# Patient Record
Sex: FEMALE | Race: BLACK OR AFRICAN AMERICAN | NOT HISPANIC OR LATINO | Employment: UNEMPLOYED | URBAN - METROPOLITAN AREA
[De-identification: names, ages, dates, MRNs, and addresses within clinical notes are randomized per-mention and may not be internally consistent; named-entity substitution may affect disease eponyms.]

---

## 2017-03-16 ENCOUNTER — TRANSFERRED RECORDS (OUTPATIENT)
Dept: HEALTH INFORMATION MANAGEMENT | Facility: CLINIC | Age: 57
End: 2017-03-16

## 2017-04-03 ENCOUNTER — OFFICE VISIT (OUTPATIENT)
Dept: FAMILY MEDICINE | Facility: CLINIC | Age: 57
End: 2017-04-03
Payer: COMMERCIAL

## 2017-04-03 ENCOUNTER — HOSPITAL ENCOUNTER (OUTPATIENT)
Dept: MAMMOGRAPHY | Facility: CLINIC | Age: 57
Discharge: HOME | End: 2017-04-03
Attending: INTERNAL MEDICINE | Admitting: INTERNAL MEDICINE
Payer: COMMERCIAL

## 2017-04-03 VITALS
WEIGHT: 251 LBS | DIASTOLIC BLOOD PRESSURE: 86 MMHG | TEMPERATURE: 98.2 F | HEART RATE: 85 BPM | HEIGHT: 67 IN | BODY MASS INDEX: 39.39 KG/M2 | SYSTOLIC BLOOD PRESSURE: 132 MMHG | OXYGEN SATURATION: 95 %

## 2017-04-03 DIAGNOSIS — G62.9 NEUROPATHY: ICD-10-CM

## 2017-04-03 DIAGNOSIS — N92.6 IRREGULAR MENSTRUAL CYCLE: ICD-10-CM

## 2017-04-03 DIAGNOSIS — M54.9 BILATERAL BACK PAIN, UNSPECIFIED BACK LOCATION, UNSPECIFIED CHRONICITY: ICD-10-CM

## 2017-04-03 DIAGNOSIS — Z12.4 SCREENING FOR MALIGNANT NEOPLASM OF CERVIX: ICD-10-CM

## 2017-04-03 DIAGNOSIS — Z12.31 VISIT FOR SCREENING MAMMOGRAM: ICD-10-CM

## 2017-04-03 DIAGNOSIS — R23.4 SKIN THICKENING: ICD-10-CM

## 2017-04-03 DIAGNOSIS — E03.8 SUBCLINICAL HYPOTHYROIDISM: ICD-10-CM

## 2017-04-03 DIAGNOSIS — I10 ESSENTIAL HYPERTENSION: ICD-10-CM

## 2017-04-03 DIAGNOSIS — J30.2 SEASONAL ALLERGIC RHINITIS, UNSPECIFIED ALLERGIC RHINITIS TRIGGER: ICD-10-CM

## 2017-04-03 DIAGNOSIS — K59.01 SLOW TRANSIT CONSTIPATION: ICD-10-CM

## 2017-04-03 DIAGNOSIS — F10.10 ALCOHOL ABUSE: Chronic | ICD-10-CM

## 2017-04-03 DIAGNOSIS — Z00.00 ROUTINE GENERAL MEDICAL EXAMINATION AT A HEALTH CARE FACILITY: Primary | ICD-10-CM

## 2017-04-03 LAB
ALBUMIN SERPL-MCNC: 2.9 G/DL (ref 3.4–5)
ALP SERPL-CCNC: 163 U/L (ref 40–150)
ALT SERPL W P-5'-P-CCNC: 34 U/L (ref 0–50)
ANION GAP SERPL CALCULATED.3IONS-SCNC: 7 MMOL/L (ref 3–14)
AST SERPL W P-5'-P-CCNC: 54 U/L (ref 0–45)
BILIRUB SERPL-MCNC: 0.9 MG/DL (ref 0.2–1.3)
BUN SERPL-MCNC: 7 MG/DL (ref 7–30)
CALCIUM SERPL-MCNC: 9 MG/DL (ref 8.5–10.1)
CHLORIDE SERPL-SCNC: 103 MMOL/L (ref 94–109)
CHOLEST SERPL-MCNC: 190 MG/DL
CO2 SERPL-SCNC: 28 MMOL/L (ref 20–32)
CREAT SERPL-MCNC: 0.75 MG/DL (ref 0.52–1.04)
ERYTHROCYTE [DISTWIDTH] IN BLOOD BY AUTOMATED COUNT: 13.5 % (ref 10–15)
FSH SERPL-ACNC: 8.6 IU/L
GFR SERPL CREATININE-BSD FRML MDRD: 79 ML/MIN/1.7M2
GLUCOSE SERPL-MCNC: 106 MG/DL (ref 70–99)
HCT VFR BLD AUTO: 39 % (ref 35–47)
HDLC SERPL-MCNC: 92 MG/DL
HGB BLD-MCNC: 13.5 G/DL (ref 11.7–15.7)
LDLC SERPL CALC-MCNC: 84 MG/DL
LH SERPL-ACNC: 7.6 IU/L
MCH RBC QN AUTO: 34.8 PG (ref 26.5–33)
MCHC RBC AUTO-ENTMCNC: 34.6 G/DL (ref 31.5–36.5)
MCV RBC AUTO: 101 FL (ref 78–100)
NONHDLC SERPL-MCNC: 98 MG/DL
PLATELET # BLD AUTO: 151 10E9/L (ref 150–450)
POTASSIUM SERPL-SCNC: 4.1 MMOL/L (ref 3.4–5.3)
PROLACTIN SERPL-MCNC: 6 UG/L (ref 3–27)
PROT SERPL-MCNC: 7.4 G/DL (ref 6.8–8.8)
RBC # BLD AUTO: 3.88 10E12/L (ref 3.8–5.2)
SODIUM SERPL-SCNC: 138 MMOL/L (ref 133–144)
T4 FREE SERPL-MCNC: 1 NG/DL (ref 0.76–1.46)
TRIGL SERPL-MCNC: 70 MG/DL
TSH SERPL DL<=0.005 MIU/L-ACNC: 6.62 MU/L (ref 0.4–4)
WBC # BLD AUTO: 4.8 10E9/L (ref 4–11)

## 2017-04-03 PROCEDURE — 80061 LIPID PANEL: CPT | Performed by: INTERNAL MEDICINE

## 2017-04-03 PROCEDURE — 83002 ASSAY OF GONADOTROPIN (LH): CPT | Performed by: INTERNAL MEDICINE

## 2017-04-03 PROCEDURE — 85027 COMPLETE CBC AUTOMATED: CPT | Performed by: INTERNAL MEDICINE

## 2017-04-03 PROCEDURE — 99396 PREV VISIT EST AGE 40-64: CPT | Performed by: INTERNAL MEDICINE

## 2017-04-03 PROCEDURE — G0145 SCR C/V CYTO,THINLAYER,RESCR: HCPCS | Performed by: INTERNAL MEDICINE

## 2017-04-03 PROCEDURE — 83001 ASSAY OF GONADOTROPIN (FSH): CPT | Performed by: INTERNAL MEDICINE

## 2017-04-03 PROCEDURE — 87624 HPV HI-RISK TYP POOLED RSLT: CPT | Performed by: INTERNAL MEDICINE

## 2017-04-03 PROCEDURE — 84146 ASSAY OF PROLACTIN: CPT | Performed by: INTERNAL MEDICINE

## 2017-04-03 PROCEDURE — 84443 ASSAY THYROID STIM HORMONE: CPT | Performed by: INTERNAL MEDICINE

## 2017-04-03 PROCEDURE — 84439 ASSAY OF FREE THYROXINE: CPT | Performed by: INTERNAL MEDICINE

## 2017-04-03 PROCEDURE — 80053 COMPREHEN METABOLIC PANEL: CPT | Performed by: INTERNAL MEDICINE

## 2017-04-03 PROCEDURE — 36415 COLL VENOUS BLD VENIPUNCTURE: CPT | Performed by: INTERNAL MEDICINE

## 2017-04-03 PROCEDURE — G0202 SCR MAMMO BI INCL CAD: HCPCS

## 2017-04-03 RX ORDER — POLYETHYLENE GLYCOL 3350 17 G/17G
1 POWDER, FOR SOLUTION ORAL DAILY PRN
Qty: 510 G | Refills: 1 | Status: SHIPPED | OUTPATIENT
Start: 2017-04-03 | End: 2021-07-22

## 2017-04-03 RX ORDER — GABAPENTIN 300 MG/1
300 CAPSULE ORAL AT BEDTIME
Qty: 30 CAPSULE | Refills: 11 | Status: SHIPPED | OUTPATIENT
Start: 2017-04-03 | End: 2018-06-27

## 2017-04-03 RX ORDER — FEXOFENADINE HCL 180 MG/1
180 TABLET ORAL DAILY
Qty: 30 TABLET | Refills: 11 | Status: SHIPPED | OUTPATIENT
Start: 2017-04-03 | End: 2018-06-27

## 2017-04-03 RX ORDER — LISINOPRIL 10 MG/1
10 TABLET ORAL DAILY
Qty: 30 TABLET | Refills: 11 | Status: SHIPPED | OUTPATIENT
Start: 2017-04-03 | End: 2018-04-11

## 2017-04-03 RX ORDER — LANOLIN ALCOHOL/MO/W.PET/CERES
100 CREAM (GRAM) TOPICAL DAILY
Qty: 30 TABLET | Refills: 11 | Status: SHIPPED | OUTPATIENT
Start: 2017-04-03 | End: 2018-06-27

## 2017-04-03 NOTE — MR AVS SNAPSHOT
After Visit Summary   4/3/2017    Josiane Dasilva    MRN: [id number]           Patient Information     Date Of Birth          1960        Visit Information        Provider Department      4/3/2017 7:30 AM Carly Grace, DO [facility]        Today's Diagnoses     Routine general medical examination at a health care facility    -  1    Neuropathy (H)        Essential hypertension        Alcohol abuse        Irregular menstrual cycle        Seasonal allergic rhinitis, unspecified allergic rhinitis trigger        Visit for screening mammogram        Screening for malignant neoplasm of cervix        Skin thickening        Slow transit constipation          Care Instructions    Lab today.   Mammogram today upstairs in [address].    Begin taking Gabapentin daily 1-2 hours before bed for neuropathy and sleep aide.    Schedule an appointment with a gynecologist. Schedule in person at [address].    Schedule an appointment with a dermatologist - upstairs [facility] with Dr. Garcia.   Follow up with me in about 6 months or sooner as needed.     Preventive Health Recommendations  Female Ages 50 - 64    Yearly exam: See your health care provider every year in order to  o Review health changes.   o Discuss preventive care.    o Review your medicines if your doctor has prescribed any.      Get a Pap test every three years (unless you have an abnormal result and your provider advises testing more often).    If you get Pap tests with HPV test, you only need to test every 5 years, unless you have an abnormal result.     You do not need a Pap test if your uterus was removed (hysterectomy) and you have not had cancer.    You should be tested each year for STDs (sexually transmitted diseases) if you're at risk.     Have a mammogram every 1 to 2 years.    Have a colonoscopy at age 50, or have a yearly FIT test (stool test). These exams screen for colon cancer.      Have a cholesterol test every  5 years, or more often if advised.    Have a diabetes test (fasting glucose) every three years. If you are at risk for diabetes, you should have this test more often.     If you are at risk for osteoporosis (brittle bone disease), think about having a bone density scan (DEXA).    Shots: Get a flu shot each year. Get a tetanus shot every 10 years.    Nutrition:     Eat at least 5 servings of fruits and vegetables each day.    Eat whole-grain bread, whole-wheat pasta and brown rice instead of white grains and rice.    Talk to your provider about Calcium and Vitamin D.     Lifestyle    Exercise at least 150 minutes a week (30 minutes a day, 5 days a week). This will help you control your weight and prevent disease.    Limit alcohol to one drink per day.    No smoking.     Wear sunscreen to prevent skin cancer.     See your dentist every six months for an exam and cleaning.    See your eye doctor every 1 to 2 years.          Follow-ups after your visit        Additional Services     DERMATOLOGY REFERRAL       Your provider has referred you to: N:  Dermatology  [facility] [phone]   [email or web address]    Please be aware that coverage of these services is subject to the terms and limitations of your health insurance plan.  Call member services at your health plan with any benefit or coverage questions.      Please bring the following with you to your appointment:    (1) Any X-Rays, CTs or MRIs which have been performed.  Contact the facility where they were done to arrange for  prior to your scheduled appointment.    (2) List of current medications  (3) This referral request   (4) Any documents/labs given to you for this referral            OB/GYN REFERRAL       Your provider has referred you to:  G: [facility]  [facility] [phone]  [email or web address]    Please be aware that coverage of these services is subject to the terms and  "limitations of your health insurance plan.  Call member services at your health plan with any benefit or coverage questions.      Please bring the following with you to your appointment:    (1) Any X-Rays, CTs or MRIs which have been performed.  Contact the facility where they were done to arrange for  prior to your scheduled appointment.   (2) List of current medications   (3) This referral request   (4) Any documents/labs given to you for this referral                  Future tests that were ordered for you today     Open Future Orders        Priority Expected Expires Ordered    MA SCREENING DIGITAL BILAT - Future  (s+30) Routine  4/3/2018 4/3/2017            Who to contact     If you have questions or need follow up information about today's clinic visit or your schedule please contact [facility] directly at [phone].  Normal or non-critical lab and imaging results will be communicated to you by MyChart, letter or phone within 4 business days after the clinic has received the results. If you do not hear from us within 7 days, please contact the clinic through [facility] or phone. If you have a critical or abnormal lab result, we will notify you by phone as soon as possible.  Submit refill requests through [employer] or call your pharmacy and they will forward the refill request to us. Please allow 3 business days for your refill to be completed.          Additional Information About Your Visit        [employer] Information     [employer] lets you send messages to your doctor, view your test results, renew your prescriptions, schedule appointments and more. To sign up, go to [email or web address] . Click on \"Log in\" on the left side of the screen, which will take you to the Welcome page. Then click on \"Sign up Now\" on the right side of the page.     You will be asked to enter the access code listed below, as well as some personal information. Please follow the directions to create your username and " "password.     Your access code is: [email or web address]  Expires: 2017 12:50 PM     Your access code will  in 90 days. If you need help or a new code, please call your [place] clinic or [phone].        Care EveryWhere ID     This is your Care EveryWhere ID. This could be used by other organizations to access your [place] medical records  [id number]        Your Vitals Were     Pulse Temperature Height Last Period Pulse Oximetry Breastfeeding?    85 98.2  F (36.8  C) (Tympanic) 5' 7\" (1.702 m) 2014 95% No    BMI (Body Mass Index)                   39.31 kg/m2            Blood Pressure from Last 3 Encounters:   17 132/86   16 139/81   01/08/15 138/90    Weight from Last 3 Encounters:   17 251 lb (113.9 kg)   16 252 lb (114.3 kg)   01/08/15 223 lb (101.2 kg)              We Performed the Following     CBC with platelets     Comprehensive metabolic panel     DEPRESSION ACTION PLAN (DAP) Order [[id number]]     DERMATOLOGY REFERRAL     Follicle stimulating hormone     Lipid panel reflex to direct LDL     Lutropin     OB/GYN REFERRAL     Pap imaged thin layer screen with HPV - recommended age 30 - 65 years (select HPV order below)     Prolactin     TSH with free T4 reflex          Today's Medication Changes          These changes are accurate as of: 4/3/17  8:17 AM.  If you have any questions, ask your nurse or doctor.               Start taking these medicines.        Dose/Directions    fexofenadine 180 MG tablet   Commonly known as:  ALLEGRA   Used for:  Seasonal allergic rhinitis, unspecified allergic rhinitis trigger   Started by:  Carly Grace DO        Dose:  180 mg   Take 1 tablet (180 mg) by mouth daily   Quantity:  30 tablet   Refills:  11       polyethylene glycol powder   Commonly known as:  MIRALAX   Used for:  Slow transit constipation   Started by:  Carly Grace DO        Dose:  1 capful   Take 17 g (1 capful) by mouth daily as needed for constipation "   Quantity:  510 g   Refills:  1         These medicines have changed or have updated prescriptions.        Dose/Directions    lisinopril 10 MG tablet   Commonly known as:  PRINIVIL/ZESTRIL   This may have changed:  See the new instructions.   Used for:  Essential hypertension   Changed by:  Carly Grace DO        Dose:  10 mg   Take 1 tablet (10 mg) by mouth daily   Quantity:  30 tablet   Refills:  11         Stop taking these medicines if you haven't already. Please contact your care team if you have questions.     erythromycin with ethanol 2 % gel   Commonly known as:  EMGEL   Stopped by:  Carly Grace DO           eucerin cream   Stopped by:  Carly Grace DO           folic acid 1 MG tablet   Commonly known as:  FOLVITE   Stopped by:  Carly Grace DO           hydrOXYzine 25 MG tablet   Commonly known as:  ATARAX   Stopped by:  Carly Grace DO           IRON SUPPLEMENT PO   Stopped by:  Carly Grace DO           loratadine 10 MG tablet   Commonly known as:  CLARITIN   Stopped by:  Carly Grace DO           multivitamin, therapeutic with minerals Tabs tablet   Stopped by:  Carly Grace DO           NYQUIL PO   Stopped by:  Carly Grace DO           omeprazole 20 MG tablet   Stopped by:  Carly Grace DO           oxyCODONE 5 MG IR tablet   Commonly known as:  ROXICODONE   Stopped by:  Carly Grace DO           sennosides 8.6 MG tablet   Commonly known as:  SENOKOT   Stopped by:  Carly Grace DO           SINGULAIR PO   Stopped by:  Carly Grace DO           TRAZODONE HCL PO   Stopped by:  Carly Grace DO                Where to get your medicines      These medications were sent to [facility]/pharmacy #[id number] - [place], [place] - [address] [facility]  [address] [place] [address]     Phone:  [phone]     fexofenadine 180 MG tablet    gabapentin 300 MG capsule    lisinopril 10 MG tablet    polyethylene glycol powder     thiamine 100 MG tablet                Primary Care Provider Office Phone # Fax #    Carly Grace [phone]       [facility] [address] [facility] [address]  [facility] [address]        Thank you!     Thank you for choosing [facility]  for your care. Our goal is always to provide you with excellent care. Hearing back from our patients is one way we can continue to improve our services. Please take a few minutes to complete the written survey that you may receive in the mail after your visit with us. Thank you!             Your Updated Medication List - Protect others around you: Learn how to safely use, store and throw away your medicines at www.disposemymeds.org.          This list is accurate as of: 4/3/17  8:17 AM.  Always use your most recent med list.                   Brand Name Dispense Instructions for use    albuterol 108 (90 BASE) MCG/ACT Inhaler    PROAIR HFA/PROVENTIL HFA/VENTOLIN HFA    1 Inhaler    Inhale 2 puffs into the lungs every 4 hours as needed for shortness of breath / dyspnea or wheezing       fexofenadine 180 MG tablet    ALLEGRA    30 tablet    Take 1 tablet (180 mg) by mouth daily       gabapentin 300 MG capsule    NEURONTIN    30 capsule    Take 1 capsule (300 mg) by mouth At Bedtime       lisinopril 10 MG tablet    PRINIVIL/ZESTRIL    30 tablet    Take 1 tablet (10 mg) by mouth daily       polyethylene glycol powder    MIRALAX    510 g    Take 17 g (1 capful) by mouth daily as needed for constipation       thiamine 100 MG tablet     30 tablet    Take 1 tablet (100 mg) by mouth daily

## 2017-04-03 NOTE — PATIENT INSTRUCTIONS
Lab today.   Mammogram today upstairs in [address].    Begin taking Gabapentin daily 1-2 hours before bed for neuropathy and sleep aide.    Schedule an appointment with a gynecologist. Schedule in person at [address].    Schedule an appointment with a dermatologist - [facility] with Dr. Garcia.   Follow up with me in about 6 months or sooner as needed.     Preventive Health Recommendations  Female Ages 50 - 64    Yearly exam: See your health care provider every year in order to  o Review health changes.   o Discuss preventive care.    o Review your medicines if your doctor has prescribed any.      Get a Pap test every three years (unless you have an abnormal result and your provider advises testing more often).    If you get Pap tests with HPV test, you only need to test every 5 years, unless you have an abnormal result.     You do not need a Pap test if your uterus was removed (hysterectomy) and you have not had cancer.    You should be tested each year for STDs (sexually transmitted diseases) if you're at risk.     Have a mammogram every 1 to 2 years.    Have a colonoscopy at age 50, or have a yearly FIT test (stool test). These exams screen for colon cancer.      Have a cholesterol test every 5 years, or more often if advised.    Have a diabetes test (fasting glucose) every three years. If you are at risk for diabetes, you should have this test more often.     If you are at risk for osteoporosis (brittle bone disease), think about having a bone density scan (DEXA).    Shots: Get a flu shot each year. Get a tetanus shot every 10 years.    Nutrition:     Eat at least 5 servings of fruits and vegetables each day.    Eat whole-grain bread, whole-wheat pasta and brown rice instead of white grains and rice.    Talk to your provider about Calcium and Vitamin D.     Lifestyle    Exercise at least 150 minutes a week (30 minutes a day, 5 days a week). This will help you control your weight and prevent  disease.    Limit alcohol to one drink per day.    No smoking.     Wear sunscreen to prevent skin cancer.     See your dentist every six months for an exam and cleaning.    See your eye doctor every 1 to 2 years.

## 2017-04-03 NOTE — LETTER
[facility]  [address] [facility] [address]  [place] [address]  [phone]                                                                                               Date: 4/7/2017    Josiane Dasilva                                                                               [address]    [facility] [address]              Dear Josiane Joshua,  It was nice to see you in clinic recently.   The labs indicate that you likely have not completely gone all the way through menopause yet and that is contributing to your irregular bleeding.   I still think it is reasonable that you visit though with the OB/GYN whom I referred you to for a second opinion.  Your cholesterol is excellent.  Your fasting glucose is mildly elevated but not in a diabetic range.  Please try to work on reducing the portions of carbohydrates in your diet (such as: breads, rice, pasta, sugars) which will help to lower the blood sugar.  Please also continue to work on cutting down on beer intake. If you want to discuss an anti-anxiety medication, please schedule an appointment with me in clinic.  One thyroid test (TSH) is slightly abnormal so I'd like to recheck that in the clinic in about 3-6 months to follow the trend. If it continues to increase, I'd suggest a prescription thyroid medication.   Your pap smear is still in process so you will get a separate notification about that when it is completed.  Please continue with the plan of care as we discussed and let me know if you have any questions/concerns. Thanks  Enclosed is a copy of your results.      It was a pleasure to see you at your last appointment. If you have any questions, please feel free to call myself or my nurse at [phone].          Sincerely,    Carly Grace DO/ Phyllis RESENDIZ CMA  Results for orders placed or performed in visit on 04/03/17   Pap imaged thin layer screen with HPV - recommended age 30 - 65 years (select HPV order below)   Result Value Ref  Range    PAP NIL     Copath Report         Patient Name: ETIENNE BUENROSTRO  MR#: [id number]  Specimen #: [id number]  Collected: 4/3/2017  Received: 4/3/2017  Reported: 4/5/2017 08:39  Ordering Phy(s): MONICA GEORGE    For improved result formatting, select 'View Enhanced Report Format'  under Linked Documents section.    SPECIMEN/STAIN PROCESS:  Pap imaged thin layer prep screening (Surepath, FocalPoint with guided  screening)       Pap-Cyto x 1, HPV ordered x 1    SOURCE: Vaginal  ----------------------------------------------------------------   Pap imaged thin layer prep screening (Surepath, FocalPoint with guided  screening)  SPECIMEN ADEQUACY:  Satisfactory for evaluation.  -Transformation zone component absent.    CYTOLOGIC INTERPRETATION:    Negative for Intraepithelial Lesion or Malignancy    Electronically signed out by:  MIREYA Montana (ASCP)    Processed and screened at [facility],  [facility]    CLINICAL HISTORY:  LMP: 4/2/2017  Irregular Bleeding,    Papanicolaou Te [address] Limitations:  Cervical cytology is a screening test  with limited sensitivity; regular screening is critical for cancer  prevention; Pap tests are primarily effective for the  diagnosis/prevention of squamous cell carcinoma, not adenocarcinomas or  other cancers.    TESTING LAB LOCATION:  [address]  [address] [phone]    COLLECTION SITE:  Client[facility]  [facility]  [facility]     Lipid panel reflex to direct LDL   Result Value Ref Range    Cholesterol 190 <200 mg/dL    Triglycerides 70 <150 mg/dL    HDL Cholesterol 92 >49 mg/dL    LDL Cholesterol Calculated 84 <100 mg/dL    Non HDL Cholesterol 98 <130 mg/dL   Comprehensive metabolic panel   Result Value Ref Range    Sodium 138 133 - 144 mmol/L    Potassium 4.1 3.4 - 5.3 mmol/L    Chloride 103 94 - 109 mmol/L    Carbon Dioxide 28 20 - 32 mmol/L    Anion Gap 7 3  - 14 mmol/L    Glucose 106 (H) 70 - 99 mg/dL    Urea Nitrogen 7 7 - 30 mg/dL    Creatinine 0.75 0.52 - 1.04 mg/dL    GFR Estimate 79 >60 mL/min/1.7m2    GFR Estimate If Black >90   GFR Calc   >60 mL/min/1.7m2    Calcium 9.0 8.5 - 10.1 mg/dL    Bilirubin Total 0.9 0.2 - 1.3 mg/dL    Albumin 2.9 (L) 3.4 - 5.0 g/dL    Protein Total 7.4 6.8 - 8.8 g/dL    Alkaline Phosphatase 163 (H) 40 - 150 U/L    ALT 34 0 - 50 U/L    AST 54 (H) 0 - 45 U/L   CBC with platelets   Result Value Ref Range    WBC 4.8 4.0 - 11.0 10e9/L    RBC Count 3.88 3.8 - 5.2 10e12/L    Hemoglobin 13.5 11.7 - 15.7 g/dL    Hematocrit 39.0 35.0 - 47.0 %     (H) 78 - 100 fl    MCH 34.8 (H) 26.5 - 33.0 pg    MCHC 34.6 31.5 - 36.5 g/dL    RDW 13.5 10.0 - 15.0 %    Platelet Count 151 150 - 450 10e9/L   Prolactin   Result Value Ref Range    Prolactin 6 3 - 27 ug/L   Lutropin   Result Value Ref Range    Lutropin 7.6 IU/L   Follicle stimulating hormone   Result Value Ref Range    FSH 8.6 IU/L   TSH with free T4 reflex   Result Value Ref Range    TSH 6.62 (H) 0.40 - 4.00 mU/L   T4 free   Result Value Ref Range    T4 Free 1.00 0.76 - 1.46 ng/dL
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
## 2017-04-03 NOTE — NURSING NOTE
"Chief Complaint   Patient presents with     Physical       Initial /88  Pulse 85  Temp 98.2  F (36.8  C) (Tympanic)  Ht 5' 7\" (1.702 m)  Wt 251 lb (113.9 kg)  LMP 04/07/2014  SpO2 95%  Breastfeeding? No  BMI 39.31 kg/m2 Estimated body mass index is 39.31 kg/(m^2) as calculated from the following:    Height as of this encounter: 5' 7\" (1.702 m).    Weight as of this encounter: 251 lb (113.9 kg).  BP completed using cuff size: large    Health Maintenance Due   Topic Date Due     RICO QUESTIONNAIRE 1 YEAR  1960     URINE DRUG SCREEN Q1 YR  12/28/1974     ADVANCE DIRECTIVE PLANNING Q5 YRS (NO INBASKET)  12/28/1977     MAMMO SCREEN Q2 YR (SYSTEM ASSIGNED)  06/30/2013     PHQ-9 Q6 MONTHS (NO INBASKET)  07/08/2015     DEPRESSION ACTION PLAN Q1 YR (NO INBASKET)  01/08/2016     PAP SCREENING Q3 YR (SYSTEM ASSIGNED)  07/30/2016         Carmen Frausto MA 4/03/17        "

## 2017-04-03 NOTE — LETTER
April 13, 2017    Josiane Dasilva  [address]    [facility] [address]    Dear Josiane,  We are happy to inform you that your PAP smear result from 04/03/17 is normal.  We are now able to do a follow up test on PAP smears. The DNA test is for HPV (Human Papilloma Virus). Cervical cancer is closely linked with certain types of HPV. Your result showed no evidence of high risk HPV.  Therefore we recommend you return in 5 years for your next pap smear and HPV test.  You will still need to return to the clinic every year for an annual exam and other preventive tests.  Please contact the clinic at [phone] with any questions.  Sincerely,    Carly Grace DO/heath

## 2017-04-03 NOTE — LETTER
My Depression Action Plan  Name: Josiane Dasilva   Date of Birth 1960  Date: 4/3/2017    My doctor: Carly Grace   My clinic: [address] [address] [phone]          GREEN    ZONE   Good Control    What it looks like:     Things are going generally well. You have normal up s and down s. You may even feel depressed from time to time, but bad moods usually last less than a day.   What you need to do:  1. Continue to care for yourself (see self care plan)  2. Check your depression survival kit and update it as needed  3. Follow your physician s recommendations including any medication.  4. Do not stop taking medication unless you consult with your physician first.           YELLOW         ZONE Getting Worse    What it looks like:     Depression is starting to interfere with your life.     It may be hard to get out of bed; you may be starting to isolate yourself from others.    Symptoms of depression are starting to last most all day and this has happened for several days.     You may have suicidal thoughts but they are not constant.   What you need to do:     1. Call your care team, your response to treatment will improve if you keep your care team informed of your progress. Yellow periods are signs an adjustment may need to be made.     2. Continue your self-care, even if you have to fake it!    3. Talk to someone in your support network    4. Open up your depression survival kit           RED    ZONE Medical Alert - Get Help    What it looks like:     Depression is seriously interfering with your life.     You may experience these or other symptoms: You can t get out of bed most days, can t work or engage in other necessary activities, you have trouble taking care of basic hygiene, or basic responsibilities, thoughts of suicide or death that will not go away, self-injurious behavior.     What you need to do:  1. Call your care team and  request a same-day appointment. If they are not available (weekends or after hours) call your local crisis line, emergency room or 911.      Electronically signed by: Carly Grace, April 3, 2017    Depression Self Care Plan / Survival Kit    Self-Care for Depression  Here s the deal. Your body and mind are really not as separate as most people think.  What you do and think affects how you feel and how you feel influences what you do and think. This means if you do things that people who feel good do, it will help you feel better.  Sometimes this is all it takes.  There is also a place for medication and therapy depending on how severe your depression is, so be sure to consult with your medical provider and/ or Behavioral Health Consultant if your symptoms are worsening or not improving.     In order to better manage my stress, I will:    Exercise  Get some form of exercise, every day. This will help reduce pain and release endorphins, the  feel good  chemicals in your brain. This is almost as good as taking antidepressants!  This is not the same as joining a gym and then never going! (they count on that by the way ) It can be as simple as just going for a walk or doing some gardening, anything that will get you moving.      Hygiene   Maintain good hygiene (Get out of bed in the morning, Make your bed, Brush your teeth, Take a shower, and Get dressed like you were going to work, even if you are unemployed).  If your clothes don't fit try to get ones that do.    Diet  I will strive to eat foods that are good for me, drink plenty of water, and avoid excessive sugar, caffeine, alcohol, and other mood-altering substances.  Some foods that are helpful in depression are: complex carbohydrates, B vitamins, flaxseed, fish or fish oil, fresh fruits and vegetables.    Psychotherapy  I agree to participate in Individual Therapy (if recommended).    Medication  If prescribed medications, I agree to take them.  Missing doses  can result in serious side effects.  I understand that drinking alcohol, or other illicit drug use, may cause potential side effects.  I will not stop my medication abruptly without first discussing it with my provider.    Staying Connected With Others  I will stay in touch with my friends, family members, and my primary care provider/team.    Use your imagination  Be creative.  We all have a creative side; it doesn t matter if it s oil painting, sand castles, or mud pies! This will also kick up the endorphins.    Witness Beauty  (AKA stop and smell the roses) Take a look outside, even in mid-winter. Notice colors, textures. Watch the squirrels and birds.     Service to others  Be of service to others.  There is always someone else in need.  By helping others we can  get out of ourselves  and remember the really important things.  This also provides opportunities for practicing all the other parts of the program.    Humor  Laugh and be silly!  Adjust your TV habits for less news and crime-drama and more comedy.    Control your stress  Try breathing deep, massage therapy, biofeedback, and meditation. Find time to relax each day.     My support system    Clinic Contact:  Phone number:    Contact 1:  Phone number:    Contact 2:  Phone number:    Oriental orthodox/:  Phone number:    Therapist:  Phone number:    Local crisis center:    Phone number:    Other community support:  Phone number:

## 2017-04-05 ENCOUNTER — HOSPITAL ENCOUNTER (EMERGENCY)
Facility: CLINIC | Age: 57
Discharge: HOME | End: 2017-04-05
Attending: EMERGENCY MEDICINE | Admitting: EMERGENCY MEDICINE
Payer: COMMERCIAL

## 2017-04-05 ENCOUNTER — APPOINTMENT (OUTPATIENT)
Dept: ULTRASOUND IMAGING | Facility: CLINIC | Age: 57
End: 2017-04-05
Attending: EMERGENCY MEDICINE
Payer: COMMERCIAL

## 2017-04-05 ENCOUNTER — APPOINTMENT (OUTPATIENT)
Dept: GENERAL RADIOLOGY | Facility: CLINIC | Age: 57
End: 2017-04-05
Attending: EMERGENCY MEDICINE
Payer: COMMERCIAL

## 2017-04-05 VITALS
WEIGHT: 255 LBS | TEMPERATURE: 98.1 F | SYSTOLIC BLOOD PRESSURE: 148 MMHG | OXYGEN SATURATION: 98 % | DIASTOLIC BLOOD PRESSURE: 78 MMHG | HEART RATE: 81 BPM | BODY MASS INDEX: 39.94 KG/M2 | RESPIRATION RATE: 18 BRPM

## 2017-04-05 DIAGNOSIS — M79.602 PAIN OF LEFT UPPER EXTREMITY: ICD-10-CM

## 2017-04-05 LAB
COPATH REPORT: NORMAL
PAP: NORMAL

## 2017-04-05 PROCEDURE — 25000132 ZZH RX MED GY IP 250 OP 250 PS 637: Performed by: EMERGENCY MEDICINE

## 2017-04-05 PROCEDURE — 93971 EXTREMITY STUDY: CPT | Mod: LT

## 2017-04-05 PROCEDURE — 73080 X-RAY EXAM OF ELBOW: CPT | Mod: LT

## 2017-04-05 PROCEDURE — 99284 EMERGENCY DEPT VISIT MOD MDM: CPT | Mod: 25

## 2017-04-05 PROCEDURE — 99284 EMERGENCY DEPT VISIT MOD MDM: CPT

## 2017-04-05 RX ORDER — CYCLOBENZAPRINE HCL 10 MG
10 TABLET ORAL 3 TIMES DAILY PRN
Qty: 20 TABLET | Refills: 0 | Status: SHIPPED | OUTPATIENT
Start: 2017-04-05 | End: 2017-04-11

## 2017-04-05 RX ORDER — OXYCODONE HYDROCHLORIDE 5 MG/1
5 TABLET ORAL ONCE
Status: COMPLETED | OUTPATIENT
Start: 2017-04-05 | End: 2017-04-05

## 2017-04-05 RX ADMIN — OXYCODONE HYDROCHLORIDE 5 MG: 5 TABLET ORAL at 22:13

## 2017-04-05 ASSESSMENT — ENCOUNTER SYMPTOMS: NECK PAIN: 0

## 2017-04-05 NOTE — ED AVS SNAPSHOT
Emergency Department    [address] [place] [id number]    Phone:  [phone]    Fax:  [phone]                                       Josiane Dasilva   MRN: [id number]    Department:   Emergency Department   Date of Visit:  4/5/2017           After Visit Summary Signature Page     I have received my discharge instructions, and my questions have been answered. I have discussed any challenges I see with this plan with the nurse or doctor.    ..........................................................................................................................................  Patient/Patient Representative Signature      ..........................................................................................................................................  Patient Representative Print Name and Relationship to Patient    ..................................................               ................................................  Date                                            Time    ..........................................................................................................................................  Reviewed by Signature/Title    ...................................................              ..............................................  Date                                                            Time

## 2017-04-05 NOTE — ED AVS SNAPSHOT
Emergency Department    [address] [facility] [address]    Phone:  [phone]    Fax:  [phone]                                       Josiane Dasilva   MRN: [id number]    Department:   Emergency Department   Date of Visit:  4/5/2017           Patient Information     Date Of Birth          1960        Your diagnoses for this visit were:     Pain of left upper extremity        You were seen by Ean Pizarro MD.      Follow-up Information     Follow up with Carly Grace DO In 1 week.    Specialty:  Internal Medicine    Contact information:    [facility]  [address]  [facility] [id number] [phone]          Follow up with  Emergency Department.    Specialty:  EMERGENCY MEDICINE    Why:  As needed, If symptoms worsen    Contact information:    [address] [id number] [phone]        Discharge Instructions         Myalgias  Myalgias are another word for muscle aches and soreness. This is a symptom, not a disease. Myalgias can have many causes. A cold, the flu, or an acute infection can cause them. So can any illness with a high fever. They may happen after exertion (such as heavy exercise) or trauma (such as an accident or fall). Some medicines (such as statins and certain antidepressants) can cause myalgias. They can also be a symptom of chronic or ongoing medical problems (such as lupus, chronic fatigue, or hypothyroidism). With these illnesses, other serious symptoms often occur in addition to muscle pain and soreness.    Myalgias most often go away on their own. If they don't go away, come back, or are severe, testing may be needed to help find the cause.  Home care    Rest until you feel better.    Follow instructions that you were given for how to care for yourself. This may depend on the cause of your myalgias.     If myalgia is thought to be due to a medicine, be sure to talk to the doctor that prescribed  the medicine about the best course of action.    To control pain, take prescription or over-the-counter medicines as directed. Unless told not to, you can try acetaminophen or ibuprophen.  Follow-up care  Follow up with your healthcare provider or as advised by our staff. If your symptoms do not go away in a few days or if they come back, follow up with your healthcare provider for an exam and testing.  When to see medical advice  Call your healthcare provider for any of the following:    Fever of 10.4 F (38 C) or higher, or as directed by your healthcare provider    Pain that gets worse and not better, or that goes away and comes back    New joint pains    New rash    Severe headache, neck pain, drowsiness, or confusion    [id number] [employer]. [address] [place] [address]. All rights reserved. This information is not intended as a substitute for professional medical care. Always follow your healthcare professional's instructions.    Opioid Medication Information    You have been given a prescription for an opioid (narcotic) pain medicine and/or have received a pain medicine while here in the Emergency Department. These medicines can make you drowsy or impaired. You must not drive, operate dangerous equipment, or engage in any other dangerous activities while taking these medications. If you drive while taking these medications, you could be arrested for DUI, or driving under the influence. Do not drink any alcohol while you are taking these medications.   Opioid pain medications can cause addiction. If you have a history of chemical dependency of any type, you are at a higher risk of becoming addicted to pain medications.  Only take these prescribed medications to treat your pain when all other options have been tried. Take it for as short a time and as few doses as possible. Store your pain pills in a secure place, as they are frequently stolen and provide a dangerous opportunity for  children or visitors in your house to start abusing these powerful medications. We will not replace any lost or stolen medicine.  As soon as your pain is better, you should flush all your remaining medication.   Many prescription pain medications contain Tylenol  (acetaminophen), including Vicodin , Tylenol #3 , Norco , Lortab , and Percocet .  You should not take any extra pills of Tylenol  if you are using these prescription medications or you can get very sick.  Do not ever take more than 4000 mg of acetaminophen in any 24 hour period.  All opioids tend to cause constipation. Drink plenty of water and eat foods that have a lot of fiber, such as fruits, vegetables, prune juice, apple juice and high fiber cereal.  Take a laxative if you don t move your bowels at least every other day. Miralax , Milk of Magnesia, Colace , or Senna  can be used to keep you regular.            24 Hour Appointment Hotline       To make an appointment at any [facility], call [phone] ([phone]). If you don't have a family doctor or clinic, we will help you find one. [employer] clinics are conveniently located to serve the needs of you and your family.             Review of your medicines      START taking        Dose / Directions Last dose taken    cyclobenzaprine 10 MG tablet   Commonly known as:  FLEXERIL   Dose:  10 mg   Quantity:  20 tablet        Take 1 tablet (10 mg) by mouth 3 times daily as needed for muscle spasms   Refills:  0          Our records show that you are taking the medicines listed below. If these are incorrect, please call your family doctor or clinic.        Dose / Directions Last dose taken    albuterol 108 (90 BASE) MCG/ACT Inhaler   Commonly known as:  PROAIR HFA/PROVENTIL HFA/VENTOLIN HFA   Dose:  2 puff   Quantity:  1 Inhaler        Inhale 2 puffs into the lungs every 4 hours as needed for shortness of breath / dyspnea or wheezing   Refills:  0        fexofenadine 180 MG tablet   Commonly known  as:  ALLEGRA   Dose:  180 mg   Quantity:  30 tablet        Take 1 tablet (180 mg) by mouth daily   Refills:  11        gabapentin 300 MG capsule   Commonly known as:  NEURONTIN   Dose:  300 mg   Quantity:  30 capsule        Take 1 capsule (300 mg) by mouth At Bedtime   Refills:  11        lisinopril 10 MG tablet   Commonly known as:  PRINIVIL/ZESTRIL   Dose:  10 mg   Quantity:  30 tablet        Take 1 tablet (10 mg) by mouth daily   Refills:  11        polyethylene glycol powder   Commonly known as:  MIRALAX   Dose:  1 capful   Quantity:  510 g        Take 17 g (1 capful) by mouth daily as needed for constipation   Refills:  1        thiamine 100 MG tablet   Dose:  100 mg   Quantity:  30 tablet        Take 1 tablet (100 mg) by mouth daily   Refills:  11                Prescriptions were sent or printed at these locations (1 Prescription)                   Other Prescriptions                Printed at Department/Unit printer (1 of 1)         cyclobenzaprine (FLEXERIL) 10 MG tablet                Procedures and tests performed during your visit     Arm, left, venous US    XR Elbow Left G/E 3 Views      Orders Needing Specimen Collection     None      Pending Results     Date and Time Order Name Status Description    4/3/2017 0859 MA SCREENING DIGITAL BILAT - Future  (s+30) In process             Pending Culture Results     No orders found from 4/3/2017 to 4/6/2017.            Test Results From Your Hospital Stay              4/5/2017 11:37 PM      Narrative     US UPPER EXTREMITY VENOUS DUPLEX LEFT   4/5/2017 11:33 PM     HISTORY: Left arm pain.    COMPARISON: None.    FINDINGS: Gray-scale, color and Doppler spectral analysis ultrasound  was performed of the left arm. Compression and augmentation imaging  was performed.    There is no evidence for deep venous thrombosis. The veins compress  and augment normally.        Impression     IMPRESSION: No DVT.     AMINAH TORO MD         4/5/2017 10:29 PM      Narrative      LEFT ELBOW THREE VIEWS  4/5/2017 10:21 PM     HISTORY: pain    COMPARISON: None.        Impression     IMPRESSION: Normal.    AKASH ZENDEJAS MD                Clinical Quality Measure: Blood Pressure Screening     Your blood pressure was checked while you were in the emergency department today. The last reading we obtained was  BP: (!) 161/105 . Please read the guidelines below about what these numbers mean and what you should do about them.  If your systolic blood pressure (the top number) is less than 120 and your diastolic blood pressure (the bottom number) is less than 80, then your blood pressure is normal. There is nothing more that you need to do about it.  If your systolic blood pressure (the top number) is 120-139 or your diastolic blood pressure (the bottom number) is 80-89, your blood pressure may be higher than it should be. You should have your blood pressure rechecked within a year by a primary care provider.  If your systolic blood pressure (the top number) is 140 or greater or your diastolic blood pressure (the bottom number) is 90 or greater, you may have high blood pressure. High blood pressure is treatable, but if left untreated over time it can put you at risk for heart attack, stroke, or kidney failure. You should have your blood pressure rechecked by a primary care provider within the next 4 weeks.  If your provider in the emergency department today gave you specific instructions to follow-up with your doctor or provider even sooner than that, you should follow that instruction and not wait for up to 4 weeks for your follow-up visit.        Thank you for choosing [facility]       Thank you for choosing [facility] for your care. Our goal is always to provide you with excellent care. Hearing back from our patients is one way we can continue to improve our services. Please take a few minutes to complete the written survey that you may receive in the mail after you visit with us. Thank you!       "  MyChart Information     [employer] lets you send messages to your doctor, view your test results, renew your prescriptions, schedule appointments and more. To sign up, go to [email or web address] . Click on \"Log in\" on the left side of the screen, which will take you to the Welcome page. Then click on \"Sign up Now\" on the right side of the page.     You will be asked to enter the access code listed below, as well as some personal information. Please follow the directions to create your username and password.     Your access code is: [email or web address]  Expires: 2017 12:50 PM     Your access code will  in 90 days. If you need help or a new code, please call your [facility] clinic or [phone].        Care EveryWhere ID     This is your Care EveryWhere ID. This could be used by other organizations to access your [facility] medical records  [id number]        After Visit Summary       This is your record. Keep this with you and show to your community pharmacist(s) and doctor(s) at your next visit.                  "

## 2017-04-06 ASSESSMENT — ENCOUNTER SYMPTOMS
JOINT SWELLING: 0
NUMBNESS: 0

## 2017-04-06 NOTE — ED NOTES
"Pt states she did exercises at physical therapy on Tuesday using her arms including lifting them over her head with a weight. Didn't feel anything Tuesday night as \"I drink beer\" but this morning when she awoke she felt like she couldn't  her left arm from the pain. States the worst of the pain is in her left elbow and into the forearm. Pain begins at shoulder though. CWMS intact to left hand. No numbness or tingling.  "

## 2017-04-06 NOTE — DISCHARGE INSTRUCTIONS
Myalgias  Myalgias are another word for muscle aches and soreness. This is a symptom, not a disease. Myalgias can have many causes. A cold, the flu, or an acute infection can cause them. So can any illness with a high fever. They may happen after exertion (such as heavy exercise) or trauma (such as an accident or fall). Some medicines (such as statins and certain antidepressants) can cause myalgias. They can also be a symptom of chronic or ongoing medical problems (such as lupus, chronic fatigue, or hypothyroidism). With these illnesses, other serious symptoms often occur in addition to muscle pain and soreness.    Myalgias most often go away on their own. If they don't go away, come back, or are severe, testing may be needed to help find the cause.  Home care    Rest until you feel better.    Follow instructions that you were given for how to care for yourself. This may depend on the cause of your myalgias.     If myalgia is thought to be due to a medicine, be sure to talk to the doctor that prescribed the medicine about the best course of action.    To control pain, take prescription or over-the-counter medicines as directed. Unless told not to, you can try acetaminophen or ibuprophen.  Follow-up care  Follow up with your healthcare provider or as advised by our staff. If your symptoms do not go away in a few days or if they come back, follow up with your healthcare provider for an exam and testing.  When to see medical advice  Call your healthcare provider for any of the following:    Fever of 10.4 F (38 C) or higher, or as directed by your healthcare provider    Pain that gets worse and not better, or that goes away and comes back    New joint pains    New rash    Severe headache, neck pain, drowsiness, or confusion    [id number] [employer]. [address] [place] [address], [place] [address]. All rights reserved. This information is not intended as a substitute for professional medical care. Always follow  your healthcare professional's instructions.    Opioid Medication Information    You have been given a prescription for an opioid (narcotic) pain medicine and/or have received a pain medicine while here in the Emergency Department. These medicines can make you drowsy or impaired. You must not drive, operate dangerous equipment, or engage in any other dangerous activities while taking these medications. If you drive while taking these medications, you could be arrested for DUI, or driving under the influence. Do not drink any alcohol while you are taking these medications.   Opioid pain medications can cause addiction. If you have a history of chemical dependency of any type, you are at a higher risk of becoming addicted to pain medications.  Only take these prescribed medications to treat your pain when all other options have been tried. Take it for as short a time and as few doses as possible. Store your pain pills in a secure place, as they are frequently stolen and provide a dangerous opportunity for children or visitors in your house to start abusing these powerful medications. We will not replace any lost or stolen medicine.  As soon as your pain is better, you should flush all your remaining medication.   Many prescription pain medications contain Tylenol  (acetaminophen), including Vicodin , Tylenol #3 , Norco , Lortab , and Percocet .  You should not take any extra pills of Tylenol  if you are using these prescription medications or you can get very sick.  Do not ever take more than 4000 mg of acetaminophen in any 24 hour period.  All opioids tend to cause constipation. Drink plenty of water and eat foods that have a lot of fiber, such as fruits, vegetables, prune juice, apple juice and high fiber cereal.  Take a laxative if you don t move your bowels at least every other day. Miralax , Milk of Magnesia, Colace , or Senna  can be used to keep you regular.

## 2017-04-06 NOTE — ED PROVIDER NOTES
"  History     Chief Complaint:  Arm Pain    HPI   Josiane Dasilva is a 56 year old female who presents with atraumatic left shoulder, arm, elbow, and forearm pain.  The patient reports on Tuesday, 4/4, she went to her PT, who she sees due to long standing history of orthopedic complaints, and was doing left arm extension and flexion exercises.  She states she woke up this morning with stiffness and pain to the left shoulder, posterior arm, elbow, and forearm which was, \"So bad it's been killing me.\" She reports limited range of motion secondary to pain.  She denies any numbness, tingling, or weakness to the left upper extremity and denies any known injury, trauma, or falls prior to the onset of her pain.  She denies neck pain, swelling to the left arm, history of gout to the left arm, personal or family history of blood clots.     Allergies:  Asa [Aspirin] - Stomach irritation (hx of PUD)  Nsaids - Stomach irritation (hx of PUD)     Medications:    Neurontin  Lisinopril  Allegra  Thiamine  Miralax  Albuterol      Past Medical History:    Alcohol Abuse  Anxiety  Depression  Fatty Liver  HTN  Obesity  Pancytopenia  PUD  Tubular adenoma of colon  Vitamin D Deficiency   Gout    Past Surgical History:    Appendectomy  Colonoscopy  Upper endoscopy  Left ankle surgery  Billroth I as teenager secondary to ulcer     Family History:  History reviewed.  No significant family history.     Social History:  The patient is a former smoker. Quit 2005  The patient reports occasional alcohol use.   The patient presents alone.     Review of Systems   Musculoskeletal: Negative for joint swelling and neck pain.        Positive for left shoulder, posterior arm, elbow pain.  Positive for limited ROM secondary to pain.  Negative for numbness, tingling, or weakness to the LUE.     Neurological: Negative for numbness.   All other systems reviewed and are negative.      Physical Exam   Patient Vitals:   BP Temp Temp src Pulse Heart Rate Resp " SpO2 Weight   04/05/17 2358 148/78 - - - 79 18 98 % -   04/05/17 2308 - - - 81 - 16 96 % -   04/05/17 2307 (!) 161/105 - - - - - - -   04/05/17 2155 (!) 171/119 - - 93 - - 97 % -   04/05/17 2051 (!) 184/92 98.1  F (36.7  C) Oral 91 - 15 96 % 115.7 kg (255 lb)       Physical Exam  General: Alert, interactive in mild distress  Head:  Scalp is atraumatic  Eyes:  The pupils are equal, round, and reactive to light    EOM's intact    No scleral icterus  ENT:      Nose:  The external nose is normal  Ears:  External ears are normal  Mouth/Throat: The oropharynx is normal    Mucus membranes are moist       Neck:  Normal range of motion.      There is no rigidity.    Trachea is in the midline         CV:  Regular rate and rhythm    No murmur   Resp:  Breath sounds are clear bilaterally    Non-labored, no retractions or accessory muscle use      GI:  Abdomen is soft, no distension, no tenderness.       MS:  Normal strength in all 4 extremities, No midline cervical, thoracic, or lumbar tenderness. Tenderness over the left lateral elbow.  Limited ROM of   shoulder and elbow secondary to pain. No edema.  No overlying warmth.  2+ radial pulse.     Skin:  Warm and dry, No rash or lesions noted.  Neuro:  Strength 5/5 x4.  Sensation intact  In all 4 extremities.        Psych:  Awake. Alert.  Normal affect.      Appropriate interactions.      Emergency Department Course     Imaging:  Radiographic findings were communicated with the patient who voiced understanding of the findings.    Left Elbow XR per radiology:   IMPRESSION: Normal.    Left arm, venous ultrasound per radiology:   IMPRESSION: No DVT.     Interventions:  2213 Roxicodone, 5 mg, PO    ED Course:  Nursing notes and past medical history reviewed.     I performed a physical examination of the patient as documented above.    I explained the plan with the patient who consents to this.     The patient underwent the workup as described above.     I personally reviewed the  imaging results with the Patient and answered all related questions prior to discharge.     Findings and plan explained to the Patient. Patient discharged home with instructions regarding supportive care, medications, and reasons to return. The importance of close follow-up was reviewed.     Impression & Plan      Medical Decision Making:  Josiane Dasilva is a 56 year old female who presents to the emergency department today with left arm and elbow pain.  The patient was seen and evaluated.  Radiographs and US were obtained, returning as unremarkable.  I believe she has likely suffered an overuse injury from her physical therapy session the other day.  There are no signs of an acute vascular compromise, significant radiculopathy, or more concerning illness.  Her muscle bellies are supple.  There are no signs of compartment syndrome.  I do not believe she needs lab workup or any further evaluation.  There are no signs of a septic arthritis, gout, or more worrisome etiology.  The patient was discharged with flexeril for muscle spasm and a sling.  I recommended she follow up with her primary care provider.  We are somewhat limited in treatment in that she is allergic to NSAIDs.      Diagnosis:    ICD-10-CM   1. Pain of left upper extremity M79.602       Disposition:   Discharge to home with primary care follow up as noted above.     Discharge Medications:         cyclobenzaprine (FLEXERIL) 10 MG tablet Take 1 tablet (10 mg) by mouth 3 times daily as needed for muscle spasms, Disp-20 tablet, R-0, Local Print               Julius ANDERSON am serving as a scribe on 4/5/2017 at 9:56 PM to personally document services performed by Ean Pizarro MD, based on my observations and the provider's statements to me.       Ean Pizarro MD  04/06/17 0338

## 2017-04-07 NOTE — PROGRESS NOTES
Please send a copy of their test results and a letter to the patient as follows. Thanks!  Josiane,  It was nice to see you in clinic recently.   The labs indicate that you likely have not completely gone all the way through menopause yet and that is contributing to your irregular bleeding.   I still think it is reasonable that you visit though with the OB/GYN whom I referred you to for a second opinion.  Your cholesterol is excellent.  Your fasting glucose is mildly elevated but not in a diabetic range.  Please try to work on reducing the portions of carbohydrates in your diet (such as: breads, rice, pasta, sugars) which will help to lower the blood sugar.  Please also continue to work on cutting down on beer intake. If you want to discuss an anti-anxiety medication, please schedule an appointment with me in clinic.  One thyroid test (TSH) is slightly abnormal so I'd like to recheck that in the clinic in about 3-6 months to follow the trend. If it continues to increase, I'd suggest a prescription thyroid medication.   Your pap smear is still in process so you will get a separate notification about that when it is completed.  Please continue with the plan of care as we discussed and let me know if you have any questions/concerns. Thanks!

## 2017-04-11 LAB
FINAL DIAGNOSIS: NORMAL
HPV HR 12 DNA CVX QL NAA+PROBE: NEGATIVE
HPV16 DNA SPEC QL NAA+PROBE: NEGATIVE
HPV18 DNA SPEC QL NAA+PROBE: NEGATIVE
SPECIMEN DESCRIPTION: NORMAL

## 2017-04-16 PROBLEM — K59.01 SLOW TRANSIT CONSTIPATION: Status: ACTIVE | Noted: 2017-04-16

## 2017-04-16 PROBLEM — E03.8 SUBCLINICAL HYPOTHYROIDISM: Status: ACTIVE | Noted: 2017-04-16

## 2017-04-16 PROBLEM — G62.9 NEUROPATHY: Status: ACTIVE | Noted: 2017-04-16

## 2017-04-16 PROBLEM — J30.2 SEASONAL ALLERGIC RHINITIS, UNSPECIFIED ALLERGIC RHINITIS TRIGGER: Status: ACTIVE | Noted: 2017-04-16

## 2017-04-16 PROBLEM — M54.9 BILATERAL BACK PAIN, UNSPECIFIED BACK LOCATION, UNSPECIFIED CHRONICITY: Status: ACTIVE | Noted: 2017-04-16

## 2017-04-16 PROBLEM — N92.6 IRREGULAR MENSTRUAL CYCLE: Status: ACTIVE | Noted: 2017-04-16

## 2017-05-30 ENCOUNTER — HOSPITAL ENCOUNTER (OUTPATIENT)
Dept: MAMMOGRAPHY | Facility: CLINIC | Age: 57
Discharge: HOME | End: 2017-05-30
Attending: INTERNAL MEDICINE | Admitting: INTERNAL MEDICINE
Payer: COMMERCIAL

## 2017-05-30 ENCOUNTER — HOSPITAL ENCOUNTER (OUTPATIENT)
Dept: MAMMOGRAPHY | Facility: CLINIC | Age: 57
End: 2017-05-30
Attending: INTERNAL MEDICINE
Payer: COMMERCIAL

## 2017-05-30 DIAGNOSIS — R92.8 ABNORMAL MAMMOGRAM: ICD-10-CM

## 2017-05-30 PROCEDURE — 76642 ULTRASOUND BREAST LIMITED: CPT | Mod: LT

## 2017-05-30 PROCEDURE — G0279 TOMOSYNTHESIS, MAMMO: HCPCS

## 2017-10-22 ENCOUNTER — HEALTH MAINTENANCE LETTER (OUTPATIENT)
Age: 57
End: 2017-10-22

## 2017-11-14 ENCOUNTER — TELEPHONE (OUTPATIENT)
Dept: FAMILY MEDICINE | Facility: CLINIC | Age: 57
End: 2017-11-14

## 2017-11-14 NOTE — TELEPHONE ENCOUNTER
Reason for Call:  Other prescription    Detailed comments: A RX for a walker was faxed on 10/30/17 Shelli with [facility] is calling to follow up on the status of that     Phone Number Patient can be reached at: Other phone number:  [phone]    Best Time: anytime     Can we leave a detailed message on this number? YES    Call taken on 11/14/2017 at 1:02 PM by Kemi Perez

## 2017-11-16 NOTE — TELEPHONE ENCOUNTER
Yes, I have form. She has no showed for a couple visits. I will fill out form at her upcoming scheduled visit.

## 2018-04-11 ENCOUNTER — HOSPITAL ENCOUNTER (EMERGENCY)
Facility: CLINIC | Age: 58
Discharge: HOME | End: 2018-04-11
Attending: EMERGENCY MEDICINE | Admitting: EMERGENCY MEDICINE
Payer: COMMERCIAL

## 2018-04-11 VITALS
RESPIRATION RATE: 16 BRPM | WEIGHT: 255 LBS | BODY MASS INDEX: 38.65 KG/M2 | TEMPERATURE: 97.8 F | HEIGHT: 68 IN | HEART RATE: 110 BPM | SYSTOLIC BLOOD PRESSURE: 114 MMHG | DIASTOLIC BLOOD PRESSURE: 75 MMHG | OXYGEN SATURATION: 93 %

## 2018-04-11 DIAGNOSIS — M10.9 ACUTE GOUTY ARTHRITIS: ICD-10-CM

## 2018-04-11 DIAGNOSIS — I10 ESSENTIAL HYPERTENSION: ICD-10-CM

## 2018-04-11 PROCEDURE — 99283 EMERGENCY DEPT VISIT LOW MDM: CPT

## 2018-04-11 PROCEDURE — 25000132 ZZH RX MED GY IP 250 OP 250 PS 637: Performed by: EMERGENCY MEDICINE

## 2018-04-11 PROCEDURE — 25000125 ZZHC RX 250: Performed by: EMERGENCY MEDICINE

## 2018-04-11 RX ORDER — OXYCODONE AND ACETAMINOPHEN 5; 325 MG/1; MG/1
1 TABLET ORAL ONCE
Status: COMPLETED | OUTPATIENT
Start: 2018-04-11 | End: 2018-04-11

## 2018-04-11 RX ORDER — COLCHICINE 0.6 MG/1
0.6 TABLET ORAL ONCE
Status: COMPLETED | OUTPATIENT
Start: 2018-04-11 | End: 2018-04-11

## 2018-04-11 RX ORDER — OXYCODONE AND ACETAMINOPHEN 5; 325 MG/1; MG/1
1-2 TABLET ORAL EVERY 4 HOURS PRN
Qty: 6 TABLET | Refills: 0 | Status: SHIPPED | OUTPATIENT
Start: 2018-04-11 | End: 2018-06-27

## 2018-04-11 RX ORDER — PREDNISONE 20 MG/1
60 TABLET ORAL ONCE
Status: COMPLETED | OUTPATIENT
Start: 2018-04-11 | End: 2018-04-11

## 2018-04-11 RX ORDER — PREDNISONE 20 MG/1
TABLET ORAL
Qty: 10 TABLET | Refills: 0 | Status: SHIPPED | OUTPATIENT
Start: 2018-04-11 | End: 2018-06-27

## 2018-04-11 RX ORDER — COLCHICINE 0.6 MG/1
1.2 TABLET ORAL ONCE
Status: COMPLETED | OUTPATIENT
Start: 2018-04-11 | End: 2018-04-11

## 2018-04-11 RX ADMIN — OXYCODONE HYDROCHLORIDE AND ACETAMINOPHEN 1 TABLET: 5; 325 TABLET ORAL at 15:52

## 2018-04-11 RX ADMIN — COLCHICINE 0.6 MG: 0.6 TABLET, FILM COATED ORAL at 16:52

## 2018-04-11 RX ADMIN — PREDNISONE 60 MG: 20 TABLET ORAL at 15:52

## 2018-04-11 RX ADMIN — COLCHICINE 1.2 MG: 0.6 TABLET, FILM COATED ORAL at 15:53

## 2018-04-11 ASSESSMENT — ENCOUNTER SYMPTOMS
ARTHRALGIAS: 1
JOINT SWELLING: 1

## 2018-04-11 NOTE — ED NOTES
Bed: ED15  Expected date:   Expected time:   Means of arrival:   [facility] 57[facility]

## 2018-04-11 NOTE — ED AVS SNAPSHOT
Emergency Department    [address] [place] [address]    Phone:  [phone]    Fax:  [phone]                                       Josiane Dasilva   MRN: [id number]    Department:   Emergency Department   Date of Visit:  4/11/2018           After Visit Summary Signature Page     I have received my discharge instructions, and my questions have been answered. I have discussed any challenges I see with this plan with the nurse or doctor.    ..........................................................................................................................................  Patient/Patient Representative Signature      ..........................................................................................................................................  Patient Representative Print Name and Relationship to Patient    ..................................................               ................................................  Date                                            Time    ..........................................................................................................................................  Reviewed by Signature/Title    ...................................................              ..............................................  Date                                                            Time

## 2018-04-11 NOTE — TELEPHONE ENCOUNTER
"Last Written Prescription Date:  4/03/17  Last Fill Quantity: 30 tablet,  # refills: 11   Last office visit: 4/3/2017 with prescribing provider:  Sanjay   Future Office Visit:      Requested Prescriptions   Pending Prescriptions Disp Refills     lisinopril (PRINIVIL/ZESTRIL) 10 MG tablet [Pharmacy Med Name: LISINOPRIL 10 MG TABLET] 30 tablet 1     Sig: TAKE 1 TABLET (10 MG) BY MOUTH DAILY    ACE Inhibitors (Including Combos) Protocol Failed    4/11/2018  6:16 PM       Failed - Recent (12 mo) or future (30 days) visit within the authorizing provider's specialty    Patient had office visit in the last 12 months or has a visit in the next 30 days with authorizing provider or within the authorizing provider's specialty.  See \"Patient Info\" tab in inbasket, or \"Choose Columns\" in Meds & Orders section of the refill encounter.           Failed - Normal serum creatinine on file in past 12 months    Recent Labs   Lab Test  04/03/17   0832   CR  0.75            Failed - Normal serum potassium on file in past 12 months    Recent Labs   Lab Test  04/03/17   0832   POTASSIUM  4.1            Passed - Blood pressure under 140/90 in past 12 months    BP Readings from Last 3 Encounters:   04/11/18 114/75   04/05/17 148/78   04/03/17 132/86                Passed - Patient is age 18 or older       Passed - No active pregnancy on record       Passed - No positive pregnancy test in past 12 months          "

## 2018-04-11 NOTE — ED PROVIDER NOTES
"  History     Chief Complaint:  Arthritis    HPI   Josiane Dasilva is a 57 year old female with history of gout, hypertension, stomach ulcers, among others, who presents to the emergency department today for evaluation of arthritis that flared up 5 days ago and has progressively been getting worse. The patient reports that she usually only gets pain in her feet due to her gout, but has been evaluated for hand/wrist arthritis in the past. With her current arthritis flare, the patient notes swelling of her right hand/fingers and wrist which is a new symptom for her. Along with the pain in her wrist, she notes decreased range of motion secondary to pain. Nothing seems to make her pain better or worse, but she states that in the past she has felt better after taking \"pills\" in the ED. The patient also notes right ankle pain, but denies any swelling. She notes drinking alcohol and eating meat of late and she knows these bring on her gout. The patient denies any other symptoms; no vaginal discharge. No history of diabetes.     Allergies:  Asa [Aspirin] - ulcers  Nsaids - ulcers    Medications:    Gabapentin   Lisinopril  albuterol inhaler     Past Medical History:    Alcohol abuse   Anxiety   Depressive disorder   Obesity   Fatty liver    Neuropathy   Hypertension   Stomach ulcer  Tubular adenoma of colon    Past Surgical History:    Appendectomy   EGD combined   Left ankle surgery   Other - Billroth I    Family History:    History reviewed. No pertinent family history.      Social History:  The patient was alone in the ED.  Smoking Status: former  Alcohol Use: Yes, 2-3 per day   Marital Status:  Single [1]     Review of Systems   Genitourinary: Negative for vaginal discharge.   Musculoskeletal: Positive for arthralgias (right hand/wrist, ankle) and joint swelling (right hand/wrist).   All other systems reviewed and are negative.    Physical Exam   First Vitals:  BP: 114/75  Pulse: 110  Temp: 97.8  F (36.6  C)  Resp: " "16  Height: 173.4 cm (5' 8.25\")  Weight: 115.7 kg (255 lb)  SpO2: 93 %    Physical Exam  Eyes:  The pupils are equal and round    Conjunctivae and sclerae are normal  ENT:    The nose is normal    Pinnae are normal  CV:  Regular rate and rhythm     No edema  Resp:  Lungs are clear    Non-labored    No rales    No wheezing   MS:  Swelling surrounding the right metacarpals with very minimal warmth, Swelling of the right elbow, Pain with range of motion.    Mild tenderness of right ankle without any swelling or erythema     Normal muscular tone    No asymmetric leg swelling  Skin:  No rash or acute skin lesions noted  Neuro:   Awake, alert.      Speech is normal and fluent.    Face is symmetric.     Moves all extremities    Emergency Department Course     Interventions:  1552 Prednisone 60 mg PO   1552 Oxycodone-acetaminophen 5-325mg (Percocet) 1 tablet PO  1553 colchicine 1.2 mg PO  1652 colchicine 0.6 mg PO      Emergency Department Course:  Nursing notes and vitals reviewed.  I entered the room.  I performed an exam of the patient as documented above.  The patient received the above intervention(s).   1547 the patient was rechecked and updated.  I discussed the treatment plan with the patient. They expressed understanding of this plan and consented to discharge. They will be discharged home with instructions for care and follow up. In addition, the patient will return to the emergency department if their symptoms persist, worsen, if new symptoms arise or if there is any concern.  All questions were answered.     Impression & Plan      Medical Decision Making:  Josiane Dasilva is a 57 year old female with history of gout as well as hypertension, who presents to the emergency department today for evaluation of right hand and elbow and right ankle pain. She has had similar pain in the past with previous gout episodes. She reports that she has recently been eating lots of meat and drinking alcohol. Suspect that this is " the cause of her symptoms. She is afebrile here. She has no diabetes. Examination is consistent with gout in multiple joints. Discussed the possibility of doing laboratory work up or joint aspiration for further diagnostics, but she preferred to trial a treatment which I feel is reasonable given her history. She was given her first two doses of colchicine, as well as a dose of steroids and pain medication. She has a wrist splint applied. She will be given a prescription for a steroid for home as well as a short course of pain medication. She is encouraged to return if symptoms are not improved or worsening despite treatment.    Diagnosis:    ICD-10-CM    1. Acute gouty arthritis M10.9      Disposition:   The patient was discharged to home.    Discharge Medications:  Discharge Medication List as of 4/11/2018  4:55 PM      START taking these medications    Details   predniSONE (DELTASONE) 20 MG tablet Take two tablets (= 40mg) each day for 5 (five) days, Disp-10 tablet, R-0, Local Print      oxyCODONE-acetaminophen (PERCOCET) 5-325 MG per tablet Take 1-2 tablets by mouth every 4 hours as needed, Disp-6 tablet, R-0, Local Print           Scribe Disclosure:  I, Andre Costa, am serving as a scribe on 4/11/2018 to document services personally performed by Kerwin Jay MD, based on my observations and the provider's statements to me.    EMERGENCY DEPARTMENT       Kerwin Jay MD  04/11/18 [id number]

## 2018-04-11 NOTE — ED AVS SNAPSHOT
Emergency Department    [address] [facility] [address]    Phone:  [phone]    Fax:  [phone]                                       Josiane Dasilva   MRN: [id number]    Department:   Emergency Department   Date of Visit:  4/11/2018           Patient Information     Date Of Birth          1960        Your diagnoses for this visit were:     Acute gouty arthritis        You were seen by Kerwin Jay MD.      Follow-up Information     Follow up with Carly Grace DO In 2 days.    Specialty:  Internal Medicine    Contact information:    [address] [facility] [address]  [facility] [id number] [phone]          Follow up with  Emergency Department.    Specialty:  EMERGENCY MEDICINE    Why:  As needed    Contact information:    [address] [facility] [address] [phone]        Discharge Instructions         Gout    Gout is an inflammation of a joint due to a build-up of gout crystals in the joint fluid. This occurs when there is an excess of uric acid (a normal waste product) in the body. Uric acid builds up in the body when the kidneys are unable to filter enough of it from the blood. This may occur with age. It is also associated with kidney disease. Gout occurs more often in people with obesity, diabetes, high blood pressure, or high levels of fats in the blood. It may run in families. Gout tends to come and go. A flare up of gout is called an attack. Drinking alcohol or eating certain foods (such as shellfish or foods with additives such as high-fructose corn syrup) may increase uric acid levels in the blood and cause a gout attack.  During a gout attack, the affected joint may become a hot, red, swollen and painful. If you have had one attack of gout, you are likely to have another. An attack of gout can be treated with medicine. If these attacks become frequent, a daily medicine may be prescribed to help the kidneys remove uric acid from the  body.  Home care  During a gout attack:    Rest painful joints. If gout affects the joints of your foot or leg, you may want to use crutches for the first few days to keep from bearing weight on the affected joint.    When sitting or lying down, raise the painful joint to a level higher than your heart.    Apply an ice pack (ice cubes in a plastic bag wrapped in a thin towel) over the injured area for 20 minutes every 1 to 2 hours the first day for pain relief. Continue this 3 to 4 times a day for swelling and pain.    Avoid alcohol and foods listed below (see Preventing attacks) during a gout attack. Drink extra fluid to help flush the uric acid through your kidneys.    If you were prescribed a medicine to treat gout, take it as your healthcare provider has instructed. Don't skip doses.    Take anti-inflammatory medicine as directed.     If pain medicines have been prescribed, take them exactly as directed.    Preventing attacks    Minimize or avoid alcohol use. Excess alcohol intake can cause a gout attack.    Limit these foods and beverages:    Organ meats, such as kidneys and liver    Certain seafoods (anchovies, sardines, shrimp, scallops, herring, mackerel)    Wild game, meat extracts and meat gravies    Foods and beverages sweetened with high-fructose corn syrup, such as sodas    Eat a healthy diet including low-fat and nonfat dairy, whole grains, and vegetables.    If you are overweight, talk to your healthcare provider about a weight reduction plan. Avoid fasting or extreme low calorie diets (less than 900 calories per day). This will increase uric acid levels in the body.    If you have diabetes or high blood pressure, work with your doctor to manage these conditions.    Protect the joint from injury. Trauma can trigger a gout attack.  Follow-up care  Follow up with your healthcare provider, or as advised.   When to seek medical advice  Call your healthcare provider if you have any of the  following:    Fever over 100.4 F (38. C) with worsening joint pain    Increasing redness around the joint    Pain developing in another joint    Repeated vomiting, abdominal pain, or blood in the vomit or stool (black or red color)  Date Last Reviewed: 3/1/2017    7314-8295 [employer]. [address] [place] [address]. All rights reserved. This information is not intended as a substitute for professional medical care. Always follow your healthcare professional's instructions.          24 Hour Appointment Hotline       To make an appointment at any [facility], call [phone] ([phone]). If you don't have a family doctor or clinic, we will help you find one. [facility] clinics are conveniently located to serve the needs of you and your family.             Review of your medicines      START taking        Dose / Directions Last dose taken    oxyCODONE-acetaminophen 5-325 MG per tablet   Commonly known as:  PERCOCET   Dose:  1-2 tablet   Quantity:  6 tablet        Take 1-2 tablets by mouth every 4 hours as needed   Refills:  0        predniSONE 20 MG tablet   Commonly known as:  DELTASONE   Quantity:  10 tablet        Take two tablets (= 40mg) each day for 5 (five) days   Refills:  0          Our records show that you are taking the medicines listed below. If these are incorrect, please call your family doctor or clinic.        Dose / Directions Last dose taken    albuterol 108 (90 Base) MCG/ACT Inhaler   Commonly known as:  PROAIR HFA/PROVENTIL HFA/VENTOLIN HFA   Dose:  2 puff   Quantity:  1 Inhaler        Inhale 2 puffs into the lungs every 4 hours as needed for shortness of breath / dyspnea or wheezing   Refills:  0        fexofenadine 180 MG tablet   Commonly known as:  ALLEGRA   Dose:  180 mg   Quantity:  30 tablet        Take 1 tablet (180 mg) by mouth daily   Refills:  11        gabapentin 300 MG capsule   Commonly known as:  NEURONTIN   Dose:  300 mg   Quantity:  30 capsule         Take 1 capsule (300 mg) by mouth At Bedtime   Refills:  11        lisinopril 10 MG tablet   Commonly known as:  PRINIVIL/ZESTRIL   Dose:  10 mg   Quantity:  30 tablet        Take 1 tablet (10 mg) by mouth daily   Refills:  11        polyethylene glycol powder   Commonly known as:  MIRALAX   Dose:  1 capful   Quantity:  510 g        Take 17 g (1 capful) by mouth daily as needed for constipation   Refills:  1        thiamine 100 MG tablet   Dose:  100 mg   Quantity:  30 tablet        Take 1 tablet (100 mg) by mouth daily   Refills:  11                Prescriptions were sent or printed at these locations (2 Prescriptions)                   Other Prescriptions                Printed at Department/Unit printer (2 of 2)         predniSONE (DELTASONE) 20 MG tablet               oxyCODONE-acetaminophen (PERCOCET) 5-325 MG per tablet                Orders Needing Specimen Collection     None      Pending Results     No orders found from 4/9/2018 to 4/12/2018.            Pending Culture Results     No orders found from 4/9/2018 to 4/12/2018.            Pending Results Instructions     If you had any lab results that were not finalized at the time of your Discharge, you can call the ED Lab Result RN at [phone]. You will be contacted by this team for any positive Lab results or changes in treatment. The nurses are available 7 days a week from 10A to 6:30P.  You can leave a message 24 hours per day and they will return your call.        Test Results From Your Hospital Stay               Clinical Quality Measure: Blood Pressure Screening     Your blood pressure was checked while you were in the emergency department today. The last reading we obtained was  BP: 114/75 . Please read the guidelines below about what these numbers mean and what you should do about them.  If your systolic blood pressure (the top number) is less than 120 and your diastolic blood pressure (the bottom number) is less than 80, then your blood  "pressure is normal. There is nothing more that you need to do about it.  If your systolic blood pressure (the top number) is 120-139 or your diastolic blood pressure (the bottom number) is 80-89, your blood pressure may be higher than it should be. You should have your blood pressure rechecked within a year by a primary care provider.  If your systolic blood pressure (the top number) is 140 or greater or your diastolic blood pressure (the bottom number) is 90 or greater, you may have high blood pressure. High blood pressure is treatable, but if left untreated over time it can put you at risk for heart attack, stroke, or kidney failure. You should have your blood pressure rechecked by a primary care provider within the next 4 weeks.  If your provider in the emergency department today gave you specific instructions to follow-up with your doctor or provider even sooner than that, you should follow that instruction and not wait for up to 4 weeks for your follow-up visit.        Thank you for choosing [facility]       Thank you for choosing [facility] for your care. Our goal is always to provide you with excellent care. Hearing back from our patients is one way we can continue to improve our services. Please take a few minutes to complete the written survey that you may receive in the mail after you visit with us. Thank you!        [employer] Information     [employer] lets you send messages to your doctor, view your test results, renew your prescriptions, schedule appointments and more. To sign up, go to [email or web address] . Click on \"Log in\" on the left side of the screen, which will take you to the Welcome page. Then click on \"Sign up Now\" on the right side of the page.     You will be asked to enter the access code listed below, as well as some personal information. Please follow the directions to create your username and password.     Your access code is: [email or web address]  Expires: 4/24/2018 10:41 AM     Your access code " will  in 90 days. If you need help or a new code, please call your [facility] clinic or [phone].        Care EveryWhere ID     This is your Care EveryWhere ID. This could be used by other organizations to access your [facility] medical records  [id number]        Equal Access to Services     ANTONIO NDIAYE : Jack Jacobson, waaxda luqadaha, qaybta kaalmarosendo melchor, sandeep palafox. So [facility] [phone].    ATENCIÓN: Si habla español, tiene a bettencourt disposición servicios gratuitos de asistencia lingüística. Llame al [phone].    We comply with applicable federal civil rights laws and [place] laws. We do not discriminate on the basis of race, color, national origin, age, disability, sex, sexual orientation, or gender identity.            After Visit Summary       This is your record. Keep this with you and show to your community pharmacist(s) and doctor(s) at your next visit.

## 2018-04-11 NOTE — DISCHARGE INSTRUCTIONS
Gout    Gout is an inflammation of a joint due to a build-up of gout crystals in the joint fluid. This occurs when there is an excess of uric acid (a normal waste product) in the body. Uric acid builds up in the body when the kidneys are unable to filter enough of it from the blood. This may occur with age. It is also associated with kidney disease. Gout occurs more often in people with obesity, diabetes, high blood pressure, or high levels of fats in the blood. It may run in families. Gout tends to come and go. A flare up of gout is called an attack. Drinking alcohol or eating certain foods (such as shellfish or foods with additives such as high-fructose corn syrup) may increase uric acid levels in the blood and cause a gout attack.  During a gout attack, the affected joint may become a hot, red, swollen and painful. If you have had one attack of gout, you are likely to have another. An attack of gout can be treated with medicine. If these attacks become frequent, a daily medicine may be prescribed to help the kidneys remove uric acid from the body.  Home care  During a gout attack:    Rest painful joints. If gout affects the joints of your foot or leg, you may want to use crutches for the first few days to keep from bearing weight on the affected joint.    When sitting or lying down, raise the painful joint to a level higher than your heart.    Apply an ice pack (ice cubes in a plastic bag wrapped in a thin towel) over the injured area for 20 minutes every 1 to 2 hours the first day for pain relief. Continue this 3 to 4 times a day for swelling and pain.    Avoid alcohol and foods listed below (see Preventing attacks) during a gout attack. Drink extra fluid to help flush the uric acid through your kidneys.    If you were prescribed a medicine to treat gout, take it as your healthcare provider has instructed. Don't skip doses.    Take anti-inflammatory medicine as directed.     If pain medicines have been  prescribed, take them exactly as directed.    Preventing attacks    Minimize or avoid alcohol use. Excess alcohol intake can cause a gout attack.    Limit these foods and beverages:    Organ meats, such as kidneys and liver    Certain seafoods (anchovies, sardines, shrimp, scallops, herring, mackerel)    Wild game, meat extracts and meat gravies    Foods and beverages sweetened with high-fructose corn syrup, such as sodas    Eat a healthy diet including low-fat and nonfat dairy, whole grains, and vegetables.    If you are overweight, talk to your healthcare provider about a weight reduction plan. Avoid fasting or extreme low calorie diets (less than 900 calories per day). This will increase uric acid levels in the body.    If you have diabetes or high blood pressure, work with your doctor to manage these conditions.    Protect the joint from injury. Trauma can trigger a gout attack.  Follow-up care  Follow up with your healthcare provider, or as advised.   When to seek medical advice  Call your healthcare provider if you have any of the following:    Fever over 100.4 F (38. C) with worsening joint pain    Increasing redness around the joint    Pain developing in another joint    Repeated vomiting, abdominal pain, or blood in the vomit or stool (black or red color)  Date Last Reviewed: 3/1/2017    7583-5728 [employer]. [address] [place] [address], [place] [address]. All rights reserved. This information is not intended as a substitute for professional medical care. Always follow your healthcare professional's instructions.

## 2018-04-12 RX ORDER — LISINOPRIL 10 MG/1
TABLET ORAL
Qty: 30 TABLET | Refills: 0 | Status: SHIPPED | OUTPATIENT
Start: 2018-04-12 | End: 2018-06-27

## 2018-04-12 NOTE — TELEPHONE ENCOUNTER
Several recent no showed appts  Medication is being filled for 1 time refill only due to:  Patient needs to be seen because due for yearly exam this month.   Arlene CRUMP RN

## 2018-06-27 ENCOUNTER — RADIANT APPOINTMENT (OUTPATIENT)
Dept: GENERAL RADIOLOGY | Facility: CLINIC | Age: 58
End: 2018-06-27
Attending: INTERNAL MEDICINE
Payer: COMMERCIAL

## 2018-06-27 ENCOUNTER — OFFICE VISIT (OUTPATIENT)
Dept: FAMILY MEDICINE | Facility: CLINIC | Age: 58
End: 2018-06-27
Payer: COMMERCIAL

## 2018-06-27 VITALS
WEIGHT: 246 LBS | OXYGEN SATURATION: 100 % | TEMPERATURE: 97.4 F | SYSTOLIC BLOOD PRESSURE: 128 MMHG | DIASTOLIC BLOOD PRESSURE: 68 MMHG | HEART RATE: 72 BPM | BODY MASS INDEX: 37.28 KG/M2 | HEIGHT: 68 IN

## 2018-06-27 DIAGNOSIS — M79.89 SWELLING OF RIGHT HAND: ICD-10-CM

## 2018-06-27 DIAGNOSIS — I10 ESSENTIAL HYPERTENSION: ICD-10-CM

## 2018-06-27 DIAGNOSIS — E03.8 SUBCLINICAL HYPOTHYROIDISM: ICD-10-CM

## 2018-06-27 DIAGNOSIS — Z11.4 ENCOUNTER FOR SCREENING FOR HIV: ICD-10-CM

## 2018-06-27 DIAGNOSIS — N39.3 STRESS INCONTINENCE OF URINE: ICD-10-CM

## 2018-06-27 DIAGNOSIS — F10.10 ALCOHOL ABUSE: Chronic | ICD-10-CM

## 2018-06-27 DIAGNOSIS — M54.50 BILATERAL LOW BACK PAIN WITHOUT SCIATICA, UNSPECIFIED CHRONICITY: ICD-10-CM

## 2018-06-27 DIAGNOSIS — M10.9 GOUT, UNSPECIFIED CAUSE, UNSPECIFIED CHRONICITY, UNSPECIFIED SITE: ICD-10-CM

## 2018-06-27 DIAGNOSIS — E66.01 MORBID OBESITY (H): ICD-10-CM

## 2018-06-27 DIAGNOSIS — G62.9 NEUROPATHY: Primary | ICD-10-CM

## 2018-06-27 DIAGNOSIS — J30.81 ALLERGIC RHINITIS DUE TO ANIMAL HAIR AND DANDER, UNSPECIFIED CHRONICITY: ICD-10-CM

## 2018-06-27 LAB
ERYTHROCYTE [DISTWIDTH] IN BLOOD BY AUTOMATED COUNT: 15.6 % (ref 10–15)
HCT VFR BLD AUTO: 37.7 % (ref 35–47)
HGB BLD-MCNC: 12 G/DL (ref 11.7–15.7)
MCH RBC QN AUTO: 31.3 PG (ref 26.5–33)
MCHC RBC AUTO-ENTMCNC: 31.8 G/DL (ref 31.5–36.5)
MCV RBC AUTO: 98 FL (ref 78–100)
PLATELET # BLD AUTO: 138 10E9/L (ref 150–450)
RBC # BLD AUTO: 3.83 10E12/L (ref 3.8–5.2)
WBC # BLD AUTO: 4.8 10E9/L (ref 4–11)

## 2018-06-27 PROCEDURE — 73130 X-RAY EXAM OF HAND: CPT | Mod: RT

## 2018-06-27 PROCEDURE — 99215 OFFICE O/P EST HI 40 MIN: CPT | Performed by: INTERNAL MEDICINE

## 2018-06-27 PROCEDURE — 84443 ASSAY THYROID STIM HORMONE: CPT | Performed by: INTERNAL MEDICINE

## 2018-06-27 PROCEDURE — 36415 COLL VENOUS BLD VENIPUNCTURE: CPT | Performed by: INTERNAL MEDICINE

## 2018-06-27 PROCEDURE — 80053 COMPREHEN METABOLIC PANEL: CPT | Performed by: INTERNAL MEDICINE

## 2018-06-27 PROCEDURE — 87389 HIV-1 AG W/HIV-1&-2 AB AG IA: CPT | Performed by: INTERNAL MEDICINE

## 2018-06-27 PROCEDURE — 84550 ASSAY OF BLOOD/URIC ACID: CPT | Performed by: INTERNAL MEDICINE

## 2018-06-27 PROCEDURE — 84439 ASSAY OF FREE THYROXINE: CPT | Performed by: INTERNAL MEDICINE

## 2018-06-27 PROCEDURE — 85027 COMPLETE CBC AUTOMATED: CPT | Performed by: INTERNAL MEDICINE

## 2018-06-27 PROCEDURE — 86431 RHEUMATOID FACTOR QUANT: CPT | Performed by: INTERNAL MEDICINE

## 2018-06-27 PROCEDURE — 86200 CCP ANTIBODY: CPT | Performed by: INTERNAL MEDICINE

## 2018-06-27 RX ORDER — CYCLOBENZAPRINE HCL 5 MG
5 TABLET ORAL
Qty: 20 TABLET | Refills: 0 | Status: ON HOLD | OUTPATIENT
Start: 2018-06-27 | End: 2019-02-22

## 2018-06-27 RX ORDER — LISINOPRIL 10 MG/1
TABLET ORAL
Qty: 90 TABLET | Refills: 3 | Status: ON HOLD | OUTPATIENT
Start: 2018-06-27 | End: 2019-02-28

## 2018-06-27 RX ORDER — GABAPENTIN 300 MG/1
300 CAPSULE ORAL AT BEDTIME
Qty: 90 CAPSULE | Refills: 3 | Status: SHIPPED | OUTPATIENT
Start: 2018-06-27 | End: 2021-07-22

## 2018-06-27 RX ORDER — OXYCODONE AND ACETAMINOPHEN 5; 325 MG/1; MG/1
1-2 TABLET ORAL EVERY 4 HOURS PRN
Qty: 6 TABLET | Refills: 0 | Status: CANCELLED | OUTPATIENT
Start: 2018-06-27

## 2018-06-27 RX ORDER — FEXOFENADINE HCL 180 MG/1
180 TABLET ORAL DAILY
Qty: 90 TABLET | Refills: 3 | Status: SHIPPED | OUTPATIENT
Start: 2018-06-27 | End: 2021-07-22

## 2018-06-27 RX ORDER — LANOLIN ALCOHOL/MO/W.PET/CERES
100 CREAM (GRAM) TOPICAL DAILY
Qty: 90 TABLET | Refills: 3 | Status: SHIPPED | OUTPATIENT
Start: 2018-06-27 | End: 2019-03-13

## 2018-06-27 ASSESSMENT — ANXIETY QUESTIONNAIRES
IF YOU CHECKED OFF ANY PROBLEMS ON THIS QUESTIONNAIRE, HOW DIFFICULT HAVE THESE PROBLEMS MADE IT FOR YOU TO DO YOUR WORK, TAKE CARE OF THINGS AT HOME, OR GET ALONG WITH OTHER PEOPLE: SOMEWHAT DIFFICULT
6. BECOMING EASILY ANNOYED OR IRRITABLE: MORE THAN HALF THE DAYS
GAD7 TOTAL SCORE: 20
7. FEELING AFRAID AS IF SOMETHING AWFUL MIGHT HAPPEN: NEARLY EVERY DAY
3. WORRYING TOO MUCH ABOUT DIFFERENT THINGS: NEARLY EVERY DAY
5. BEING SO RESTLESS THAT IT IS HARD TO SIT STILL: NEARLY EVERY DAY
2. NOT BEING ABLE TO STOP OR CONTROL WORRYING: NEARLY EVERY DAY
1. FEELING NERVOUS, ANXIOUS, OR ON EDGE: NEARLY EVERY DAY

## 2018-06-27 ASSESSMENT — PATIENT HEALTH QUESTIONNAIRE - PHQ9: 5. POOR APPETITE OR OVEREATING: NEARLY EVERY DAY

## 2018-06-27 NOTE — MR AVS SNAPSHOT
After Visit Summary   6/27/2018    Josiane Dasilva    MRN: [id number]           Patient Information     Date Of Birth          1960        Visit Information        Provider Department      6/27/2018 2:00 PM Carly Grace,  [facility] [place]        Today's Diagnoses     Neuropathy    -  1    Essential hypertension        Swelling of right hand        Subclinical hypothyroidism        Gout, unspecified cause, unspecified chronicity, unspecified site        Bilateral low back pain without sciatica, unspecified chronicity        Stress incontinence of urine        Encounter for screening for HIV        Alcohol abuse        Allergic rhinitis due to animal hair and dander, unspecified chronicity          Care Instructions    Labs today on 5th floor    4th floor: [address] [facility] (the physical therapy / chiropractor is on 4th floor too: [facility]    1st floor ([address]) xray    Flexeril for muscle spasms was prescribed along with your other regular medications     Follow up at least annually but sooner if not improving          Follow-ups after your visit        Additional Services     [facility] PT, HAND, AND CHIROPRACTIC REFERRAL       **This order will print in the [facility] Scheduling Office**    Physical Therapy, Hand Therapy and Chiropractic Care are available through:    *[facility]  *[facility]  *[facility]    Call one number to schedule at any of the above locations: [phone].    Your provider has referred you to: Physical Therapy AND Chiropractic care at [facility] or [facility]    Indication/Reason for Referral: Low Back Pain  Onset of Illness: 8 months  Therapy Orders: Evaluate and Treat  Special Programs: None  Special Request: None    Poli Sub-Score (Q5-9): 4 (06/27/18 0813)  Poli Total Score (all 9): 7 (06/27/18 1359)    Additional Comments for the Therapist or Chiropractor: Used to go to chiropractor  "which helped    Please be aware that coverage of these services is subject to the terms and limitations of your health insurance plan.  Call member services at your health plan with any benefit or coverage questions.      Please bring the following to your appointment:    *Your personal calendar for scheduling future appointments  *Comfortable clothing                  Future tests that were ordered for you today     Open Future Orders        Priority Expected Expires Ordered    XR Hand Right G/E 3 Views Routine 2018            Who to contact     If you have questions or need follow up information about today's clinic visit or your schedule please contact [facility] directly at [phone].  Normal or non-critical lab and imaging results will be communicated to you by Halobandhart, letter or phone within 4 business days after the clinic has received the results. If you do not hear from us within 7 days, please contact the clinic through Halobandhart or phone. If you have a critical or abnormal lab result, we will notify you by phone as soon as possible.  Submit refill requests through Moat or call your pharmacy and they will forward the refill request to us. Please allow 3 business days for your refill to be completed.          Additional Information About Your Visit        HalobandharGroupe [employer] Information     Moat lets you send messages to your doctor, view your test results, renew your prescriptions, schedule appointments and more. To sign up, go to [email or web address] . Click on \"Log in\" on the left side of the screen, which will take you to the Welcome page. Then click on \"Sign up Now\" on the right side of the page.     You will be asked to enter the access code listed below, as well as some personal information. Please follow the directions to create your username and password.     Your access code is: [email or web address]  Expires: 2018  3:08 PM     Your access code will  in 90 " "days. If you need help or a new code, please call your [place] clinic or [phone].        Care EveryWhere ID     This is your Care EveryWhere ID. This could be used by other organizations to access your [place] medical records  [id number]        Your Vitals Were     Pulse Temperature Height Last Period Pulse Oximetry Breastfeeding?    72 97.4  F (36.3  C) (Oral) 5' 8.25\" (1.734 m) 04/07/2014 100% No    BMI (Body Mass Index)                   37.13 kg/m2            Blood Pressure from Last 3 Encounters:   06/27/18 128/68   04/11/18 114/75   04/05/17 148/78    Weight from Last 3 Encounters:   06/27/18 246 lb (111.6 kg)   04/11/18 255 lb (115.7 kg)   04/05/17 255 lb (115.7 kg)              We Performed the Following     CBC with platelets     Comprehensive metabolic panel     Cyclic Citrullinated Peptide Antibody IgG     HIV Antigen Antibody Combo     [facility] PT, HAND, AND CHIROPRACTIC REFERRAL     Rheumatoid factor     TSH with free T4 reflex     Uric acid          Today's Medication Changes          These changes are accurate as of 6/27/18  3:08 PM.  If you have any questions, ask your nurse or doctor.               Start taking these medicines.        Dose/Directions    cyclobenzaprine 5 MG tablet   Commonly known as:  FLEXERIL   Used for:  Bilateral low back pain without sciatica, unspecified chronicity   Started by:  Carly Grace DO        Dose:  5 mg   Take 1 tablet (5 mg) by mouth every evening as needed for muscle spasms   Quantity:  20 tablet   Refills:  0       order for DME   Used for:  Bilateral low back pain without sciatica, unspecified chronicity   Started by:  Carly Grace DO        Equipment being ordered: Standard cane   Quantity:  1 each   Refills:  0       order for DME   Used for:  Stress incontinence of urine   Started by:  Carly Grace DO        Equipment being ordered: Incontinence pads   Quantity:  100 each   Refills:  5         Stop taking these medicines if you haven't " already. Please contact your care team if you have questions.     albuterol 108 (90 Base) MCG/ACT Inhaler   Commonly known as:  PROAIR HFA/PROVENTIL HFA/VENTOLIN HFA   Stopped by:  Carly Grace DO           oxyCODONE-acetaminophen 5-325 MG per tablet   Commonly known as:  PERCOCET   Stopped by:  Carly Grace DO                Where to get your medicines      These medications were sent to [facility]/pharmacy #[id number] - [place], [place] - [address]  [address] [address]     Phone:  [phone]     cyclobenzaprine 5 MG tablet    fexofenadine 180 MG tablet    gabapentin 300 MG capsule    lisinopril 10 MG tablet    thiamine 100 MG tablet         Some of these will need a paper prescription and others can be bought over the counter.  Ask your nurse if you have questions.     Bring a paper prescription for each of these medications     order for DME    order for DME                Primary Care Provider Office Phone # Fax #    Carly Grace [phone] [address] [address]        Equal Access to Services     [facility]: Hadii aad ku hadasho Soomaali, waaxda luqadaha, qaybta kaalmada adeegyada, sandeep dorado . So [facility] [phone].    ATENCIÓN: Si habla español, tiene a bettencourt disposición servicios gratuitos de asistencia lingüística. [facility] [phone].    We comply with applicable federal civil rights laws and [place] laws. We do not discriminate on the basis of race, color, national origin, age, disability, sex, sexual orientation, or gender identity.            Thank you!     Thank you for choosing [facility]  for your care. Our goal is always to provide you with excellent care. Hearing back from our patients is one way we can continue to improve our services. Please take a few minutes to complete the written survey that you may receive in the mail after your visit with us. Thank you!              Your Updated Medication List - Protect others around you: Learn how to safely use, store and throw away your medicines at www.disposemymeds.org.          This list is accurate as of 6/27/18  3:08 PM.  Always use your most recent med list.                   Brand Name Dispense Instructions for use Diagnosis    cyclobenzaprine 5 MG tablet    FLEXERIL    20 tablet    Take 1 tablet (5 mg) by mouth every evening as needed for muscle spasms    Bilateral low back pain without sciatica, unspecified chronicity       fexofenadine 180 MG tablet    ALLEGRA    90 tablet    Take 1 tablet (180 mg) by mouth daily    Allergic rhinitis due to animal hair and dander, unspecified chronicity       gabapentin 300 MG capsule    NEURONTIN    90 capsule    Take 1 capsule (300 mg) by mouth At Bedtime    Neuropathy       lisinopril 10 MG tablet    PRINIVIL/ZESTRIL    90 tablet    TAKE 1 TABLET (10 MG) BY MOUTH DAILY    Essential hypertension       order for DME     1 each    Equipment being ordered: Standard cane    Bilateral low back pain without sciatica, unspecified chronicity       order for DME     100 each    Equipment being ordered: Incontinence pads    Stress incontinence of urine       polyethylene glycol powder    MIRALAX    510 g    Take 17 g (1 capful) by mouth daily as needed for constipation    Slow transit constipation       thiamine 100 MG tablet     90 tablet    Take 1 tablet (100 mg) by mouth daily    Alcohol abuse
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
## 2018-06-27 NOTE — LETTER
[facility]  [address] [facility]  [address]  [place]  [address]  Tel: [phone]    July 5, 2018    Josiane Dasilva  [address]    [facility] [address]        Dear Josiane Miranda,  It was nice to see you in clinic recently! Your x-rays do show some inflammation of your hand and the labs make me think its related to gout. I suggest a medication called Allopurinol to try and improve this for you and hopefully help your joints feel better. I'll send this into your pharmacy to start every day. If you notice worsening symptoms on the medication, please let me know right away and I can prescribe some prednisone for you to calm the inflammation down quickly. Otherwise, your labs look very stable. Please follow up with me in about 2-3 months to recheck your gout lab (uric acid) and check on how the physical therapy and chiropractic care has been working for your back as well as your mood. Please let me know if you have any questions or concerns. Thanks!     If you have any further questions or problems, please contact our office.      Sincerely,    Carly Grace DO/ Nidhi Moss, CMA  Results for orders placed or performed in visit on 06/27/18   TSH with free T4 reflex   Result Value Ref Range    TSH 5.48 (H) 0.40 - 4.00 mU/L   Comprehensive metabolic panel   Result Value Ref Range    Sodium 137 133 - 144 mmol/L    Potassium 4.1 3.4 - 5.3 mmol/L    Chloride 102 94 - 109 mmol/L    Carbon Dioxide 23 20 - 32 mmol/L    Anion Gap 12 3 - 14 mmol/L    Glucose 100 (H) 70 - 99 mg/dL    Urea Nitrogen 7 7 - 30 mg/dL    Creatinine 0.76 0.52 - 1.04 mg/dL    GFR Estimate 78 >60 mL/min/1.7m2    GFR Estimate If Black >90 >60 mL/min/1.7m2    Calcium 8.5 8.5 - 10.1 mg/dL    Bilirubin Total 1.0 0.2 - 1.3 mg/dL    Albumin 2.9 (L) 3.4 - 5.0 g/dL    Protein Total 7.2 6.8 - 8.8 g/dL    Alkaline Phosphatase 164 (H) 40 - 150 U/L    ALT 30 0 - 50 U/L    AST 43 0 - 45 U/L   CBC with platelets   Result Value Ref  Range    WBC 4.8 4.0 - 11.0 10e9/L    RBC Count 3.83 3.8 - 5.2 10e12/L    Hemoglobin 12.0 11.7 - 15.7 g/dL    Hematocrit 37.7 35.0 - 47.0 %    MCV 98 78 - 100 fl    MCH 31.3 26.5 - 33.0 pg    MCHC 31.8 31.5 - 36.5 g/dL    RDW 15.6 (H) 10.0 - 15.0 %    Platelet Count 138 (L) 150 - 450 10e9/L   Uric acid   Result Value Ref Range    Uric Acid 6.4 (H) 2.6 - 6.0 mg/dL   Rheumatoid factor   Result Value Ref Range    Rheumatoid Factor <20 <20 IU/mL   Cyclic Citrullinated Peptide Antibody IgG   Result Value Ref Range    Cyclic Citrullinated Peptide Antibody, IgG 1 <7 U/mL   HIV Antigen Antibody Combo   Result Value Ref Range    HIV Antigen Antibody Combo Nonreactive NR^Nonreactive       T4 free   Result Value Ref Range    T4 Free 0.93 0.76 - 1.46 ng/dL               Enclosure: Lab Results

## 2018-06-27 NOTE — PATIENT INSTRUCTIONS
Labs today on 5th floor    4th floor: [address] [facility] (the physical therapy / chiropractor is on 4th floor too: [facility]    1st floor ([address]) xray    Flexeril for muscle spasms was prescribed along with your other regular medications     Follow up at least annually but sooner if not improving

## 2018-06-28 LAB
ALBUMIN SERPL-MCNC: 2.9 G/DL (ref 3.4–5)
ALP SERPL-CCNC: 164 U/L (ref 40–150)
ALT SERPL W P-5'-P-CCNC: 30 U/L (ref 0–50)
ANION GAP SERPL CALCULATED.3IONS-SCNC: 12 MMOL/L (ref 3–14)
AST SERPL W P-5'-P-CCNC: 43 U/L (ref 0–45)
BILIRUB SERPL-MCNC: 1 MG/DL (ref 0.2–1.3)
BUN SERPL-MCNC: 7 MG/DL (ref 7–30)
CALCIUM SERPL-MCNC: 8.5 MG/DL (ref 8.5–10.1)
CCP AB SER IA-ACNC: 1 U/ML
CHLORIDE SERPL-SCNC: 102 MMOL/L (ref 94–109)
CO2 SERPL-SCNC: 23 MMOL/L (ref 20–32)
CREAT SERPL-MCNC: 0.76 MG/DL (ref 0.52–1.04)
GFR SERPL CREATININE-BSD FRML MDRD: 78 ML/MIN/1.7M2
GLUCOSE SERPL-MCNC: 100 MG/DL (ref 70–99)
HIV 1+2 AB+HIV1 P24 AG SERPL QL IA: NONREACTIVE
POTASSIUM SERPL-SCNC: 4.1 MMOL/L (ref 3.4–5.3)
PROT SERPL-MCNC: 7.2 G/DL (ref 6.8–8.8)
RHEUMATOID FACT SER NEPH-ACNC: <20 IU/ML (ref 0–20)
SODIUM SERPL-SCNC: 137 MMOL/L (ref 133–144)
T4 FREE SERPL-MCNC: 0.93 NG/DL (ref 0.76–1.46)
TSH SERPL DL<=0.005 MIU/L-ACNC: 5.48 MU/L (ref 0.4–4)
URATE SERPL-MCNC: 6.4 MG/DL (ref 2.6–6)

## 2018-06-28 ASSESSMENT — PATIENT HEALTH QUESTIONNAIRE - PHQ9: SUM OF ALL RESPONSES TO PHQ QUESTIONS 1-9: 20

## 2018-06-28 ASSESSMENT — ANXIETY QUESTIONNAIRES: GAD7 TOTAL SCORE: 20

## 2018-07-02 PROBLEM — E66.01 MORBID OBESITY (H): Status: ACTIVE | Noted: 2018-07-02

## 2018-07-04 ENCOUNTER — TELEPHONE (OUTPATIENT)
Dept: FAMILY MEDICINE | Facility: CLINIC | Age: 58
End: 2018-07-04

## 2018-07-04 DIAGNOSIS — M1A.09X0 IDIOPATHIC CHRONIC GOUT OF MULTIPLE SITES WITHOUT TOPHUS: Primary | ICD-10-CM

## 2018-07-04 PROBLEM — E66.01 MORBID OBESITY (H): Chronic | Status: ACTIVE | Noted: 2018-07-02

## 2018-07-04 PROBLEM — E03.8 SUBCLINICAL HYPOTHYROIDISM: Chronic | Status: ACTIVE | Noted: 2017-04-16

## 2018-07-04 PROBLEM — N39.3 STRESS INCONTINENCE OF URINE: Status: ACTIVE | Noted: 2018-07-04

## 2018-07-04 PROBLEM — G62.9 NEUROPATHY: Chronic | Status: ACTIVE | Noted: 2017-04-16

## 2018-07-04 RX ORDER — ALLOPURINOL 100 MG/1
100 TABLET ORAL DAILY
Qty: 90 TABLET | Refills: 1 | Status: ON HOLD | OUTPATIENT
Start: 2018-07-04 | End: 2019-02-22

## 2018-08-15 ENCOUNTER — TELEPHONE (OUTPATIENT)
Dept: FAMILY MEDICINE | Facility: CLINIC | Age: 58
End: 2018-08-15

## 2018-08-15 NOTE — TELEPHONE ENCOUNTER
Called Josiane and she did receive my results note but hadn't started Allopurinol yet. Says she will go to pharmacy and start it. Has f/u appointment with me in late Sept to recheck labs.

## 2019-02-14 ENCOUNTER — TELEPHONE (OUTPATIENT)
Dept: FAMILY MEDICINE | Facility: CLINIC | Age: 59
End: 2019-02-14

## 2019-02-14 ENCOUNTER — TELEPHONE (OUTPATIENT)
Dept: LAB | Facility: CLINIC | Age: 59
End: 2019-02-14

## 2019-02-14 NOTE — TELEPHONE ENCOUNTER
Reason for Call:  appointment    Detailed comments: Would either  or [facility] be willing to be her PCP?  She would prefer a Female Provider  Please call patient back  Thank you    Phone Number Patient can be reached at: Cell number on file:    Telephone Information:   Mobile [phone]       Best Time: anytime    Can we leave a detailed message on this number? YES    Call taken on 2/14/2019 at 3:40 PM by Lencho Moore

## 2019-02-15 ENCOUNTER — TELEPHONE (OUTPATIENT)
Dept: LAB | Facility: CLINIC | Age: 59
End: 2019-02-15

## 2019-02-15 NOTE — TELEPHONE ENCOUNTER
"Reason for call:  Patient reporting a symptom    Symptom or request: Vaginal bleeding with clots     Duration (how long have symptoms been present):   Ongoing 3-4 months   Stops then comes right back   Stops at a certain time at night then come back     Dizzy/weak/lightheaded? No   Tired all the time   \"bleeding is not the problem, it's the clots coming out of me\" - size of chicken wings   Denies paleness of skin   Fever/chills? No, hot flashes though   No lightheadedness with sitting up   No pain     Currently spotting   Uses pampers - not going through more than 1 pad per hour, only gushes out when she has been laying down     Have you been treated for this before? No    Discussed with patient - if bleeding becomes heavy, she begins to feel feverish or lightheaded/weak or dizzy or any pain - need ER visit. Pt verbalized understanding.     Also advised she call and schedule with a GYN provider ASAP to follow up on vaginal bleeding. She does not have a gyn provider so [facility]. She agreed to call and schedule ASAP with them     Call taken on 2/15/2019 at 1:24 PM by Cayla Swartz    "

## 2019-02-15 NOTE — TELEPHONE ENCOUNTER
LM that [facility] and [facility] are just to full to accept any new pts. Did offer [place] locations. Said pt can call back and we can assist her further if needed.

## 2019-02-18 NOTE — PROGRESS NOTES
SUBJECTIVE:                                                   Josinae Dasilva is a 58 year old female who presents to clinic today for the following health issue(s):  Patient presents with:  Vaginal Bleeding: heavy vaginal bleeding with large clots started after moving furniture.        HPI: Sent by Dr Grace's office to evaluate vaginal bleeding.  Having vaginal bleeding for 3 months. Can have gushes. Wearing adult diapers for protection. No real pain.   Hx of 3 deliveries.   FH of hysterectomies in mom and sisters. Doesn't know reasons.        Patient's last menstrual period was 04/07/2014..   Patient is not sexually active, No obstetric history on file..  Using not sexually active for contraception.    reports that she quit smoking about 14 years ago. She smoked 0.00 packs per day for 10.00 years. she has never used smokeless tobacco.    STD testing offered?  Declined    Health maintenance updated:  yes    Today's PHQ-2 Score:   PHQ-2 ( 1999 [employer]) 1/8/2015   Q1: Little interest or pleasure in doing things 2   Q2: Feeling down, depressed or hopeless 2   PHQ-2 Score 4     Today's PHQ-9 Score:   PHQ-9 SCORE 2/19/2019   PHQ-9 Total Score -   PHQ-9 Total Score 21     Today's RICO-7 Score:   RICO-7 SCORE 6/27/2018   Total Score 20       Problem list and histories reviewed & adjusted, as indicated.  Additional history: as documented.    Patient Active Problem List   Diagnosis     Hypertension; goal <140/90     Anxiety     Depressive disorder     Alcohol abuse     Fatty liver     Ankle pain, chronic     Neuropathy     Irregular menstrual cycle     Seasonal allergic rhinitis, unspecified allergic rhinitis trigger     Slow transit constipation     Bilateral back pain, unspecified back location, unspecified chronicity     Subclinical hypothyroidism     Morbid obesity (H)     Stress incontinence of urine     Idiopathic chronic gout of multiple sites without tophus     Past Surgical History:   Procedure Laterality Date      APPENDECTOMY       COLONOSCOPY  2014    Procedure: COMBINED COLONOSCOPY, SINGLE BIOPSY/POLYPECTOMY BY BIOPSY;  Surgeon: Mike Mancuso MD;  Location:  GI     ESOPHAGOSCOPY, GASTROSCOPY, DUODENOSCOPY (EGD), COMBINED  2014    Procedure: COMBINED ESOPHAGOSCOPY, GASTROSCOPY, DUODENOSCOPY (EGD), BIOPSY SINGLE OR MULTIPLE;  Surgeon: Mike Mancuso MD;  Location:  GI     ORTHOPEDIC SURGERY      left ankle s/p bimalleolar fracture 2014     OTHER SURGICAL HISTORY      Billroth I as teenager secondary to ulcer      Social History     Tobacco Use     Smoking status: Former Smoker     Packs/day: 0.00     Years: 10.00     Pack years: 0.00     Last attempt to quit: 2005     Years since quittin.1     Smokeless tobacco: Never Used   Substance Use Topics     Alcohol use: Yes     Alcohol/week: 0.0 oz     Comment: H/o heavy alcohol abuse, 2-3 beer/day      Problem (# of Occurrences) Relation (Name,Age of Onset)    Hypertension (1) Father    Thyroid Disease (2) Mother, Sister            Current Outpatient Medications   Medication Sig     allopurinol (ZYLOPRIM) 100 MG tablet Take 1 tablet (100 mg) by mouth daily     cyclobenzaprine (FLEXERIL) 5 MG tablet Take 1 tablet (5 mg) by mouth every evening as needed for muscle spasms     fexofenadine (ALLEGRA) 180 MG tablet Take 1 tablet (180 mg) by mouth daily     gabapentin (NEURONTIN) 300 MG capsule Take 1 capsule (300 mg) by mouth At Bedtime     lisinopril (PRINIVIL/ZESTRIL) 10 MG tablet TAKE 1 TABLET (10 MG) BY MOUTH DAILY     order for DME Equipment being ordered: Standard cane     order for DME Equipment being ordered: Incontinence pads     polyethylene glycol (MIRALAX) powder Take 17 g (1 capful) by mouth daily as needed for constipation     thiamine 100 MG tablet Take 1 tablet (100 mg) by mouth daily     No current facility-administered medications for this visit.      Allergies   Allergen Reactions     Asa [Aspirin]      Stomach irritation  "(hx of PUD)     Nsaids      Stomach irritation (Hx of PUD)       ROS:  12 point review of systems negative other than symptoms noted below.  Eyes: Vision Loss  Head: Nasal Congestion  Cardiovascular: Lightheadedness  Respiratory: Cough  Gastrointestinal: Abdominal Pain and Nausea  Genitourinary: Heavy Bleeding with Period, Hot Flashes and Pelvic Pain  Neurologic: Dizziness  Musculoskeletal: Muscular Weakness  Psychiatric: Difficulty Sleeping    OBJECTIVE:     /74   Pulse 72   Ht 1.734 m (5' 8.25\")   Wt 111.6 kg (246 lb)   LMP 04/07/2014   BMI 37.13 kg/m    Body mass index is 37.13 kg/m .    Exam:  Constitutional:  Appearance: Well nourished, well developed alert, in no acute distress  Neurologic/Psychiatric:  Mental Status:  Oriented X3   Pelvic Exam:  External Genitalia:     Normal appearance for age, no discharge present, no tenderness present, no inflammatory lesions present, color normal  Vagina:     Normal vaginal vault without central or paravaginal defects, no discharge present, no inflammatory lesions present, no masses present, BRIGHT RED BLODD.   Bladder:     Nontender to palpation  Urethra:   Urethral Body:  Urethra palpation normal, urethra structural support normal   Urethral Meatus:  No erythema or lesions present  Cervix:     OS NOT SEEN. TOO HIGH AND PT COULDN'T TOLERATE  Uterus:     Uterus: firm, normal sized and nontender, anteverted in position.   Adnexa:     No adnexal tenderness present, no adnexal masses present  Perineum:     Perineum within normal limits, no evidence of trauma, no rashes or skin lesions present  Anus:     Anus within normal limits, no hemorrhoids present  Inguinal Lymph Nodes:     No lymphadenopathy present  Pubic Hair:     Normal pubic hair distribution for age  Genitalia and Groin:     No rashes present, no lesions present, no areas of discoloration, no masses present       In-Clinic Test Results:  Results for orders placed or performed in visit on 02/19/19 (from " the past 24 hour(s))   CBC with platelets   Result Value Ref Range    WBC 6.1 4.0 - 11.0 10e9/L    RBC Count 2.85 (L) 3.8 - 5.2 10e12/L    Hemoglobin 7.3 (LL) 11.7 - 15.7 g/dL    Hematocrit 24.6 (L) 35.0 - 47.0 %    MCV 86 78 - 100 fl    MCH 25.6 (L) 26.5 - 33.0 pg    MCHC 29.7 (L) 31.5 - 36.5 g/dL    RDW 16.2 (H) 10.0 - 15.0 %    Platelet Count 147 (L) 150 - 450 10e9/L       ASSESSMENT/PLAN:                                                        ICD-10-CM    1. PMB (postmenopausal bleeding) N95.0 US Transvaginal Non OB     CBC with platelets         PMB. Cx not seen. No lesion in vagina.  Needs ultrasound to at least visualize endometrium. Differential Dx is fibroids or endometrial cancer.  Pt has risk factors for endometrial cancer, Obesity, HTN, ETOH abuse  If ultrasound shows endometrium >5mm will need D/C for further evaluation.  CBC today to r/o anemia from the bleeding.    PT ANEMIC DUE TO BLOOD LOSS OVER 3 MONTHS. START PROGESTERONE TO SLOW DOWN BLEEDING. ULTRASOUND IN 2 DAYS. DISCUSSED WITH PATIENT. UNDERSTANDS THE SITUATION IS SERIOUS AND WILL NEED TREATMENT.    Efrain Juarez MD  [facility]

## 2019-02-19 ENCOUNTER — OFFICE VISIT (OUTPATIENT)
Dept: OBGYN | Facility: CLINIC | Age: 59
End: 2019-02-19
Payer: COMMERCIAL

## 2019-02-19 VITALS
BODY MASS INDEX: 37.28 KG/M2 | HEIGHT: 68 IN | HEART RATE: 72 BPM | WEIGHT: 246 LBS | DIASTOLIC BLOOD PRESSURE: 74 MMHG | SYSTOLIC BLOOD PRESSURE: 126 MMHG

## 2019-02-19 DIAGNOSIS — N95.0 PMB (POSTMENOPAUSAL BLEEDING): Primary | ICD-10-CM

## 2019-02-19 LAB
ERYTHROCYTE [DISTWIDTH] IN BLOOD BY AUTOMATED COUNT: 16.2 % (ref 10–15)
HCT VFR BLD AUTO: 24.6 % (ref 35–47)
HGB BLD-MCNC: 7.3 G/DL (ref 11.7–15.7)
MCH RBC QN AUTO: 25.6 PG (ref 26.5–33)
MCHC RBC AUTO-ENTMCNC: 29.7 G/DL (ref 31.5–36.5)
MCV RBC AUTO: 86 FL (ref 78–100)
PLATELET # BLD AUTO: 147 10E9/L (ref 150–450)
RBC # BLD AUTO: 2.85 10E12/L (ref 3.8–5.2)
WBC # BLD AUTO: 6.1 10E9/L (ref 4–11)

## 2019-02-19 PROCEDURE — 36415 COLL VENOUS BLD VENIPUNCTURE: CPT | Performed by: OBSTETRICS & GYNECOLOGY

## 2019-02-19 PROCEDURE — 85027 COMPLETE CBC AUTOMATED: CPT | Performed by: OBSTETRICS & GYNECOLOGY

## 2019-02-19 PROCEDURE — 99243 OFF/OP CNSLTJ NEW/EST LOW 30: CPT | Performed by: OBSTETRICS & GYNECOLOGY

## 2019-02-19 RX ORDER — MEDROXYPROGESTERONE ACETATE 10 MG
10 TABLET ORAL DAILY
Qty: 21 TABLET | Refills: 0 | Status: ON HOLD | OUTPATIENT
Start: 2019-02-19 | End: 2019-02-28

## 2019-02-19 ASSESSMENT — ANXIETY QUESTIONNAIRES
1. FEELING NERVOUS, ANXIOUS, OR ON EDGE: NOT AT ALL
5. BEING SO RESTLESS THAT IT IS HARD TO SIT STILL: NOT AT ALL
IF YOU CHECKED OFF ANY PROBLEMS ON THIS QUESTIONNAIRE, HOW DIFFICULT HAVE THESE PROBLEMS MADE IT FOR YOU TO DO YOUR WORK, TAKE CARE OF THINGS AT HOME, OR GET ALONG WITH OTHER PEOPLE: NOT DIFFICULT AT ALL
6. BECOMING EASILY ANNOYED OR IRRITABLE: NOT AT ALL
3. WORRYING TOO MUCH ABOUT DIFFERENT THINGS: SEVERAL DAYS
2. NOT BEING ABLE TO STOP OR CONTROL WORRYING: SEVERAL DAYS

## 2019-02-19 ASSESSMENT — MIFFLIN-ST. JEOR: SCORE: 1743.32

## 2019-02-19 ASSESSMENT — PATIENT HEALTH QUESTIONNAIRE - PHQ9
5. POOR APPETITE OR OVEREATING: SEVERAL DAYS
SUM OF ALL RESPONSES TO PHQ QUESTIONS 1-9: 21

## 2019-02-19 NOTE — PATIENT INSTRUCTIONS
Check blood count today to be sure not anemic.  Schedule ultrasound to see if lining of uterus is thick or if fibroids are present.   Depending on ultrasound results may need a D+C or other surgery.

## 2019-02-21 NOTE — PROGRESS NOTES
SUBJECTIVE:                                                   Josiane Dasilva is a 58 year old female who presents to clinic today for the following health issue(s):  Patient presents with:  Ultrasound: follow-up post menopausal bleeding        HPI:  Pt here for ultrasound follow up for PMB. Seen 3 days ago and Hb low at 7.3. Was given provera 30mg to help slow bleeding and return for ultrasound.   Pt here today. Lightheaded and symptomatic. Didn't start Provera until yesterday.  Last pap  negative.    Patient's last menstrual period was 2014..   Patient is not sexually active, .   reports that she quit smoking about 14 years ago. She smoked 0.00 packs per day for 10.00 years. she has never used smokeless tobacco.    Health maintenance updated:  DUE FOR MAMMO    Today's PHQ-2 Score:   PHQ-2 (  [employer]) 2015   Q1: Little interest or pleasure in doing things 2   Q2: Feeling down, depressed or hopeless 2   PHQ-2 Score 4     Today's PHQ-9 Score:   PHQ-9 SCORE 2019   PHQ-9 Total Score -   PHQ-9 Total Score 21     Today's RICO-7 Score:   RICO-7 SCORE 2018   Total Score 20       Problem list and histories reviewed & adjusted, as indicated.  Additional history: as documented.    Patient Active Problem List   Diagnosis     Hypertension; goal <140/90     Anxiety     Depressive disorder     Alcohol abuse     Fatty liver     Ankle pain, chronic     Neuropathy     Irregular menstrual cycle     Seasonal allergic rhinitis, unspecified allergic rhinitis trigger     Slow transit constipation     Bilateral back pain, unspecified back location, unspecified chronicity     Subclinical hypothyroidism     Morbid obesity (H)     Stress incontinence of urine     Idiopathic chronic gout of multiple sites without tophus     Post-menopausal bleeding     Past Surgical History:   Procedure Laterality Date     APPENDECTOMY       COLONOSCOPY  2014    Procedure: COMBINED COLONOSCOPY, SINGLE  "BIOPSY/POLYPECTOMY BY BIOPSY;  Surgeon: Mike Mancuso MD;  Location:  GI     ESOPHAGOSCOPY, GASTROSCOPY, DUODENOSCOPY (EGD), COMBINED  2014    Procedure: COMBINED ESOPHAGOSCOPY, GASTROSCOPY, DUODENOSCOPY (EGD), BIOPSY SINGLE OR MULTIPLE;  Surgeon: Mike Mancuso MD;  Location:  GI     ORTHOPEDIC SURGERY      left ankle s/p bimalleolar fracture 2014     OTHER SURGICAL HISTORY      Billroth I as teenager secondary to ulcer      Social History     Tobacco Use     Smoking status: Former Smoker     Packs/day: 0.00     Years: 10.00     Pack years: 0.00     Last attempt to quit: 2005     Years since quittin.1     Smokeless tobacco: Never Used   Substance Use Topics     Alcohol use: Yes     Alcohol/week: 0.0 oz     Comment: H/o heavy alcohol abuse, 2-3 beer/day      Problem (# of Occurrences) Relation (Name,Age of Onset)    Hypertension (1) Father    Thyroid Disease (2) Mother, Sister            No current outpatient medications on file.     No current facility-administered medications for this visit.      Allergies   Allergen Reactions     Asa [Aspirin]      Stomach irritation (hx of PUD)     Nsaids      Stomach irritation (Hx of PUD)       ROS:  Constitutional: Fatigue  Cardiovascular: Lightheadedness  Genitourinary: vaginal bleeding    OBJECTIVE:     /62   Ht 1.734 m (5' 8.25\")   Wt 111.6 kg (246 lb)   LMP 2014   BMI 37.13 kg/m    Body mass index is 37.13 kg/m .    Exam:  Constitutional:  Appearance: Well nourished, well developed alert, in no acute distress  Neurologic/Psychiatric:  Mental Status:  Oriented X3      In-Clinic Test Results:  Results for orders placed or performed in visit on 19 (from the past 24 hour(s))   Hemoglobin   Result Value Ref Range    Hemoglobin 6.5 (LL) 11.7 - 15.7 g/dL       ASSESSMENT/PLAN:                                                        ICD-10-CM    1. Anemia due to acute blood loss D62 Hemoglobin   2. Post-menopausal " bleeding N95.0    3. Morbid obesity (H) E66.01    4. Uterine leiomyoma, unspecified location D25.9          Ultrasound inadequate due to pt's habitus and discomfort. Appears enlarged and myomatous. Endometrium thickened.  Pt is anemic from blood loss and symptomatic.   She needs to be admitted and given 2 units RBCs.   Will discuss with GYN Onc and determine best plan for her.   Consider D/C alone for endometrial sampling. Otherwise combine D/C with frozen section. If negative perform LASH/BSO at same time to control bleeding.     Spoke with GYN Onc. They will see pt on Monday for eval and attempt EMB. Agree with transfusion and outpatient follow up.      15 minutes was spent face to face with the patient today discussing her history, diagnosis, and follow-up plan as noted above. Pt was then admitted to the hospital and needed paper work done there. Had to coordinate f/u outpatient care with GYN Onc.  Total Visit Time: 25 minutes.           Efrain Juarez MD  [facility]

## 2019-02-22 ENCOUNTER — ANCILLARY PROCEDURE (OUTPATIENT)
Dept: ULTRASOUND IMAGING | Facility: CLINIC | Age: 59
End: 2019-02-22
Attending: OBSTETRICS & GYNECOLOGY
Payer: COMMERCIAL

## 2019-02-22 ENCOUNTER — OFFICE VISIT (OUTPATIENT)
Dept: OBGYN | Facility: CLINIC | Age: 59
End: 2019-02-22
Attending: OBSTETRICS & GYNECOLOGY
Payer: COMMERCIAL

## 2019-02-22 ENCOUNTER — HOSPITAL ENCOUNTER (INPATIENT)
Facility: CLINIC | Age: 59
LOS: 4 days | Discharge: HOME | DRG: 812 | End: 2019-02-28
Attending: OBSTETRICS & GYNECOLOGY | Admitting: HOSPITALIST
Payer: COMMERCIAL

## 2019-02-22 ENCOUNTER — HOSPITAL ENCOUNTER (EMERGENCY)
Facility: CLINIC | Age: 59
Discharge: STILL ADMITTED OR ADMINISTRATIVE | DRG: 812 | End: 2019-02-22
Payer: COMMERCIAL

## 2019-02-22 ENCOUNTER — APPOINTMENT (OUTPATIENT)
Dept: GENERAL RADIOLOGY | Facility: CLINIC | Age: 59
DRG: 812 | End: 2019-02-22
Attending: PHYSICIAN ASSISTANT
Payer: COMMERCIAL

## 2019-02-22 VITALS
HEIGHT: 68 IN | SYSTOLIC BLOOD PRESSURE: 112 MMHG | BODY MASS INDEX: 37.28 KG/M2 | WEIGHT: 246 LBS | DIASTOLIC BLOOD PRESSURE: 62 MMHG

## 2019-02-22 DIAGNOSIS — F10.10 ALCOHOL ABUSE: Primary | ICD-10-CM

## 2019-02-22 DIAGNOSIS — N95.0 POST-MENOPAUSAL BLEEDING: ICD-10-CM

## 2019-02-22 DIAGNOSIS — I10 HYPERTENSION, UNSPECIFIED TYPE: Chronic | ICD-10-CM

## 2019-02-22 DIAGNOSIS — K64.4 EXTERNAL HEMORRHOIDS: ICD-10-CM

## 2019-02-22 DIAGNOSIS — E66.01 MORBID OBESITY (H): ICD-10-CM

## 2019-02-22 DIAGNOSIS — D25.9 UTERINE LEIOMYOMA, UNSPECIFIED LOCATION: ICD-10-CM

## 2019-02-22 DIAGNOSIS — A49.8 BACTEROIDES INFECTION: ICD-10-CM

## 2019-02-22 DIAGNOSIS — N95.0 PMB (POSTMENOPAUSAL BLEEDING): ICD-10-CM

## 2019-02-22 DIAGNOSIS — D62 ANEMIA DUE TO ACUTE BLOOD LOSS: Primary | ICD-10-CM

## 2019-02-22 PROBLEM — D64.9 ANEMIA: Status: ACTIVE | Noted: 2019-02-22

## 2019-02-22 LAB
ABO + RH BLD: NORMAL
ABO + RH BLD: NORMAL
ALBUMIN SERPL-MCNC: 2.4 G/DL (ref 3.4–5)
ALBUMIN UR-MCNC: 30 MG/DL
ALP SERPL-CCNC: 190 U/L (ref 40–150)
ALT SERPL W P-5'-P-CCNC: 23 U/L (ref 0–50)
ANION GAP SERPL CALCULATED.3IONS-SCNC: 8 MMOL/L (ref 3–14)
APPEARANCE UR: ABNORMAL
AST SERPL W P-5'-P-CCNC: 52 U/L (ref 0–45)
BACTERIA #/AREA URNS HPF: ABNORMAL /HPF
BASOPHILS # BLD AUTO: 0 10E9/L (ref 0–0.2)
BASOPHILS NFR BLD AUTO: 0.2 %
BILIRUB SERPL-MCNC: 0.9 MG/DL (ref 0.2–1.3)
BILIRUB UR QL STRIP: NEGATIVE
BLD GP AB SCN SERPL QL: NORMAL
BLD PROD TYP BPU: NORMAL
BLD UNIT ID BPU: 0
BLD UNIT ID BPU: 0
BLOOD BANK CMNT PATIENT-IMP: NORMAL
BLOOD PRODUCT CODE: NORMAL
BLOOD PRODUCT CODE: NORMAL
BPU ID: NORMAL
BPU ID: NORMAL
BUN SERPL-MCNC: 7 MG/DL (ref 7–30)
CALCIUM SERPL-MCNC: 8.2 MG/DL (ref 8.5–10.1)
CHLORIDE SERPL-SCNC: 104 MMOL/L (ref 94–109)
CO2 SERPL-SCNC: 22 MMOL/L (ref 20–32)
COLOR UR AUTO: YELLOW
CREAT SERPL-MCNC: 0.96 MG/DL (ref 0.52–1.04)
DIFFERENTIAL METHOD BLD: ABNORMAL
EOSINOPHIL # BLD AUTO: 0.1 10E9/L (ref 0–0.7)
EOSINOPHIL NFR BLD AUTO: 0.4 %
ERYTHROCYTE [DISTWIDTH] IN BLOOD BY AUTOMATED COUNT: 17.3 % (ref 10–15)
GFR SERPL CREATININE-BSD FRML MDRD: 65 ML/MIN/{1.73_M2}
GLUCOSE BLDC GLUCOMTR-MCNC: 133 MG/DL (ref 70–99)
GLUCOSE SERPL-MCNC: 116 MG/DL (ref 70–99)
GLUCOSE UR STRIP-MCNC: NEGATIVE MG/DL
HCT VFR BLD AUTO: 21.3 % (ref 35–47)
HGB BLD-MCNC: 6.5 G/DL (ref 11.7–15.7)
HGB BLD-MCNC: 6.5 G/DL (ref 11.7–15.7)
HGB UR QL STRIP: ABNORMAL
IMM GRANULOCYTES # BLD: 0 10E9/L (ref 0–0.4)
IMM GRANULOCYTES NFR BLD: 0.4 %
KETONES UR STRIP-MCNC: NEGATIVE MG/DL
LACTATE BLD-SCNC: 1.9 MMOL/L (ref 0.7–2)
LEUKOCYTE ESTERASE UR QL STRIP: ABNORMAL
LIPASE SERPL-CCNC: 262 U/L (ref 73–393)
LYMPHOCYTES # BLD AUTO: 0.6 10E9/L (ref 0.8–5.3)
LYMPHOCYTES NFR BLD AUTO: 5.7 %
MCH RBC QN AUTO: 24.8 PG (ref 26.5–33)
MCHC RBC AUTO-ENTMCNC: 30.5 G/DL (ref 31.5–36.5)
MCV RBC AUTO: 81 FL (ref 78–100)
MONOCYTES # BLD AUTO: 0.3 10E9/L (ref 0–1.3)
MONOCYTES NFR BLD AUTO: 2.7 %
MUCOUS THREADS #/AREA URNS LPF: PRESENT /LPF
NEUTROPHILS # BLD AUTO: 10.3 10E9/L (ref 1.6–8.3)
NEUTROPHILS NFR BLD AUTO: 90.6 %
NITRATE UR QL: NEGATIVE
NRBC # BLD AUTO: 0.1 10*3/UL
NRBC BLD AUTO-RTO: 1 /100
NUM BPU REQUESTED: 2
PH UR STRIP: 5.5 PH (ref 5–7)
PLATELET # BLD AUTO: 134 10E9/L (ref 150–450)
POTASSIUM SERPL-SCNC: 4.4 MMOL/L (ref 3.4–5.3)
PROT SERPL-MCNC: 6.7 G/DL (ref 6.8–8.8)
RBC # BLD AUTO: 2.62 10E12/L (ref 3.8–5.2)
RBC #/AREA URNS AUTO: >182 /HPF (ref 0–2)
SODIUM SERPL-SCNC: 134 MMOL/L (ref 133–144)
SOURCE: ABNORMAL
SP GR UR STRIP: 1.01 (ref 1–1.03)
SPECIMEN EXP DATE BLD: NORMAL
SQUAMOUS #/AREA URNS AUTO: 1 /HPF (ref 0–1)
TRANSFUSION RXN BLOOD BANK NOTIFICATION: NORMAL
TRANSFUSION STATUS PATIENT QL: NORMAL
UROBILINOGEN UR STRIP-MCNC: 2 MG/DL (ref 0–2)
WBC # BLD AUTO: 11.3 10E9/L (ref 4–11)
WBC #/AREA URNS AUTO: 18 /HPF (ref 0–5)

## 2019-02-22 PROCEDURE — 81001 URINALYSIS AUTO W/SCOPE: CPT | Performed by: PHYSICIAN ASSISTANT

## 2019-02-22 PROCEDURE — 86901 BLOOD TYPING SEROLOGIC RH(D): CPT | Performed by: OBSTETRICS & GYNECOLOGY

## 2019-02-22 PROCEDURE — 86923 COMPATIBILITY TEST ELECTRIC: CPT | Performed by: OBSTETRICS & GYNECOLOGY

## 2019-02-22 PROCEDURE — 25000132 ZZH RX MED GY IP 250 OP 250 PS 637: Performed by: PHYSICIAN ASSISTANT

## 2019-02-22 PROCEDURE — 36415 COLL VENOUS BLD VENIPUNCTURE: CPT | Performed by: PHYSICIAN ASSISTANT

## 2019-02-22 PROCEDURE — 87181 SC STD AGAR DILUTION PER AGT: CPT | Performed by: PHYSICIAN ASSISTANT

## 2019-02-22 PROCEDURE — 99205 OFFICE O/P NEW HI 60 MIN: CPT | Mod: AI | Performed by: PHYSICIAN ASSISTANT

## 2019-02-22 PROCEDURE — 85018 HEMOGLOBIN: CPT | Performed by: OBSTETRICS & GYNECOLOGY

## 2019-02-22 PROCEDURE — 96361 HYDRATE IV INFUSION ADD-ON: CPT

## 2019-02-22 PROCEDURE — 87076 CULTURE ANAEROBE IDENT EACH: CPT | Performed by: PHYSICIAN ASSISTANT

## 2019-02-22 PROCEDURE — 40000341 ZZHCL STATISTIC BB TRANSF RXN INVEST: Performed by: PATHOLOGY

## 2019-02-22 PROCEDURE — 83690 ASSAY OF LIPASE: CPT | Performed by: PHYSICIAN ASSISTANT

## 2019-02-22 PROCEDURE — 71045 X-RAY EXAM CHEST 1 VIEW: CPT

## 2019-02-22 PROCEDURE — 80053 COMPREHEN METABOLIC PANEL: CPT | Performed by: PHYSICIAN ASSISTANT

## 2019-02-22 PROCEDURE — 25000132 ZZH RX MED GY IP 250 OP 250 PS 637: Performed by: OBSTETRICS & GYNECOLOGY

## 2019-02-22 PROCEDURE — 00000146 ZZHCL STATISTIC GLUCOSE BY METER IP

## 2019-02-22 PROCEDURE — 87086 URINE CULTURE/COLONY COUNT: CPT | Performed by: OBSTETRICS & GYNECOLOGY

## 2019-02-22 PROCEDURE — 86850 RBC ANTIBODY SCREEN: CPT | Performed by: OBSTETRICS & GYNECOLOGY

## 2019-02-22 PROCEDURE — 25000128 H RX IP 250 OP 636: Performed by: PHYSICIAN ASSISTANT

## 2019-02-22 PROCEDURE — 36415 COLL VENOUS BLD VENIPUNCTURE: CPT | Performed by: OBSTETRICS & GYNECOLOGY

## 2019-02-22 PROCEDURE — 87800 DETECT AGNT MULT DNA DIREC: CPT | Performed by: PHYSICIAN ASSISTANT

## 2019-02-22 PROCEDURE — 25800030 ZZH RX IP 258 OP 636: Performed by: PHYSICIAN ASSISTANT

## 2019-02-22 PROCEDURE — 86078 PHYS BLOOD BANK SERV REACTJ: CPT | Performed by: PATHOLOGY

## 2019-02-22 PROCEDURE — 83605 ASSAY OF LACTIC ACID: CPT | Performed by: OBSTETRICS & GYNECOLOGY

## 2019-02-22 PROCEDURE — 36430 TRANSFUSION BLD/BLD COMPNT: CPT

## 2019-02-22 PROCEDURE — 85025 COMPLETE CBC W/AUTO DIFF WBC: CPT | Performed by: PHYSICIAN ASSISTANT

## 2019-02-22 PROCEDURE — 96374 THER/PROPH/DIAG INJ IV PUSH: CPT

## 2019-02-22 PROCEDURE — 86900 BLOOD TYPING SEROLOGIC ABO: CPT | Performed by: OBSTETRICS & GYNECOLOGY

## 2019-02-22 PROCEDURE — 99214 OFFICE O/P EST MOD 30 MIN: CPT | Performed by: OBSTETRICS & GYNECOLOGY

## 2019-02-22 PROCEDURE — G0378 HOSPITAL OBSERVATION PER HR: HCPCS

## 2019-02-22 PROCEDURE — P9016 RBC LEUKOCYTES REDUCED: HCPCS | Performed by: OBSTETRICS & GYNECOLOGY

## 2019-02-22 PROCEDURE — 87040 BLOOD CULTURE FOR BACTERIA: CPT | Performed by: PHYSICIAN ASSISTANT

## 2019-02-22 PROCEDURE — 76830 TRANSVAGINAL US NON-OB: CPT | Performed by: OBSTETRICS & GYNECOLOGY

## 2019-02-22 RX ORDER — ACETAMINOPHEN 325 MG/1
650 TABLET ORAL EVERY 4 HOURS PRN
Status: DISCONTINUED | OUTPATIENT
Start: 2019-02-22 | End: 2019-02-22

## 2019-02-22 RX ORDER — TRANEXAMIC ACID 650 MG/1
1300 TABLET ORAL 2 TIMES DAILY
Status: DISCONTINUED | OUTPATIENT
Start: 2019-02-22 | End: 2019-02-24

## 2019-02-22 RX ORDER — SODIUM CHLORIDE 9 MG/ML
INJECTION, SOLUTION INTRAVENOUS CONTINUOUS
Status: DISCONTINUED | OUTPATIENT
Start: 2019-02-22 | End: 2019-02-24

## 2019-02-22 RX ORDER — ONDANSETRON 2 MG/ML
4 INJECTION INTRAMUSCULAR; INTRAVENOUS EVERY 6 HOURS PRN
Status: DISCONTINUED | OUTPATIENT
Start: 2019-02-22 | End: 2019-02-28 | Stop reason: HOSPADM

## 2019-02-22 RX ORDER — FEXOFENADINE HCL 180 MG/1
180 TABLET ORAL DAILY PRN
Status: DISCONTINUED | OUTPATIENT
Start: 2019-02-22 | End: 2019-02-28 | Stop reason: HOSPADM

## 2019-02-22 RX ORDER — GABAPENTIN 300 MG/1
300 CAPSULE ORAL AT BEDTIME
Status: DISCONTINUED | OUTPATIENT
Start: 2019-02-22 | End: 2019-02-28 | Stop reason: HOSPADM

## 2019-02-22 RX ORDER — ACETAMINOPHEN 325 MG/1
650 TABLET ORAL EVERY 4 HOURS PRN
Status: DISCONTINUED | OUTPATIENT
Start: 2019-02-22 | End: 2019-02-28 | Stop reason: HOSPADM

## 2019-02-22 RX ORDER — ACETAMINOPHEN 650 MG/1
650 SUPPOSITORY RECTAL EVERY 4 HOURS PRN
Status: DISCONTINUED | OUTPATIENT
Start: 2019-02-22 | End: 2019-02-28 | Stop reason: HOSPADM

## 2019-02-22 RX ORDER — ONDANSETRON 4 MG/1
4 TABLET, ORALLY DISINTEGRATING ORAL EVERY 6 HOURS PRN
Status: DISCONTINUED | OUTPATIENT
Start: 2019-02-22 | End: 2019-02-28 | Stop reason: HOSPADM

## 2019-02-22 RX ORDER — PROCHLORPERAZINE 25 MG
25 SUPPOSITORY, RECTAL RECTAL EVERY 12 HOURS PRN
Status: DISCONTINUED | OUTPATIENT
Start: 2019-02-22 | End: 2019-02-28 | Stop reason: HOSPADM

## 2019-02-22 RX ORDER — DIPHENHYDRAMINE HYDROCHLORIDE 50 MG/ML
25 INJECTION INTRAMUSCULAR; INTRAVENOUS EVERY 6 HOURS PRN
Status: DISCONTINUED | OUTPATIENT
Start: 2019-02-22 | End: 2019-02-28 | Stop reason: HOSPADM

## 2019-02-22 RX ORDER — PROCHLORPERAZINE MALEATE 10 MG
10 TABLET ORAL EVERY 6 HOURS PRN
Status: DISCONTINUED | OUTPATIENT
Start: 2019-02-22 | End: 2019-02-28 | Stop reason: HOSPADM

## 2019-02-22 RX ORDER — NICOTINE POLACRILEX 4 MG
15-30 LOZENGE BUCCAL
Status: DISCONTINUED | OUTPATIENT
Start: 2019-02-22 | End: 2019-02-28 | Stop reason: HOSPADM

## 2019-02-22 RX ORDER — DEXTROSE MONOHYDRATE 25 G/50ML
25-50 INJECTION, SOLUTION INTRAVENOUS
Status: DISCONTINUED | OUTPATIENT
Start: 2019-02-22 | End: 2019-02-28 | Stop reason: HOSPADM

## 2019-02-22 RX ORDER — ACETAMINOPHEN 325 MG/1
650 TABLET ORAL ONCE
Status: COMPLETED | OUTPATIENT
Start: 2019-02-22 | End: 2019-02-22

## 2019-02-22 RX ORDER — ACETAMINOPHEN 650 MG/1
650 SUPPOSITORY RECTAL EVERY 4 HOURS PRN
Status: DISCONTINUED | OUTPATIENT
Start: 2019-02-22 | End: 2019-02-22

## 2019-02-22 RX ORDER — LISINOPRIL 10 MG/1
10 TABLET ORAL DAILY
Status: DISCONTINUED | OUTPATIENT
Start: 2019-02-22 | End: 2019-02-23

## 2019-02-22 RX ORDER — NALOXONE HYDROCHLORIDE 0.4 MG/ML
.1-.4 INJECTION, SOLUTION INTRAMUSCULAR; INTRAVENOUS; SUBCUTANEOUS
Status: DISCONTINUED | OUTPATIENT
Start: 2019-02-22 | End: 2019-02-28 | Stop reason: HOSPADM

## 2019-02-22 RX ORDER — ALLOPURINOL 100 MG/1
100 TABLET ORAL DAILY
Status: DISCONTINUED | OUTPATIENT
Start: 2019-02-22 | End: 2019-02-22

## 2019-02-22 RX ORDER — CYCLOBENZAPRINE HCL 5 MG
5 TABLET ORAL
Status: DISCONTINUED | OUTPATIENT
Start: 2019-02-22 | End: 2019-02-22

## 2019-02-22 RX ADMIN — LISINOPRIL 10 MG: 10 TABLET ORAL at 13:54

## 2019-02-22 RX ADMIN — PROCHLORPERAZINE EDISYLATE 10 MG: 5 INJECTION INTRAMUSCULAR; INTRAVENOUS at 19:01

## 2019-02-22 RX ADMIN — ACETAMINOPHEN 650 MG: 325 TABLET, FILM COATED ORAL at 20:09

## 2019-02-22 RX ADMIN — SODIUM CHLORIDE: 9 INJECTION, SOLUTION INTRAVENOUS at 18:48

## 2019-02-22 RX ADMIN — ACETAMINOPHEN 650 MG: 325 TABLET, FILM COATED ORAL at 17:56

## 2019-02-22 RX ADMIN — TRANEXAMIC ACID 1300 MG: 650 TABLET ORAL at 20:10

## 2019-02-22 RX ADMIN — TRANEXAMIC ACID 1300 MG: 650 TABLET ORAL at 13:54

## 2019-02-22 RX ADMIN — GABAPENTIN 300 MG: 300 CAPSULE ORAL at 22:19

## 2019-02-22 ASSESSMENT — ACTIVITIES OF DAILY LIVING (ADL)
BATHING: 0-->INDEPENDENT
AMBULATION: 0-->INDEPENDENT
COGNITION: 0 - NO COGNITION ISSUES REPORTED
SWALLOWING: 0-->SWALLOWS FOODS/LIQUIDS WITHOUT DIFFICULTY
TRANSFERRING: 0-->INDEPENDENT
TOILETING: 0-->INDEPENDENT
DRESS: 0-->INDEPENDENT
NUMBER_OF_TIMES_PATIENT_HAS_FALLEN_WITHIN_LAST_SIX_MONTHS: 1
RETIRED_COMMUNICATION: 0-->UNDERSTANDS/COMMUNICATES WITHOUT DIFFICULTY
WHICH_OF_THE_ABOVE_FUNCTIONAL_RISKS_HAD_A_RECENT_ONSET_OR_CHANGE?: FALL HISTORY;AMBULATION
RETIRED_EATING: 0-->INDEPENDENT
FALL_HISTORY_WITHIN_LAST_SIX_MONTHS: YES

## 2019-02-22 ASSESSMENT — COLUMBIA-SUICIDE SEVERITY RATING SCALE - C-SSRS
2. HAVE YOU ACTUALLY HAD ANY THOUGHTS OF KILLING YOURSELF IN THE PAST MONTH?: NO
6. HAVE YOU EVER DONE ANYTHING, STARTED TO DO ANYTHING, OR PREPARED TO DO ANYTHING TO END YOUR LIFE?: NO
1. IN THE PAST MONTH, HAVE YOU WISHED YOU WERE DEAD OR WISHED YOU COULD GO TO SLEEP AND NOT WAKE UP?: NO

## 2019-02-22 ASSESSMENT — MIFFLIN-ST. JEOR
SCORE: 1743.32
SCORE: 1712.13

## 2019-02-22 NOTE — PROGRESS NOTES
"Met with the patient at the bedside Patient is A+Ox4.  Patient is admitted for transfusion for anemia and vaginal bleeding.  Gave the patient the \"what is outpatient observation\" brochure and answered all questions.  Patient is hopeful to return home after transfusion.  Patient did not identify any further needs.  "

## 2019-02-22 NOTE — PROGRESS NOTES
Discussed case with GYN onc at [facility].  They will see pt Monday and attempt EMB.  Transfuse today. Only allowed 2 units per blood bank.  Pt may need more if having surgery.  GYN onc clinic to call pt while in Hospital to schedule appointment.

## 2019-02-22 NOTE — LETTER
Transition Communication Hand-off for Care Transitions to Next Level of Care Provider    Name: Josiane Dasilva  : 1960  MRN #: [id number]  Primary Care Provider: Carmen Arriaza  Primary Care MD Name: Carmen Arriaza  Primary Clinic: [address] [facility] [address]  Primary Care Clinic Name:  [facility]  Reason for Hospitalization:  Severe anemia, post menopausal bleeding  Anemia  Acute on chronic blood loss anemia (H)  Admit Date/Time: 2019 11:58 AM  Discharge Date: 2019  Payor Source: Payor: MEDICA / Plan: MEDICA ACCESS ABILITY MA / Product Type: HMO /     Readmission Assessment Measure (MERNA) Risk Score/category: elevated          Reason for Communication Hand-off Referral: Multiple providers/specialties    Discharge Plan: discharge to home       Concern for non-adherence with plan of care:   Y/N Y  Discharge Needs Assessment:  Needs      Most Recent Value   # of Referrals Placed by CTS  Communication hand-offs to next level of Care Providers          Already enrolled in Tele-monitoring program and name of program:  N  Follow-up specialty is recommended: Yes    Follow-up plan:    Future Appointments   Date Time Provider Department Center   3/5/2019  8:30 AM Mary Ash MD [facility]   3/8/2019  3:15 PM Carmen Arriaza MD [facility]       Any outstanding tests or procedures:        Referrals     Future Labs/Procedures    Colorectal Surgery Referral     Comments:    External hemorroids  Intermittent bright blood per rectum    [facility] [address] [place] [address], [address] [place] [address], [id number]  Phone:  [phone] Fax:  [phone]            Key Recommendations:  offered to make patient appt w/ colon rectal but pt declined and wanted to make herself.  Please follow up that patient goes to these appointments.     Karmen Perez    AVS/Discharge Summary is the source of truth; this is a helpful guide for improved communication of patient story

## 2019-02-23 LAB
ABO + RH BLD: NORMAL
ABO + RH BLD: NORMAL
ANION GAP SERPL CALCULATED.3IONS-SCNC: 8 MMOL/L (ref 3–14)
BACTERIA SPEC CULT: NORMAL
BASOPHILS # BLD AUTO: 0 10E9/L (ref 0–0.2)
BASOPHILS NFR BLD AUTO: 0.1 %
BLD GP AB SCN SERPL QL: NORMAL
BLD PROD TYP BPU: NORMAL
BLD UNIT ID BPU: 0
BLOOD BANK CMNT PATIENT-IMP: NORMAL
BLOOD PRODUCT CODE: NORMAL
BPU ID: NORMAL
BUN SERPL-MCNC: 8 MG/DL (ref 7–30)
CALCIUM SERPL-MCNC: 7.6 MG/DL (ref 8.5–10.1)
CHLORIDE SERPL-SCNC: 110 MMOL/L (ref 94–109)
CO2 SERPL-SCNC: 21 MMOL/L (ref 20–32)
CREAT SERPL-MCNC: 0.97 MG/DL (ref 0.52–1.04)
DIFFERENTIAL METHOD BLD: ABNORMAL
EOSINOPHIL # BLD AUTO: 0 10E9/L (ref 0–0.7)
EOSINOPHIL NFR BLD AUTO: 0.1 %
ERYTHROCYTE [DISTWIDTH] IN BLOOD BY AUTOMATED COUNT: 17.3 % (ref 10–15)
FERRITIN SERPL-MCNC: 27 NG/ML (ref 8–252)
GFR SERPL CREATININE-BSD FRML MDRD: 64 ML/MIN/{1.73_M2}
GLUCOSE BLDC GLUCOMTR-MCNC: 102 MG/DL (ref 70–99)
GLUCOSE BLDC GLUCOMTR-MCNC: 111 MG/DL (ref 70–99)
GLUCOSE BLDC GLUCOMTR-MCNC: 114 MG/DL (ref 70–99)
GLUCOSE BLDC GLUCOMTR-MCNC: 122 MG/DL (ref 70–99)
GLUCOSE BLDC GLUCOMTR-MCNC: 122 MG/DL (ref 70–99)
GLUCOSE BLDC GLUCOMTR-MCNC: 98 MG/DL (ref 70–99)
GLUCOSE SERPL-MCNC: 107 MG/DL (ref 70–99)
HBA1C MFR BLD: NORMAL % (ref 0–5.6)
HCT VFR BLD AUTO: 19.6 % (ref 35–47)
HGB BLD-MCNC: 6.1 G/DL (ref 11.7–15.7)
IMM GRANULOCYTES # BLD: 0 10E9/L (ref 0–0.4)
IMM GRANULOCYTES NFR BLD: 0.2 %
IRON SATN MFR SERPL: 5 % (ref 15–46)
IRON SERPL-MCNC: 13 UG/DL (ref 35–180)
LYMPHOCYTES # BLD AUTO: 0.6 10E9/L (ref 0.8–5.3)
LYMPHOCYTES NFR BLD AUTO: 5.7 %
Lab: NORMAL
MCH RBC QN AUTO: 25.6 PG (ref 26.5–33)
MCHC RBC AUTO-ENTMCNC: 31.1 G/DL (ref 31.5–36.5)
MCV RBC AUTO: 82 FL (ref 78–100)
MONOCYTES # BLD AUTO: 0.5 10E9/L (ref 0–1.3)
MONOCYTES NFR BLD AUTO: 4.9 %
NEUTROPHILS # BLD AUTO: 9 10E9/L (ref 1.6–8.3)
NEUTROPHILS NFR BLD AUTO: 89 %
NRBC # BLD AUTO: 0 10*3/UL
NRBC BLD AUTO-RTO: 0 /100
NUM BPU REQUESTED: 1
PLATELET # BLD AUTO: 107 10E9/L (ref 150–450)
POTASSIUM SERPL-SCNC: 4.2 MMOL/L (ref 3.4–5.3)
RBC # BLD AUTO: 2.38 10E12/L (ref 3.8–5.2)
SODIUM SERPL-SCNC: 139 MMOL/L (ref 133–144)
SPECIMEN EXP DATE BLD: NORMAL
SPECIMEN SOURCE: NORMAL
TIBC SERPL-MCNC: 274 UG/DL (ref 240–430)
TRANSFUSION RXN BLOOD BANK NOTIFICATION: NORMAL
TRANSFUSION STATUS PATIENT QL: NORMAL
WBC # BLD AUTO: 10.1 10E9/L (ref 4–11)

## 2019-02-23 PROCEDURE — 85025 COMPLETE CBC W/AUTO DIFF WBC: CPT | Performed by: PHYSICIAN ASSISTANT

## 2019-02-23 PROCEDURE — 25800030 ZZH RX IP 258 OP 636: Performed by: PHYSICIAN ASSISTANT

## 2019-02-23 PROCEDURE — 25000132 ZZH RX MED GY IP 250 OP 250 PS 637: Performed by: PHYSICIAN ASSISTANT

## 2019-02-23 PROCEDURE — 96376 TX/PRO/DX INJ SAME DRUG ADON: CPT

## 2019-02-23 PROCEDURE — 36430 TRANSFUSION BLD/BLD COMPNT: CPT

## 2019-02-23 PROCEDURE — 86078 PHYS BLOOD BANK SERV REACTJ: CPT | Performed by: PATHOLOGY

## 2019-02-23 PROCEDURE — 00000146 ZZHCL STATISTIC GLUCOSE BY METER IP

## 2019-02-23 PROCEDURE — 25000128 H RX IP 250 OP 636: Performed by: INTERNAL MEDICINE

## 2019-02-23 PROCEDURE — P9016 RBC LEUKOCYTES REDUCED: HCPCS | Performed by: OBSTETRICS & GYNECOLOGY

## 2019-02-23 PROCEDURE — 86901 BLOOD TYPING SEROLOGIC RH(D): CPT | Performed by: OBSTETRICS & GYNECOLOGY

## 2019-02-23 PROCEDURE — 83540 ASSAY OF IRON: CPT | Performed by: PATHOLOGY

## 2019-02-23 PROCEDURE — 96361 HYDRATE IV INFUSION ADD-ON: CPT

## 2019-02-23 PROCEDURE — 82728 ASSAY OF FERRITIN: CPT | Performed by: PATHOLOGY

## 2019-02-23 PROCEDURE — 36415 COLL VENOUS BLD VENIPUNCTURE: CPT | Performed by: OBSTETRICS & GYNECOLOGY

## 2019-02-23 PROCEDURE — 25000128 H RX IP 250 OP 636: Performed by: PHYSICIAN ASSISTANT

## 2019-02-23 PROCEDURE — 83550 IRON BINDING TEST: CPT | Performed by: PATHOLOGY

## 2019-02-23 PROCEDURE — 99226 ZZC SUBSEQUENT OBSERVATION CARE,LEVEL III: CPT | Performed by: INTERNAL MEDICINE

## 2019-02-23 PROCEDURE — 40000341 ZZHCL STATISTIC BB TRANSF RXN INVEST: Performed by: PATHOLOGY

## 2019-02-23 PROCEDURE — 80048 BASIC METABOLIC PNL TOTAL CA: CPT | Performed by: PHYSICIAN ASSISTANT

## 2019-02-23 PROCEDURE — 96375 TX/PRO/DX INJ NEW DRUG ADDON: CPT

## 2019-02-23 PROCEDURE — 25000132 ZZH RX MED GY IP 250 OP 250 PS 637: Performed by: OBSTETRICS & GYNECOLOGY

## 2019-02-23 PROCEDURE — G0378 HOSPITAL OBSERVATION PER HR: HCPCS

## 2019-02-23 PROCEDURE — 86900 BLOOD TYPING SEROLOGIC ABO: CPT | Performed by: OBSTETRICS & GYNECOLOGY

## 2019-02-23 PROCEDURE — 86850 RBC ANTIBODY SCREEN: CPT | Performed by: OBSTETRICS & GYNECOLOGY

## 2019-02-23 PROCEDURE — 36415 COLL VENOUS BLD VENIPUNCTURE: CPT | Performed by: PHYSICIAN ASSISTANT

## 2019-02-23 PROCEDURE — 86923 COMPATIBILITY TEST ELECTRIC: CPT | Performed by: OBSTETRICS & GYNECOLOGY

## 2019-02-23 PROCEDURE — 87040 BLOOD CULTURE FOR BACTERIA: CPT | Performed by: OBSTETRICS & GYNECOLOGY

## 2019-02-23 RX ADMIN — SODIUM CHLORIDE: 9 INJECTION, SOLUTION INTRAVENOUS at 06:03

## 2019-02-23 RX ADMIN — DIPHENHYDRAMINE HYDROCHLORIDE 25 MG: 50 INJECTION, SOLUTION INTRAMUSCULAR; INTRAVENOUS at 12:31

## 2019-02-23 RX ADMIN — ACETAMINOPHEN 650 MG: 325 TABLET, FILM COATED ORAL at 12:31

## 2019-02-23 RX ADMIN — TRANEXAMIC ACID 1300 MG: 650 TABLET ORAL at 08:07

## 2019-02-23 RX ADMIN — SODIUM CHLORIDE 1000 ML: 9 INJECTION, SOLUTION INTRAVENOUS at 04:00

## 2019-02-23 RX ADMIN — TRANEXAMIC ACID 1300 MG: 650 TABLET ORAL at 21:00

## 2019-02-23 RX ADMIN — ACETAMINOPHEN 650 MG: 325 TABLET, FILM COATED ORAL at 01:25

## 2019-02-23 RX ADMIN — DIPHENHYDRAMINE HYDROCHLORIDE 25 MG: 50 INJECTION, SOLUTION INTRAMUSCULAR; INTRAVENOUS at 01:25

## 2019-02-23 RX ADMIN — SODIUM CHLORIDE 1000 ML: 9 INJECTION, SOLUTION INTRAVENOUS at 02:38

## 2019-02-23 RX ADMIN — SODIUM CHLORIDE: 9 INJECTION, SOLUTION INTRAVENOUS at 23:01

## 2019-02-23 RX ADMIN — LISINOPRIL 10 MG: 10 TABLET ORAL at 08:07

## 2019-02-23 RX ADMIN — GABAPENTIN 300 MG: 300 CAPSULE ORAL at 21:00

## 2019-02-23 NOTE — PROGRESS NOTES
Consult dictated #[id number]    ADDENDUM 0462 on 2/22/19:   Infectious work-up to date is unremarkable. Fever responding well to tylenol, once below 100.4 degrees fahrenheit, ok to transfuse 1 U, premedicate with tylenol and benadryl. Orders are placed. Full dictation to follow.

## 2019-02-23 NOTE — PROGRESS NOTES
MD Notification    Notified Person: MD    Notified Person Name: Meng     Notification Date/Time: 2/23/19 02:21    Notification Interaction: Phone answering service     Purpose of Notification: BPs low during blood transfusion     Orders Received: Transfusion stopped, blood returned to lab blood bank. 1L bolus ordered for hypotension- continue to monitor     Comments:

## 2019-02-23 NOTE — PROVIDER NOTIFICATION
Brief update;    Paged re: drop in BP with reinitiation of blood products    Stop transfusion  Send blood to blood blank.  1 L NS infusion.    Errol Meng MD  2:32 AM    BP improving, but still lower. Additional 1L over 2hr  Errol Meng MD  3:52 AM

## 2019-02-23 NOTE — PROVIDER NOTIFICATION
MD Notification    Person notified: Dr. De La Cruz    Person Name: Rosanna Kovacs RN    Date/Time: 2/23/19@1000    Interaction: Paged    Purpose of Notification: Inform MD that spoke with Amina Grant from blood bank and was told to transfuse 1-2 units from another donor slowly and replenish iron if comes back low.    Orders Received: Dr. De La Cruz came up to round on patient and working on new orders for patient.

## 2019-02-23 NOTE — PROVIDER NOTIFICATION
MD Notification    Person notified: SHAY Cordova    Person Name: Rosannagina Kovacs RN    Date/Time: 2/22/19@0619    Interaction: Paged    Purpose of Notification: Temp up to 102.6 and pulse 117. Requesting to stop blood transfusion and asking for new orders.    Orders Received: Stop blood transfusion and new orders entered for labs, blood cultures and UA.

## 2019-02-23 NOTE — PROGRESS NOTES
Patient is A&Ox4. Direct admit from home this afternoon. No complaints of pain. VSS ex T max: 101.5, tachycardic and elevated respirations. Lung sounds diminished. Regular diet. Voiding adequately. 2 units PRBC ordered and possible transfusion reaction occurred after receiving blood at 75mL/hr for approximately 30 minutes. Orders to stop blood transfusion and labs ordered with blood cultures and UA. Blood and normal saline sent down to blood bank. Lactic acid fired and came back 1.9. See MD notes regarding patient's vital signs and new orders after blood transfusion was started. Pt has generalized weakness. Plan: Discharge pending.

## 2019-02-23 NOTE — PROGRESS NOTES
[facility]    Hospitalist Progress Note    Assessment & Plan     Josiane Dasilva is a 58-year-old female with past medical history of hypertension, anxiety, depression, alcohol abuse, fatty liver, morbid obesity, gout and 3 months of postmenopausal bleeding, with findings of a myomatous uterus, registered under observation status by Gynecology on 02/22 for symptomatic anemia related to postmenopausal bleeding with a hemoglobin of 6.5.  The Hospitalist Service was consulted for fever with concern for transfusion reaction versus underlying undiagnosed infection.  T-max 102.6 degrees Fahrenheit and pulse 113.  Remainder of vitals within normal limits.  The patient is currently stable.      Postmenopausal bleeding  Subacute blood loss anemia, symptomatic:  The patient describes 3 months of PMB, seen by GYN on 02/19 with a hemoglobin of 7.3.  Prescribed Provera to slow down the bleeding, but did not start this until 02/21. Had transvaginal ultrasound which showed a myomatous uterus.  Hemoglobin rechecked and 6.5 in the clinic and patient was admitted for transfusion.  Two units of packed red blood cells were ordered.  A temperature of 100 degrees Fahrenheit was noted prior to initiation of the transfusion; however, the temperature slowly increased while the patient was receiving the transfusion, with of T-max 102.6.  The patient describes symptoms of her anemia including shortness of breath, palpitations, dizziness and lightheadedness.  She denies any melena, hematochezia, hemoptysis or hematemesis.  No abdominal pain, diarrhea, dysuria, rash, chest pain or recent viral illness.  CMP unremarkable aside from an alkaline phosphatase of 190 and an AST of 52.  Lipase within normal limits.  Lactic acid 1.9.  CBC with WBC of 11.3, platelet count of 134, hemoglobin 6.5, again with an MCV of 81.  Chest x-ray unremarkable for acute cardiopulmonary process. UA with large amount of blood, otherwise unremarkable.  "  -no infectious sxs or fever prior to admission  -- Initial transfusion of 1 unit of packed red blood cells was stopped.  A transfusion reaction panel was sent to the blood bank.  An infectious workup has been initiated without clear evidence for infection.  At this time, we will plan to allow the  patient's fever to resolve.  The patient received a dose of Tylenol 650 mg around 17:56 and will receive another dose now.  Once the fever has improved in the next 4 hours, we will restart another unit of packed red blood cells, but will premedicate the patient with both Tylenol and Benadryl.   -- partner discussed the case with Dr. Juarez.  Plan for patient to have close outpatient followup with GYN Onc early next week upon discharge.     -- Followup blood cultures.  Ngtd. Remains afebrile. No focal infectouis sxs  - 2nd attempt at blood transfusion pt had hypotension or routine monitoring in 80s/- but no sxs. No fever. -given 1L NS bolus with hypotension- resolved  - discussed case with on call pathologist at blood bank, Dr. Amina Grant. Transfusion reaction labs all normal.  Dr. Grant recommends transfusion but with different donor (both prior attempts with same donor) and transfuse slowly. Discussed with RN and will call blood bank to transfuse 2 units each over 4 hours and from different donor.   - Ferritin 27, iron 13. Will consider IV iron course.   -Hb stable overnight in 6 range- symptomatic  -- Monitor fever curve closely.   -- IV fluids at 100 mL per hour.   -- Monitor for ongoing evidence of transfusion reaction  -- CBC and BMP In the AM   -gyn folllowing  - am cbc     Hypertension:   Good control.  Low bp last night with 2nd attempt of blood. Given fluid bolus 1LNS  - Continued PTA lisinopril but will hold for now. Reassess in am     Alcohol abuse  Hx of esophageal varices, grade I (per EGD)  Fatty liver disease: Pt reports drinking three beers/night. Describes experiencing \"withdrawal\" at times, but unable " to further describe the symptoms she experiences. No hx of withdrawal seizure. Last beer the day prior to admission.   No signs etoh w/d currently.  -- Monitor for withdrawal      Bilateral deep venous thrombosis prophylaxis:  Encourage ambulation.      CODE STATUS:  Full code.         Melvin De La Cruz MD  Text Page  (7am to 6pm)  Interval History     Felt hot and flushed with first attempt at bp  Low bp without other sxs with 2nd attempt  Denies infectious sxs PTA  Feels fatigued and a little dizzy today as on admission  No rash, itching tongue swelling, cp,sob, n/b/d/d/abd pain  Given 1L NS bolus overnight for low bp. Now bp nl    -Data reviewed today: I reviewed all new labs and imaging results over the last 24 hours. I personally reviewed labs and imaging.  CXR negative for acute findings    Physical Exam   Temp: 98.2  F (36.8  C) Temp src: Oral BP: 123/60 Pulse: 110 Heart Rate: 66 Resp: 18 SpO2: 100 % O2 Device: None (Room air)    There were no vitals filed for this visit.  Vital Signs with Ranges  Temp:  [96.8  F (36  C)-102.8  F (39.3  C)] 96.8  F (36  C)  Pulse:  [] 100  Heart Rate:  [] 66  Resp:  [16-29] 18  BP: ()/(33-60) 120/54  SpO2:  [98 %-100 %] 100 %  I/O last 3 completed shifts:  In: 1088.25 [I.V.:1052]  Out: 1000 [Urine:950; Emesis/NG output:50]    Constitutional: Nad, notoxix, appears comfortable  Respiratory: CTAB  HEENT; nl sclera, op, nl tongue  Cardiovascular: not tachy, RRR no r/g/m  GI: soft, nt, nd  Skin/Integumen: no rash or edema  Psych:  nl affect  Neuro: alert, nl speech and mentation    Medications     sodium chloride 100 mL/hr at 02/23/19 0603       gabapentin  300 mg Oral At Bedtime     insulin aspart  1-6 Units Subcutaneous Q4H     lisinopril  10 mg Oral Daily     tranexamic acid  1,300 mg Oral BID       Data   Recent Labs   Lab 02/23/19  0654 02/22/19  1837 02/22/19  1009 02/19/19  1417   WBC 10.1 11.3*  --  6.1   HGB 6.1* 6.5* 6.5* 7.3*   MCV 82 81  --  86   PLT  107* 134*  --  147*    134  --   --    POTASSIUM 4.2 4.4  --   --    CHLORIDE 110* 104  --   --    CO2 21 22  --   --    BUN 8 7  --   --    CR 0.97 0.96  --   --    ANIONGAP 8 8  --   --    JESSICA 7.6* 8.2*  --   --    * 116*  --   --    ALBUMIN  --  2.4*  --   --    PROTTOTAL  --  6.7*  --   --    BILITOTAL  --  0.9  --   --    ALKPHOS  --  190*  --   --    ALT  --  23  --   --    AST  --  52*  --   --    LIPASE  --  262  --   --        Imaging:   Recent Results (from the past 24 hour(s))   XR Chest Port 1 View    Narrative    XR PORTABLE CHEST ONE VIEW   2/22/2019 7:03 PM     HISTORY: Shortness of breath, fever.    COMPARISON: Chest x-ray from 6/26/2014.      Impression    IMPRESSION: Cardiomediastinal silhouette is normal. Pulmonary  vasculature is normal. Lungs are clear. No pleural fluid.    LISA RODRIGUEZ MD

## 2019-02-23 NOTE — PROVIDER NOTIFICATION
MD Notification    Person notified: Dr. Juarez    Person Name: Rosanna Kovacs RN    Date/Time: 2/22/19@7260    Interaction: Called    Purpose of Notification: Notified MD of low grade temperature and tachycardic and asked if blood transfusion should be started.    Orders Received: Start the blood transfusion and monitor vital signs.

## 2019-02-23 NOTE — PROVIDER NOTIFICATION
MD Notification    Person notified: Dr. Juarez    Person Name: Rosanna Kovacs RN    Date/Time: 2/22/19@2167    Interaction: Called    Purpose of Notification: Patient's temp increased and tachycardic.    Orders Received: Continue blood transfusion and put in order for hospitalist consult. Monitor vital signs.

## 2019-02-23 NOTE — CONSULTS
Consult Date:  02/22/2019      PRIMARY CARE PHYSICIAN:  Carly Grace DO      CONSULTING PHYSICIAN:  Efrain Juarez MD      REASON FOR CONSULTATION:  Medical co-management, concern for transfusion reaction versus underlying infection.      History is obtained from patient and chart review.      HISTORY OF PRESENT ILLNESS:  Josiane Dasilva is a 58-year-old female with past medical history of hypertension, anxiety, depression, alcohol abuse, fatty liver disease, morbid obesity and gout, who initially presented to outpatient GYN on 02/19/2019 for further evaluation of vaginal bleeding.  The patient reported PMB for about 3 months.  Her last menstrual period was 04/07/2014.  At that time she was noted to have a hemoglobin of 7.3 and she was prescribed progesterone to slow down her bleeding with plan for outpatient vaginal ultrasound.  She had a reevaluation in clinic on 02/22.  Unfortunately, she had not started her Provera until 02/21.  Her ultrasound 2/22 showed a myomatous uterus, possible submucous and intramural location of the fibroids.  The hemoglobin was rechecked and was noted to be 6.5.  The patient was symptomatic with complaints of shortness of breath, dizziness, lightheadedness, and palpitations and thus was registered to observation by Gynecology for a blood transfusion.  Two units of packed red blood cells were ordered.  The patient was noted to have a temperature of 100 degrees Fahrenheit prior to receiving the transfusion.  Her  fever slowly increased while receiving the transfusion with a T-max of 102.6 and a pulse of 113.  The patient also reported being quite nauseated.  Denied any recent viral illness, diarrhea, rash, dysuria chest pain, or abdominal pain.  No current vaginal bleeding.  Note history of alcohol abuse with fatty liver and gastric ulcer at the age of 14.  The patient does not use NSAIDs or aspirin because of prior GI bleed.  She last had a colonoscopy and EGD in 2014.  A 3-mm polyp  was found on colonoscopy, resected and retrieved and EGD showed grade 1 esophageal varices, a Billroth 1 anastomosis was found.  The patient denies any melena, hematochezia, hematemesis or hemoptysis.     The Hospitalist Service was consulted because of the ongoing temperature, to rule out infection versus transfusion reaction.  Labs were obtained which showed an unremarkable CMP, aside from a mildly elevated AST of 52 and an alkaline phosphatase of 190, lipase was within normal limits.  Lactic acid 1.9.  CBC with again a hemoglobin of 6.5.  WBC of 11.3, platelets of 134, MCV of 81.  Blood cultures are ordered and pending.  Chest x-ray was unremarkable for acute cardiopulmonary process.      REVIEW OF SYSTEMS:  A 10-point review of systems was performed and is otherwise negative unless specified in the HPI.      PAST MEDICAL HISTORY:   1.  Hypertension.   2.  Anxiety.   3.  Depression.   4.  Alcohol abuse.   5.  Fatty liver.   6.  Esophageal varices grade I noted on EGD in 2014.   7.  Gout.   8.  History of gastric ulcer at the age of 14.   9.  History of alcoholic hepatitis.      MEDICATIONS:    Medications Prior to Admission   Medication Sig Dispense Refill Last Dose     fexofenadine (ALLEGRA) 180 MG tablet Take 1 tablet (180 mg) by mouth daily (Patient taking differently: Take 180 mg by mouth daily as needed ) 90 tablet 3 unknown     gabapentin (NEURONTIN) 300 MG capsule Take 1 capsule (300 mg) by mouth At Bedtime 90 capsule 3 unknown     lisinopril (PRINIVIL/ZESTRIL) 10 MG tablet TAKE 1 TABLET (10 MG) BY MOUTH DAILY 90 tablet 3 2/21/2019 at Unknown time     medroxyPROGESTERone (PROVERA) 10 MG tablet Take 1 tablet (10 mg) by mouth daily for 7 days 21 tablet 0 Hasn't started yet     polyethylene glycol (MIRALAX) powder Take 17 g (1 capful) by mouth daily as needed for constipation 510 g 1 unknown     thiamine 100 MG tablet Take 1 tablet (100 mg) by mouth daily 90 tablet 3 unknown     order for DME Equipment being  ordered: Standard cane 1 each 0 Taking     order for DME Equipment being ordered: Incontinence pads 100 each 5 Taking      ALLERGIES:       Allergies   Allergen Reactions     Asa [Aspirin]      Stomach irritation (hx of PUD)     Nsaids      Stomach irritation (Hx of PUD)     PAST SURGICAL HISTORY:    Past Surgical History:   Procedure Laterality Date     APPENDECTOMY       COLONOSCOPY  8/13/2014    Procedure: COMBINED COLONOSCOPY, SINGLE BIOPSY/POLYPECTOMY BY BIOPSY;  Surgeon: Mike Mancuso MD;  Location:  GI     ESOPHAGOSCOPY, GASTROSCOPY, DUODENOSCOPY (EGD), COMBINED  8/12/2014    Procedure: COMBINED ESOPHAGOSCOPY, GASTROSCOPY, DUODENOSCOPY (EGD), BIOPSY SINGLE OR MULTIPLE;  Surgeon: Mike Mancuso MD;  Location:  GI     ORTHOPEDIC SURGERY      left ankle s/p bimalleolar fracture April 2014     OTHER SURGICAL HISTORY      Billroth I as teenager secondary to ulcer      SOCIAL HISTORY:  The patient is a former smoker, quit in 2005.  Drinks 3 beers per day with her last beer being in 02/21.      FAMILY HISTORY:  Father with hypertension.  Mother with thyroid disease.      LABORATORY DATA:  Reviewed Epic and noted in the HPI.      IMAGING:  Noted in the HPI.      PHYSICAL EXAMINATION:   VITAL SIGNS:  T 102.6, P 113, /47, RR 28, SpO2 100% on room air.   CONSTITUTIONAL: Pt laying in bed, dressed in hospital garb. Appears uncomfortable, nauseated, dry heaving.   HEENT: Normocephalic, atraumatic. Pupils equal, round, and reactive to light. Negative for conjunctival redness or scleral icterus.  Oral mucosa pink and moist; negative for ulcerations, erythema, or exudates.  Dentition in good repair.   CARDIOVASCULAR: RRR, no murmurs, rubs, or extra heart sounds appreciated. Pulses +2/4 and regular in upper and lower extremities, bilaterally.   RESPIRATORY: No increased work of breathing.  No supplemental oxygen at this time. CTA, bilat; no wheezes, rales, or rhonchi appreciated.  GASTROINTESTINAL:   Abdomen soft, non-distended. BS auscultated in all four quadrants. Negative for tenderness to percussion or light and deep palpation.  No masses or organomegaly noted.  MUSCULOSKELETAL: No gross deformities noted. Normal muscle tone.   HEMATOLOGIC/LYMPHATIC/IMMUNOLOGIC: Negative for lower extremity edema, bilaterally.  NEUROLOGIC: Alert and oriented to person, place, and time.  strength intact. No focal neuro deficits.   SKIN: Warm, dry, intact. No jaundice noted. Negative for suspicious lesions, rashes, bruising, open sores or abrasions.      ASSESSMENT AND PLAN:  Josiane Dasilva is a 58-year-old female with past medical history of hypertension, anxiety, depression, alcohol abuse, fatty liver, morbid obesity, gout and 3 months of postmenopausal bleeding, with findings of a myomatous uterus, registered under observation status by Gynecology on 02/22 for symptomatic anemia related to postmenopausal bleeding with a hemoglobin of 6.5.  The Hospitalist Service was consulted for fever with concern for transfusion reaction versus underlying undiagnosed infection.  T-max 102.6 degrees Fahrenheit and pulse 113.  Remainder of vitals within normal limits.  The patient is currently stable.      Postmenopausal bleeding  Subacute blood loss anemia, symptomatic:  The patient describes 3 months of PMB, seen by GYN on 02/19 with a hemoglobin of 7.3.  Prescribed Provera to slow down the bleeding, but did not start this until 02/21. Had transvaginal ultrasound which showed a myomatous uterus.  Hemoglobin rechecked and 6.5 in the clinic and patient was admitted for transfusion.  Two units of packed red blood cells were ordered.  A temperature of 100 degrees Fahrenheit was noted prior to initiation of the transfusion; however, the temperature slowly increased while the patient was receiving the transfusion, with of T-max 102.6.  The patient describes symptoms of her anemia including shortness of breath, palpitations, dizziness and  "lightheadedness.  She denies any melena, hematochezia, hemoptysis or hematemesis.  No abdominal pain, diarrhea, dysuria, rash, chest pain or recent viral illness.  CMP unremarkable aside from an alkaline phosphatase of 190 and an AST of 52.  Lipase within normal limits.  Lactic acid 1.9.  CBC with WBC of 11.3, platelet count of 134, hemoglobin 6.5, again with an MCV of 81.  Chest x-ray unremarkable for acute cardiopulmonary process. UA with large amount of blood, otherwise unremarkable.   -- Initial transfusion of 1 unit of packed red blood cells was stopped.  A transfusion reaction panel was sent to the blood bank.  An infectious workup has been initiated without clear evidence for infection.  At this time, we will plan to allow the  patient's fever to resolve.  The patient received a dose of Tylenol 650 mg around 17:56 and will receive another dose now.  Once the fever has improved in the next 4 hours, we will restart another unit of packed red blood cells, but will premedicate the patient with both Tylenol and Benadryl.   -- I discussed the case with Dr. Juarez.  Plan for patient to have close outpatient followup with GYN Onc early next week upon discharge.   -- Followup blood cultures.   -- Monitor fever curve closely.   -- IV fluids at 100 mL per hour.   -- Monitor for ongoing evidence of transfusion reaction  -- CBC and BMP In the AM     Hypertension:  Continue PTA lisinopril.      Alcohol abuse  Hx of esophageal varices, grade I (per EGD)  Fatty liver disease: Pt reports drinking three beers/night. Describes experiencing \"withdrawal\" at times, but unable to further describe the symptoms she experiences. No hx of withdrawal seizure. Last beer the day prior to admission.   -- Monitor for withdrawal     Bilateral deep venous thrombosis prophylaxis:  Encourage ambulation.      CODE STATUS:  Full code.      The patient was seen and assessed with Dr. Hernández of the Hospitalist Service, who agrees with the plan as " outlined above.         TYLER ORTEZ MD       As dictated by ANABELL YEE PA-C            D: 2019   T: 2019   MT: MAY      Name:     ETIENNE BUENROSTRO   MRN:      [id number]        Account:       [id number]   :      1960           Consult Date:  2019      Document: [id number]       cc: Efrain Juarez MD

## 2019-02-24 PROBLEM — D62 ACUTE ON CHRONIC BLOOD LOSS ANEMIA: Status: ACTIVE | Noted: 2019-02-24

## 2019-02-24 LAB
ABO + RH BLD: NORMAL
ABO + RH BLD: NORMAL
ANION GAP SERPL CALCULATED.3IONS-SCNC: 8 MMOL/L (ref 3–14)
BLD GP AB SCN SERPL QL: NORMAL
BLD PROD TYP BPU: NORMAL
BLD PROD TYP BPU: NORMAL
BLD UNIT ID BPU: 0
BLOOD BANK CMNT PATIENT-IMP: NORMAL
BLOOD PRODUCT CODE: NORMAL
BPU ID: NORMAL
BUN SERPL-MCNC: 11 MG/DL (ref 7–30)
CALCIUM SERPL-MCNC: 7.7 MG/DL (ref 8.5–10.1)
CHLORIDE SERPL-SCNC: 109 MMOL/L (ref 94–109)
CO2 SERPL-SCNC: 20 MMOL/L (ref 20–32)
CREAT SERPL-MCNC: 0.93 MG/DL (ref 0.52–1.04)
ERYTHROCYTE [DISTWIDTH] IN BLOOD BY AUTOMATED COUNT: 16.4 % (ref 10–15)
GFR SERPL CREATININE-BSD FRML MDRD: 67 ML/MIN/{1.73_M2}
GLUCOSE BLDC GLUCOMTR-MCNC: 103 MG/DL (ref 70–99)
GLUCOSE BLDC GLUCOMTR-MCNC: 103 MG/DL (ref 70–99)
GLUCOSE BLDC GLUCOMTR-MCNC: 87 MG/DL (ref 70–99)
GLUCOSE BLDC GLUCOMTR-MCNC: 91 MG/DL (ref 70–99)
GLUCOSE BLDC GLUCOMTR-MCNC: 93 MG/DL (ref 70–99)
GLUCOSE BLDC GLUCOMTR-MCNC: 97 MG/DL (ref 70–99)
GLUCOSE SERPL-MCNC: 93 MG/DL (ref 70–99)
HCT VFR BLD AUTO: 23.7 % (ref 35–47)
HGB BLD-MCNC: 7.6 G/DL (ref 11.7–15.7)
HGB BLD-MCNC: 8.8 G/DL (ref 11.7–15.7)
MCH RBC QN AUTO: 26.4 PG (ref 26.5–33)
MCHC RBC AUTO-ENTMCNC: 32.1 G/DL (ref 31.5–36.5)
MCV RBC AUTO: 82 FL (ref 78–100)
NUM BPU REQUESTED: 3
PLATELET # BLD AUTO: 112 10E9/L (ref 150–450)
POTASSIUM SERPL-SCNC: 4.1 MMOL/L (ref 3.4–5.3)
RBC # BLD AUTO: 2.88 10E12/L (ref 3.8–5.2)
SODIUM SERPL-SCNC: 137 MMOL/L (ref 133–144)
SPECIMEN EXP DATE BLD: NORMAL
TRANSFUSION STATUS PATIENT QL: NORMAL
TRANSFUSION STATUS PATIENT QL: NORMAL
TROPONIN I SERPL-MCNC: <0.015 UG/L (ref 0–0.04)
TROPONIN I SERPL-MCNC: <0.015 UG/L (ref 0–0.04)
WBC # BLD AUTO: 6.5 10E9/L (ref 4–11)

## 2019-02-24 PROCEDURE — 25000128 H RX IP 250 OP 636: Performed by: INTERNAL MEDICINE

## 2019-02-24 PROCEDURE — 96376 TX/PRO/DX INJ SAME DRUG ADON: CPT

## 2019-02-24 PROCEDURE — 93010 ELECTROCARDIOGRAM REPORT: CPT | Performed by: INTERNAL MEDICINE

## 2019-02-24 PROCEDURE — 25000132 ZZH RX MED GY IP 250 OP 250 PS 637: Performed by: OBSTETRICS & GYNECOLOGY

## 2019-02-24 PROCEDURE — 25000132 ZZH RX MED GY IP 250 OP 250 PS 637: Performed by: INTERNAL MEDICINE

## 2019-02-24 PROCEDURE — 99207 ZZC APP CREDIT; MD BILLING SHARED VISIT: CPT | Performed by: NURSE PRACTITIONER

## 2019-02-24 PROCEDURE — 80048 BASIC METABOLIC PNL TOTAL CA: CPT | Performed by: INTERNAL MEDICINE

## 2019-02-24 PROCEDURE — 84484 ASSAY OF TROPONIN QUANT: CPT | Performed by: NURSE PRACTITIONER

## 2019-02-24 PROCEDURE — 00000146 ZZHCL STATISTIC GLUCOSE BY METER IP

## 2019-02-24 PROCEDURE — 25800030 ZZH RX IP 258 OP 636: Performed by: OBSTETRICS & GYNECOLOGY

## 2019-02-24 PROCEDURE — 99231 SBSQ HOSP IP/OBS SF/LOW 25: CPT | Performed by: OBSTETRICS & GYNECOLOGY

## 2019-02-24 PROCEDURE — P9016 RBC LEUKOCYTES REDUCED: HCPCS | Performed by: OBSTETRICS & GYNECOLOGY

## 2019-02-24 PROCEDURE — 25000128 H RX IP 250 OP 636: Performed by: OBSTETRICS & GYNECOLOGY

## 2019-02-24 PROCEDURE — 93005 ELECTROCARDIOGRAM TRACING: CPT

## 2019-02-24 PROCEDURE — 25000132 ZZH RX MED GY IP 250 OP 250 PS 637: Performed by: PHYSICIAN ASSISTANT

## 2019-02-24 PROCEDURE — 85027 COMPLETE CBC AUTOMATED: CPT | Performed by: INTERNAL MEDICINE

## 2019-02-24 PROCEDURE — 36430 TRANSFUSION BLD/BLD COMPNT: CPT

## 2019-02-24 PROCEDURE — 12000000 ZZH R&B MED SURG/OB

## 2019-02-24 PROCEDURE — 84484 ASSAY OF TROPONIN QUANT: CPT | Performed by: INTERNAL MEDICINE

## 2019-02-24 PROCEDURE — 36415 COLL VENOUS BLD VENIPUNCTURE: CPT | Performed by: INTERNAL MEDICINE

## 2019-02-24 PROCEDURE — 96375 TX/PRO/DX INJ NEW DRUG ADDON: CPT

## 2019-02-24 PROCEDURE — 99233 SBSQ HOSP IP/OBS HIGH 50: CPT | Performed by: INTERNAL MEDICINE

## 2019-02-24 PROCEDURE — 85018 HEMOGLOBIN: CPT | Performed by: INTERNAL MEDICINE

## 2019-02-24 PROCEDURE — 25800030 ZZH RX IP 258 OP 636: Performed by: INTERNAL MEDICINE

## 2019-02-24 PROCEDURE — G0378 HOSPITAL OBSERVATION PER HR: HCPCS

## 2019-02-24 RX ORDER — TRANEXAMIC ACID 650 MG/1
1300 TABLET ORAL 3 TIMES DAILY
Status: DISCONTINUED | OUTPATIENT
Start: 2019-02-24 | End: 2019-02-28 | Stop reason: HOSPADM

## 2019-02-24 RX ORDER — SENNOSIDES 8.6 MG
1-2 TABLET ORAL 2 TIMES DAILY PRN
Status: DISCONTINUED | OUTPATIENT
Start: 2019-02-24 | End: 2019-02-28 | Stop reason: HOSPADM

## 2019-02-24 RX ORDER — MEDROXYPROGESTERONE ACETATE 10 MG
20 TABLET ORAL DAILY
Status: DISCONTINUED | OUTPATIENT
Start: 2019-02-24 | End: 2019-02-24

## 2019-02-24 RX ORDER — BISACODYL 10 MG
10 SUPPOSITORY, RECTAL RECTAL DAILY PRN
Status: DISCONTINUED | OUTPATIENT
Start: 2019-02-24 | End: 2019-02-28 | Stop reason: HOSPADM

## 2019-02-24 RX ORDER — MEDROXYPROGESTERONE ACETATE 10 MG
30 TABLET ORAL DAILY
Status: DISCONTINUED | OUTPATIENT
Start: 2019-02-24 | End: 2019-02-28 | Stop reason: HOSPADM

## 2019-02-24 RX ORDER — METHYLPREDNISOLONE SODIUM SUCCINATE 125 MG/2ML
125 INJECTION, POWDER, LYOPHILIZED, FOR SOLUTION INTRAMUSCULAR; INTRAVENOUS
Status: DISCONTINUED | OUTPATIENT
Start: 2019-02-24 | End: 2019-02-26

## 2019-02-24 RX ORDER — HYDRALAZINE HYDROCHLORIDE 20 MG/ML
10 INJECTION INTRAMUSCULAR; INTRAVENOUS EVERY 4 HOURS PRN
Status: DISCONTINUED | OUTPATIENT
Start: 2019-02-24 | End: 2019-02-28 | Stop reason: HOSPADM

## 2019-02-24 RX ORDER — DIPHENHYDRAMINE HYDROCHLORIDE 50 MG/ML
50 INJECTION INTRAMUSCULAR; INTRAVENOUS
Status: DISCONTINUED | OUTPATIENT
Start: 2019-02-24 | End: 2019-02-25

## 2019-02-24 RX ORDER — SENNOSIDES 8.6 MG
1-2 TABLET ORAL 2 TIMES DAILY PRN
Status: DISCONTINUED | OUTPATIENT
Start: 2019-02-24 | End: 2019-02-24

## 2019-02-24 RX ORDER — BENZONATATE 100 MG/1
100 CAPSULE ORAL 3 TIMES DAILY PRN
Status: DISCONTINUED | OUTPATIENT
Start: 2019-02-24 | End: 2019-02-28 | Stop reason: HOSPADM

## 2019-02-24 RX ADMIN — Medication 1 LOZENGE: at 12:11

## 2019-02-24 RX ADMIN — MEDROXYPROGESTERONE ACETATE 30 MG: 10 TABLET ORAL at 13:14

## 2019-02-24 RX ADMIN — IRON SUCROSE 300 MG: 20 INJECTION, SOLUTION INTRAVENOUS at 10:30

## 2019-02-24 RX ADMIN — ACETAMINOPHEN 650 MG: 325 TABLET, FILM COATED ORAL at 09:27

## 2019-02-24 RX ADMIN — TRANEXAMIC ACID 1300 MG: 650 TABLET ORAL at 22:12

## 2019-02-24 RX ADMIN — BENZONATATE 100 MG: 100 CAPSULE ORAL at 08:17

## 2019-02-24 RX ADMIN — SODIUM CHLORIDE 2.5 MILLION UNITS: 9 INJECTION, SOLUTION INTRAVENOUS at 12:11

## 2019-02-24 RX ADMIN — SODIUM CHLORIDE 2.5 MILLION UNITS: 9 INJECTION, SOLUTION INTRAVENOUS at 22:13

## 2019-02-24 RX ADMIN — Medication 1 LOZENGE: at 14:41

## 2019-02-24 RX ADMIN — SODIUM CHLORIDE 2.5 MILLION UNITS: 9 INJECTION, SOLUTION INTRAVENOUS at 18:50

## 2019-02-24 RX ADMIN — TRANEXAMIC ACID 1300 MG: 650 TABLET ORAL at 15:55

## 2019-02-24 RX ADMIN — TRANEXAMIC ACID 1300 MG: 650 TABLET ORAL at 08:03

## 2019-02-24 RX ADMIN — GABAPENTIN 300 MG: 300 CAPSULE ORAL at 22:13

## 2019-02-24 RX ADMIN — VANCOMYCIN HYDROCHLORIDE 2000 MG: 1 INJECTION, POWDER, LYOPHILIZED, FOR SOLUTION INTRAVENOUS at 03:10

## 2019-02-24 RX ADMIN — SODIUM CHLORIDE 2.5 MILLION UNITS: 9 INJECTION, SOLUTION INTRAVENOUS at 08:17

## 2019-02-24 ASSESSMENT — ACTIVITIES OF DAILY LIVING (ADL)
ADLS_ACUITY_SCORE: 13
ADLS_ACUITY_SCORE: 13

## 2019-02-24 NOTE — PROGRESS NOTES
Patient is A&Ox4. No complaints of pain. VSS on room air. Lung sounds diminished. Regular diet, good appetite - tolerated well today, no emesis. Voiding adequately. Bowel sounds active. PIV infusing Normal Saline@100mL/hr. Hgb 6.1 - 2 units of PRBC's transfused with no complications or transfusion reactions. Q4 blood glucose checks with sliding scale. Up with SBA, alarm on in bed. Plan: Discharge pending.

## 2019-02-24 NOTE — PROVIDER NOTIFICATION
MD Notification    Person notified: Dr. De La Cruz    Person Name: Rosannagina Kovacs RN    Date/Time: 2/24/19@6190    Interaction: Paged    Purpose of Notification: Requesting order for Preparation H.    Orders Received: Awaiting call back.

## 2019-02-24 NOTE — CODE/RAPID RESPONSE
[facility]    [facility] RRT Note  2/24/2019   Time Called: 0530    RRT called for: Chest tightness    Assessment & Plan     Midsternal chest tightness possibly 2/2 anxiety (history of anxiety, not on PTA medications) vs demand ischemia is setting of subacute blood loss anemia, will rule out ACS.  - Upon arrival, pt lying in bed, awake, alert, conversing with staff, in no apparent distress.  Pt reports new onset of midsternal chest tightness in sync with initiation of vancomycin.  Per reports no radiation, no associated SOB, nausea or diaphoresis.  No reports of GERD or pleuritic chest pain.  Pt endorses feeling anxious in setting of recent blood transfusion reaction.  Pt's VS noting NSR HR 80s,  initially, down to SBP 130s after calming measures utilized, afebrile, RR 10s, O2 sats 100% on RA.    INTERVENTIONS:  - Stat EKG  - Stat troponin  - Hydralazine 10 mg IV Q4H PRN for SBP >180    Discussed with and defer further cares to nursing and hospitalist.    Interval History     Josiane Dasilva is a 58 year old female who was admitted on 2/22/2019 for symptomatic anemia 2/2 postmenopausal bleeding.    Medical history significant for: HTN, anxiety, depression, alcohol use disorder, fatty liver, morbid obesity, gout    Code Status: Full Code    Doc Banks, KELLY, [facility]  House [facility]    Allergies   Allergies   Allergen Reactions     Asa [Aspirin]      Stomach irritation (hx of PUD)     Nsaids      Stomach irritation (Hx of PUD)     Physical Exam   Vital Signs with Ranges:  Temp:  [96.1  F (35.6  C)-98.2  F (36.8  C)] 97.5  F (36.4  C)  Pulse:  [] 86  Heart Rate:  [81-87] 82  Resp:  [16-18] 17  BP: (112-200)/(51-98) 156/71  SpO2:  [99 %-100 %] 100 %  I/O last 3 completed shifts:  In: 1088.25 [I.V.:1052]  Out: 1725 [Urine:1725]    Constitutional: Pt lying in bed, awake, alert, conversing with staff  Pulmonary: In no apparent respiratory distress, clear to auscultation bilaterally, no crackles or  wheezes noted  Cardiovascular: Regular rate and rhythm, normal S1S2, no murmur, rub or gallop noted  GI: Soft, nondistended, nontender  Skin/Integumen: Warm, dry  Neuro: A/Ox4, clear speech  Psych:  Appears slightly anxious  Extremities: Moves all extremities    Data     EKG:  Interpreted by Doc Banks  Time reviewed: 0541  Symptoms at time of EKG: Midsternal chest tightness   Rhythm: normal sinus   Rate: Normal  Axis: Normal  Ectopy: none  Conduction: normal  ST Segments/ T Waves: No ST-T wave changes and No acute ischemic changes  Q Waves: none  Comparison to prior: Now NSR from ST noted in 2014    Clinical Impression: no acute changes    Troponin:    Recent Labs   Lab Test 02/24/19  0550   TROPI <0.015     CBC with Diff:  Recent Labs   Lab Test 02/24/19  0550  08/26/14  1000   WBC 6.5   < > 4.6   HGB 7.6*   < > 11.6*   MCV 82   < > 101*   *   < > 179   INR  --   --  1.08    < > = values in this interval not displayed.      Comprehensive Metabolic Panel:  Recent Labs   Lab 02/24/19  0550  02/22/19  1837      < > 134   POTASSIUM 4.1   < > 4.4   CHLORIDE 109   < > 104   CO2 20   < > 22   ANIONGAP 8   < > 8   GLC 93   < > 116*   BUN 11   < > 7   CR 0.93   < > 0.96   GFRESTIMATED 67   < > 65   GFRESTBLACK 78   < > 75   JESSICA 7.7*   < > 8.2*   PROTTOTAL  --   --  6.7*   ALBUMIN  --   --  2.4*   BILITOTAL  --   --  0.9   ALKPHOS  --   --  190*   AST  --   --  52*   ALT  --   --  23    < > = values in this interval not displayed.     Time Spent on this Encounter   I spent 10 minutes on the unit/floor managing the care of Josiane Dasilva. Over 50% of my time was spent counseling the patient and/or coordinating care regarding services listed in this note.

## 2019-02-24 NOTE — PROVIDER NOTIFICATION
MD Notification    Notified Person: MD    Notified Person Name: Meng    Notification Date/Time: 0426 2/24/19    Notification Interaction: Phone Answering Service     Purpose of Notification: FYI- Microbiology lab called with results of rapid PCR test- back positive for streptococcus species     Orders Received: Wait for sensitivities to come back and rounding MD will adjust antibiotics as needed    Comments:

## 2019-02-24 NOTE — PROGRESS NOTES
[facility]    Hospitalist Progress Note    Assessment & Plan     Josiane Dasilva is a 58-year-old female with past medical history of hypertension, anxiety, depression, alcohol abuse, fatty liver, morbid obesity, gout and 3 months of postmenopausal bleeding, with findings of a myomatous uterus, registered under observation status by Gynecology on 02/22 for symptomatic anemia related to postmenopausal bleeding with a hemoglobin of 6.5.  The Hospitalist Service was consulted for fever with concern for transfusion reaction versus underlying undiagnosed infection.  T-max 102.6 degrees Fahrenheit and pulse 113.  Remainder of vitals within normal limits.  The patient is currently stable.      Postmenopausal bleeding  Subacute blood loss anemia, symptomatic:  The patient describes 3 months of PMB, seen by GYN on 02/19 with a hemoglobin of 7.3.  Prescribed Provera to slow down the bleeding, but did not start this until 02/21. Had transvaginal ultrasound which showed a myomatous uterus.  Hemoglobin rechecked and 6.5 in the clinic and patient was admitted for transfusion.  Two units of packed red blood cells were ordered.  A temperature of 100 degrees Fahrenheit was noted prior to initiation of the transfusion; however, the temperature slowly increased while the patient was receiving the transfusion, with of T-max 102.6.  The patient describes symptoms of her anemia including shortness of breath, palpitations, dizziness and lightheadedness.  She denies any melena, hematochezia, hemoptysis or hematemesis.  No abdominal pain, diarrhea, dysuria, rash, chest pain or recent viral illness.  CMP unremarkable aside from an alkaline phosphatase of 190 and an AST of 52.  Lipase within normal limits.  Lactic acid 1.9.  CBC with WBC of 11.3, platelet count of 134, hemoglobin 6.5, again with an MCV of 81.  Chest x-ray unremarkable for acute cardiopulmonary process. UA with large amount of blood, otherwise unremarkable.    -no infectious sxs or fever prior to admission  -- Initial transfusion of 1 unit of packed red blood cells was stopped.  A transfusion reaction panel was sent to the blood bank.  An infectious workup has been initiated without clear evidence for infection.  At this time, we will plan to allow the  patient's fever to resolve.  The patient received a dose of Tylenol 650 mg around 17:56 and will receive another dose now.  Once the fever has improved in the next 4 hours, we will restart another unit of packed red blood cells, but will premedicate the patient with both Tylenol and Benadryl.   -- partner discussed the case with Dr. Juarez.  Plan for patient to have close outpatient followup with GYN Onc early next week upon discharge.     -- Followup blood cultures.  Remains afebrile. No focal infectouis sxs  - 2nd attempt at blood transfusion pt had hypotension or routine monitoring in 80s/- but no sxs. No fever. -given 1L NS bolus with hypotension- resolved  - discussed case with on call pathologist at blood bank, Dr. Amina Grant. Transfusion reaction labs all normal.  Dr. Grant recommended transfusion but with different donor (both prior attempts with same donor) and transfuse slowly. Discussed with RN and will call blood bank to transfuse 2 units each over 4 hours and from different donor.   -s/p 2 units blood 2/23/19 with Hb rise to low 7 range.  -on call hospitalist called for + cx for gram positive cocci pairs/chairns on 2nd unit of blood pt reacted to but not complete (not complete given reaction) for strep and apparently + strep pcr.  Pt placed emperically on vancomycin  -no issues with 2 units blood patient transfused with on 2/23.   - Ferritin 27, iron 13. Will consider IV iron course.   -pt passed several blood clots over night. ? Still bleeding.   -bld cx ngtd.     Plan:   - discussed + blood cx on blood with dr. Grant of blood back who will get back to me on detials  - stop vancomycin. emperic PCN iV for  "now, may need to treat for 14 dasy.   -- Monitor fever curve closely.   --stop fluids   -transfuse 1 unit PRBCs today given passing clots and Hb not fully rise as expectd.   -1600Hb and am Hb  -gyn folllowing  -given low iron stores will give Venofir IV X 1 today.  Recommend outpatient IV iron in infusion center    addendem : per dr. Grant of bld bank 1/2 blood pt reacted to but not receive fully + cx of strep and pcr + for strep. Being cultured and ID and sensitivities back later today.  I will be updated.     Chest pain  RRT called this am. Nl troponin and ekg. Had transient substernal chest pain for about 45 minutes. ? Anxious about + cx on blood.   Resolved.   Follow. Follow bp and consider restarting ace inh  Check f/u troponin  Being transfused but doubt cardiac ischemia     Hypertension:   Good control.  Low bp last night with 2nd attempt of blood. Given fluid bolus 1LNS  - Continued PTA lisinopril but will hold for now. Reassess later today     Alcohol abuse  Hx of esophageal varices, grade I (per EGD)  Fatty liver disease: Pt reports drinking three beers/night. Describes experiencing \"withdrawal\" at times, but unable to further describe the symptoms she experiences. No hx of withdrawal seizure. Last beer the day prior to admission.   No signs etoh w/d currently.  - no signs etoh w/d during stay  -- Monitor for withdrawal      Bilateral deep venous thrombosis prophylaxis:  Encourage ambulation.      CODE STATUS:  Full code.         Melvin De La Cruz MD  Text Page  (7am to 6pm)  Interval History     Apparently cx on blood pt reacted to with gram positive cocci chains and pairs. See dr. Medina note. Given vancomycin  RRT this am for cp. Substernal. No sob or other sxs. Nl troponin and ekg  Resolved.     Feels a little stronger. Passed several moderate sized clots from vagina today.   Hb up a little with 2 units blood yesterday- no issues.     -Data reviewed today: I reviewed all new labs and imaging results over " the last 24 hours. I personally reviewed labs and imaging.  CXR negative for acute findings    Physical Exam   Temp: 97.5  F (36.4  C) Temp src: Oral BP: 144/76 Pulse: 98 Heart Rate: 82 Resp: 15 SpO2: 98 % O2 Device: None (Room air)    There were no vitals filed for this visit.  Vital Signs with Ranges  Temp:  [96.1  F (35.6  C)-98.2  F (36.8  C)] 97.5  F (36.4  C)  Pulse:  [81-98] 98  Heart Rate:  [81-87] 82  Resp:  [15-18] 15  BP: (112-200)/(51-98) 144/76  SpO2:  [98 %-100 %] 98 %  I/O last 3 completed shifts:  In: -   Out: 1225 [Urine:1225]    Constitutional: Nad, notoxix, appears comfortable  Respiratory: CTAB  HEENT; nl sclera, op, nl tongue  Cardiovascular: not tachy, RRR no r/g/m  GI: soft, nt, nd  Skin/Integumen: no rash or edema  Psych:  nl affect  Neuro: alert, nl speech and mentation    Medications       gabapentin  300 mg Oral At Bedtime     insulin aspart  1-6 Units Subcutaneous Q4H     iron sucrose (VENOFER) intermittent infusion  300 mg Intravenous Q72H     penicillin G potassium  2.5 Million Units Intravenous Q4H     tranexamic acid  1,300 mg Oral BID       Data   Recent Labs   Lab 02/24/19  0550 02/23/19  0654 02/22/19  1837   WBC 6.5 10.1 11.3*   HGB 7.6* 6.1* 6.5*   MCV 82 82 81   * 107* 134*    139 134   POTASSIUM 4.1 4.2 4.4   CHLORIDE 109 110* 104   CO2 20 21 22   BUN 11 8 7   CR 0.93 0.97 0.96   ANIONGAP 8 8 8   JESSICA 7.7* 7.6* 8.2*   GLC 93 107* 116*   ALBUMIN  --   --  2.4*   PROTTOTAL  --   --  6.7*   BILITOTAL  --   --  0.9   ALKPHOS  --   --  190*   ALT  --   --  23   AST  --   --  52*   LIPASE  --   --  262   TROPI <0.015  --   --        Imaging:   No results found for this or any previous visit (from the past 24 hour(s)).

## 2019-02-24 NOTE — PROGRESS NOTES
Patient is A&Ox4. VSS on room air. No complaints of pain. Lung sounds diminished. Regular diet, poor appetite. Voiding adequately. BS active. RRT called on night shift for chest tightness - EKG normal and trops negative. PIV infusing Normal Saline@75mL/hr. Hgb 7.6 today, 1 unit PRBC's infused this afternoon with no complications. Recheck hemoglobin scheduled for 1930. Up with SBA, calls appropriately. Dr. Johnson (on call for gyn) assessed patient today and coordinated care with hospitalist. Receiving IV penicillin and iron. Plan: Discharge pending. Plan for patient to have close outpatient follow up with Gyn Onc early next week upon discharge.

## 2019-02-24 NOTE — PROGRESS NOTES
MD Notification    Notified Person: MD    Notified Person Name: Meng     Notification Date/Time: 0124 2/24/19    Notification Interaction: Phone answering service     Purpose of Notification: FYI- microbiology lab found + BC from unit of transfused blood yesterday. Additional tests being done and they will call back. BC from pt taken yesterday morning NGTD     Orders Received: Orders to start Vanco, MD will discuss with patient and wait for additional results.     Comments:

## 2019-02-24 NOTE — PROVIDER NOTIFICATION
MD Notification    Person notified: Dr. Johnson    Person Name: Rosanna Kovacs RN    Date/Time: 2/24/19@1200    Interaction: Paged    Purpose of Notification: Requested for Dr. Johnson to call Dr. De La Cruz directly to discuss patient's care and plan.    Orders Received: N/A

## 2019-02-24 NOTE — PROGRESS NOTES
Connected with patient's admitting physician and patient was rx'd 30mg provera initially, not 10mg as expected. B/c she was just on obs status for <24 hours for transfusion it appears that this medication got inadvertently discontinued and is likely why the patient began to bleed again today.    Will reorder the 30mg provera and get her started on it.    Will connect with Dr. Roberto of gyn/onc to make sure that an outpt f/u plan is in place asap for this coming week.

## 2019-02-24 NOTE — PROVIDER NOTIFICATION
MD Notification    Person notified: Dr. Johnson (OB/Gyn on call)    Person Name: Rosanna Kovacs RN    Date/Time: 2/24/19@0376    Interaction: Called    Purpose of Notification: Inform MD that patient has been passing moderate sized clots today and to ask when she will be scheduled to have her surgery.    Orders Received: No call back. MD came in to see patient and adjusted medication.

## 2019-02-24 NOTE — PROGRESS NOTES
Gynecology Progress Note    S:Pt is now hospital day 3. She was placed on obs status Friday with a plan to transfuse and then d/c home with gyn/onc follow up as an outpatient asap. Patient received one unit of blood Friday and got hypotensive so 2nd unit delayed. With the second unit patient again got hypotensive and febrile. Transfused unit of blood was found to have strep bacteria in it. She subsequently received a third unit from a different donor yesterday and tolerated that fine however b/c of the bacteria identified she was started on vanco and now has been transitioned to pen V K.    Patient reports that in the last 3 yrs she has had at least one episode of bleeding per year. She had it last 1.5-2 months in the past but then would stop on its own. Never saw a gyn doctor about it and hasn't been to a gyn in years. Prior to menopause she had had heavy periods her whole life but never with clots.    When saw Dr. Juarez in clinic she had been feeling light headed and dizzy and hgb found to be 6.1 at it's emma. This AM she is 7.6. An U/S in the office on 2/22 showed a myomatous uterus with thickened EMS of 11.4mm. EMbx was not done at that point due to heavy and sx bleeding but plan per Dr. Juarez was that gyn/onc would contact patient to plan for hysterectomy and possible staging as high risk for malignancy. In the mean time provera daily.    She was started on provera to help control the bleeding. Didn't start it until 2/21 but states that until today her bleeding had mostly stopped. Then this morning she felt cramping and still does mildly, but then started to have some blood clots passing again. Doesn't feel dizzy or lightheaded like she had before though.    Patient also had some chest pain with one of her transfusions. EKG and troponins negative. Also a dry cough but had that prior to admission. CXR 2/22 was clear as well.    Patient has not had a fever in >24 hrs at this point.    O: 144/76, 98, temp  97.5, RR 15 and 98% o2 sats on room air.  BP range has been 110s-200/51-98.   CV:rrr, mild flow murmur  Lungs:CTA  Abdomen:soft, NT, no guarding or rebound    A/P 58 y.o with postmenopausal bleeding and sx anemia. Initially just on obs status for blood transfusion and then plan for outpt f/u with gyn/onc. However patient had a transfusion reaction and needing abx now due to isolated bacteria in one of the units of blood.    Patient's bleeding had improved but today has worsened again. Plan is one more unit of blood and will now need abx treatment for potentially up to 14 days.    Will plan to increase her provera to 20mg from 10mg to see if this will help to slow her bleeding. Do not expect it to completely stop her bleeding and this is just a temporizing measure as patient needs hysterectomy. D/t high risk of malignancy she will be best managed by gyn/onc rather than gynecology. Patient has not yet heard from the gyn/onc clinic as was the plan so will need to contact them asap tomorrow to make a plan for surgery with patient sooner rather than later.    If the provera does not provide adequate control we can certainly always try TXA orally. However given BMI, HTN, her other comorbidities and now hospitalization patient is high risk for VTE with TXA so will try to avoid unless very significant bleeding noted.    Appreciate hospitalist service management of patient's other medical issues.      Addendum: on review of patient's med list I see that she is already on TXA at BID dosing. This can and should be increased to TID since she is already on it and then close monitoring/prevention for VTE with either pneumoboots in bed or frequent ambulation.    Am not seeing provera on her med list but will start her on 20mg daily rather than 10mg

## 2019-02-24 NOTE — TELEPHONE ENCOUNTER
RECORDS STATUS - ALL OTHER DIAGNOSIS      *Scheduled via phone call from Larisa per Dr Roberto*    RECORDS RECEIVED FROM: [facility] - in [facility] (internal referral)   DATE RECEIVED: 2/22/19   NOTES STATUS DETAILS   OFFICE NOTE from referring provider 2/22/19 In [facility]     OFFICE NOTE from medical oncologist 2/22/19 In [facility]     DISCHARGE SUMMARY from hospital In [facility] In [facility]     DISCHARGE REPORT from the ER In [facility] In [facility]     OPERATIVE REPORT In [facility]     MEDICATION LIST In [facility]     CLINICAL TRIAL TREATMENTS TO DATE None None     LABS     PATHOLOGY REPORTS 8/13/14 In [facility]     ANYTHING RELATED TO DIAGNOSIS In Epic In Epic     GENONOMIC TESTING     TYPE: None None   IMAGING (NEED IMAGES & REPORT)     XRAYS 2/22/19 Chest In Epic   CT SCANS None None   MRI None None   MAMMO 5/30/17 In Epic   ULTRASOUND 2/22/19 Transvaginal In [facility]   PET None None

## 2019-02-24 NOTE — PHARMACY-VANCOMYCIN DOSING SERVICE
Pharmacy Vancomycin Initial Note  Date of Service 2019  Patient's  1960  58 year old, female    Indication: Bacteremia    Current estimated CrCl = CrCl cannot be calculated (Unknown ideal weight.).    Creatinine for last 3 days  2019:  6:37 PM Creatinine 0.96 mg/dL  2019:  6:54 AM Creatinine 0.97 mg/dL    Recent Vancomycin Level(s) for last 3 days  No results found for requested labs within last 72 hours.      Vancomycin IV Administrations (past 72 hours)      No vancomycin orders with administrations in past 72 hours.                Nephrotoxins and other renal medications (From now, onward)    Start     Dose/Rate Route Frequency Ordered Stop    19 0245  vancomycin (VANCOCIN) 2,000 mg in sodium chloride 0.9 % 500 mL intermittent infusion      2,000 mg  over 2 Hours Intravenous EVERY 12 HOURS 19 0244            Contrast Orders - past 72 hours (72h ago, onward)    None                Plan:  1.  Start vancomycin  2000 mg IV q12h.   2.  Goal Trough Level: 15-20 mg/L   3.  Pharmacy will check trough levels as appropriate in 1-3 Days.    4. Serum creatinine levels will be ordered daily for the first week of therapy and at least twice weekly for subsequent weeks.    5. [place] method utilized to dose vancomycin therapy: Method 1    Kvng Bradley

## 2019-02-24 NOTE — PROVIDER NOTIFICATION
MD Notification    Person notified: Dr. De La Cruz    Person Name: Rosannagina Kovacs RN    Date/Time: 2/24/19@4615    Interaction: Paged    Purpose of Notification: Pt requesting order for Senna.    Orders Received: New order for Senna entered.

## 2019-02-24 NOTE — PROVIDER NOTIFICATION
Brief update:    Paged re: positive blood culture from donor unit of transfused blood resulting in transfusion reaction (hypotension) last night (2nd unit). Patient had a reaction to attempted transfusion of first unit of blood as well (fever). Per nursing report, both units of blood were reportedly from the same donor source.    Note that patient had a temp of 100 F prior to transfusion, 100.4 at time of transfusion initiation (unclear if patient's temp vs resultant from transfusion).     Call from micro lab to nursing re: gram positive cocci in pairs and chains.     Discussed with blood bank. They are not aware of a system-wide protocol on similar instances for antibiotic treatment/duration.     Thus far, patient is improved, afebrile now, normalized blood pressures.     Discussed findings with patient including suspected bacteremia of donor unit(s).    Will place on vancomycin at this time given GPCs in donor blood. Pending verigene and culture finalization, will likely be able to reduce to oral regimen in the coming hours. If patient cultures remain negative, anticipate ~10 day course of antibiotics for transient bacteremia. Patient denies prior heart/valvular disease, no history of arthoplasty/implants.     Blood pathology will be following results.    Errol Meng MD  2:11 AM    ~10 minutes at bedside with patient discussing results and plan of action.     Patient complains of a cough present prior to admission and worse when laying on R side, but does not cough during our discussion, breath sounds clear on auscultation.    ADDENDUM: PCR w/ strep spp (no further details, and through verbal report).  Anticipate transition to PCN agent later this AM, but at this time I am unable to view these reported results confirming strep infection of donor blood. Covered with vancomycin with next dose not due until ~2pm, but bacteriocidal agent would be preferred.

## 2019-02-25 ENCOUNTER — PRE VISIT (OUTPATIENT)
Dept: ONCOLOGY | Facility: CLINIC | Age: 59
End: 2019-02-25

## 2019-02-25 LAB
ERYTHROCYTE [DISTWIDTH] IN BLOOD BY AUTOMATED COUNT: 16.5 % (ref 10–15)
GLUCOSE BLDC GLUCOMTR-MCNC: 113 MG/DL (ref 70–99)
GLUCOSE BLDC GLUCOMTR-MCNC: 80 MG/DL (ref 70–99)
GLUCOSE BLDC GLUCOMTR-MCNC: 88 MG/DL (ref 70–99)
GLUCOSE BLDC GLUCOMTR-MCNC: 90 MG/DL (ref 70–99)
HCT VFR BLD AUTO: 28.4 % (ref 35–47)
HGB BLD-MCNC: 8.8 G/DL (ref 11.7–15.7)
INTERPRETATION ECG - MUSE: NORMAL
MCH RBC QN AUTO: 25.8 PG (ref 26.5–33)
MCHC RBC AUTO-ENTMCNC: 31 G/DL (ref 31.5–36.5)
MCV RBC AUTO: 83 FL (ref 78–100)
PLATELET # BLD AUTO: 116 10E9/L (ref 150–450)
RBC # BLD AUTO: 3.41 10E12/L (ref 3.8–5.2)
TRANSF REACT PLASRBC-IMP: NORMAL
TRANSF REACT PLASRBC-IMP: NORMAL
WBC # BLD AUTO: 7.9 10E9/L (ref 4–11)

## 2019-02-25 PROCEDURE — 87040 BLOOD CULTURE FOR BACTERIA: CPT | Performed by: INTERNAL MEDICINE

## 2019-02-25 PROCEDURE — 25000128 H RX IP 250 OP 636: Performed by: INTERNAL MEDICINE

## 2019-02-25 PROCEDURE — 00000146 ZZHCL STATISTIC GLUCOSE BY METER IP

## 2019-02-25 PROCEDURE — 36415 COLL VENOUS BLD VENIPUNCTURE: CPT | Performed by: INTERNAL MEDICINE

## 2019-02-25 PROCEDURE — 85027 COMPLETE CBC AUTOMATED: CPT | Performed by: INTERNAL MEDICINE

## 2019-02-25 PROCEDURE — 25800030 ZZH RX IP 258 OP 636: Performed by: INTERNAL MEDICINE

## 2019-02-25 PROCEDURE — 25000132 ZZH RX MED GY IP 250 OP 250 PS 637: Performed by: INTERNAL MEDICINE

## 2019-02-25 PROCEDURE — 25000132 ZZH RX MED GY IP 250 OP 250 PS 637: Performed by: OBSTETRICS & GYNECOLOGY

## 2019-02-25 PROCEDURE — 99207 ZZC CDG-MDM COMPONENT: MEETS LOW - DOWN CODED: CPT | Performed by: INTERNAL MEDICINE

## 2019-02-25 PROCEDURE — 12000000 ZZH R&B MED SURG/OB

## 2019-02-25 PROCEDURE — 99232 SBSQ HOSP IP/OBS MODERATE 35: CPT | Performed by: INTERNAL MEDICINE

## 2019-02-25 RX ORDER — DIPHENHYDRAMINE HYDROCHLORIDE 50 MG/ML
50 INJECTION INTRAMUSCULAR; INTRAVENOUS
Status: DISCONTINUED | OUTPATIENT
Start: 2019-02-25 | End: 2019-02-28 | Stop reason: HOSPADM

## 2019-02-25 RX ORDER — LISINOPRIL 5 MG/1
5 TABLET ORAL DAILY
Status: DISCONTINUED | OUTPATIENT
Start: 2019-02-25 | End: 2019-02-26

## 2019-02-25 RX ORDER — METHYLPREDNISOLONE SODIUM SUCCINATE 125 MG/2ML
125 INJECTION, POWDER, LYOPHILIZED, FOR SOLUTION INTRAMUSCULAR; INTRAVENOUS
Status: DISCONTINUED | OUTPATIENT
Start: 2019-02-25 | End: 2019-02-28 | Stop reason: HOSPADM

## 2019-02-25 RX ORDER — PIPERACILLIN SODIUM, TAZOBACTAM SODIUM 3; .375 G/15ML; G/15ML
3.38 INJECTION, POWDER, LYOPHILIZED, FOR SOLUTION INTRAVENOUS EVERY 6 HOURS
Status: DISCONTINUED | OUTPATIENT
Start: 2019-02-25 | End: 2019-02-28

## 2019-02-25 RX ADMIN — LISINOPRIL 5 MG: 5 TABLET ORAL at 16:36

## 2019-02-25 RX ADMIN — SODIUM CHLORIDE 2.5 MILLION UNITS: 9 INJECTION, SOLUTION INTRAVENOUS at 05:34

## 2019-02-25 RX ADMIN — PIPERACILLIN SODIUM,TAZOBACTAM SODIUM 3.38 G: 3; .375 INJECTION, POWDER, FOR SOLUTION INTRAVENOUS at 08:27

## 2019-02-25 RX ADMIN — TRANEXAMIC ACID 1300 MG: 650 TABLET ORAL at 16:36

## 2019-02-25 RX ADMIN — PIPERACILLIN SODIUM,TAZOBACTAM SODIUM 3.38 G: 3; .375 INJECTION, POWDER, FOR SOLUTION INTRAVENOUS at 21:09

## 2019-02-25 RX ADMIN — SODIUM CHLORIDE 2.5 MILLION UNITS: 9 INJECTION, SOLUTION INTRAVENOUS at 01:25

## 2019-02-25 RX ADMIN — PIPERACILLIN SODIUM,TAZOBACTAM SODIUM 3.38 G: 3; .375 INJECTION, POWDER, FOR SOLUTION INTRAVENOUS at 14:50

## 2019-02-25 RX ADMIN — TRANEXAMIC ACID 1300 MG: 650 TABLET ORAL at 08:30

## 2019-02-25 RX ADMIN — TRANEXAMIC ACID 1300 MG: 650 TABLET ORAL at 21:14

## 2019-02-25 RX ADMIN — Medication 1 LOZENGE: at 17:24

## 2019-02-25 RX ADMIN — GABAPENTIN 300 MG: 300 CAPSULE ORAL at 21:14

## 2019-02-25 RX ADMIN — MEDROXYPROGESTERONE ACETATE 30 MG: 10 TABLET ORAL at 08:30

## 2019-02-25 RX ADMIN — Medication 1 LOZENGE: at 21:14

## 2019-02-25 ASSESSMENT — ACTIVITIES OF DAILY LIVING (ADL)
ADLS_ACUITY_SCORE: 13

## 2019-02-25 NOTE — PROGRESS NOTES
[facility]    Hospitalist Progress Note    Assessment & Plan     Josiane Dasilva is a 58-year-old female with past medical history of hypertension, anxiety, depression, alcohol abuse, fatty liver, morbid obesity, gout and 3 months of postmenopausal bleeding, with findings of a myomatous uterus, registered under observation status by Gynecology on 02/22 for symptomatic anemia related to postmenopausal bleeding with a hemoglobin of 6.5.  The Hospitalist Service was consulted for fever with concern for transfusion reaction versus underlying undiagnosed infection.  T-max 102.6 degrees Fahrenheit and pulse 113.  Remainder of vitals within normal limits.  The patient is currently stable.      Postmenopausal bleeding  Subacute blood loss anemia, symptomatic:  The patient describes 3 months of PMB, seen by GYN on 02/19 with a hemoglobin of 7.3.  Prescribed Provera to slow down the bleeding, but did not start this until 02/21. Had transvaginal ultrasound which showed a myomatous uterus.  Hemoglobin rechecked and 6.5 in the clinic and patient was admitted for transfusion.  Two units of packed red blood cells were ordered.  A temperature of 100 degrees Fahrenheit was noted prior to initiation of the transfusion; however, the temperature slowly increased while the patient was receiving the transfusion, with of T-max 102.6.  The patient describes symptoms of her anemia including shortness of breath, palpitations, dizziness and lightheadedness.  She denies any melena, hematochezia, hemoptysis or hematemesis.  No abdominal pain, diarrhea, dysuria, rash, chest pain or recent viral illness.  CMP unremarkable aside from an alkaline phosphatase of 190 and an AST of 52.  Lipase within normal limits.  Lactic acid 1.9.  CBC with WBC of 11.3, platelet count of 134, hemoglobin 6.5, again with an MCV of 81.  Chest x-ray unremarkable for acute cardiopulmonary process. UA with large amount of blood, otherwise unremarkable.    -no infectious sxs or fever prior to admission  -- Initial transfusion of 1 unit of packed red blood cells was stopped.  A transfusion reaction panel was sent to the blood bank.  An infectious workup has been initiated without clear evidence for infection.  At this time, we will plan to allow the  patient's fever to resolve.  The patient received a dose of Tylenol 650 mg around 17:56 and will receive another dose now.  Once the fever has improved in the next 4 hours, we will restart another unit of packed red blood cells, but will premedicate the patient with both Tylenol and Benadryl.   -- partner discussed the case with Dr. Juarez.  Plan for patient to have close outpatient followup with GYN Onc early next week upon discharge.     -- Followup blood cultures.  Remains afebrile. No focal infectouis sxs  - 2nd attempt at blood transfusion pt had hypotension or routine monitoring in 80s/- but no sxs. No fever. -given 1L NS bolus with hypotension- resolved  - discussed case with on call pathologist at blood bank, Dr. Amina Grant. Transfusion reaction labs all normal.  Dr. Grant recommended transfusion but with different donor (both prior attempts with same donor) and transfuse slowly. Discussed with RN and will call blood bank to transfuse 2 units each over 4 hours and from different donor.   -s/p 2 units blood 2/23/19 with Hb rise to low 7 range.  -on call hospitalist called for + cx for gram positive cocci pairs/chairns on 2nd unit of blood pt reacted to but not complete (not complete given reaction) for strep and apparently + strep pcr.  Pt placed emperically on vancomycin  -no issues with 2 units blood patient transfused with on 2/23.   - Ferritin 27, iron 13. Will consider IV iron course.   -pt passed several blood clots 2/23  - per dr. Grant of d bank 1/2 blood pt reacted to but not receive fully + cx of strep and pcr + for strep. Being cultured and ID and sensitivities back later today.  I will be updated.  "  -given venofer 2/24    Hb stable in 8 range following 3 rd unit blood 2/24.   Vaginal bleeding stopped.     Plan:   -2nd and last iv venofer 72 hours after first dose on 2/24.   -stop PCN, start zosyn given Gram neg rods on cx of 2/22  - ID consulted -a gree with zosyn  - follow in hospital at least until this Wednesday  -- Monitor fever curve closely.   -am Hb  -pt to follow up with gyn/onc    Gram negative bacteremia  +strep PCR on blood sample from 2nd transfusion attempt  pcr on blood sample from 2nd blood transfusion attempt + PCR for strep but blood cultures without strep- not need to cover per ID.   Need to cover Gram negative rods on blood cx per ID  On blood cx 2/22  Pt had fever of 100 before initial blood transfusion.   No clear infectious sxs. Some cough. Lungs clear. CXR negative.   No abd sxs. Mild elevation in alk phos and ast.  ++ etoh use daily  Wbc 11---> normalized 6-7 range.     Plan:   ID consulted, agree with zosyn and follow bx  Needs to remain in hospital until at least Wednesday  -zosyn  - stopped pcn  - f/u  bld cx  -am labs, am lfts    Chest pain  RRT called this am. Nl troponin and ekg. Had transient substernal chest pain for about 45 minutes. ? Anxious about + cx on blood.   seriel troponins negative.   Resolved. No recurrence  Follow.        Hypertension:   Good control.   - restart lisinopril but at lower dose     Alcohol abuse  Hx of esophageal varices, grade I (per EGD)  Fatty liver disease: Pt reports drinking three beers/night. Describes experiencing \"withdrawal\" at times, but unable to further describe the symptoms she experiences. No hx of withdrawal seizure. Last beer the day prior to admission.   No signs etoh w/d currently.  - no signs etoh w/d during stay  -- Monitor for withdrawal   -drinks beers per day, declines chem dep  - needs new PCP-will have care coordinator arrange  - discussed naltrexone     Bilateral deep venous thrombosis prophylaxis:  Encourage ambulation.    "   CODE STATUS:  Full code.         Melvin De La Cruz MD  Text Page  (7am to 6pm)  Interval History     Seen by ID  Gram negative rods growing from 2nd unit pt not tolerate.     Feeling much better, fatigue and sob much improved/resolved.   No n/v/d/abd pain,   occ cough  No vaginal discharge recently.  No dental pain or sinus pain.     -Data reviewed today: I reviewed all new labs and imaging results over the last 24 hours. I personally reviewed labs and imaging.  CXR negative for acute findings    Physical Exam   Temp: 97.7  F (36.5  C) Temp src: Oral BP: 149/78 Pulse: 81 Heart Rate: 81 Resp: 16 SpO2: 100 % O2 Device: None (Room air)    There were no vitals filed for this visit.  Vital Signs with Ranges  Temp:  [96.4  F (35.8  C)-97.7  F (36.5  C)] 97.7  F (36.5  C)  Pulse:  [81] 81  Heart Rate:  [53-81] 81  Resp:  [16-18] 16  BP: (129-149)/(58-78) 149/78  SpO2:  [98 %-100 %] 100 %  I/O last 3 completed shifts:  In: -   Out: 1000 [Urine:1000]    Constitutional: Nad, notoxix, appears comfortable  Respiratory: CTAB  HEENT; nl sclera, op, nl tongue  Cardiovascular: not tachy, RRR no r/g/m  GI: soft, nt, nd  Skin/Integumen: no rash or edema  Psych:  nl affect  Neuro: alert, nl speech and mentation    Medications       gabapentin  300 mg Oral At Bedtime     insulin aspart  1-6 Units Subcutaneous Q4H     iron sucrose (VENOFER) intermittent infusion  300 mg Intravenous Q72H     lisinopril  5 mg Oral Daily     medroxyPROGESTERone  30 mg Oral Daily     piperacillin-tazobactam  3.375 g Intravenous Q6H     tranexamic acid  1,300 mg Oral TID       Data   Recent Labs   Lab 02/25/19  0656 02/24/19 2003 02/24/19  1142 02/24/19  0550 02/23/19  0654 02/22/19  1837   WBC 7.9  --   --  6.5 10.1 11.3*   HGB 8.8* 8.8*  --  7.6* 6.1* 6.5*   MCV 83  --   --  82 82 81   *  --   --  112* 107* 134*   NA  --   --   --  137 139 134   POTASSIUM  --   --   --  4.1 4.2 4.4   CHLORIDE  --   --   --  109 110* 104   CO2  --   --   --  20  21 22   BUN  --   --   --  11 8 7   CR  --   --   --  0.93 0.97 0.96   ANIONGAP  --   --   --  8 8 8   JESSICA  --   --   --  7.7* 7.6* 8.2*   GLC  --   --   --  93 107* 116*   ALBUMIN  --   --   --   --   --  2.4*   PROTTOTAL  --   --   --   --   --  6.7*   BILITOTAL  --   --   --   --   --  0.9   ALKPHOS  --   --   --   --   --  190*   ALT  --   --   --   --   --  23   AST  --   --   --   --   --  52*   LIPASE  --   --   --   --   --  262   TROPI  --   --  <0.015 <0.015  --   --        Imaging:   No results found for this or any previous visit (from the past 24 hour(s)).

## 2019-02-25 NOTE — PROVIDER NOTIFICATION
Provider    Brief update:    Paged re: GNR in blood cultured from initial blood culture 2/22 (after initial attempt at 1st unit PRBC).    Clinically, seems to fit with patient's presentation with  issue and subsequent fever (febrile even as initial transfusion was initiated). Atypical in that it has taken 2 days for this culture to become positive despite no anti-infectives initially given.     GNR not consistent with GPCs (Strep) noted on 2nd unit of donor blood (further testing in process)..    Patient is on penicillin for strep in donor blood. Would have variable resistance patterns to possible GNR species. Will await verigene later tonight for prelim speciation. As patient is vitally stable and afebrile, am not changing from penicillin at this time; can certainly broaden coverage if patient becomes clinically ill or based on verigene analysis.    Errol Meng MD  10:38 PM

## 2019-02-25 NOTE — PROGRESS NOTES
S: Patient says bleeding is back to very light, not passing clots anymore   Patient anxious for discharge    O: /58 (BP Location: Right arm)   Pulse 81   Temp 97.1  F (36.2  C) (Oral)   Resp 18   LMP 04/07/2014   SpO2 98%     abd  Mild suprapubic tenderness                Hemoglobin   Date Value Ref Range Status   02/25/2019 8.8 (L) 11.7 - 15.7 g/dL Final            Lab Results   Component Value Date    RH Pos 02/23/2019       A: Hosp day 4, received iv venofer for anemia      Blood culture + gram negative rods      Donor unit of blood initial had + strep      Afebrile in last 24 hrs       No current tachycardia         P: Patient needs to see gyn oncology as outpatient for surgical office emb since malignancy risk for uterine ca is high at her age ( Dr Naz Roberto)   Dr Johnson sent Dr Roberto message yesterday as patient originally was supposed to see her today in clinic  Hospitalist service managing her infection and anemia    Has been on iv pcn G here  Continue provera  30 mg po daily and lysteda 1300 mg tid po    We received message this am from Dr Roberto ( Gyn Onc)  She will see patient as an outpatient on March 5 in clinic  Dr Roberto is gone until then since patient was unable to see her this am  Dr Roberto office will contact patient to schedule her appt

## 2019-02-25 NOTE — PROVIDER NOTIFICATION
MD Notification    Notified Person: MD    Notified Person Name:  Dr. Meng    Notification Date/Time:  2/24/19  0970    Notification Interaction: telephone    Purpose of Notification:   + Blood cultures gram negative rods in L arm from 2/22/19    Orders Received:  Continue with current antibiotics and monitor.    Comments:

## 2019-02-25 NOTE — CONSULTS
[facility]    Infectious Disease Consultation     Date of Admission:  2/22/2019  Date of Consult (When I saw the patient): 02/25/19    Assessment & Plan   Josiane Dasilva is a 58 year old female who was admitted on 2/22/2019.     Impression:  1. 58 y.o female with alcohol abuse history.   2. HTN.   3. Anxiety and depression.   4. Admitted with acute anemia related to postmenopausal uterine bleeding, low back pain and fatigue.   5. Fever before the blood transfusion with reports of strep species in the donor blood, with her own blood cultures coming back positive for GNR, no strep in patients blood cultures.   6. Currently on zosyn.     Recommendations:   Follow up on the patient`s blood cultures for ID and TIFFANIE on the GNR.   Continue on zosyn for now.   Discussed with Dr. De La Cruz.       Silvia Freeman MD    Reason for Consult   Reason for consult: I was asked by Dr. De La Cruz  to evaluate this patient for bacteremia. .    Primary Care Physician   Carly Grace    Chief Complaint   Low back pain   Fatigue     History is obtained from the patient and medical records    History of Present Illness   Josiane Dasilva is a 58 year old female with hypertension, anxiety, depression, alcohol abuse, fatty liver, morbid obesity, gout and 3 months of postmenopausal bleeding, with findings of a myomatous uterus, registered under observation status by Gynecology on 02/22 for symptomatic anemia related to postmenopausal bleeding with a hemoglobin of 6.5.  then had a fever with blood transfusion, ? Reports of GPC in pairs and chains from the donor blood. Patients blood cultures positive for GNR, pending ID, currently on zosyn and afebrile now.         Past Medical History   I have reviewed this patient's medical history and updated it with pertinent information if needed.   Past Medical History:   Diagnosis Date     Alcohol abuse      Anxiety      Depressive disorder      Elevated uric acid in blood      Fatty liver       Hypertension      Normal cardiac stress test June 2014    Lexiscan     Obesity      Pancytopenia (H)      Stomach ulcer     As a teenager in [place] (s/p [place]roth I)     Tubular adenoma of colon April 2012; Aug 2014    Recommend q3 yr colonoscopies     Vitamin D deficiency        Past Surgical History   I have reviewed this patient's surgical history and updated it with pertinent information if needed.  Past Surgical History:   Procedure Laterality Date     APPENDECTOMY       COLONOSCOPY  8/13/2014    Procedure: COMBINED COLONOSCOPY, SINGLE BIOPSY/POLYPECTOMY BY BIOPSY;  Surgeon: Mike Mancuso MD;  Location:  GI     ESOPHAGOSCOPY, GASTROSCOPY, DUODENOSCOPY (EGD), COMBINED  8/12/2014    Procedure: COMBINED ESOPHAGOSCOPY, GASTROSCOPY, DUODENOSCOPY (EGD), BIOPSY SINGLE OR MULTIPLE;  Surgeon: Mike Mancuso MD;  Location:  GI     ORTHOPEDIC SURGERY      left ankle s/p bimalleolar fracture April 2014     OTHER SURGICAL HISTORY      Billroth I as teenager secondary to ulcer       Prior to Admission Medications   Prior to Admission Medications   Prescriptions Last Dose Informant Patient Reported? Taking?   fexofenadine (ALLEGRA) 180 MG tablet unknown Self No Yes   Sig: Take 1 tablet (180 mg) by mouth daily   Patient taking differently: Take 180 mg by mouth daily as needed    gabapentin (NEURONTIN) 300 MG capsule unknown Self No Yes   Sig: Take 1 capsule (300 mg) by mouth At Bedtime   lisinopril (PRINIVIL/ZESTRIL) 10 MG tablet 2/21/2019 at Unknown time Self No Yes   Sig: TAKE 1 TABLET (10 MG) BY MOUTH DAILY   medroxyPROGESTERone (PROVERA) 10 MG tablet Hasn't started yet Self No Yes   Sig: Take 1 tablet (10 mg) by mouth daily for 7 days   order for DME   No No   Sig: Equipment being ordered: Standard cane   order for DME   No No   Sig: Equipment being ordered: Incontinence pads   polyethylene glycol (MIRALAX) powder unknown Self No Yes   Sig: Take 17 g (1 capful) by mouth daily as needed for  constipation   thiamine 100 MG tablet unknown Self No Yes   Sig: Take 1 tablet (100 mg) by mouth daily      Facility-Administered Medications: None     Allergies   Allergies   Allergen Reactions     Asa [Aspirin]      Stomach irritation (hx of PUD)     Nsaids      Stomach irritation (Hx of PUD)       Immunization History   Immunization History   Administered Date(s) Administered     Influenza (IIV3) PF 02/07/2013     Influenza Not Indicated - By Hx 11/04/2014     Tdap (Adacel,Boostrix) 06/03/2011       Social History   I have reviewed this patient's social history and updated it with pertinent information if needed. Josiane Dasilva  reports that she quit smoking about 14 years ago. She smoked 0.00 packs per day for 10.00 years. she has never used smokeless tobacco. She reports that she drinks alcohol. She reports that she does not use drugs.    Family History   I have reviewed this patient's family history and updated it with pertinent information if needed.   Family History   Problem Relation Age of Onset     Thyroid Disease Mother      Hypertension Father      Thyroid Disease Sister        Review of Systems   The 10 point Review of Systems is negative other than noted in the HPI or here.     Physical Exam   Temp: 96.4  F (35.8  C) Temp src: Oral BP: 142/58 Pulse: 81 Heart Rate: 53 Resp: 18 SpO2: 100 % O2 Device: None (Room air)    Vital Signs with Ranges  Temp:  [96.4  F (35.8  C)-97.1  F (36.2  C)] 96.4  F (35.8  C)  Pulse:  [75-81] 81  Heart Rate:  [53-77] 53  Resp:  [16-18] 18  BP: (129-166)/(58-74) 142/58  SpO2:  [98 %-100 %] 100 %  0 lbs 0 oz  There is no height or weight on file to calculate BMI.    GENERAL APPEARANCE:  alert and no distress  EYES: Eyes grossly normal to inspection, PERRL and conjunctivae and sclerae normal  HENT: ear canals and TM's normal and nose and mouth without ulcers or lesions  NECK: no adenopathy, no asymmetry, masses, or scars and thyroid normal to palpation  RESP: lungs clear to  auscultation - no rales, rhonchi or wheezes  CV: regular rates and rhythm, normal S1 S2, no S3 or S4 and no murmur, click or rub  LYMPHATICS: normal ant/post cervical and supraclavicular nodes  ABDOMEN: soft, nontender, without hepatosplenomegaly or masses and bowel sounds normal  MS: extremities normal- no gross deformities noted  SKIN: no suspicious lesions or rashes      Data   Lab Results   Component Value Date    WBC 7.9 02/25/2019    HGB 8.8 (L) 02/25/2019    HCT 28.4 (L) 02/25/2019     (L) 02/25/2019     02/24/2019    POTASSIUM 4.1 02/24/2019    CHLORIDE 109 02/24/2019    CO2 20 02/24/2019    BUN 11 02/24/2019    CR 0.93 02/24/2019    GLC 93 02/24/2019    TROPI <0.015 02/24/2019    AST 52 (H) 02/22/2019    ALT 23 02/22/2019    ALKPHOS 190 (H) 02/22/2019    BILITOTAL 0.9 02/22/2019    INR 1.08 08/26/2014     Recent Labs   Lab 02/23/19  0653 02/22/19 2045 02/22/19 1841 02/22/19  1836   CULT No growth after 2 days 10,000 to 50,000 colonies/mL  mixed urogenital laly  Susceptibility testing not routinely done   No growth after 3 days Cultured on the 2nd day of incubation:  Gram negative rods  *  Critical Value/Significant Value, preliminary result only, called to and read back by   SARAI GEORGE RN @7967 2/24/19. CT    (Note)  NEGATIVE for the following organisms and resistance markers:  Acinetobacter sp., Citrobacter sp., Enterobacter sp., Proteus sp., E.  coli, K. pneumoniae/oxytoca, P. aeruginosa, CTX-M, KPC, NDM, VIM, IMP  and OXA by [employer] multiplex nucleic acid test. Final identification  and antimicrobial susceptibility testing will be verified by standard  methods.    Critical Value/Significant Value called to and read back by Yennifer George RN  2.25.19 FRANTZ.         Recent Labs   Lab Test 02/23/19  0653 02/22/19 2045 02/22/19 1841 02/22/19  1836 08/12/14  0841 08/11/14  1900   CULT No growth after 2 days 10,000 to 50,000 colonies/mL  mixed urogenital laly  Susceptibility  testing not routinely done   No growth after 3 days Cultured on the 2nd day of incubation:  Gram negative rods  *  Critical Value/Significant Value, preliminary result only, called to and read back by   SARAI GEORGE RN @2217 2/24/19. CT    (Note)  NEGATIVE for the following organisms and resistance markers:  Acinetobacter sp., Citrobacter sp., Enterobacter sp., Proteus sp., E.  coli, K. pneumoniae/oxytoca, P. aeruginosa, CTX-M, KPC, NDM, VIM, IMP  and OXA by [employer] multiplex nucleic acid test. Final identification  and antimicrobial susceptibility testing will be verified by standard  methods.    Critical Value/Significant Value called to and read back by Yennifer George, RN  2.25.19 FRANTZ.     10,000 to 50,000 colonies/mL mixed urogenital laly 50,000 to 100,000 colonies/mL mixed urogenital laly

## 2019-02-26 LAB
ALBUMIN SERPL-MCNC: 2.1 G/DL (ref 3.4–5)
ALP SERPL-CCNC: 145 U/L (ref 40–150)
ALT SERPL W P-5'-P-CCNC: 18 U/L (ref 0–50)
ANION GAP SERPL CALCULATED.3IONS-SCNC: 8 MMOL/L (ref 3–14)
AST SERPL W P-5'-P-CCNC: 32 U/L (ref 0–45)
BILIRUB DIRECT SERPL-MCNC: 0.2 MG/DL (ref 0–0.2)
BILIRUB SERPL-MCNC: 0.6 MG/DL (ref 0.2–1.3)
BUN SERPL-MCNC: 8 MG/DL (ref 7–30)
CALCIUM SERPL-MCNC: 7.9 MG/DL (ref 8.5–10.1)
CHLORIDE SERPL-SCNC: 109 MMOL/L (ref 94–109)
CO2 SERPL-SCNC: 21 MMOL/L (ref 20–32)
CREAT SERPL-MCNC: 0.97 MG/DL (ref 0.52–1.04)
ERYTHROCYTE [DISTWIDTH] IN BLOOD BY AUTOMATED COUNT: 17 % (ref 10–15)
GFR SERPL CREATININE-BSD FRML MDRD: 64 ML/MIN/{1.73_M2}
GLUCOSE SERPL-MCNC: 89 MG/DL (ref 70–99)
HCT VFR BLD AUTO: 24.9 % (ref 35–47)
HGB BLD-MCNC: 7.8 G/DL (ref 11.7–15.7)
HGB BLD-MCNC: 8.1 G/DL (ref 11.7–15.7)
MCH RBC QN AUTO: 26.1 PG (ref 26.5–33)
MCHC RBC AUTO-ENTMCNC: 31.3 G/DL (ref 31.5–36.5)
MCV RBC AUTO: 83 FL (ref 78–100)
PLATELET # BLD AUTO: 106 10E9/L (ref 150–450)
POTASSIUM SERPL-SCNC: 3.9 MMOL/L (ref 3.4–5.3)
PROT SERPL-MCNC: 5.9 G/DL (ref 6.8–8.8)
RBC # BLD AUTO: 2.99 10E12/L (ref 3.8–5.2)
SODIUM SERPL-SCNC: 138 MMOL/L (ref 133–144)
WBC # BLD AUTO: 7.1 10E9/L (ref 4–11)

## 2019-02-26 PROCEDURE — 25000132 ZZH RX MED GY IP 250 OP 250 PS 637: Performed by: OBSTETRICS & GYNECOLOGY

## 2019-02-26 PROCEDURE — 25000128 H RX IP 250 OP 636: Performed by: INTERNAL MEDICINE

## 2019-02-26 PROCEDURE — 80076 HEPATIC FUNCTION PANEL: CPT | Performed by: INTERNAL MEDICINE

## 2019-02-26 PROCEDURE — 25000132 ZZH RX MED GY IP 250 OP 250 PS 637: Performed by: INTERNAL MEDICINE

## 2019-02-26 PROCEDURE — 12000000 ZZH R&B MED SURG/OB

## 2019-02-26 PROCEDURE — 80048 BASIC METABOLIC PNL TOTAL CA: CPT | Performed by: INTERNAL MEDICINE

## 2019-02-26 PROCEDURE — 99232 SBSQ HOSP IP/OBS MODERATE 35: CPT | Performed by: INTERNAL MEDICINE

## 2019-02-26 PROCEDURE — 85027 COMPLETE CBC AUTOMATED: CPT | Performed by: INTERNAL MEDICINE

## 2019-02-26 PROCEDURE — 36415 COLL VENOUS BLD VENIPUNCTURE: CPT | Performed by: INTERNAL MEDICINE

## 2019-02-26 PROCEDURE — 85018 HEMOGLOBIN: CPT | Performed by: INTERNAL MEDICINE

## 2019-02-26 RX ORDER — LISINOPRIL 5 MG/1
5 TABLET ORAL ONCE
Status: COMPLETED | OUTPATIENT
Start: 2019-02-26 | End: 2019-02-26

## 2019-02-26 RX ORDER — LISINOPRIL 10 MG/1
10 TABLET ORAL DAILY
Status: DISCONTINUED | OUTPATIENT
Start: 2019-02-27 | End: 2019-02-28

## 2019-02-26 RX ADMIN — Medication 1 LOZENGE: at 23:29

## 2019-02-26 RX ADMIN — Medication 1 LOZENGE: at 04:08

## 2019-02-26 RX ADMIN — GABAPENTIN 300 MG: 300 CAPSULE ORAL at 21:37

## 2019-02-26 RX ADMIN — MEDROXYPROGESTERONE ACETATE 30 MG: 10 TABLET ORAL at 09:59

## 2019-02-26 RX ADMIN — PIPERACILLIN SODIUM,TAZOBACTAM SODIUM 3.38 G: 3; .375 INJECTION, POWDER, FOR SOLUTION INTRAVENOUS at 16:06

## 2019-02-26 RX ADMIN — LISINOPRIL 5 MG: 5 TABLET ORAL at 09:58

## 2019-02-26 RX ADMIN — TRANEXAMIC ACID 1300 MG: 650 TABLET ORAL at 16:06

## 2019-02-26 RX ADMIN — PIPERACILLIN SODIUM,TAZOBACTAM SODIUM 3.38 G: 3; .375 INJECTION, POWDER, FOR SOLUTION INTRAVENOUS at 03:34

## 2019-02-26 RX ADMIN — PIPERACILLIN SODIUM,TAZOBACTAM SODIUM 3.38 G: 3; .375 INJECTION, POWDER, FOR SOLUTION INTRAVENOUS at 21:37

## 2019-02-26 RX ADMIN — PIPERACILLIN SODIUM,TAZOBACTAM SODIUM 3.38 G: 3; .375 INJECTION, POWDER, FOR SOLUTION INTRAVENOUS at 09:58

## 2019-02-26 RX ADMIN — LISINOPRIL 5 MG: 5 TABLET ORAL at 17:27

## 2019-02-26 RX ADMIN — TRANEXAMIC ACID 1300 MG: 650 TABLET ORAL at 09:59

## 2019-02-26 RX ADMIN — TRANEXAMIC ACID 1300 MG: 650 TABLET ORAL at 21:37

## 2019-02-26 ASSESSMENT — ACTIVITIES OF DAILY LIVING (ADL)
ADLS_ACUITY_SCORE: 13

## 2019-02-26 NOTE — PROGRESS NOTES
[facility]    Hospitalist Progress Note    Assessment & Plan     Josiane Dasilva is a 58-year-old female with past medical history of hypertension, anxiety, depression, alcohol abuse, fatty liver, morbid obesity, gout and 3 months of postmenopausal bleeding, with findings of a myomatous uterus, registered under observation status by Gynecology on 02/22 for symptomatic anemia related to postmenopausal bleeding with a hemoglobin of 6.5.  The Hospitalist Service was consulted for fever with concern for transfusion reaction versus underlying undiagnosed infection.  T-max 102.6 degrees Fahrenheit and pulse 113.  Remainder of vitals within normal limits.  The patient is currently stable.      Postmenopausal bleeding  Subacute blood loss anemia, symptomatic:  The patient describes 3 months of PMB, seen by GYN on 02/19 with a hemoglobin of 7.3.  Prescribed Provera to slow down the bleeding, but did not start this until 02/21. Had transvaginal ultrasound which showed a myomatous uterus.  Hemoglobin rechecked and 6.5 in the clinic and patient was admitted for transfusion.  Two units of packed red blood cells were ordered.  A temperature of 100 degrees Fahrenheit was noted prior to initiation of the transfusion; however, the temperature slowly increased while the patient was receiving the transfusion, with of T-max 102.6.  The patient describes symptoms of her anemia including shortness of breath, palpitations, dizziness and lightheadedness.  She denies any melena, hematochezia, hemoptysis or hematemesis.  No abdominal pain, diarrhea, dysuria, rash, chest pain or recent viral illness.  CMP unremarkable aside from an alkaline phosphatase of 190 and an AST of 52.  Lipase within normal limits.  Lactic acid 1.9.  CBC with WBC of 11.3, platelet count of 134, hemoglobin 6.5, again with an MCV of 81.  Chest x-ray unremarkable for acute cardiopulmonary process. UA with large amount of blood, otherwise unremarkable.    -no infectious sxs or fever prior to admission  -- Initial transfusion of 1 unit of packed red blood cells was stopped.  A transfusion reaction panel was sent to the blood bank.  An infectious workup has been initiated without clear evidence for infection.  At this time, we will plan to allow the  patient's fever to resolve.  The patient received a dose of Tylenol 650 mg around 17:56 and will receive another dose now.  Once the fever has improved in the next 4 hours, we will restart another unit of packed red blood cells, but will premedicate the patient with both Tylenol and Benadryl.   -- partner discussed the case with Dr. Juarez.  Plan for patient to have close outpatient followup with GYN Onc early next week upon discharge.     -- Followup blood cultures.  Remains afebrile. No focal infectouis sxs  - 2nd attempt at blood transfusion pt had hypotension or routine monitoring in 80s/- but no sxs. No fever. -given 1L NS bolus with hypotension- resolved  - discussed case with on call pathologist at blood bank, Dr. Amina Grant. Transfusion reaction labs all normal.  Dr. Grant recommended transfusion but with different donor (both prior attempts with same donor) and transfuse slowly. Discussed with RN and will call blood bank to transfuse 2 units each over 4 hours and from different donor.   -s/p 2 units blood 2/23/19 with Hb rise to low 7 range.  -on call hospitalist called for + cx for gram positive cocci pairs/chairns on 2nd unit of blood pt reacted to but not complete (not complete given reaction) for strep and apparently + strep pcr.  Pt placed emperically on vancomycin  -no issues with 2 units blood patient transfused with on 2/23.   - Ferritin 27, iron 13.   -pt passed several blood clots 2/23  - per dr. Grant of d bank 1/2 blood pt reacted to but not receive fully + cx of strep and pcr + for strep. Being cultured and ID and sensitivities back later today.  I will be updated.   -given venofer 2/24    -Hb  "stable in 8 range following 3 rd unit blood 2/24.   Vaginal bleeding stopped.   2/26 Hb down to 7 range but repeat in 8 range. No indication for need of repeat blood transfusion  Per gyn, bleeding improved with initiation of provera 30mg and TXA TID        Plan:   -iv venofer tomorrow am  -possible discharge tomorrow  - am cbc    -stop PCN, start zosyn given Gram neg rods on cx of 2/22  - ID consulted -agree with zosyn  - follow in hospital at least until this Wednesday  -- Monitor fever curve closely.   -pt to follow up with gyn/onc  -scheduled for new PCP    Gram negative bacteremia  +strep PCR on blood sample from 2nd transfusion attempt  pcr on blood sample from 2nd blood transfusion attempt + PCR for strep but blood cultures without strep- not need to cover per ID.   Need to cover Gram negative rods on blood cx per ID  On blood cx 2/22  Pt had fever of 100 before initial blood transfusion.   No clear infectious sxs. Some cough. Lungs clear. CXR negative.   No abd sxs. Mild elevation in alk phos and ast.  ++ etoh use daily  Wbc 11---> normalized 6-7 range.   LFTs nl alk phos 190---> 145. AST normalized.     Plan:   ID consulted, agree with zosyn and follow bx  Needs to remain in hospital until at least Wednesday  -zosyn- continue today per ID, ? Change to po abx tomorrow.   - stopped pcn  - f/u  bld cx      Chest pain  RRT called this am. Nl troponin and ekg. Had transient substernal chest pain for about 45 minutes. ? Anxious about + cx on blood.   seriel troponins negative.   Resolved. No recurrence  Follow.        Hypertension:   Good control.   - restart lisinopril but at lower dose     Alcohol abuse  Hx of esophageal varices, grade I (per EGD)  Fatty liver disease: Pt reports drinking three beers/night. Describes experiencing \"withdrawal\" at times, but unable to further describe the symptoms she experiences. No hx of withdrawal seizure. Last beer the day prior to admission.   No signs etoh w/d currently.  - " no signs etoh w/d during stay  -- Monitor for withdrawal   -drinks beers per day, declines chem dep  - needs new PCP-care coordinator arranged  - discussed naltrexone with new pcp     Bilateral deep venous thrombosis prophylaxis:  Encourage ambulation.      CODE STATUS:  Full code.         Melvin De La Cruz MD  Text Page  (7am to 6pm)  Interval History         Cont to feel better with Hb up  occ cough.   No n/v/d/abd pain,   No vaginal discharge recently.  No dental pain or sinus pain.     -Data reviewed today: I reviewed all new labs and imaging results over the last 24 hours. I personally reviewed labs and imaging.  CXR negative for acute findings    Physical Exam   Temp: 97.2  F (36.2  C) Temp src: Oral BP: 141/72   Heart Rate: 79 Resp: 16 SpO2: 98 % O2 Device: None (Room air)    There were no vitals filed for this visit.  Vital Signs with Ranges  Temp:  [96.8  F (36  C)-97.7  F (36.5  C)] 97.2  F (36.2  C)  Heart Rate:  [78-81] 79  Resp:  [16] 16  BP: (133-176)/(61-89) 141/72  SpO2:  [98 %-100 %] 98 %  I/O last 3 completed shifts:  In: 700 [P.O.:500; I.V.:200]  Out: 1000 [Urine:1000]    Constitutional: Nad, notoxix, appears comfortable, sitting on edge of bed eating dinner  Respiratory: CTAB  HEENT; nl sclera,  Cardiovascular: not tachy, RRR no r/g/m  GI: soft, nt, nd  Skin/Integumen: no rash or edema  Psych:  nl affect  Neuro: alert, nl speech and mentation    Medications       gabapentin  300 mg Oral At Bedtime     iron sucrose (VENOFER) intermittent infusion  300 mg Intravenous Q72H     lisinopril  5 mg Oral Daily     medroxyPROGESTERone  30 mg Oral Daily     piperacillin-tazobactam  3.375 g Intravenous Q6H     tranexamic acid  1,300 mg Oral TID       Data   Recent Labs   Lab 02/26/19  1410 02/26/19  0706 02/25/19  0656  02/24/19  1142 02/24/19  0550 02/23/19  0654 02/22/19  1837   WBC  --  7.1 7.9  --   --  6.5 10.1 11.3*   HGB 8.1* 7.8* 8.8*   < >  --  7.6* 6.1* 6.5*   MCV  --  83 83  --   --  82 82 81    PLT  --  106* 116*  --   --  112* 107* 134*   NA  --  138  --   --   --  137 139 134   POTASSIUM  --  3.9  --   --   --  4.1 4.2 4.4   CHLORIDE  --  109  --   --   --  109 110* 104   CO2  --  21  --   --   --  20 21 22   BUN  --  8  --   --   --  11 8 7   CR  --  0.97  --   --   --  0.93 0.97 0.96   ANIONGAP  --  8  --   --   --  8 8 8   JESSICA  --  7.9*  --   --   --  7.7* 7.6* 8.2*   GLC  --  89  --   --   --  93 107* 116*   ALBUMIN  --  2.1*  --   --   --   --   --  2.4*   PROTTOTAL  --  5.9*  --   --   --   --   --  6.7*   BILITOTAL  --  0.6  --   --   --   --   --  0.9   ALKPHOS  --  145  --   --   --   --   --  190*   ALT  --  18  --   --   --   --   --  23   AST  --  32  --   --   --   --   --  52*   LIPASE  --   --   --   --   --   --   --  262   TROPI  --   --   --   --  <0.015 <0.015  --   --     < > = values in this interval not displayed.       Imaging:   No results found for this or any previous visit (from the past 24 hour(s)).

## 2019-02-26 NOTE — PROGRESS NOTES
Gyn Progress note    S:had just a tiny bit of bleeding today o/w has mostly stopped since restarting on the provera on Sunday. hgb was 8.8 yesterday after 3rd full unit of blood. Patient is back to 7.8 today. She is not dizzy or sx from the anemia now like she was then. Just feels really fatigued from poor sleep in the hospital. Ambulating fine, tolerating a regular diet. BM x2 yesterday. Has been afebrile but had to start on IV zosyn yesterday b/c prelim blood cx showed gram neg rods and the one unit of blood that was initially attempted to transfuse had strep + PCR. Patient has also gotten iv venofer as well    O: 120-140s/80-90s most with some intermittent higher, HR 80s, afeb  Abd:soft, ND, NT  Ext:soft and NT      A/P 58 y.o with postmenopausal bleeding and myomatous uterus admitted with sx anemia and now bacteremia first on vanco and now on IV zosyn.    1)gyn:pt's bleeding is improved on provera 30mg and TXA TID. However her hgb declined again since yesterday despite minimal vaginal bleeding. Patient also with unexplained thrombocytopenia though most likely cause is consumption d/t acute blood loss.  Patient is getting Venofer and s/p 3 units PRBC. May be beneficial to give one more unit again today but will defer decision to hospitalist service    Patient has an appointment with Dr. Mary Hoffman of gyn/onc at [facility] on 3/5 at 0830am. Gave patient that information. The ultimate plan will certainly be hysterectomy but likely the initial evaluation will be to reattempt office endometrial bx with her. Was unable to have this done with Dr. Juarez b/c of deep set cvx and ongoing bleeding and patient didn't not tolerate it. Would recommend vaginal cytotec the night before appointment with Dr. Hoffman to facilitate the bx.  Explained to patient that if bx shows malignancy she would need hysterectomy and staging and if no malignancy then likely could just have simple STEPAN/BSO. May need 2 step process with D&C in  the OR first, if can't tolerate the biopsy.    2)bacteremia-plan is per hospitalist service but likely planning some additional IV zosyn today and if remains afebrile likely plan for d/c home tomorrow on oral abx

## 2019-02-26 NOTE — PROGRESS NOTES
[facility]    Infectious Disease Progress Note    Date of Service (when I saw the patient): 02/26/2019     Assessment & Plan   Josiane Dasilva is a 58 year old female who was admitted on 2/22/2019.       Impression:  1. 58 y.o female with alcohol abuse history.   2. HTN.   3. Anxiety and depression.   4. Admitted with acute anemia related to postmenopausal uterine bleeding, low back pain and fatigue.   5. Fever before the blood transfusion with reports of strep species in the donor blood, with her own blood cultures coming back positive for GNR, no strep in patients blood cultures.   6. Currently on zosyn.      Recommendations:   Follow up on the patient`s blood cultures for ID and TIFFANIE on the GNR.   Continue on zosyn for now.             Silvia Freeman MD    Interval History   No ID on the cultures yet, probable anaerobic GNR     Physical Exam   Temp: 96.8  F (36  C) Temp src: Oral BP: 133/61   Heart Rate: 80 Resp: 16 SpO2: 99 % O2 Device: None (Room air)    There were no vitals filed for this visit.  Vital Signs with Ranges  Temp:  [96.8  F (36  C)-97.7  F (36.5  C)] 96.8  F (36  C)  Heart Rate:  [78-81] 80  Resp:  [16] 16  BP: (133-176)/(61-89) 133/61  SpO2:  [99 %-100 %] 99 %    Constitutional: Awake, alert, cooperative, no apparent distress  Lungs: Clear to auscultation bilaterally, no crackles or wheezing  Cardiovascular: Regular rate and rhythm, normal S1 and S2, and no murmur noted  Abdomen: Normal bowel sounds, soft, non-distended, non-tender  Skin: No rashes, no cyanosis, no edema  Other:    Medications       gabapentin  300 mg Oral At Bedtime     iron sucrose (VENOFER) intermittent infusion  300 mg Intravenous Q72H     lisinopril  5 mg Oral Daily     medroxyPROGESTERone  30 mg Oral Daily     piperacillin-tazobactam  3.375 g Intravenous Q6H     tranexamic acid  1,300 mg Oral TID       Data   All microbiology laboratory data reviewed.  Recent Labs   Lab Test 02/26/19  0706 02/25/19  0656  02/24/19 2003 02/24/19  0550   WBC 7.1 7.9  --  6.5   HGB 7.8* 8.8* 8.8* 7.6*   HCT 24.9* 28.4*  --  23.7*   MCV 83 83  --  82   * 116*  --  112*     Recent Labs   Lab Test 02/26/19  0706 02/24/19  0550 02/23/19  0654   CR 0.97 0.93 0.97     No lab results found.  Recent Labs   Lab Test 02/25/19  0800 02/25/19  0754 02/23/19  0653 02/22/19  2045 02/22/19  1841 02/22/19  1836 08/12/14  0841 08/11/14  1900   CULT No growth after 17 hours No growth after 17 hours No growth after 3 days 10,000 to 50,000 colonies/mL  mixed urogenital laly  Susceptibility testing not routinely done   No growth after 4 days Cultured on the 2nd day of incubation:  Gram negative rods  Referred to anaerobes for identification  *  Critical Value/Significant Value, preliminary result only, called to and read back by   SARAI GEORGE RN @2215 2/24/19. CT    (Note)  NEGATIVE for the following organisms and resistance markers:  Acinetobacter sp., Citrobacter sp., Enterobacter sp., Proteus sp., E.  coli, K. pneumoniae/oxytoca, P. aeruginosa, CTX-M, KPC, NDM, VIM, IMP  and OXA by [employer] multiplex nucleic acid test. Final identification  and antimicrobial susceptibility testing will be verified by standard  methods.    Critical Value/Significant Value called to and read back by Yennifer George RN  2.25.19 FRANTZ.     10,000 to 50,000 colonies/mL mixed urogenital laly 50,000 to 100,000 colonies/mL mixed urogenital laly

## 2019-02-26 NOTE — CONSULTS
Care Transition Initial Assessment - RN        Met with: Patient.  DATA   Active Problems:    Anemia    Acute on chronic blood loss anemia (H)       Cognitive Status: awake, alert and oriented.  Primary Care Clinic Name: [facility]  Primary Care MD Name: Carmen Arriaza  Contact information and PCP information verified: Yes  Lives With: child(jossie), adult   Living Arrangements: house  Insurance concerns: No Insurance issues identified  ASSESSMENT  Patient currently receives the following services:  n/a        Identified issues/concerns regarding health management: patient admitted for 3-4 weeks of vaginal bleeding.  Patient received transfusion support and IV antibiotics for +BC's.  Patient is most likely discharging to home on oral antibiotics and has a follow up with OB/GYN provider. Patient previously was at [facility] with Carly Grace who is no longer at the clinic.  Per chart review no female providers (per patient preference) are seeing patients at [place].  Patient agreeable to have writer get her scheduled to establish care at the [facility] with Carmen Arriaza. Discussed with the patient that she should see PCP sooner rather than later for Hgb recheck, however patient declined anything earlier than the end of next week.  Writer got the patient scheduled to establish care on 3/8 3:15 p.m. Scheduled in [facility].  Informed the patient that if she needed anything after her OB/GYN appointment on 3/5 8:30 a.m. she would need to call the OB/GYN provider.  Patient identified no further needs for discharge.  Patient will let her bedside RN know if she has further questions or needs for discharge.    PLAN  Financial costs for the patient include n/a .  Patient given options and choices for discharge yes .  Patient/family is agreeable to the plan?  Yes:   Patient anticipates discharging to home .        Patient anticipates needs for home equipment: No  Plan/Disposition: Home   Appointments:     Mar 05, 2019   8:30 AM CST  New Visit with Mary Hoffman MD  [facility] ([facility]) [facility]  [id number] [place] [address]  [facility] [address]  [facility] [id number] [phone]   Mar 08, 2019  3:15 PM CST  Office Visit with aCrmen Arriaza MD  [facility] [address] [id number] [phone]       Care  (CTS) will continue to follow as needed.

## 2019-02-27 ENCOUNTER — APPOINTMENT (OUTPATIENT)
Dept: ULTRASOUND IMAGING | Facility: CLINIC | Age: 59
DRG: 812 | End: 2019-02-27
Attending: INTERNAL MEDICINE
Payer: COMMERCIAL

## 2019-02-27 VITALS
TEMPERATURE: 97.3 F | OXYGEN SATURATION: 100 % | DIASTOLIC BLOOD PRESSURE: 81 MMHG | HEIGHT: 68 IN | RESPIRATION RATE: 16 BRPM | HEART RATE: 75 BPM | SYSTOLIC BLOOD PRESSURE: 158 MMHG | WEIGHT: 240 LBS | BODY MASS INDEX: 36.37 KG/M2

## 2019-02-27 LAB
ALBUMIN SERPL-MCNC: 2.2 G/DL (ref 3.4–5)
ALP SERPL-CCNC: 163 U/L (ref 40–150)
ALT SERPL W P-5'-P-CCNC: 23 U/L (ref 0–50)
ANION GAP SERPL CALCULATED.3IONS-SCNC: 7 MMOL/L (ref 3–14)
AST SERPL W P-5'-P-CCNC: 35 U/L (ref 0–45)
BILIRUB DIRECT SERPL-MCNC: 0.2 MG/DL (ref 0–0.2)
BILIRUB SERPL-MCNC: 0.4 MG/DL (ref 0.2–1.3)
BUN SERPL-MCNC: 9 MG/DL (ref 7–30)
CALCIUM SERPL-MCNC: 8.1 MG/DL (ref 8.5–10.1)
CHLORIDE SERPL-SCNC: 111 MMOL/L (ref 94–109)
CO2 SERPL-SCNC: 21 MMOL/L (ref 20–32)
CREAT SERPL-MCNC: 0.9 MG/DL (ref 0.52–1.04)
ERYTHROCYTE [DISTWIDTH] IN BLOOD BY AUTOMATED COUNT: 17.6 % (ref 10–15)
GFR SERPL CREATININE-BSD FRML MDRD: 70 ML/MIN/{1.73_M2}
GLUCOSE SERPL-MCNC: 112 MG/DL (ref 70–99)
HCT VFR BLD AUTO: 26 % (ref 35–47)
HGB BLD-MCNC: 8.1 G/DL (ref 11.7–15.7)
MCH RBC QN AUTO: 26.1 PG (ref 26.5–33)
MCHC RBC AUTO-ENTMCNC: 31.2 G/DL (ref 31.5–36.5)
MCV RBC AUTO: 84 FL (ref 78–100)
PLATELET # BLD AUTO: 122 10E9/L (ref 150–450)
POTASSIUM SERPL-SCNC: 4 MMOL/L (ref 3.4–5.3)
PROT SERPL-MCNC: 6.2 G/DL (ref 6.8–8.8)
RBC # BLD AUTO: 3.1 10E12/L (ref 3.8–5.2)
SODIUM SERPL-SCNC: 139 MMOL/L (ref 133–144)
WBC # BLD AUTO: 7.2 10E9/L (ref 4–11)

## 2019-02-27 PROCEDURE — 99231 SBSQ HOSP IP/OBS SF/LOW 25: CPT | Performed by: OBSTETRICS & GYNECOLOGY

## 2019-02-27 PROCEDURE — 25000132 ZZH RX MED GY IP 250 OP 250 PS 637: Performed by: INTERNAL MEDICINE

## 2019-02-27 PROCEDURE — 25800030 ZZH RX IP 258 OP 636: Performed by: INTERNAL MEDICINE

## 2019-02-27 PROCEDURE — 40000141 ZZH STATISTIC PERIPHERAL IV START W/O US GUIDANCE

## 2019-02-27 PROCEDURE — 76700 US EXAM ABDOM COMPLETE: CPT

## 2019-02-27 PROCEDURE — 80076 HEPATIC FUNCTION PANEL: CPT | Performed by: INTERNAL MEDICINE

## 2019-02-27 PROCEDURE — 25000132 ZZH RX MED GY IP 250 OP 250 PS 637: Performed by: HOSPITALIST

## 2019-02-27 PROCEDURE — 85027 COMPLETE CBC AUTOMATED: CPT | Performed by: INTERNAL MEDICINE

## 2019-02-27 PROCEDURE — 25000132 ZZH RX MED GY IP 250 OP 250 PS 637: Performed by: OBSTETRICS & GYNECOLOGY

## 2019-02-27 PROCEDURE — 99232 SBSQ HOSP IP/OBS MODERATE 35: CPT | Performed by: INTERNAL MEDICINE

## 2019-02-27 PROCEDURE — 25000131 ZZH RX MED GY IP 250 OP 636 PS 637: Performed by: PHYSICIAN ASSISTANT

## 2019-02-27 PROCEDURE — 36415 COLL VENOUS BLD VENIPUNCTURE: CPT | Performed by: INTERNAL MEDICINE

## 2019-02-27 PROCEDURE — 99207 ZZC CDG-MDM COMPONENT: MEETS LOW - DOWN CODED: CPT | Performed by: INTERNAL MEDICINE

## 2019-02-27 PROCEDURE — 80048 BASIC METABOLIC PNL TOTAL CA: CPT | Performed by: INTERNAL MEDICINE

## 2019-02-27 PROCEDURE — 25000128 H RX IP 250 OP 636: Performed by: INTERNAL MEDICINE

## 2019-02-27 PROCEDURE — 12000000 ZZH R&B MED SURG/OB

## 2019-02-27 RX ORDER — MULTIPLE VITAMINS W/ MINERALS TAB 9MG-400MCG
1 TAB ORAL DAILY
Qty: 30 EACH | Refills: 0 | Status: SHIPPED | OUTPATIENT
Start: 2019-02-27 | End: 2022-07-04

## 2019-02-27 RX ADMIN — GABAPENTIN 300 MG: 300 CAPSULE ORAL at 21:37

## 2019-02-27 RX ADMIN — Medication 1 MG: at 23:09

## 2019-02-27 RX ADMIN — LISINOPRIL 10 MG: 10 TABLET ORAL at 08:01

## 2019-02-27 RX ADMIN — ONDANSETRON 4 MG: 4 TABLET, ORALLY DISINTEGRATING ORAL at 11:46

## 2019-02-27 RX ADMIN — TRANEXAMIC ACID 1300 MG: 650 TABLET ORAL at 08:00

## 2019-02-27 RX ADMIN — TRANEXAMIC ACID 1300 MG: 650 TABLET ORAL at 21:37

## 2019-02-27 RX ADMIN — Medication 1 LOZENGE: at 07:05

## 2019-02-27 RX ADMIN — TRANEXAMIC ACID 1300 MG: 650 TABLET ORAL at 16:03

## 2019-02-27 RX ADMIN — PIPERACILLIN SODIUM,TAZOBACTAM SODIUM 3.38 G: 3; .375 INJECTION, POWDER, FOR SOLUTION INTRAVENOUS at 16:03

## 2019-02-27 RX ADMIN — PIPERACILLIN SODIUM,TAZOBACTAM SODIUM 3.38 G: 3; .375 INJECTION, POWDER, FOR SOLUTION INTRAVENOUS at 21:37

## 2019-02-27 RX ADMIN — MEDROXYPROGESTERONE ACETATE 30 MG: 10 TABLET ORAL at 08:00

## 2019-02-27 RX ADMIN — IRON SUCROSE 300 MG: 20 INJECTION, SOLUTION INTRAVENOUS at 06:44

## 2019-02-27 RX ADMIN — PIPERACILLIN SODIUM,TAZOBACTAM SODIUM 3.38 G: 3; .375 INJECTION, POWDER, FOR SOLUTION INTRAVENOUS at 04:24

## 2019-02-27 RX ADMIN — PIPERACILLIN SODIUM,TAZOBACTAM SODIUM 3.38 G: 3; .375 INJECTION, POWDER, FOR SOLUTION INTRAVENOUS at 10:19

## 2019-02-27 RX ADMIN — Medication 1 LOZENGE: at 10:04

## 2019-02-27 ASSESSMENT — ACTIVITIES OF DAILY LIVING (ADL)
ADLS_ACUITY_SCORE: 13

## 2019-02-27 NOTE — PROGRESS NOTES
[facility]    Hospitalist Progress Note    Assessment & Plan     Josiane Dasilva is a 58-year-old female with past medical history of hypertension, anxiety, depression, alcohol abuse, fatty liver, morbid obesity, gout and 3 months of postmenopausal bleeding, with findings of a myomatous uterus, registered under observation status by Gynecology on 02/22 for symptomatic anemia related to postmenopausal bleeding with a hemoglobin of 6.5.  The Hospitalist Service was consulted for fever with concern for transfusion reaction versus underlying undiagnosed infection.  T-max 102.6 degrees Fahrenheit and pulse 113.  Remainder of vitals within normal limits.  The patient is currently stable.      Postmenopausal bleeding  Subacute blood loss anemia, symptomatic:  The patient describes 3 months of PMB, seen by GYN on 02/19 with a hemoglobin of 7.3.  Prescribed Provera to slow down the bleeding, but did not start this until 02/21. Had transvaginal ultrasound which showed a myomatous uterus.  Hemoglobin rechecked and 6.5 in the clinic and patient was admitted for transfusion.  Two units of packed red blood cells were ordered.  A temperature of 100 degrees Fahrenheit was noted prior to initiation of the transfusion; however, the temperature slowly increased while the patient was receiving the transfusion, with of T-max 102.6.  The patient describes symptoms of her anemia including shortness of breath, palpitations, dizziness and lightheadedness.  She denies any melena, hematochezia, hemoptysis or hematemesis.  No abdominal pain, diarrhea, dysuria, rash, chest pain or recent viral illness.  CMP unremarkable aside from an alkaline phosphatase of 190 and an AST of 52.  Lipase within normal limits.  Lactic acid 1.9.  CBC with WBC of 11.3, platelet count of 134, hemoglobin 6.5, again with an MCV of 81.  Chest x-ray unremarkable for acute cardiopulmonary process. UA with large amount of blood, otherwise unremarkable.    -no infectious sxs or fever prior to admission  -- Initial transfusion of 1 unit of packed red blood cells was stopped.  A transfusion reaction panel was sent to the blood bank.  An infectious workup has been initiated without clear evidence for infection.  At this time, we will plan to allow the  patient's fever to resolve.  The patient received a dose of Tylenol 650 mg around 17:56 and will receive another dose now.  Once the fever has improved in the next 4 hours, we will restart another unit of packed red blood cells, but will premedicate the patient with both Tylenol and Benadryl.   -- partner discussed the case with Dr. Juarez.  Plan for patient to have close outpatient followup with GYN Onc early next week upon discharge.     -- Followup blood cultures.  Remains afebrile. No focal infectouis sxs  - 2nd attempt at blood transfusion pt had hypotension or routine monitoring in 80s/- but no sxs. No fever. -given 1L NS bolus with hypotension- resolved  - discussed case with on call pathologist at blood bank, Dr. Amina Grant. Transfusion reaction labs all normal.  Dr. Grant recommended transfusion but with different donor (both prior attempts with same donor) and transfuse slowly. Discussed with RN and will call blood bank to transfuse 2 units each over 4 hours and from different donor.   -s/p 2 units blood 2/23/19 with Hb rise to low 7 range.  -on call hospitalist called for + cx for gram positive cocci pairs/chairns on 2nd unit of blood pt reacted to but not complete (not complete given reaction) for strep and apparently + strep pcr.  Pt placed emperically on vancomycin  -no issues with 2 units blood patient transfused with on 2/23.   - Ferritin 27, iron 13.   -pt passed several blood clots 2/23  - per dr. Grant of d bank 1/2 blood pt reacted to but not receive fully + cx of strep and pcr + for strep. Being cultured and ID and sensitivities back later today.  I will be updated.   -given venofer 2/24    -Hb  stable in 8 range following 3 rd unit blood 2/24.   Vaginal bleeding stopped.   2/26 Hb down to 7 range but repeat in 8 range. No indication for need of repeat blood transfusion  Per gyn, bleeding improved with initiation of provera 30mg and TXA TID  IV venofer 2/27.   Hb stable. No indication for further blood transfusion.   No further bleeding.     Plan:   - Patient has an appointment with Dr. Mary Hoffman of gyn/onc at [facility] on 3/5 at 0830am  - am cbc  -stop PCN, start zosyn given Gram neg rods on cx of 2/22  - ID consulted -agree with zosyn  -pt to follow up with gyn/onc  -scheduled for new PCP    Gram negative bacteremia  +strep PCR on blood sample from 2nd transfusion attempt  pcr on blood sample from 2nd blood transfusion attempt + PCR for strep but blood cultures without strep- not need to cover per ID.   Need to cover Gram negative rods on blood cx per ID  On blood cx 2/22 1/2 Parabacteroides merdae  Bld cx 2/23 ngtd  bld cx 2/25 ngtd  ucx negative.   Pt had fever of 100 before initial blood transfusion.   No clear infectious sxs. Some cough. Lungs clear. CXR negative.   No abd sxs. Mild elevation in alk phos and ast.  ++ etoh use daily  Wbc 11---> normalized 6-7 range.   LFTs nl alk phos 190---> 145. AST normalized.     Today  Post prandial N/V. Loss of appetite  Receiving iv venofer at the time but tolerated this 3 days ago  Now with new epigastric pain and ruq  Pain.   Nl wbc. Afebrile  States she has had some post prandial pain intermitently at home but also drinking etoh at time.     Plan:   -cont ppi  - check RUQ US and LFTs to eval for cholecystitis.   ID consulted, agree with zosyn and follow bx  Needs to remain in hospital until at least Wednesday  -zosyn- continue today per ID as sensitivities not back  - stopped pcn  - f/u  bld cx      Chest pain  RRT called this am. Nl troponin and ekg. Had transient substernal chest pain for about 45 minutes. ? Anxious about + cx on blood.   pietro  "troponins negative.   Resolved. No recurrence  Follow.        Hypertension:   Good control.   - restart lisinopril but at lower dose     Alcohol abuse  Hx of esophageal varices, grade I (per EGD)  Fatty liver disease: Pt reports drinking three beers/night. Describes experiencing \"withdrawal\" at times, but unable to further describe the symptoms she experiences. No hx of withdrawal seizure. Last beer the day prior to admission.   No signs etoh w/d currently.  - no signs etoh w/d during stay  -- Monitor for withdrawal   -drinks beers per day, declines chem dep  - needs new PCP-care coordinator arranged  - discussed naltrexone with new pcp     Bilateral deep venous thrombosis prophylaxis:  Encourage ambulation.      CODE STATUS:  Full code.         Melvin De La Cruz MD  Text Page  (7am to 6pm)  Interval History     After breakfast and during iv Venofer pt developed nausea and emesis.   Anorexia since and not want to each much lunch  Has denied in past to me but now states she has had some post prandial pain in upper abd with late meals with etoh with emesis.   Tolerated iv venofer 3 days ago.     -Data reviewed today: I reviewed all new labs and imaging results over the last 24 hours. I personally reviewed labs and imaging.  CXR negative for acute findings    Physical Exam   Temp: 98.3  F (36.8  C) Temp src: Oral BP: 153/70 Pulse: 83 Heart Rate: 82 Resp: 16 SpO2: 97 % O2 Device: None (Room air)    There were no vitals filed for this visit.  Vital Signs with Ranges  Temp:  [96.4  F (35.8  C)-98.3  F (36.8  C)] 98.3  F (36.8  C)  Pulse:  [74-83] 83  Heart Rate:  [76-82] 79  Resp:  [14-16] 16  BP: (129-161)/(56-85) 153/70  SpO2:  [97 %-100 %] 97 %  I/O last 3 completed shifts:  In: 250 [P.O.:250]  Out: 1100 [Urine:1100]    Constitutional: Nad, notoxix, appears comfortable, sitting on edge of bed eating dinner  Respiratory: CTAB  HEENT; nl sclera,  Cardiovascular: not tachy, RRR no r/g/m  GI: obese, soft,+ new tenderness " in epigastrium and Ruq   Skin/Integumen: no rash or edema  Psych:  nl affect  Neuro: alert, nl speech and mentation    Medications       gabapentin  300 mg Oral At Bedtime     lisinopril  10 mg Oral Daily     medroxyPROGESTERone  30 mg Oral Daily     piperacillin-tazobactam  3.375 g Intravenous Q6H     tranexamic acid  1,300 mg Oral TID       Data   Recent Labs   Lab 02/27/19  0709 02/26/19  1410 02/26/19  0706 02/25/19  0656  02/24/19  1142 02/24/19  0550 02/23/19  0654 02/22/19  1837   WBC 7.2  --  7.1 7.9  --   --  6.5 10.1 11.3*   HGB 8.1* 8.1* 7.8* 8.8*   < >  --  7.6* 6.1* 6.5*   MCV 84  --  83 83  --   --  82 82 81   *  --  106* 116*  --   --  112* 107* 134*   NA  --   --  138  --   --   --  137 139 134   POTASSIUM  --   --  3.9  --   --   --  4.1 4.2 4.4   CHLORIDE  --   --  109  --   --   --  109 110* 104   CO2  --   --  21  --   --   --  20 21 22   BUN  --   --  8  --   --   --  11 8 7   CR  --   --  0.97  --   --   --  0.93 0.97 0.96   ANIONGAP  --   --  8  --   --   --  8 8 8   JESSICA  --   --  7.9*  --   --   --  7.7* 7.6* 8.2*   GLC  --   --  89  --   --   --  93 107* 116*   ALBUMIN  --   --  2.1*  --   --   --   --   --  2.4*   PROTTOTAL  --   --  5.9*  --   --   --   --   --  6.7*   BILITOTAL  --   --  0.6  --   --   --   --   --  0.9   ALKPHOS  --   --  145  --   --   --   --   --  190*   ALT  --   --  18  --   --   --   --   --  23   AST  --   --  32  --   --   --   --   --  52*   LIPASE  --   --   --   --   --   --   --   --  262   TROPI  --   --   --   --   --  <0.015 <0.015  --   --     < > = values in this interval not displayed.       Imaging:   No results found for this or any previous visit (from the past 24 hour(s)).

## 2019-02-27 NOTE — PROGRESS NOTES
[facility]    Infectious Disease Progress Note    Date of Service (when I saw the patient): 02/27/2019     Assessment & Plan   Josiane Dasilva is a 58 year old female who was admitted on 2/22/2019.       Impression:  1. 58 y.o female with alcohol abuse history.   2. HTN.   3. Anxiety and depression.   4. Admitted with acute anemia related to postmenopausal uterine bleeding, low back pain and fatigue.   5. Fever before the blood transfusion with reports of strep species in the donor blood, with her own blood cultures coming back positive for GNR, no strep in patients blood cultures.   6. Currently on zosyn.      Recommendations:   Parabacteroids in the blood culture, usually associated with GI abscesses, patient had one episode of vomiting on admission but then today more vomiting and right UQ pain, ultrasound pending will follow, discussed with Dr. Juarez, continue on zosyn for now.             Silvia Freeman MD    Interval History   Vomiting today   Cultures as above     Physical Exam   Temp: 98.3  F (36.8  C) Temp src: Oral BP: 153/70 Pulse: 83 Heart Rate: 82 Resp: 16 SpO2: 97 % O2 Device: None (Room air)    There were no vitals filed for this visit.  Vital Signs with Ranges  Temp:  [96.4  F (35.8  C)-98.3  F (36.8  C)] 98.3  F (36.8  C)  Pulse:  [74-83] 83  Heart Rate:  [76-82] 79  Resp:  [14-16] 16  BP: (129-161)/(56-85) 153/70  SpO2:  [97 %-100 %] 97 %    Constitutional: Awake, alert, cooperative, no apparent distress  Lungs: Clear to auscultation bilaterally, no crackles or wheezing  Cardiovascular: Regular rate and rhythm, normal S1 and S2, and no murmur noted  Abdomen: Normal bowel sounds, soft, non-distended, non-tender  Skin: No rashes, no cyanosis, no edema  Other:    Medications       gabapentin  300 mg Oral At Bedtime     lisinopril  10 mg Oral Daily     medroxyPROGESTERone  30 mg Oral Daily     piperacillin-tazobactam  3.375 g Intravenous Q6H     tranexamic acid  1,300 mg Oral TID        Data   All microbiology laboratory data reviewed.  Recent Labs   Lab Test 02/27/19  0709 02/26/19  1410 02/26/19  0706 02/25/19  0656   WBC 7.2  --  7.1 7.9   HGB 8.1* 8.1* 7.8* 8.8*   HCT 26.0*  --  24.9* 28.4*   MCV 84  --  83 83   *  --  106* 116*     Recent Labs   Lab Test 02/27/19  1300 02/26/19  0706 02/24/19  0550   CR PENDING 0.97 0.93     No lab results found.  Recent Labs   Lab Test 02/25/19  0800 02/25/19  0754 02/23/19  0653 02/22/19  2045 02/22/19  1841 02/22/19  1836 08/12/14  0841 08/11/14  1900   CULT No growth after 2 days No growth after 2 days No growth after 4 days 10,000 to 50,000 colonies/mL  mixed urogenital laly  Susceptibility testing not routinely done   No growth after 5 days Cultured on the 2nd day of incubation:  Parabacteroides merdae    Critical Value/Significant Value, preliminary result only, called to and read back by   SARAI GEORGE RN @4960 2/24/19. CT    Susceptibility testing in progress  (Note)  NEGATIVE for the following organisms and resistance markers:  Acinetobacter sp., Citrobacter sp., Enterobacter sp., Proteus sp., E.  coli, K. pneumoniae/oxytoca, P. aeruginosa, CTX-M, KPC, NDM, VIM, IMP  and OXA by [employer] multiplex nucleic acid test. Final identification  and antimicrobial susceptibility testing will be verified by standard  methods.    Critical Value/Significant Value called to and read back by Yenniefr George RN  2.25.19 FRANTZ.     10,000 to 50,000 colonies/mL mixed urogenital laly 50,000 to 100,000 colonies/mL mixed urogenital laly

## 2019-02-28 VITALS
HEART RATE: 65 BPM | DIASTOLIC BLOOD PRESSURE: 55 MMHG | TEMPERATURE: 97.3 F | SYSTOLIC BLOOD PRESSURE: 138 MMHG | OXYGEN SATURATION: 99 % | RESPIRATION RATE: 16 BRPM

## 2019-02-28 PROBLEM — K62.5 BRBPR (BRIGHT RED BLOOD PER RECTUM): Status: ACTIVE | Noted: 2019-02-28

## 2019-02-28 PROBLEM — I85.00 ESOPHAGEAL VARICES WITHOUT BLEEDING (H): Status: ACTIVE | Noted: 2019-02-28

## 2019-02-28 LAB
ALBUMIN SERPL-MCNC: 2.2 G/DL (ref 3.4–5)
ALP SERPL-CCNC: 147 U/L (ref 40–150)
ALT SERPL W P-5'-P-CCNC: 23 U/L (ref 0–50)
AST SERPL W P-5'-P-CCNC: 33 U/L (ref 0–45)
BACTERIA SPEC CULT: NO GROWTH
BILIRUB DIRECT SERPL-MCNC: 0.3 MG/DL (ref 0–0.2)
BILIRUB SERPL-MCNC: 0.4 MG/DL (ref 0.2–1.3)
Lab: NORMAL
PROT SERPL-MCNC: 6.3 G/DL (ref 6.8–8.8)
SPECIMEN SOURCE: NORMAL

## 2019-02-28 PROCEDURE — 36415 COLL VENOUS BLD VENIPUNCTURE: CPT | Performed by: INTERNAL MEDICINE

## 2019-02-28 PROCEDURE — 25000128 H RX IP 250 OP 636: Performed by: INTERNAL MEDICINE

## 2019-02-28 PROCEDURE — 25000132 ZZH RX MED GY IP 250 OP 250 PS 637: Performed by: INTERNAL MEDICINE

## 2019-02-28 PROCEDURE — 80076 HEPATIC FUNCTION PANEL: CPT | Performed by: INTERNAL MEDICINE

## 2019-02-28 PROCEDURE — 99207 ZZC CDG-MDM COMPONENT: MEETS LOW - DOWN CODED: CPT | Performed by: INTERNAL MEDICINE

## 2019-02-28 PROCEDURE — 25000132 ZZH RX MED GY IP 250 OP 250 PS 637: Performed by: OBSTETRICS & GYNECOLOGY

## 2019-02-28 PROCEDURE — 99232 SBSQ HOSP IP/OBS MODERATE 35: CPT | Performed by: INTERNAL MEDICINE

## 2019-02-28 PROCEDURE — 99231 SBSQ HOSP IP/OBS SF/LOW 25: CPT | Performed by: OBSTETRICS & GYNECOLOGY

## 2019-02-28 RX ORDER — PANTOPRAZOLE SODIUM 40 MG/1
40 TABLET, DELAYED RELEASE ORAL ONCE
Status: COMPLETED | OUTPATIENT
Start: 2019-02-28 | End: 2019-02-28

## 2019-02-28 RX ORDER — MEDROXYPROGESTERONE ACETATE 10 MG
10 TABLET ORAL DAILY
Qty: 21 TABLET | Refills: 0 | Status: ON HOLD
Start: 2019-02-28 | End: 2019-03-16

## 2019-02-28 RX ORDER — LISINOPRIL 20 MG/1
20 TABLET ORAL DAILY
Qty: 30 TABLET | Refills: 0 | Status: ON HOLD | OUTPATIENT
Start: 2019-03-01 | End: 2020-06-02

## 2019-02-28 RX ORDER — METRONIDAZOLE 500 MG/1
500 TABLET ORAL EVERY 8 HOURS SCHEDULED
Status: DISCONTINUED | OUTPATIENT
Start: 2019-02-28 | End: 2019-02-28 | Stop reason: HOSPADM

## 2019-02-28 RX ORDER — PANTOPRAZOLE SODIUM 40 MG/1
40 TABLET, DELAYED RELEASE ORAL
Status: DISCONTINUED | OUTPATIENT
Start: 2019-03-01 | End: 2019-02-28 | Stop reason: HOSPADM

## 2019-02-28 RX ORDER — LISINOPRIL 10 MG/1
10 TABLET ORAL ONCE
Status: COMPLETED | OUTPATIENT
Start: 2019-02-28 | End: 2019-02-28

## 2019-02-28 RX ORDER — LISINOPRIL 20 MG/1
20 TABLET ORAL DAILY
Status: DISCONTINUED | OUTPATIENT
Start: 2019-03-01 | End: 2019-02-28 | Stop reason: HOSPADM

## 2019-02-28 RX ORDER — PANTOPRAZOLE SODIUM 40 MG/1
40 TABLET, DELAYED RELEASE ORAL
Qty: 30 TABLET | Refills: 0 | Status: SHIPPED | OUTPATIENT
Start: 2019-03-01 | End: 2021-07-19

## 2019-02-28 RX ADMIN — MEDROXYPROGESTERONE ACETATE 30 MG: 10 TABLET ORAL at 07:51

## 2019-02-28 RX ADMIN — METRONIDAZOLE 500 MG: 500 TABLET ORAL at 14:10

## 2019-02-28 RX ADMIN — LISINOPRIL 10 MG: 10 TABLET ORAL at 10:50

## 2019-02-28 RX ADMIN — PIPERACILLIN SODIUM,TAZOBACTAM SODIUM 3.38 G: 3; .375 INJECTION, POWDER, FOR SOLUTION INTRAVENOUS at 10:50

## 2019-02-28 RX ADMIN — TRANEXAMIC ACID 1300 MG: 650 TABLET ORAL at 09:24

## 2019-02-28 RX ADMIN — PIPERACILLIN SODIUM,TAZOBACTAM SODIUM 3.38 G: 3; .375 INJECTION, POWDER, FOR SOLUTION INTRAVENOUS at 04:02

## 2019-02-28 RX ADMIN — LISINOPRIL 10 MG: 10 TABLET ORAL at 07:52

## 2019-02-28 RX ADMIN — PANTOPRAZOLE SODIUM 40 MG: 40 TABLET, DELAYED RELEASE ORAL at 10:50

## 2019-02-28 ASSESSMENT — ACTIVITIES OF DAILY LIVING (ADL)
ADLS_ACUITY_SCORE: 13

## 2019-02-28 NOTE — PLAN OF CARE
[id number] Pt is A/OX4. VSS on RA. Leg pain, managed with PRN tramadol. Regular diet. SBA, Alarm on in bed. Voiding adequately, no blood present in urine.  Q4 Blood sugars. HBG 6.1, recheck in the AM. Nursing will continue to monitor.   
A&O x4. VSS. Direct admit from home this afternoon.  Denies pain at this time. Up SBA. Generalized weakness noted. Hgb 6.5 upon admission. 2u PRBC ordered. LS clear, dim in bases. BS active.  LBM yesterday. Voiding adequately. Progressing toward plan of care. Discharge pending transfusion.  
A&Ox4. BP sl. elevated, lisinopril increased. Up independently. Denies pain. Regular diet. Voiding adequately in bathroom with tea colored output, reports small vaginal bleeding with clots this AM, none since. BM x 2, LS diminished with infrequent dry cough, IS teaching provided and use encouraged. New IV-SL with intermittent antibiotics. Continue to monitor.   
A&Ox4. VSS ex BP (176/89, hydralazine PRN >180 available if needed) on RA. Up independently. Denies pain. Regular diet. Voiding adequately to bathroom, stated had 2 BM's previous shift. BS active, +Flatus. LS diminished with infrequent dry cough, lozenge given. IV-SL with intermittent antibiotics. Hgb 8.8. Continue to monitor.   
A&Ox4. VSS exp ZZ=384/70. C/o new upper abd pain/nausea, 1 episode emesis, given zofran x1, effective. NPO until abd US at 2000. Voiding adequately, reports occ small vaginal bleeding with clots, none since. LS diminished with infrequent dry cough, IS use encouraged. Up independently, steady. IV infiltrated with iron, replaced.  
A&Ox4. VSS, on RA. Up independently. Denies pain. Regular diet. Voiding adequately in bathroom with tea colored output, reports small vaginal bleeding with clots this AM. BS active, +Flatus. LS diminished with infrequent dry cough, IS teaching provided and use encouraged. IV-SL with intermittent antibiotics. Continue to monitor.   
AOx4. VSS on RA. No c/o pain. LS: clear, infrequent cough. PRN cough drop given. Regular diet, good appetite. Blood sugar checks, corrected per orders, d/c'ed. R PIV SL, abx switched to zosyn. ID following. BC drawn. Up independently. Voiding adequately in BR, no blood. Plan pending, possibly discharge wednesday on oral abx. Continue to monitor.  
Blood transfusion started @ 1728. Primary RN notified of change in vital signs at 10 minutes check. Will continue transfusion @ slow rate of 75 ml/hr per discussion with primary RN after she consulted patient's attending MD. Hospitalist consult pending.   
Patient alert and oriented, VSS on room air, complains of pain in upper abdomen, declined interventions. Pt NPO until 2000, tolerated regular diet after ultrasound, no nausea or abdominal pain. PIV SL with intermittent zosyn. Voiding adequately, 1 soft BM this shift. Ultrasound results pending. Discharge pending US and BC results.  
Patient is A&Ox4. VSS on room air. No complaints of pain. Lung sounds diminished. Regular diet, poor appetite. Voiding adequately. BS active. PIV SL. Hgb 8.8 after recheck. Up independently, calls appropriately. Receiving IV penicillin. Plan: Discharge pending. Plan for patient to have close outpatient follow up with Gyn Onc early next week upon discharge.   
Pt is A&Ox4. Denies pain. Attempted second blood transfusion, pre medicated with PRN benadryl and tylenol as ordered. Pt became hypotensive during transfusion and did not tolerate. Asymptomatic, pt remained in bed and did not ambulate during reaction. Blood stopped, MD called and made aware- placed orders for transfusion reaction study for lab blood bank. Ordered bolus x 2 for hypotension, now improved to 123/60. Voiding adequately, BS active. PIV inf NS @ 100. Q4 BG checks with sliding scale insulin as needed- none overnight. Up with SBA to bathroom. Plans to discharge pending transfusion. Will continue to monitor.   
Pt is A&Ox4. VSS on RA, afebrile. Denies any chest discomfort or pain this shift. LS dim, infrequent dry cough. Tolerating regular diet. Voiding adequately, BS active. No bleeding or clots noted in urine overnight. PIV SL + abx. Last hgb 8.8. Q4 BG checks, no insulin given overnight. +BC called out last evening, on appropriate abx. Up Ind/SBA. Plans to discharge pending improvement and course of abx. Will continue to monitor.   
Pt is A&Ox4. VSS on RA. Denies pain. LS dim, infrequent dry cough- PRN meds ordered and available. Tolerating regular diet. Voiding adequately, active BS. Michelle started this shift after receiving notice from microbiology lab that +BC were found in unit of transfused blood from yesterday overnight- spoke with MD for plan, see notes. RRT called at approx 0530 for chest tightness- EKG normal and trops neg- see notes and results. PIV inf NS @ 100. Re-check Hgb 7.6. Q4 BG checks with sliding scale insulin- none needed this shift. Up SBA to bathroom. Plans to discharge pending stabilization of Hgb. Will continue to monitor.   
Pt is A&Ox4. VSS on room air. Denied pain and nausea. Regular diet. Independent in the room, voids in bathroom. PIV SL + IV Zosyn. Discharge pending blood culture sensitivities.   
Pt is A&Ox4. VSS. Up ind. Denies pain. Reg diet. Pt having dry cough occasionally. PIV SL with intermittent antibiotics. Hbg 8.1. Plan is for Venofer ( IV iron), repeat CBC this morning and possible discharge home today. Slept between cares.   
Pt is alert/oriented, denies pain, SBA when OOB. Temp remains high at 100.7, down from 102.8, , RR 29, BP stable. MD notified of VS, one time dose tylenol given, instructions to given next transfusion when temp normalizes. UA collected, will continue to monitor.   
Abdomen soft, non-tender and non-distended without organomegaly or masses. Normal bowel sounds.

## 2019-02-28 NOTE — PROGRESS NOTES
GYN NOTE:  Pt in good spirits this AM. No complaints of upper GI pain.  VSS and afebrile.    U/S last yariel not read but tech notes show no gallbladder disease.    Blood culture pos for Parabacteroides. Sensitivities pending. Still on IV antibiotics.    A: Vaginal bleeding stable on Provera 30mg      Anemia somewhat corrected with transfusion.  P: D/C per ID recommendations       F/U with Felisa next week.    Thanks to ID and Hospitalist service for all their help.

## 2019-02-28 NOTE — PROGRESS NOTES
"DIscharge instructions discussed with patient, questions answered. Emphasized importance of following up with coloretcal - she wants to \"deal with the other problems first\".  Information for contacting colorectal in AVS.  Discharge medications sent up from pharmacy, sent with patient.  IV removed. Denies pain.  Belongings gathered and sent with patient. Transported to hospital entrance by NA, pt to call cab for transportation home.   "

## 2019-02-28 NOTE — PROGRESS NOTES
"Care Coordination:    RN called to inform of discharge and referral for colorectal surgery follow up.  Met with patient and she does not want me to make this appointment.  She \"wants to get through other appointments.\"  Information added on AVS and encouraged Pt to call and schedule.    Other f/u appointments on AVS.        Karmen Perez RN BSN  Care Coordinator  "

## 2019-02-28 NOTE — PROGRESS NOTES
Called from Dr De La Cruz.   Will send home today on Augmentin orally.  Will continue Provera 30mg Rx that pt just filled prior to hospitalization.

## 2019-02-28 NOTE — PROGRESS NOTES
[facility]    Infectious Disease Progress Note    Date of Service (when I saw the patient): 02/28/2019     Assessment & Plan   Josiane Dasilva is a 58 year old female who was admitted on 2/22/2019.       Impression:  1. 58 y.o female with alcohol abuse history.   2. HTN.   3. Anxiety and depression.   4. Admitted with acute anemia related to postmenopausal uterine bleeding, low back pain and fatigue.   5. Fever before the blood transfusion with reports of strep species in the donor blood, with her own blood cultures coming back positive for GNR, no strep in patients blood cultures.   6. Currently on zosyn.      Recommendations:   Parabacteroids in the blood culture, usually associated with GI abscesses, patient had one episode of vomiting on admission but then yesterday more vomiting and right UQ pain, ultrasound is negative for GB disease, no more vomiting or nausea today. Switch to oral flagyl 10 more days when ready for discharge.           Silvia Freeman MD    Interval History   No nausea or vomiting today  Cultures as above     Physical Exam   Temp: 97.3  F (36.3  C) Temp src: Oral BP: 146/60 Pulse: 83 Heart Rate: 85 Resp: 16 SpO2: 99 % O2 Device: None (Room air)    There were no vitals filed for this visit.  Vital Signs with Ranges  Temp:  [97.3  F (36.3  C)-98.8  F (37.1  C)] 97.3  F (36.3  C)  Pulse:  [74-83] 83  Heart Rate:  [73-85] 85  Resp:  [14-16] 16  BP: (130-158)/(55-81) 146/60  SpO2:  [97 %-100 %] 99 %    Constitutional: Awake, alert, cooperative, no apparent distress  Lungs: Clear to auscultation bilaterally, no crackles or wheezing  Cardiovascular: Regular rate and rhythm, normal S1 and S2, and no murmur noted  Abdomen: Normal bowel sounds, soft, non-distended, non-tender  Skin: No rashes, no cyanosis, no edema  Other:    Medications       gabapentin  300 mg Oral At Bedtime     lisinopril  10 mg Oral Daily     medroxyPROGESTERone  30 mg Oral Daily     piperacillin-tazobactam  3.375 g  Intravenous Q6H     tranexamic acid  1,300 mg Oral TID       Data   All microbiology laboratory data reviewed.  Recent Labs   Lab Test 02/27/19  0709 02/26/19  1410 02/26/19  0706 02/25/19  0656   WBC 7.2  --  7.1 7.9   HGB 8.1* 8.1* 7.8* 8.8*   HCT 26.0*  --  24.9* 28.4*   MCV 84  --  83 83   *  --  106* 116*     Recent Labs   Lab Test 02/27/19  1300 02/26/19  0706 02/24/19  0550   CR 0.90 0.97 0.93     No lab results found.  Recent Labs   Lab Test 02/25/19  0800 02/25/19  0754 02/23/19  0653 02/22/19  2045 02/22/19  1841 02/22/19  1836 08/12/14  0841 08/11/14  1900   CULT No growth after 3 days No growth after 3 days No growth after 5 days 10,000 to 50,000 colonies/mL  mixed urogenital laly  Susceptibility testing not routinely done   No growth Cultured on the 2nd day of incubation:  Parabacteroides merdae    Critical Value/Significant Value, preliminary result only, called to and read back by   SARAI GEORGE RN @6705 2/24/19. CT    Susceptibility testing in progress  (Note)  NEGATIVE for the following organisms and resistance markers:  Acinetobacter sp., Citrobacter sp., Enterobacter sp., Proteus sp., E.  coli, K. pneumoniae/oxytoca, P. aeruginosa, CTX-M, KPC, NDM, VIM, IMP  and OXA by [employer] multiplex nucleic acid test. Final identification  and antimicrobial susceptibility testing will be verified by standard  methods.    Critical Value/Significant Value called to and read back by Yennifer George RN  2.25.19 FRANTZ.     10,000 to 50,000 colonies/mL mixed urogenital laly 50,000 to 100,000 colonies/mL mixed urogenital laly

## 2019-02-28 NOTE — TELEPHONE ENCOUNTER
RECORDS STATUS - ALL OTHER DIAGNOSIS      RECORDS RECEIVED FROM: Epic    DATE RECEIVED: February 28, 2019         NOTES STATUS DETAILS   OFFICE NOTE from referring provider Hospital admission     OFFICE NOTE from medical oncologist Epic     DISCHARGE SUMMARY from hospital [facility]     DISCHARGE REPORT from the ER [facility]     OPERATIVE REPORT NA     MEDICATION LIST [facility]     CLINICAL TRIAL TREATMENTS TO DATE NA     LABS     PATHOLOGY REPORTS NA     ANYTHING RELATED TO DIAGNOSIS EPic     GENONOMIC TESTING     TYPE: NA     IMAGING (NEED IMAGES & REPORT)     CT SCANS NA     MRI NA     MAMMO Epic     ULTRASOUND Epic     PET NA

## 2019-02-28 NOTE — PROVIDER NOTIFICATION
MD Notification    Person notified:on call hospitalist    Person Name:Dr. Reddy    Date/Time:2/27/19  8106    Interaction: phone    Purpose of Notification: Patient unable to fall asleep, please advise.    Orders Received: PRN melatonin 1-3 mg ordered    Comments:

## 2019-02-28 NOTE — PROGRESS NOTES
[facility]    Hospitalist Progress Note    Assessment & Plan     Josiane Dasilva is a 58-year-old female with past medical history of hypertension, anxiety, depression, alcohol abuse, fatty liver, morbid obesity, gout and 3 months of postmenopausal bleeding, with findings of a myomatous uterus, registered under observation status by Gynecology on 02/22 for symptomatic anemia related to postmenopausal bleeding with a hemoglobin of 6.5.  The Hospitalist Service was consulted for fever with concern for transfusion reaction versus underlying undiagnosed infection.  T-max 102.6 degrees Fahrenheit and pulse 113.  Remainder of vitals within normal limits.  The patient is currently stable.      Postmenopausal bleeding  Subacute blood loss anemia, symptomatic:  The patient describes 3 months of PMB, seen by GYN on 02/19 with a hemoglobin of 7.3.  Prescribed Provera to slow down the bleeding, but did not start this until 02/21. Had transvaginal ultrasound which showed a myomatous uterus.  Hemoglobin rechecked and 6.5 in the clinic and patient was admitted for transfusion.  Two units of packed red blood cells were ordered.  A temperature of 100 degrees Fahrenheit was noted prior to initiation of the transfusion; however, the temperature slowly increased while the patient was receiving the transfusion, with of T-max 102.6.  The patient describes symptoms of her anemia including shortness of breath, palpitations, dizziness and lightheadedness.  She denies any melena, hematochezia, hemoptysis or hematemesis.  No abdominal pain, diarrhea, dysuria, rash, chest pain or recent viral illness.  CMP unremarkable aside from an alkaline phosphatase of 190 and an AST of 52.  Lipase within normal limits.  Lactic acid 1.9.  CBC with WBC of 11.3, platelet count of 134, hemoglobin 6.5, again with an MCV of 81.  Chest x-ray unremarkable for acute cardiopulmonary process. UA with large amount of blood, otherwise unremarkable.    -no infectious sxs or fever prior to admission  -- Initial transfusion of 1 unit of packed red blood cells was stopped.  A transfusion reaction panel was sent to the blood bank.  An infectious workup has been initiated without clear evidence for infection.  At this time, we will plan to allow the  patient's fever to resolve.  The patient received a dose of Tylenol 650 mg around 17:56 and will receive another dose now.  Once the fever has improved in the next 4 hours, we will restart another unit of packed red blood cells, but will premedicate the patient with both Tylenol and Benadryl.   -- partner discussed the case with Dr. Juarez.  Plan for patient to have close outpatient followup with GYN Onc early next week upon discharge.     -- Followup blood cultures.  Remains afebrile. No focal infectouis sxs  - 2nd attempt at blood transfusion pt had hypotension or routine monitoring in 80s/- but no sxs. No fever. -given 1L NS bolus with hypotension- resolved  - discussed case with on call pathologist at blood bank, Dr. Amina Grant. Transfusion reaction labs all normal.  Dr. Grant recommended transfusion but with different donor (both prior attempts with same donor) and transfuse slowly. Discussed with RN and will call blood bank to transfuse 2 units each over 4 hours and from different donor.   -s/p 2 units blood 2/23/19 with Hb rise to low 7 range.  -on call hospitalist called for + cx for gram positive cocci pairs/chairns on 2nd unit of blood pt reacted to but not complete (not complete given reaction) for strep and apparently + strep pcr.  Pt placed emperically on vancomycin  -no issues with 2 units blood patient transfused with on 2/23.   - Ferritin 27, iron 13.   -pt passed several blood clots 2/23  - per dr. Grant of d bank 1/2 blood pt reacted to but not receive fully + cx of strep and pcr + for strep. Being cultured and ID and sensitivities back later today.  I will be updated.   -given venofer 2/24    -Hb  stable in 8 range following 3 rd unit blood 2/24.   Vaginal bleeding stopped.   2/26 Hb down to 7 range but repeat in 8 range. No indication for need of repeat blood transfusion  Per gyn, bleeding improved with initiation of provera 30mg and TXA TID  IV venofer 2/27.   Hb stable. No indication for further blood transfusion.   No further bleeding.     On day of discharge pt doing well. No further vaginal bleeding. Outpatient gyn onc visit planned, Hb stable.   Pt completed two doses of Venofer in hospital  ID not concerned to with + Strep PCR on 2nd unit of blood attempted to give to patient but reacted to and not complete transfusion because bld cx negative for strep. PcN stopped by ID.     Plan:   - Patient has an appointment with Dr. Mary Hoffman of gyn/onc at [facility] on 3/5 at 0830am  -CBC with pcp and gyn outpatient.     Gram negative bacteremia  +strep PCR on blood sample from 2nd transfusion attempt  pcr on blood sample from 2nd blood transfusion attempt + PCR for strep but blood cultures without strep- not need to cover per ID.   Need to cover Gram negative rods on blood cx per ID  On blood cx 2/22 1/2 Parabacteroides merdae  Bld cx 2/23 ngtd  bld cx 2/25 ngtd  ucx negative.   Pt had fever of 100 before initial blood transfusion.   No clear infectious sxs. Some cough. Lungs clear. CXR negative.   No abd sxs. Mild elevation in alk phos and ast.  ++ etoh use daily  Wbc 11---> normalized 6-7 range.   LFTs nl alk phos 190---> 145. AST normalized.   Pt had vague epigastric pain on exam. This was not consistently present on exam  Had bout of N/V on 2/27 but resolved.  Nl Abd US. Nl GB on US. Nl LfTs, afebrile and nl wbc.   Doubt cholecystitis.   May have some gastritis from etoh use. Placed on PPI  Taking po well day of discharge.    unclear source of bacteroides in blood 1/2. Discussed with ID day of discharge. Neg GB eval and nl rectal exam for infection    Plan:   -per ID pt to discharge on course of  "augmentin for Parabacteroides merdae 1/2 cultures. No clear source. Not discharge on Flagyl given pt etoh use hx.   - f/u new PcP    Chest pain  RRT called this am. Nl troponin and ekg. Had transient substernal chest pain for about 45 minutes. ? Anxious about + cx on blood.   seriel troponins negative.   Resolved. No recurrence  Follow.   ? Gerd---> discharge on PPI. Stop all etoh       Hypertension:   Running high on lisinopril 10mg every day.   Plan:  Discharge on Lisinopril 20mg every day.  sbp 135/70s on this dose before dishareg  F/u with new PcP with BMP in office.   Stop etoh       Alcohol abuse  Hx of esophageal varices, grade I (per EGD)  Fatty liver disease: Pt reports drinking three beers/night. Describes experiencing \"withdrawal\" at times, but unable to further describe the symptoms she experiences. No hx of withdrawal seizure. Last beer the day prior to admission.   No signs etoh w/d currently.  - no signs etoh w/d during stay  -drinks beers per day, declines chem dep  -new PCP appt arranged.   - discussed naltrexone with new pcp  -stop etoh  - PPI, MV.        Bright red blood per rectum  Last colonoscopy and EGD in 2014 by AUGUSTUS reviewed. Pt noted to have esoph Varices  Pt denies hx of melena  Still drinking etoh  Pt noted to have some bright red blood, small amounts intermitently on tissue for 1 month  Rectal exam shows large external hemorroid.   Colorectal surgery referral placed  See Pcp  TUCKs ordered for patient.       Bilateral deep venous thrombosis prophylaxis:  Encourage ambulation.      CODE STATUS:  Full code.         Melvin De La Cruz MD  Text Page  (7am to 6pm)  Interval History     Doing well. Wants to go home.   Tolerating po intake  No n/v/d/abd pain    Nl abd us    -Data reviewed today: I reviewed all new labs and imaging results over the last 24 hours. I personally reviewed labs and imaging.  CXR negative for acute findings    Physical Exam   Temp: 97.3  F (36.3  C) Temp src: Oral " BP: 138/55 Pulse: 65 Heart Rate: 85 Resp: 16 SpO2: 99 % O2 Device: None (Room air)    There were no vitals filed for this visit.  Vital Signs with Ranges  Temp:  [97.3  F (36.3  C)-98.8  F (37.1  C)] 97.3  F (36.3  C)  Pulse:  [65] 65  Heart Rate:  [73-85] 85  Resp:  [16] 16  BP: (119-158)/(55-81) 138/55  SpO2:  [99 %-100 %] 99 %  No intake/output data recorded.    Constitutional: Nad, notoxix, appears comfortable, sitting on edge of bed eating dinner  Respiratory: CTAB  HEENT; nl sclera,  Cardiovascular: not tachy, RRR no r/g/m  GI: obese, soft, ? Mild epigastric pain  Recta: female RN present during exam. Large noninfllammed nontender external hemorroid. No blood or mass in rectum  Skin/Integumen: no rash or edema  Psych:  nl affect  Neuro: alert, nl speech and mentation    Medications       gabapentin  300 mg Oral At Bedtime     [START ON 3/1/2019] lisinopril  20 mg Oral Daily     medroxyPROGESTERone  30 mg Oral Daily     metroNIDAZOLE  500 mg Oral Q8H VENTURA     [START ON 3/1/2019] pantoprazole  40 mg Oral QAM AC     tranexamic acid  1,300 mg Oral TID       Data   Recent Labs   Lab 02/28/19  1135 02/27/19  1300 02/27/19  0709 02/26/19  1410 02/26/19  0706 02/25/19  0656  02/24/19  1142 02/24/19  0550  02/22/19  1837   WBC  --   --  7.2  --  7.1 7.9  --   --  6.5   < > 11.3*   HGB  --   --  8.1* 8.1* 7.8* 8.8*   < >  --  7.6*   < > 6.5*   MCV  --   --  84  --  83 83  --   --  82   < > 81   PLT  --   --  122*  --  106* 116*  --   --  112*   < > 134*   NA  --  139  --   --  138  --   --   --  137   < > 134   POTASSIUM  --  4.0  --   --  3.9  --   --   --  4.1   < > 4.4   CHLORIDE  --  111*  --   --  109  --   --   --  109   < > 104   CO2  --  21  --   --  21  --   --   --  20   < > 22   BUN  --  9  --   --  8  --   --   --  11   < > 7   CR  --  0.90  --   --  0.97  --   --   --  0.93   < > 0.96   ANIONGAP  --  7  --   --  8  --   --   --  8   < > 8   JESSICA  --  8.1*  --   --  7.9*  --   --   --  7.7*   < > 8.2*   GLC   --  112*  --   --  89  --   --   --  93   < > 116*   ALBUMIN 2.2* 2.2*  --   --  2.1*  --   --   --   --   --  2.4*   PROTTOTAL 6.3* 6.2*  --   --  5.9*  --   --   --   --   --  6.7*   BILITOTAL 0.4 0.4  --   --  0.6  --   --   --   --   --  0.9   ALKPHOS 147 163*  --   --  145  --   --   --   --   --  190*   ALT 23 23  --   --  18  --   --   --   --   --  23   AST 33 35  --   --  32  --   --   --   --   --  52*   LIPASE  --   --   --   --   --   --   --   --   --   --  262   TROPI  --   --   --   --   --   --   --  <0.015 <0.015  --   --     < > = values in this interval not displayed.       Imaging:   Recent Results (from the past 24 hour(s))   US Abdomen Complete    Narrative    ULTRASOUND ABDOMEN COMPLETE  2/27/2019 7:48 PM    HISTORY: Right upper quadrant and epigastric pain.    COMPARISON: None.    FINDINGS: There is mild diffuse fatty infiltration of the liver. No  focal hepatic lesions are identified. The gallbladder is unremarkable,  without evidence of cholelithiasis. Patient is nontender over the  gallbladder. No intra or extrahepatic bile duct dilatation. The common  duct could not be visualized in its entirety. Pancreas is partially  obscured by overlying bowel gas, but appears unremarkable where seen.  Unremarkable spleen. Both kidneys are unremarkable. No hydronephrosis.  Abdominal aorta and IVC are segmentally seen, and are of normal  caliber where visualized.      Impression    IMPRESSION:   1. Mild diffuse fatty infiltration of the liver.  2. Otherwise unremarkable abdominal ultrasound.      SELMA NARANJO MD

## 2019-03-01 ENCOUNTER — PATIENT OUTREACH (OUTPATIENT)
Dept: CARE COORDINATION | Facility: CLINIC | Age: 59
End: 2019-03-01

## 2019-03-01 ENCOUNTER — TELEPHONE (OUTPATIENT)
Dept: INTERNAL MEDICINE | Facility: CLINIC | Age: 59
End: 2019-03-01

## 2019-03-01 LAB
BACTERIA SPEC CULT: NO GROWTH
Lab: NORMAL
SPECIMEN SOURCE: NORMAL

## 2019-03-01 ASSESSMENT — ACTIVITIES OF DAILY LIVING (ADL): DEPENDENT_IADLS:: TRANSPORTATION

## 2019-03-01 NOTE — PROGRESS NOTES
Clinic Care Coordination Contact  [facility]/Voicemail    Referral Source: IP Handoff  Vaginal bleeding, hemorrhoids  New PCP appointment 3/8/19; needs to schedule with Colorectal Surgery    Clinical Data: Care Coordinator Outreach  Outreach attempted x 1.  Left message on voicemail with call back information and requested return call.  Plan: Care Coordinator will try to reach patient again in 1-2 business days.    Kyrie Carrillo, RN  Clinic Care Coordinator  [facility]  Ph: [phone]

## 2019-03-01 NOTE — PROGRESS NOTES
"Clinic Care Coordination Contact    Clinic Care Coordination Contact  OUTREACH    Referral Information:  Referral Source: IP Handoff  Primary Diagnosis: Gynecological disorders  Chief Complaint   Patient presents with     Clinic Care Coordination - [facility] Hospital     vaginal bleeding, hemorrhoids   Clinical Concerns:  Follow-up call to the patient to see how she is doing since her recent hospitalization.    Today the patient reports she's feeling fair aside from a current gout flare; see Pain below.  She had been sleeping at the time of CCC RN call and was hoping to go back to sleep but was agreeable to reviewing her recent hospital stay briefly.     The patient denied any recent or current alcohol use stating \"I only drink Koolaid and water\".    The patient confirmed the information for her upcoming appointments and noted that she needs to make a Colorectal appointment but would like to wait to schedule until after her currently scheduled appointments.    Pain  Pain (GOAL):: Yes  Location of chronic pain:: feet r/t gout  The patient states she ate a bologna sandwich last night which she feels is what caused her gout to flare up today.  At this time she reported the pain as being manageable but agreed to call to clinic if it worsens.    Medication Management:  The patient manages her medications independently.  She confirmed she is taking her new medications per her hospital discharge AVS and increased her lisinopril dose as prescribed.    Functional Status:  Dependent ADLs:: Ambulation-cane, Ambulation-walker  Dependent IADLs:: Transportation  Mobility Status: Independent w/Device    Living Situation:  Lives alone in an apartment    Transportation:  Transportation concerns (GOAL):: No  Transportation means:: Medical transport (through [employer]), Friend     Psychosocial:  Informal Support system:: Children     Resources and Interventions:  Equipment Currently Used at Home: walker, standard, cane, " straight    Patient/Caregiver understanding: Yes    Outreach Frequency: weekly  Future Appointments              In 4 days Mary Ash MD [facility]    In 1 week Carmen Arriaza MD [facility]        Plan: The patient will attend her upcoming appointments as scheduled and will then call to schedule a Colorectal appointment as recommended.  The patient will continue her antibiotics course as prescribed and will contact CCC RN with additional questions/concerns.  CCC RN will plan to outreach to patient in approximately 1 week when she is in clinic for her PCP appointment.    Kyrie Carrillo RN  Clinic Care Coordinator  [facility]  Ph: [phone]

## 2019-03-01 NOTE — LETTER
[facility] CARE COORDINATION  [facility]  [address], [place] [address]    March 1, 2019    Josiane Dasilva  [address]    [place] [facility] [place] [address]      Dear Josiane,    I am a clinic care coordinator who works with Carmen Arriaza MD at [facility]. I wanted to thank you for spending the time to talk with me and provide you with my contact information so that you can call me with questions or concerns about your health care. Below is a description of clinic care coordination and how I can further assist you.     The clinic care coordinator is a registered nurse and/or  who understand the health care system. The goal of clinic care coordination is to help you manage your health and improve access to the [facility] system in the most efficient manner. The registered nurse can assist you in meeting your health care goals by providing education, coordinating services, and strengthening the communication among your providers. The  can assist you with financial, behavioral, psychosocial, chemical dependency, counseling, and/or psychiatric resources.    Please feel free to contact me at [phone] with any questions or concerns. We at [facility] are focused on providing you with the highest-quality healthcare experience possible and that all starts with you.     Sincerely,     Kyrie Carrillo RN  Clinic Care Coordinator  [facility]  Ph: [phone]      Enclosed: I have enclosed a copy of a 24 Hour Access Plan. This has helpful phone numbers for you to call when needed. Please keep this in an easy to access place to use as needed.

## 2019-03-01 NOTE — LETTER
Health Care Home - Access Care Plan    About Me  Patient Name:  Josiane Dasilva    YOB: 1960  Age:                             58 year old   Precious MRN:            [id number] Telephone Information:   Home Phone [phone]   Mobile [phone]       Address:    [address]  [address] [place] [address] Email address:  No e-mail address on record      Emergency Contact(s)  Name Relationship Lgl Grd Work Phone Home Phone Mobile Phone   1. SANCHO RODRÍGUEZ Sister No  [phone]    2. ASHLEY CORLEY Sister No  [phone]              Health Maintenance: Routine Health maintenance Reviewed: Due/Overdue   Health Maintenance Due   Topic Date Due     URINE DRUG SCREEN Q1 YR  12/28/1974     ZOSTER IMMUNIZATION (1 of 2) 12/28/2009     PREVENTIVE CARE VISIT  04/03/2018     INFLUENZA VACCINE (1) 09/01/2018     TSH Q3 MOS  09/27/2018     My Access Plan  Medical Emergency 911   Questions or concerns during clinic hours Primary Clinic Line, I will call the clinic directly: [facility] [phone]   24 Hour Appointment Line [phone] or  [phone]) (toll free)   24 Hour Nurse Line [phone] (toll free)   Questions or concerns outside clinic hours 24 Hour Appointment Line, I will call the after-hours on-call line:   [facility] [phone] or [phone]) (toll-free)   Preferred Urgent Care [facility], [phone]   Preferred Hospital [facility]  [phone]   Preferred Pharmacy [id number] [place], [place] - [address] [facility]     Behavioral Health Crisis Line The National Suicide Prevention Lifeline at 1-317.755.7051 or 911     My Care Team Members  Patient Care Team       Relationship Specialty Notifications Start End    Carmen Arriaza MD PCP - General Internal Medicine  2/26/19     Phone: [phone] Fax: [phone] [address] [facility] [address]     Carly Grace DO PCP - Assigned PCP   2/19/17     Phone: [phone] Fax: [phone] [address] [facility] [address] [facility] [address]    Kyrie Carrillo, RN Clinic Care Coordinator Primary Care - CC  3/1/19     Phone: [phone]                My Medical and Care Information  Problem List   Patient Active Problem List   Diagnosis     Hypertension; goal <140/90     Anxiety     Depressive disorder     Alcohol abuse     Fatty liver     Ankle pain, chronic     Neuropathy     Irregular menstrual cycle     Seasonal allergic rhinitis, unspecified allergic rhinitis trigger     Slow transit constipation     Bilateral back pain, unspecified back location, unspecified chronicity     Subclinical hypothyroidism     Morbid obesity (H)     Stress incontinence of urine     Idiopathic chronic gout of multiple sites without tophus     Post-menopausal bleeding     Anemia     Acute on chronic blood loss anemia (H)     Esophageal varices without bleeding (H)     BRBPR (bright red blood per rectum)      Current Medications:  Current Outpatient Medications   Medication     amoxicillin-clavulanate (AUGMENTIN) 875-125 MG tablet     fexofenadine (ALLEGRA) 180 MG tablet     gabapentin (NEURONTIN) 300 MG capsule     lisinopril (PRINIVIL/ZESTRIL) 20 MG tablet     medroxyPROGESTERone (PROVERA) 10 MG tablet     multivitamin w/minerals (THERA-VIT-M) tablet     order for DME     order for DME     pantoprazole (PROTONIX) 40 MG EC tablet     polyethylene glycol (MIRALAX) powder     thiamine 100 MG tablet     witch hazel-glycerin (TUCKS) pad     No current facility-administered medications for this visit.

## 2019-03-03 LAB
BACTERIA SPEC CULT: NO GROWTH
BACTERIA SPEC CULT: NO GROWTH
Lab: NORMAL
Lab: NORMAL
SPECIMEN SOURCE: NORMAL
SPECIMEN SOURCE: NORMAL

## 2019-03-04 ENCOUNTER — TELEPHONE (OUTPATIENT)
Dept: ONCOLOGY | Facility: CLINIC | Age: 59
End: 2019-03-04

## 2019-03-04 NOTE — PROGRESS NOTES
Consult Notes on Referred Patient            RE: Josiane Dasilva  : 1960  GLEN: 3/5/2019        I had the pleasure of seeing your patient Josiane Dasilva here at the Gynecologic Cancer Clinic at the [facility] on 3/5/2019.  As you know she is a very pleasant 58 year old woman with a recent diagnosis of significant post-menopausal bleeding, severe anemia.  Given these findings she was subsequently sent to the Gynecologic Cancer Clinic for new patient consultation.  She is unaccompanied today.    Patient had been experiencing heavy PMB for the past 3 months.  She was found to be anemic with Hb at 6.1 and thus was admitted and transfused 2 units PRBC's with appropriate rise in her Hb.  She was started on Provera in the hospital and since then her bleeding has improved significantly.  She was also noted to have strep species in the donated blood and thus has been on antibiotics which she will complete soon.  She notes she feels much better following the blood transfusion.  She is quite immobile at baseline, however and uses a walker to get around.  She has also had a flare of gout in her right ankle which is causing her significant pain and impairing her mobility further.    19:  US Pelvis:  Uterus measures 12.5 x 8.1 x 7.6 cm with 3 leiomyoma measuring 3-4 cm.  EMS 11.4 mm.  Non-visualization of the bilateral ovaries      Review of Systems:  Systemic           no weight changes; no fever; no chills; no night sweats; no appetite changes  Skin           no rashes, or lesions  Eye           no irritation; no changes in vision  Maria Guadalupe-Laryngeal           no dysphagia; no hoarseness   Pulmonary    no cough; no shortness of breath  Cardiovascular    no chest pain; no palpitations  Gastrointestinal    no diarrhea; no constipation; no abdominal pain; no changes in bowel  habits; no blood in stool  Genitourinary   no urinary frequency; no urinary urgency; no dysuria; no pain; no abnormal vaginal  discharge; + abnormal vaginal bleeding  Breast    no breast discharge; no breast changes; no breast pain  Musculoskeletal    no myalgias; no arthralgias; no back pain  Psychiatric           no depressed mood; no anxiety    Hematologic            no tender lymph nodes; no noticeable swellings or lumps   Endocrine    no hot flashes; no heat/cold intolerance         Neurological   no tremor; no numbness and tingling; no headaches; + difficulty sleeping; + difficulty sleeping; + mood changes; + difficulty walking    Obstetrics and Gynecology History:  ,  x 3  No HRT use      Past Medical History:  Past Medical History:   Diagnosis Date     Alcohol abuse      Anxiety      Depressive disorder      GERD (gastroesophageal reflux disease)      Gout      Hypertension      Obesity        Past Surgical History:  Past Surgical History:   Procedure Laterality Date     APPENDECTOMY       COLONOSCOPY  2014    Procedure: COMBINED COLONOSCOPY, SINGLE BIOPSY/POLYPECTOMY BY BIOPSY;  Surgeon: Mike Mancuso MD;  Location:  GI     ESOPHAGOSCOPY, GASTROSCOPY, DUODENOSCOPY (EGD), COMBINED  2014    Procedure: COMBINED ESOPHAGOSCOPY, GASTROSCOPY, DUODENOSCOPY (EGD), BIOPSY SINGLE OR MULTIPLE;  Surgeon: Mike Mancuso MD;  Location:  GI     ORTHOPEDIC SURGERY      left ankle s/p bimalleolar fracture 2014     OTHER SURGICAL HISTORY      Billroth I as teenager secondary to ulcer     TUBAL LIGATION         Health Maintenance:  Last Pap Smear: 4/3/17              Result: negative, HPV negative  She has not had a history of abnormal Pap smears.    Last Mammogram: 4/3/17              Result: needed follow up US which was benign      She has not had a history of abnormal mammograms.    Last Colonoscopy: 14              Result: polyps- repeat 5 years      Current Medications:   has a current medication list which includes the following prescription(s): amoxicillin-clavulanate, fexofenadine,  gabapentin, lisinopril, medroxyprogesterone, multivitamin w/minerals, order for dme, order for dme, pantoprazole, polyethylene glycol, vitamin b1, and witch hazel-glycerin.     Allergies:   Asa [aspirin] and Nsaids      Social History:  Social History     Socioeconomic History     Marital status: Single     Spouse name: Not on file     Number of children: Not on file     Years of education: Not on file     Highest education level: Not on file   Occupational History     Not on file   Social Needs     Financial resource strain: Not on file     Food insecurity:     Worry: Not on file     Inability: Not on file     Transportation needs:     Medical: Not on file     Non-medical: Not on file   Tobacco Use     Smoking status: Former Smoker     Packs/day: 0.00     Years: 10.00     Pack years: 0.00     Last attempt to quit: 2005     Years since quittin.1     Smokeless tobacco: Never Used   Substance and Sexual Activity     Alcohol use: Yes     Alcohol/week: 0.0 oz     Comment: H/o heavy alcohol abuse, 2-3 beer/day     Drug use: No     Sexual activity: No     Partners: Male   Lifestyle     Physical activity:     Days per week: Not on file     Minutes per session: Not on file     Stress: Not on file   Relationships     Social connections:     Talks on phone: Not on file     Gets together: Not on file     Attends [employer] service: Not on file     Active member of club or organization: Not on file     Attends meetings of clubs or organizations: Not on file     Relationship status: Not on file     Intimate partner violence:     Fear of current or ex partner: Not on file     Emotionally abused: Not on file     Physically abused: Not on file     Forced sexual activity: Not on file   Other Topics Concern     Parent/sibling w/ CABG, MI or angioplasty before 65F 55M? Not Asked   Social History Narrative     Not on file       Lives alone, feels safe at home.  Is on disability.  She has 3 sons, one of whom lives locally,  "however he is not very available to assist her.  Does not have an advanced directive on file and would like her sons to be her POA.  Full Code  She has a significant history of alcohol abuse but reports that lately she has cut back significantly and has not been drinking the past several weeks.  She has a remote history of drug abuse as well but has not used in 30 years.    Family History:   The patient's family history is significant for.  Family History   Problem Relation Age of Onset     Thyroid Disease Mother      Hypertension Father      Thyroid Disease Sister          Physical Exam:   /76   Pulse 85   Temp 98.2  F (36.8  C) (Oral)   Resp 16   Ht 1.727 m (5' 8\")   Wt 111.1 kg (245 lb)   LMP 04/07/2014   SpO2 99%   BMI 37.25 kg/m    Body mass index is 37.25 kg/m .    General Appearance: healthy and alert, no distress     HEENT:  no thyromegaly, no palpable nodules or masses        Cardiovascular: regular rate and rhythm, no gallops, rubs or murmurs     Respiratory: lungs clear, no rales, rhonchi or wheezes, normal diaphragmatic excursion    Musculoskeletal: extremities non tender and without edema    Skin: no lesions or rashes     Neurological: normal gait, no gross defects     Psychiatric: appropriate mood and affect                               Hematological: normal cervical, supraclavicular and inguinal lymph nodes     Gastrointestinal:       abdomen soft, non-tender, non-distended, no organomegaly or masses    Genitourinary: External genitalia and urethral meatus appears normal.  Vagina is smooth without nodularity or masses.  Cervix appears normal and without lesions.  Bimanual exam reveal no masses, nodularity or fullness.  Recto-vaginal exam confirms these findings.    Procedure Note:  Consent was obtained.  Speculum was placed and the cervix was grasped with a single toothed tenaculum.  Cervix was cleansed with betadine.  An attempt to pass the Pipelle was unsuccessful due to what felt " like a cervical fibroid.  A sound was then used to attempt to dilate the cervix and get past the fibroid, however this was unsuccessful.  Another unsuccessful attempt was then made to pass the Pipelle.  The tenaculum and speculum were removed.  Patient tolerated the procedure well.      Assessment:    Josiane Dasilva is a 58 year old woman with a new diagnosis of PMB causing significant anemia.     A total of 60 minutes was spent with the patient, >50% of which were spent in counseling the patient and/or treatment planning.      Plan:     1.)    PMB leading to significant anemia.  We reviewed possible etiologies for this significant bleeding including benign, hyperplasia and malignant.  Unfortunately, I was unable to obtain a biopsy today to further characterize.  We did discuss options, but in light of her significant bleeding leading to significant anemia requiring hospitalization and transfusion, I strongly recommend hysterectomy both for diagnosis and to stop any further bleeding.  She is also in agreement with this plan.  We discussed the role of frozen section analysis and that further surgical findings would be based on these results.  We discussed need for CT to evaluate for possible metastatic disease as well as to evaluate for possible cirrhosis due to her significant alcohol use in the past.  Risks and benefits of ovarian preservation vs removal were discussed and she would like bilateral oophorectomy at the time of her procedure.  Given her multiple medical co-morbidities as well as her poor compliance with PCP in the past, I have asked her to see the PAC clinic prior to surgery and labs/further pre-operative work up will be deferred until then.  Risks, benefits, indications and alternatives were discussed with the patient.  All her questions were answered and consents were signed for laparoscopic removal of uterus, cervix, both ovaries and fallopian tubes, possible cancer surgery, possible open on  3/14.    2.) History of EtOH abuse, possible cirrhosis due to history of thrombocytopenia.  Will evaluate further with CT.  She will also see PAC clinic prior to surgery    3.) Significant mobility issues and poor social support.  We will plan to admit her overnight following her procedure for PT assessment as well as for SW involvement in her care     4.) Genetic risk factors were assessed and the patient does not meet the qualifications for a referral.      5.) Labs and/or tests ordered include:  CT C/A/P.     6.) Health maintenance issues addressed today include pt is due for a mammogram which we will address at a future visit.    7.) Pre-op teaching was completed today.        Thank you for allowing us to participate in the care of your patient.         Sincerely,    Mary Roberto MD  Gynecologic Oncology  [facility]       CC

## 2019-03-04 NOTE — TELEPHONE ENCOUNTER
Called patient to remind her of her upcoming appointment tomorrow with Dr. Roberto (3/5/19) at 8:30am.  Pt did not answer, left voicemail for patient to call us back at her earliest convenience.  Awaiting return call.      Attempted to try patient 5 times, but was unable to get a hold of her.  She did not return phone call.  Unable to leave a detailed message as we don't have consent on file.

## 2019-03-05 ENCOUNTER — ALLIED HEALTH/NURSE VISIT (OUTPATIENT)
Dept: ONCOLOGY | Facility: CLINIC | Age: 59
End: 2019-03-05

## 2019-03-05 ENCOUNTER — TELEPHONE (OUTPATIENT)
Dept: ONCOLOGY | Facility: CLINIC | Age: 59
End: 2019-03-05

## 2019-03-05 ENCOUNTER — ONCOLOGY VISIT (OUTPATIENT)
Dept: ONCOLOGY | Facility: CLINIC | Age: 59
End: 2019-03-05
Attending: OBSTETRICS & GYNECOLOGY
Payer: COMMERCIAL

## 2019-03-05 ENCOUNTER — PRE VISIT (OUTPATIENT)
Dept: ONCOLOGY | Facility: CLINIC | Age: 59
End: 2019-03-05

## 2019-03-05 VITALS
HEIGHT: 68 IN | OXYGEN SATURATION: 99 % | SYSTOLIC BLOOD PRESSURE: 146 MMHG | DIASTOLIC BLOOD PRESSURE: 76 MMHG | HEART RATE: 85 BPM | WEIGHT: 245 LBS | BODY MASS INDEX: 37.13 KG/M2 | RESPIRATION RATE: 16 BRPM | TEMPERATURE: 98.2 F

## 2019-03-05 DIAGNOSIS — Z71.9 COUNSELING NOS(V65.40): Primary | ICD-10-CM

## 2019-03-05 DIAGNOSIS — N95.0 POST-MENOPAUSAL BLEEDING: Primary | ICD-10-CM

## 2019-03-05 LAB
BACTERIA SPEC CULT: ABNORMAL
Lab: ABNORMAL
SPECIMEN SOURCE: ABNORMAL

## 2019-03-05 PROCEDURE — 58100 BIOPSY OF UTERUS LINING: CPT | Mod: 53 | Performed by: OBSTETRICS & GYNECOLOGY

## 2019-03-05 PROCEDURE — 58100 BIOPSY OF UTERUS LINING: CPT | Mod: 52

## 2019-03-05 PROCEDURE — G0463 HOSPITAL OUTPT CLINIC VISIT: HCPCS

## 2019-03-05 PROCEDURE — 99215 OFFICE O/P EST HI 40 MIN: CPT | Mod: 25 | Performed by: OBSTETRICS & GYNECOLOGY

## 2019-03-05 PROCEDURE — G0463 HOSPITAL OUTPT CLINIC VISIT: HCPCS | Mod: 25

## 2019-03-05 RX ORDER — CEFAZOLIN SODIUM 1 G
1 VIAL (EA) INJECTION SEE ADMIN INSTRUCTIONS
Status: CANCELLED | OUTPATIENT
Start: 2019-03-05

## 2019-03-05 RX ORDER — HEPARIN SODIUM 10000 [USP'U]/ML
5000 INJECTION, SOLUTION INTRAVENOUS; SUBCUTANEOUS
Status: CANCELLED | OUTPATIENT
Start: 2019-03-05

## 2019-03-05 RX ORDER — PHENAZOPYRIDINE HYDROCHLORIDE 100 MG/1
200 TABLET, FILM COATED ORAL ONCE
Status: CANCELLED | OUTPATIENT
Start: 2019-03-05 | End: 2019-03-05

## 2019-03-05 ASSESSMENT — PAIN SCALES - GENERAL: PAINLEVEL: WORST PAIN (10)

## 2019-03-05 ASSESSMENT — MIFFLIN-ST. JEOR: SCORE: 1734.81

## 2019-03-05 NOTE — LETTER
3/5/2019         RE: Josiane Dasilva  [address]  [address]  [facility] [place] [facility] [address]        Dear Colleague,    Thank you for referring your patient, Josiane Dasilva, to the [facility]. Please see a copy of my visit note below.    Consult Notes on Referred Patient            RE: Josiane Dasilva  : 1960  GLEN: 3/5/2019        I had the pleasure of seeing your patient Josiane Dasilva here at the Gynecologic Cancer Clinic at the [facility] on 3/5/2019.  As you know she is a very pleasant 58 year old woman with a recent diagnosis of significant post-menopausal bleeding, severe anemia.  Given these findings she was subsequently sent to the Gynecologic Cancer Clinic for new patient consultation.  She is unaccompanied today.    Patient had been experiencing heavy PMB for the past 3 months.  She was found to be anemic with Hb at 6.1 and thus was admitted and transfused 2 units PRBC's with appropriate rise in her Hb.  She was started on Provera in the hospital and since then her bleeding has improved significantly.  She was also noted to have strep species in the donated blood and thus has been on antibiotics which she will complete soon.  She notes she feels much better following the blood transfusion.  She is quite immobile at baseline, however and uses a walker to get around.  She has also had a flare of gout in her right ankle which is causing her significant pain and impairing her mobility further.    19:  US Pelvis:  Uterus measures 12.5 x 8.1 x 7.6 cm with 3 leiomyoma measuring 3-4 cm.  EMS 11.4 mm.  Non-visualization of the bilateral ovaries      Review of Systems:  Systemic           no weight changes; no fever; no chills; no night sweats; no appetite changes  Skin           no rashes, or lesions  Eye           no irritation; no changes in vision  Maria Guadalupe-Laryngeal           no dysphagia; no hoarseness   Pulmonary    no cough; no shortness of breath  Cardiovascular     no chest pain; no palpitations  Gastrointestinal    no diarrhea; no constipation; no abdominal pain; no changes in bowel  habits; no blood in stool  Genitourinary   no urinary frequency; no urinary urgency; no dysuria; no pain; no abnormal vaginal discharge; + abnormal vaginal bleeding  Breast    no breast discharge; no breast changes; no breast pain  Musculoskeletal    no myalgias; no arthralgias; no back pain  Psychiatric           no depressed mood; no anxiety    Hematologic            no tender lymph nodes; no noticeable swellings or lumps   Endocrine    no hot flashes; no heat/cold intolerance         Neurological   no tremor; no numbness and tingling; no headaches; + difficulty sleeping; + difficulty sleeping; + mood changes; + difficulty walking    Obstetrics and Gynecology History:  ,  x 3  No HRT use      Past Medical History:  Past Medical History:   Diagnosis Date     Alcohol abuse      Anxiety      Depressive disorder      GERD (gastroesophageal reflux disease)      Gout      Hypertension      Obesity        Past Surgical History:  Past Surgical History:   Procedure Laterality Date     APPENDECTOMY       COLONOSCOPY  2014    Procedure: COMBINED COLONOSCOPY, SINGLE BIOPSY/POLYPECTOMY BY BIOPSY;  Surgeon: Mike Mancuso MD;  Location:  GI     ESOPHAGOSCOPY, GASTROSCOPY, DUODENOSCOPY (EGD), COMBINED  2014    Procedure: COMBINED ESOPHAGOSCOPY, GASTROSCOPY, DUODENOSCOPY (EGD), BIOPSY SINGLE OR MULTIPLE;  Surgeon: Mike Mancuso MD;  Location:  GI     ORTHOPEDIC SURGERY      left ankle s/p bimalleolar fracture 2014     OTHER SURGICAL HISTORY      Billroth I as teenager secondary to ulcer     TUBAL LIGATION         Health Maintenance:  Last Pap Smear: 4/3/17              Result: negative, HPV negative  She has not had a history of abnormal Pap smears.    Last Mammogram: 4/3/17              Result: needed follow up US which was benign      She has not had a  history of abnormal mammograms.    Last Colonoscopy: 14              Result: polyps- repeat 5 years      Current Medications:   has a current medication list which includes the following prescription(s): amoxicillin-clavulanate, fexofenadine, gabapentin, lisinopril, medroxyprogesterone, multivitamin w/minerals, order for dme, order for dme, pantoprazole, polyethylene glycol, vitamin b1, and witch hazel-glycerin.     Allergies:   Asa [aspirin] and Nsaids      Social History:  Social History     Socioeconomic History     Marital status: Single     Spouse name: Not on file     Number of children: Not on file     Years of education: Not on file     Highest education level: Not on file   Occupational History     Not on file   Social Needs     Financial resource strain: Not on file     Food insecurity:     Worry: Not on file     Inability: Not on file     Transportation needs:     Medical: Not on file     Non-medical: Not on file   Tobacco Use     Smoking status: Former Smoker     Packs/day: 0.00     Years: 10.00     Pack years: 0.00     Last attempt to quit: 2005     Years since quittin.1     Smokeless tobacco: Never Used   Substance and Sexual Activity     Alcohol use: Yes     Alcohol/week: 0.0 oz     Comment: H/o heavy alcohol abuse, 2-3 beer/day     Drug use: No     Sexual activity: No     Partners: Male   Lifestyle     Physical activity:     Days per week: Not on file     Minutes per session: Not on file     Stress: Not on file   Relationships     Social connections:     Talks on phone: Not on file     Gets together: Not on file     Attends [employer] service: Not on file     Active member of club or organization: Not on file     Attends meetings of clubs or organizations: Not on file     Relationship status: Not on file     Intimate partner violence:     Fear of current or ex partner: Not on file     Emotionally abused: Not on file     Physically abused: Not on file     Forced sexual activity: Not on  "file   Other Topics Concern     Parent/sibling w/ CABG, MI or angioplasty before 65F 55M? Not Asked   Social History Narrative     Not on file       Lives alone, feels safe at home.  Is on disability.  She has 3 sons, one of whom lives locally, however he is not very available to assist her.  Does not have an advanced directive on file and would like her sons to be her POA.  Full Code  She has a significant history of alcohol abuse but reports that lately she has cut back significantly and has not been drinking the past several weeks.  She has a remote history of drug abuse as well but has not used in 30 years.    Family History:   The patient's family history is significant for.  Family History   Problem Relation Age of Onset     Thyroid Disease Mother      Hypertension Father      Thyroid Disease Sister          Physical Exam:   /76   Pulse 85   Temp 98.2  F (36.8  C) (Oral)   Resp 16   Ht 1.727 m (5' 8\")   Wt 111.1 kg (245 lb)   LMP 04/07/2014   SpO2 99%   BMI 37.25 kg/m     Body mass index is 37.25 kg/m .    General Appearance: healthy and alert, no distress     HEENT:  no thyromegaly, no palpable nodules or masses        Cardiovascular: regular rate and rhythm, no gallops, rubs or murmurs     Respiratory: lungs clear, no rales, rhonchi or wheezes, normal diaphragmatic excursion    Musculoskeletal: extremities non tender and without edema    Skin: no lesions or rashes     Neurological: normal gait, no gross defects     Psychiatric: appropriate mood and affect                               Hematological: normal cervical, supraclavicular and inguinal lymph nodes     Gastrointestinal:       abdomen soft, non-tender, non-distended, no organomegaly or masses    Genitourinary: External genitalia and urethral meatus appears normal.  Vagina is smooth without nodularity or masses.  Cervix appears normal and without lesions.  Bimanual exam reveal no masses, nodularity or fullness.  Recto-vaginal exam " confirms these findings.    Procedure Note:  Consent was obtained.  Speculum was placed and the cervix was grasped with a single toothed tenaculum.  Cervix was cleansed with betadine.  An attempt to pass the Pipelle was unsuccessful due to what felt like a cervical fibroid.  A sound was then used to attempt to dilate the cervix and get past the fibroid, however this was unsuccessful.  Another unsuccessful attempt was then made to pass the Pipelle.  The tenaculum and speculum were removed.  Patient tolerated the procedure well.      Assessment:    Josiane Dasilva is a 58 year old woman with a new diagnosis of PMB causing significant anemia.     A total of 60 minutes was spent with the patient, >50% of which were spent in counseling the patient and/or treatment planning.      Plan:     1.)    PMB leading to significant anemia.  We reviewed possible etiologies for this significant bleeding including benign, hyperplasia and malignant.  Unfortunately, I was unable to obtain a biopsy today to further characterize.  We did discuss options, but in light of her significant bleeding leading to significant anemia requiring hospitalization and transfusion, I strongly recommend hysterectomy both for diagnosis and to stop any further bleeding.  She is also in agreement with this plan.  We discussed the role of frozen section analysis and that further surgical findings would be based on these results.  We discussed need for CT to evaluate for possible metastatic disease as well as to evaluate for possible cirrhosis due to her significant alcohol use in the past.  Risks and benefits of ovarian preservation vs removal were discussed and she would like bilateral oophorectomy at the time of her procedure.  Given her multiple medical co-morbidities as well as her poor compliance with PCP in the past, I have asked her to see the PAC clinic prior to surgery and labs/further pre-operative work up will be deferred until then.  Risks,  benefits, indications and alternatives were discussed with the patient.  All her questions were answered and consents were signed for laparoscopic removal of uterus, cervix, both ovaries and fallopian tubes, possible cancer surgery, possible open on 3/14.    2.) History of EtOH abuse, possible cirrhosis due to history of thrombocytopenia.  Will evaluate further with CT.  She will also see PAC clinic prior to surgery    3.) Significant mobility issues and poor social support.  We will plan to admit her overnight following her procedure for PT assessment as well as for SW involvement in her care     4.) Genetic risk factors were assessed and the patient does not meet the qualifications for a referral.      5.) Labs and/or tests ordered include:  CT C/A/P.     6.) Health maintenance issues addressed today include pt is due for a mammogram which we will address at a future visit.    7.) Pre-op teaching was completed today.        Thank you for allowing us to participate in the care of your patient.         Sincerely,    Mary Roberto MD  Gynecologic Oncology  [facility]       CC           Again, thank you for allowing me to participate in the care of your patient.        Sincerely,        Dalton Roberto MD

## 2019-03-05 NOTE — NURSING NOTE
"Oncology Rooming Note    March 5, 2019 8:28 AM   Josiane Dasilva is a 58 year old female who presents for:    Chief Complaint   Patient presents with     Oncology Clinic Visit     New Patient consult     Initial Vitals: /76   Pulse 85   Temp 98.2  F (36.8  C) (Oral)   Resp 16   Ht 1.727 m (5' 8\")   Wt 111.1 kg (245 lb)   LMP 04/07/2014   SpO2 99%   BMI 37.25 kg/m   Estimated body mass index is 37.25 kg/m  as calculated from the following:    Height as of this encounter: 1.727 m (5' 8\").    Weight as of this encounter: 111.1 kg (245 lb). Body surface area is 2.31 meters squared.  Worst Pain (10) Comment: Data Unavailable   Patient's last menstrual period was 04/07/2014.  Allergies reviewed: Yes  Medications reviewed: Yes    Medications: Medication refills not needed today.  Pharmacy name entered into [facility]: [id number] [address] [facility]    Clinical concerns: New Patient consult       Cayla Ace CMA              "

## 2019-03-05 NOTE — PROGRESS NOTES
Social Work Progress Note      Data/Intervention:  Patient Name:  Josiane Dasilva  /Age:  1960 (58 year old)    Reason for Follow-Up:  Josiane is a 58-year-old woman who comes to [facility] for consultation with Dr. Roberto to discuss recommendations for post-menopausal bleeding. This clinician received referral from [facility] for additional support in home.     Intervention:   Josiane is a single woman who resides with her adult son. Josiane reports that she has a few family members locally, namely a sister in [place] and another in [place]. Josiane uses a wheelchair in clinic today, and reports that she uses a walker at home. Josiane reports that she struggles at home with pain from gout, and worsening arthritis which make it difficult for her to engage with household chores. Josiane reports that at present, she is able to cook for herself and chooses to sit on stool at times to cook when it is too tiring to stand. Josiane reports that she is not interested in meal delivery. Josiane reports that she could benefit from cleaning and laundry assistance, as laundry is downstairs in home and vacuuming is painful to her back. Oriented Josiane to [facility] assessment for waiver services through the [facility] and encouraged her to explore options for additional PCA support. This clinician also provided pt with resources Help At Your Door, [facility] program, and Senior Linkage Line as additional possible referrals. Oriented Josiane to  services and support, and normalized complex emotional coping that can come as a result of plans for surgery. Josiane appreciative of resources and reports that she will reach out for additional support as needed.     Assessment:  Josiane is an independent woman who acknowledges that she has little social support surrounding home care needs. Josiane was receptive to community referrals for additional home support. Josiane uses  transportation support through insurance, and reports that aside from laundry and cleaning support, that she has no other needs. Josiane aware of SW support available and knows to reach out in the case of additional psychosocial distress or need.     Plan:  Provided patient/family with writer's contact information and availability. Josiane will reach out to SW in the case of distress or need.     Please call or page if needs or concerns arise.     KAYLIE Schneider, [facility] Hospital[facility]  Direct Phone: [phone]  Pager: [phone]

## 2019-03-05 NOTE — PATIENT INSTRUCTIONS
You have been scheduled for surgery on: 3/14    Diagnosis:  Post-menopausal bleeding    The surgical procedure is: Laparoscopic removal of uterus, cervix, both ovaries and fallopian tubes, possible cancer surgery, possible open, cystoscopy    Anticipated length of surgery: 1-3 hrs.  You will be in the recovery room for approximately 2-3 hrs after surgery.  Your family will not be able to see you until after you leave the recovery room.    Length of hospital stay:  This is an outpatient, extended stay surgery.  You will be discharged same day if you meet criteria for discharge.  If you have a larger surgical incision, you will need to be in the hospital 2-3 days.  ______________________________________________________________________    Preparation for Surgery:    To Schedule   1.  CT C/A/P prior to surgery   2.  PAC clinic prior to surgery   3.  Post-operative exam with me 1-2 weeks following surgery      Postoperative Restrictions:  No heavy lifting >20lbs for six weeks, nothing in the vagina (no tampons, no intercourse, no douching) for eight weeks.     Postoperative visit:  Return to clinic 1-2 weeks after surgery for post operative visit.      Please contact the clinic with any questions or concerns.    Mary Roberto MD  Gynecologic Oncology  [facility]

## 2019-03-05 NOTE — DISCHARGE SUMMARY
Admit Date:     2019   Discharge Date:     2019      REASON FOR ADMISSION:  The patient was admitted on 2019 for symptomatic anemia secondary to postmenopausal bleeding.      HOSPITAL COURSE:  The patient is a 58-year-old female who had had persistent bleeding for the last 3 months,  thought to be secondary to fibroid uterus.  She also has a thickened endometrium.  The patient had a hemoglobin of 6.5 and was feeling lightheaded and dizzy and therefore admitted for transfusion.  During transfusion, the patient had a significant febrile episode.  Had workup for a transfusion reaction.  One of the blood units was positive PCR for Strep.  The patient also had a positive blood culture obtained showing Parabacteroides species.  The patient had been maintained on vancomycin, switched to Zosyn, and remained afebrile for the rest of her hospitalization.  She did become hypotensive during 1 unit of transfusion, which responded to fluids.  Eventually, she was discharged afebrile with a prescription for Augmentin.  Flagyl would have been recommended, but due to the patient's history of alcohol abuse, this was not selected.      The patient was discharged with a hemoglobin of 8.1 after receiving 3 units of packed red blood cells.  She has an appointment with GYN/Oncology on 2019 in order to attempt endometrial biopsy.  The patient will be undergoing hysterectomy at some point in the future.  She also has a followup appointment with a new primary care physician, Dr. Carmen Arriaza, at [facility].         DARRION MIRAMONTES MD             D: 2019   T: 2019   MT: PEYTON      Name:     ETIENNE BUENROSTRO   MRN:      [id number]        Account:        [id number]   :      1960           Admit Date:     2019                                  Discharge Date: 2019      Document: [id number]       cc: Carmen Arriaza MD

## 2019-03-08 NOTE — NURSING NOTE
Late entry from 3/5/19:  Met with pt for pre and post op surgical education.  Pt scheduled for surgery with Dr Roberto on 3/14/19.  See pt education for detailed note.

## 2019-03-11 ENCOUNTER — HOSPITAL ENCOUNTER (OUTPATIENT)
Dept: CT IMAGING | Facility: CLINIC | Age: 59
Discharge: HOME | End: 2019-03-11
Attending: OBSTETRICS & GYNECOLOGY | Admitting: OBSTETRICS & GYNECOLOGY
Payer: COMMERCIAL

## 2019-03-11 DIAGNOSIS — N95.0 POST-MENOPAUSAL BLEEDING: ICD-10-CM

## 2019-03-11 PROCEDURE — 40000556 ZZH STATISTIC PERIPHERAL IV START W US GUIDANCE

## 2019-03-11 PROCEDURE — 25000128 H RX IP 250 OP 636: Performed by: RADIOLOGY

## 2019-03-11 PROCEDURE — 71260 CT THORAX DX C+: CPT

## 2019-03-11 PROCEDURE — 74177 CT ABD & PELVIS W/CONTRAST: CPT

## 2019-03-11 RX ORDER — IOPAMIDOL 755 MG/ML
500 INJECTION, SOLUTION INTRAVASCULAR ONCE
Status: COMPLETED | OUTPATIENT
Start: 2019-03-11 | End: 2019-03-11

## 2019-03-11 RX ADMIN — SODIUM CHLORIDE 65 ML: 9 INJECTION, SOLUTION INTRAVENOUS at 14:46

## 2019-03-11 RX ADMIN — IOPAMIDOL 100 ML: 755 INJECTION, SOLUTION INTRAVENOUS at 14:46

## 2019-03-13 ENCOUNTER — OFFICE VISIT (OUTPATIENT)
Dept: SURGERY | Facility: CLINIC | Age: 59
End: 2019-03-13
Payer: COMMERCIAL

## 2019-03-13 ENCOUNTER — PATIENT OUTREACH (OUTPATIENT)
Dept: CARE COORDINATION | Facility: CLINIC | Age: 59
End: 2019-03-13

## 2019-03-13 ENCOUNTER — ANESTHESIA EVENT (OUTPATIENT)
Dept: SURGERY | Facility: CLINIC | Age: 59
End: 2019-03-13
Payer: COMMERCIAL

## 2019-03-13 VITALS
OXYGEN SATURATION: 97 % | SYSTOLIC BLOOD PRESSURE: 135 MMHG | DIASTOLIC BLOOD PRESSURE: 78 MMHG | BODY MASS INDEX: 36.84 KG/M2 | RESPIRATION RATE: 18 BRPM | WEIGHT: 243.1 LBS | HEIGHT: 68 IN | TEMPERATURE: 97.6 F | HEART RATE: 86 BPM

## 2019-03-13 DIAGNOSIS — Z01.818 PRE-OP EXAMINATION: ICD-10-CM

## 2019-03-13 DIAGNOSIS — N95.0 POST-MENOPAUSAL BLEEDING: ICD-10-CM

## 2019-03-13 DIAGNOSIS — N95.0 POST-MENOPAUSAL BLEEDING: Primary | ICD-10-CM

## 2019-03-13 LAB
ABO + RH BLD: NORMAL
ABO + RH BLD: NORMAL
BLD GP AB SCN SERPL QL: NORMAL
BLD PROD TYP BPU: NORMAL
BLOOD BANK CMNT PATIENT-IMP: NORMAL
HGB BLD-MCNC: 9.8 G/DL (ref 11.7–15.7)
NT-PROBNP SERPL-MCNC: 148 PG/ML (ref 0–125)
NUM BPU REQUESTED: 2
SPECIMEN EXP DATE BLD: NORMAL

## 2019-03-13 PROCEDURE — 86923 COMPATIBILITY TEST ELECTRIC: CPT | Performed by: PHYSICIAN ASSISTANT

## 2019-03-13 ASSESSMENT — ACTIVITIES OF DAILY LIVING (ADL): DEPENDENT_IADLS:: TRANSPORTATION

## 2019-03-13 ASSESSMENT — MIFFLIN-ST. JEOR: SCORE: 1730.16

## 2019-03-13 NOTE — ANESTHESIA PREPROCEDURE EVALUATION
Anesthesia Pre-Procedure Evaluation    Patient: Josiane Dasilva   MRN:     [id number] Gender:   female   Age:    58 year old :      1960        Preoperative Diagnosis: Post-menopausal Bleeding   Procedure(s):  Laparoscopic Removal Of Uterus, Cervix, Both Ovaries And Fallopian Tubes, Possible Cancer Surgery  Possible Open  Cystoscopy     Past Medical History:   Diagnosis Date     Alcohol abuse      Anxiety      Depressive disorder      GERD (gastroesophageal reflux disease)      Gout      Hypertension      Obesity       Past Surgical History:   Procedure Laterality Date     APPENDECTOMY       COLONOSCOPY  2014    Procedure: COMBINED COLONOSCOPY, SINGLE BIOPSY/POLYPECTOMY BY BIOPSY;  Surgeon: Mike Mancuso MD;  Location:  GI     ESOPHAGOSCOPY, GASTROSCOPY, DUODENOSCOPY (EGD), COMBINED  2014    Procedure: COMBINED ESOPHAGOSCOPY, GASTROSCOPY, DUODENOSCOPY (EGD), BIOPSY SINGLE OR MULTIPLE;  Surgeon: Mike Mancuso MD;  Location:  GI     ORTHOPEDIC SURGERY      left ankle s/p bimalleolar fracture 2014     OTHER SURGICAL HISTORY      Billroth I as teenager secondary to ulcer     TUBAL LIGATION            Anesthesia Evaluation     . Pt has had prior anesthetic. Type: General and MAC    No history of anesthetic complications          ROS/MED HX    ENT/Pulmonary: Comment: Uses albuterol inhaler PRN    (+)MARILYN risk factors hypertension, obese, allergic rhinitis, , . .    Neurologic:     (+)neuropathy     Cardiovascular:     (+) hypertension----. : . . . :. . Previous cardiac testing date:results:Stress Testdate:14 results:EF 65%ECG reviewed date:19 results:NSR date: results:          METS/Exercise Tolerance: Comment: Due to neuropathy patient uses wheelchair 1 - Eating, dressing   Hematologic:     (+) Anemia, History of Transfusion no previous transfusion reaction -      Musculoskeletal:         GI/Hepatic:     (+) GERD Asymptomatic on medication, liver disease (Fatty  liver), Other GI/Hepatic       Renal/Genitourinary:  - ROS Renal section negative       Endo: Comment: Gout    (+) thyroid problem hypothyroidism, Obesity, .      Psychiatric:     (+) psychiatric history anxiety, depression and other (comment) (PTSD)      Infectious Disease:   (+) Other Infectious Disease Being treated for positive blood culture parabacteroides and positive PCR strep on blood transfusion      Malignancy:      - no malignancy   Other:    (+) No chance of pregnancy C-spine cleared: N/A, no H/O Chronic Pain,other significant disability Other (comment)                       PHYSICAL EXAM:   Mental Status/Neuro: A/A/O; Age Appropriate   Airway: Facies: Feasible  Mallampati: III  Mouth/Opening: Full  TM distance: > 6 cm  Neck ROM: Full   Respiratory: Auscultation: CTAB     Resp. Rate: Normal     Resp. Effort: Normal      CV: Rhythm: Regular  Heart: Normal Sounds   Comments: Pitting edema in right lower ankle and foot with gout flare. Difficult to palpate DT and PT pulse. Skin is warm and well perfused. No rash or erythema. Negative Marina's sign.      Dental:  Dental Comments: Back right - broken crown   Habitus: Obesity             Lab Results   Component Value Date    WBC 7.2 02/27/2019    HGB 8.1 (L) 02/27/2019    HCT 26.0 (L) 02/27/2019     (L) 02/27/2019     02/27/2019    POTASSIUM 4.0 02/27/2019    CHLORIDE 111 (H) 02/27/2019    CO2 21 02/27/2019    BUN 9 02/27/2019    CR 0.90 02/27/2019     (H) 02/27/2019    JESSICA 8.1 (L) 02/27/2019    ALBUMIN 2.2 (L) 02/28/2019    PROTTOTAL 6.3 (L) 02/28/2019    ALT 23 02/28/2019    AST 33 02/28/2019    ALKPHOS 147 02/28/2019    BILITOTAL 0.4 02/28/2019    LIPASE 262 02/22/2019    INR 1.08 08/26/2014    TSH 5.48 (H) 06/27/2018    T4 0.93 06/27/2018   Results for ETIENNE BUENROSTRO (MRN [id number]) as of 3/13/2019 12:26   Ref. Range 3/13/2019 08:22 3/13/2019 08:23   N-Terminal Pro Bnp Latest Ref Range: 0 - 125 pg/mL 148 (H)    Hemoglobin Latest Ref  "Range: 11.7 - 15.7 g/dL 9.8 (L)    ABO Unknown  O   RH(D) Unknown  Pos   Antibody Screen Unknown  Neg   Test Valid Only At Latest Units:      [facility]   Specimen Expires Unknown  03/16/2019   Results reviewed and no further changes    Preop Vitals  BP Readings from Last 3 Encounters:   03/05/19 146/76   02/28/19 138/55   02/22/19 112/62    Pulse Readings from Last 3 Encounters:   03/05/19 85   02/28/19 65   02/19/19 72      Resp Readings from Last 3 Encounters:   03/05/19 16   02/28/19 16   04/11/18 16    SpO2 Readings from Last 3 Encounters:   03/05/19 99%   02/28/19 99%   06/27/18 100%      Temp Readings from Last 1 Encounters:   03/05/19 98.2  F (36.8  C) (Oral)    Ht Readings from Last 1 Encounters:   03/05/19 1.727 m (5' 8\")      Wt Readings from Last 1 Encounters:   03/05/19 111.1 kg (245 lb)    Estimated body mass index is 37.25 kg/m  as calculated from the following:    Height as of 3/5/19: 1.727 m (5' 8\").    Weight as of 3/5/19: 111.1 kg (245 lb).     LDA:            Assessment:   ASA SCORE: 3       Documentation: H&P complete; Preop Testing complete; Consents complete   Proceeding: Proceed without further delay  Tobacco Use:  NO Active use of Tobacco/UNKNOWN Tobacco use status     Plan:   Anes. Type:  General   Pre-Induction: Midazolam IV; Acetaminophen PO   Induction:  IV (Standard)   Airway: Oral ETT; CMAC/VL   Access/Monitoring: PIV; A-Line; 2nd PIV   Maintenance: Balanced; Ketamine   Emergence: Procedure Site   Logistics: Observation/Admission     Postop Pain/Sedation Strategy:  Standard-Options: Opioids PRN     PONV Management:  Adult Risk Factors: Female, Non-Smoker, Postop Opioids  Prevention: Ondansetron; Dexamethasone     CONSENT: Direct conversation   Plan and risks discussed with: Patient   Blood Products: Consented (ALL Blood Products)                  PAC Discussion and Assessment    ASA Classification: 3  Case is suitable for: [place]  Anesthetic techniques and relevant risks " discussed: GA  Invasive monitoring and risk discussed:   Types:   Possibility and Risk of blood transfusion discussed:   NPO instructions given:   Additional anesthetic preparation and risks discussed:   Needs early admission to pre-op area:   Other:     PAC Resident/NP Anesthesia Assessment:  Josiane is a 58 year old woman who is scheduled for Laparoscopic removal of uterus, cervix, both ovaries and fallopian tubes, possible cancer surgery, possible open, cystoscopy on 3/14/19 by Dr. Roberto in treatment of post-menopausal bleeding.  PAC referral for risk assessment and optimization for anesthesia with comorbid conditions of HTN, anemia with recent hospitalization and transfusion, positive blood cultures for parabacteroides on Augmentin, obesity, allergic rhinitis, gout, neuropathy - uses wheelchair, GERD, history of bleeding ulcer, fatty liver, history of alcohol abuse, PTSD, depression, and anxiety:    Pre-operative considerations:  1.  Cardiac:  HTN on Lisinopril. EKG 2/22/19 shows normal sinus rhythm. Stress test 6/26/14 done for atypical chest pain without ischemia and EF 64%. Functional status- METS 1.  Intermediate risk surgery with 0.9% (RCRI #) risk of major adverse cardiac event. Given low functional status will check BNP today.   2.  Pulm:  Airway feasible.  MARILYN risk: Intermediate. Patient reports uses inhaler PRN for cough but hasn't used it recently. Allergic rhinitis controlled with Allegra.   3. Heme:  Anemia has received 3 units of PRBC over the course of hospitalization 2/22/19-2/28/19. Recheck hgb today and type and screen. Will have 2 units available for surgery.   4. Neuro: Neuropathy and gout. Continues to have active flare of gout in right foot and neuropathy in feet. Uses wheelchair. Consideration for careful positioning of right leg.   5. Endo: Obesity class II: BMI 36.   6. GI:   ~ GERD/ history of bleeding ulcer - continue on protonix   ~ Fatty liver/ history of alcohol abuse- seen on  ultrasound. Patient reports she now only has 1 drink weekly to monthly. Consider CIWA protocol if indicated.   ~ Risk of PONV score = 3.  If > 2, anti-emetic intervention recommended.  7. : Post-menopausal bleeding. Continue Provera and procedure as above.    8. ID: Positive PCR strep on blood transfusion sample. Positive blood culture of parabacteroides 2/22. Treated as inpatient with Zosyn and discharged (2/28/19) on Augmentin - should complete course by tomorrow.     VTE risk: 0.26%    Patient is optimized and is acceptable candidate for the proposed procedure.  No further diagnostic evaluation is needed.     Patient discussed with anesthesia    For further details of assessment, testing, and physical exam please see H and P completed on same date.    Tania Andres PA-C        Mid-Level Provider/Resident: Tania Andres PA-C  Date: 3/13/19  Time:     Attending Anesthesiologist Anesthesia Assessment:        Anesthesiologist:   Date:   Time:   Pass/Fail:   Disposition:     PAC Pharmacist Assessment:        Pharmacist:   Date:   Time:        Tania Andres PA-C

## 2019-03-13 NOTE — H&P
Pre-Operative H & P     CC:  Preoperative exam to assess for increased cardiopulmonary risk while undergoing surgery and anesthesia.    Date of Encounter: 3/13/2019  Primary Care Physician:  Carmen Arriaza    Reason for visit: pre op exam, Post-menopausal bleeding [N95.0]    HPI  Josiane Dasilva is a 58 year old female who presents for pre-operative H & P in preparation for Laparoscopic removal of uterus, cervix, both ovaries and fallopian tubes, possible cancer surgery, possible open, cystoscopy with Dr. Roberto on 3/14/19 at [facility].     The patient is a 58 year old woman who developed significant post-menopausal bleeding causing severe anemia for the past 3 months. Her hemoglobin was down to 6.1 and she was hospitalized and given 3 units of PRBC. She has been maintained on Provera and her bleeding has improved and she overall feels better. She met with Dr. Roberto on 3/5/19 and a biopsy unfortunately could not be obtained so treatment options were discussed and the patient is now scheduled for the procedure as above.     History is obtained from the patient and chart review     Past Medical History  Past Medical History:   Diagnosis Date     Alcohol abuse      Anxiety      Depressive disorder      Fatty liver      GERD (gastroesophageal reflux disease)      Gout      Hypertension      Neuropathy      Obesity      Post-menopausal bleeding        Past Surgical History  Past Surgical History:   Procedure Laterality Date     APPENDECTOMY       COLONOSCOPY  8/13/2014    Procedure: COMBINED COLONOSCOPY, SINGLE BIOPSY/POLYPECTOMY BY BIOPSY;  Surgeon: Mike Mancuso MD;  Location:  GI     ESOPHAGOSCOPY, GASTROSCOPY, DUODENOSCOPY (EGD), COMBINED  8/12/2014    Procedure: COMBINED ESOPHAGOSCOPY, GASTROSCOPY, DUODENOSCOPY (EGD), BIOPSY SINGLE OR MULTIPLE;  Surgeon: Mike Mancuso MD;  Location:  GI     ORTHOPEDIC SURGERY      left ankle s/p bimalleolar  fracture April 2014     OTHER SURGICAL HISTORY      Billroth I as teenager secondary to ulcer     TUBAL LIGATION         Hx of Blood transfusions/reactions: yes, none     Hx of abnormal bleeding or anti-platelet use: denies    Menstrual history: Patient's last menstrual period was 04/07/2014.:     Steroid use in the last year: denies    Personal or FH with difficulty with Anesthesia:  denies    Prior to Admission Medications  Current Outpatient Medications   Medication Sig Dispense Refill     amoxicillin-clavulanate (AUGMENTIN) 875-125 MG tablet Take 1 tablet by mouth 2 times daily for 9 days 18 tablet 0     gabapentin (NEURONTIN) 300 MG capsule Take 1 capsule (300 mg) by mouth At Bedtime 90 capsule 3     lisinopril (PRINIVIL/ZESTRIL) 20 MG tablet Take 1 tablet (20 mg) by mouth daily (Patient taking differently: Take 20 mg by mouth every morning ) 30 tablet 0     medroxyPROGESTERone (PROVERA) 10 MG tablet Take 1 tablet (10 mg) by mouth daily (Patient taking differently: Take 10 mg by mouth every morning ) 21 tablet 0     pantoprazole (PROTONIX) 40 MG EC tablet Take 1 tablet (40 mg) by mouth every morning (before breakfast) 30 tablet 0     fexofenadine (ALLEGRA) 180 MG tablet Take 1 tablet (180 mg) by mouth daily (Patient taking differently: Take 180 mg by mouth daily as needed ) 90 tablet 3     multivitamin w/minerals (THERA-VIT-M) tablet Take 1 tablet by mouth daily (Patient taking differently: Take 1 tablet by mouth daily On hold for surgery) 30 each 0     order for DME Equipment being ordered: Standard cane 1 each 0     order for DME Equipment being ordered: Incontinence pads 100 each 5     polyethylene glycol (MIRALAX) powder Take 17 g (1 capful) by mouth daily as needed for constipation 510 g 1     witch hazel-glycerin (TUCKS) pad Place rectally daily as needed for hemorrhoids (Patient taking differently: Place rectally daily as needed for hemorrhoids Hasn't pick these up yet.) 40 each 0        Allergies  Allergies   Allergen Reactions     Asa [Aspirin]      Stomach irritation (hx of PUD)     Nsaids      Stomach irritation (Hx of PUD)       Social History  Social History     Socioeconomic History     Marital status: Single     Spouse name: Not on file     Number of children: Not on file     Years of education: Not on file     Highest education level: Not on file   Occupational History     Not on file   Social Needs     Financial resource strain: Not on file     Food insecurity:     Worry: Not on file     Inability: Not on file     Transportation needs:     Medical: Not on file     Non-medical: Not on file   Tobacco Use     Smoking status: Former Smoker     Packs/day: 0.00     Years: 10.00     Pack years: 0.00     Last attempt to quit: 2005     Years since quittin.2     Smokeless tobacco: Never Used   Substance and Sexual Activity     Alcohol use: Yes     Alcohol/week: 0.6 oz     Types: 1 Standard drinks or equivalent per week     Comment: 1 drink weekly/monthly     Drug use: No     Sexual activity: No     Partners: Male   Lifestyle     Physical activity:     Days per week: Not on file     Minutes per session: Not on file     Stress: Not on file   Relationships     Social connections:     Talks on phone: Not on file     Gets together: Not on file     Attends Judaism service: Not on file     Active member of club or organization: Not on file     Attends meetings of clubs or organizations: Not on file     Relationship status: Not on file     Intimate partner violence:     Fear of current or ex partner: Not on file     Emotionally abused: Not on file     Physically abused: Not on file     Forced sexual activity: Not on file   Other Topics Concern     Parent/sibling w/ CABG, MI or angioplasty before 65F 55M? Not Asked   Social History Narrative     Not on file       Family History  Family History   Problem Relation Age of Onset     Thyroid Disease Mother      Hypertension Father      Thyroid  "Disease Sister        Review of Systems    ROS/MED HX    ENT/Pulmonary: Comment: Uses albuterol inhaler PRN    (+)MARILYN risk factors hypertension, obese, allergic rhinitis, , . .    Neurologic:     (+)neuropathy     Cardiovascular:     (+) hypertension----. : . . . :. . Previous cardiac testing date:results:Stress Testdate:6/26/14 results:EF 65%ECG reviewed date:2/22/19 results:NSR date: results:          METS/Exercise Tolerance: Comment: Due to neuropathy patient uses wheelchair 1 - Eating, dressing   Hematologic:     (+) Anemia, History of Transfusion no previous transfusion reaction -      Musculoskeletal:         GI/Hepatic:     (+) GERD Asymptomatic on medication, liver disease,       Renal/Genitourinary:  - ROS Renal section negative       Endo: Comment: Gout    (+) Obesity, .      Psychiatric:     (+) psychiatric history anxiety, depression and other (comment) (PTSD)      Infectious Disease:   (+) Other Infectious Disease Being treated for positive blood culture parabacteroides and positive PCR strep on blood transfusion      Malignancy:      - no malignancy   Other:    (+) No chance of pregnancy C-spine cleared: N/A, no H/O Chronic Pain,other significant disability Other (comment)           The complete review of systems is negative other than noted in the HPI or here.   Temp: 97.6  F (36.4  C) Temp src: Oral BP: 135/78 Pulse: 86   Resp: 18 SpO2: 97 %         243 lbs 1.6 oz  5' 8.25\"   Body mass index is 36.69 kg/m .       Physical Exam  Constitutional: Awake, alert, cooperative, no apparent distress, and appears stated age. Patient seen and examined in wheelchair  Eyes: Pupils equal, round and reactive to light, extra ocular muscles intact, sclera clear, conjunctiva normal.  HENT: Normocephalic, oral pharynx with moist mucus membranes, good dentition. No goiter appreciated.   Respiratory: Clear to auscultation bilaterally, no crackles or wheezing.  Cardiovascular: Regular rate and rhythm, normal S1 and S2, " and no murmur noted. Carotids +2, no bruits. Palpable pulses to radial arteries.    GI: Normal bowel sounds, soft, non-distended, non-tender, exam limited to body habitus and position.   Lymph/Hematologic: No cervical lymphadenopathy and no supraclavicular lymphadenopathy.  Genitourinary:  defer  Skin: Warm and dry.  No rashes at anticipated surgical site.   Musculoskeletal: Full ROM of neck. There is no redness, warmth, or swelling of the joints except for swelling of the right ankle and foot. Skin is warm and foot is well perfused without erythema. Gross motor strength is normal in upper extremities and left leg. Secondary to pain reduced in left leg.     Neurologic: Awake, alert, oriented to name, place and time. Cranial nerves II-XII are grossly intact.    Neuropsychiatric: Calm, cooperative. Normal affect.     Labs: (personally reviewed)  Results for ETIENNE BUENROSTRO (MRN [id number]) as of 3/13/2019 12:26   Ref. Range 3/13/2019 08:22 3/13/2019 08:23   N-Terminal Pro Bnp Latest Ref Range: 0 - 125 pg/mL 148 (H)    Hemoglobin Latest Ref Range: 11.7 - 15.7 g/dL 9.8 (L)    ABO Unknown  O   RH(D) Unknown  Pos   Antibody Screen Unknown  Neg   Test Valid Only At Latest Units:      [facility]...   Specimen Expires Unknown  2019   Results reviewed and hgb improved. BNP <300. No further testing indicated.     EK19 normal sinus rhythm.     Cardiac echo: 14  Impression  1.  Myocardial perfusion imaging using single isotope technique  demonstrated no significant perfusion defect consistent with  significant ischemia or infarct..   2. Gated images demonstrated normal size left ventricle with good  overall contractility and no significant regional wall motion  abnormality.  The left ventricular systolic function is normal with  ejection fraction 64%.  3. Compared to the prior study from not applicable .    ULTRASOUND ABDOMEN COMPLETE  2019 7:48 PM                                                        IMPRESSION:   1. Mild diffuse fatty infiltration of the liver.  2. Otherwise unremarkable abdominal ultrasound.       CT CHEST/ABDOMEN/PELVIS WITH CONTRAST   3/11/2019 3:04 PM  IMPRESSION:   1. Enlarged fibroid uterus. Pelvic ultrasound and/or MRI may be  helpful for further characterization.  2. Mild consolidation at the right lung base anteriorly could be  related to atelectasis or pneumonia. Please clinically correlate.  3. Colonic diverticulosis, without convincing evidence for  diverticulitis.  4. Avascular necrosis is noted within the femoral heads bilaterally    The patient's records and results personally reviewed by this provider.     Outside records reviewed from: [facility] everywhere    ASSESSMENT and PLAN  Josiane is a 58 year old woman who is scheduled for Laparoscopic removal of uterus, cervix, both ovaries and fallopian tubes, possible cancer surgery, possible open, cystoscopy on 3/14/19 by Dr. Roberto in treatment of post-menopausal bleeding.  PAC referral for risk assessment and optimization for anesthesia with comorbid conditions of HTN, anemia with recent hospitalization and transfusion, positive blood cultures for parabacteroides on Augmentin, obesity, allergic rhinitis, gout, neuropathy - uses wheelchair, GERD, history of bleeding ulcer, fatty liver, history of alcohol abuse, PTSD, depression, and anxiety:    Pre-operative considerations:  1.  Cardiac:  HTN on Lisinopril. EKG 2/22/19 shows normal sinus rhythm. Stress test 6/26/14 done for atypical chest pain without ischemia and EF 64%. Functional status- METS 1.  Intermediate risk surgery with 0.9% (RCRI #) risk of major adverse cardiac event. Given low functional status will check BNP today.   2.  Pulm:  Airway feasible.  MARILYN risk: Intermediate. Patient reports uses inhaler PRN for cough but hasn't used it recently. Allergic rhinitis controlled with Allegra.   3. Heme:  Anemia has received 3 units of PRBC over the course of hospitalization  2/22/19-2/28/19. Recheck hgb today and type and screen. Will have 2 units available for surgery.   4. Neuro: Neuropathy and gout. Continues to have active flare of gout in right foot and neuropathy in feet. Uses wheelchair. Consideration for careful positioning of right leg.   5. Endo: Obesity class II: BMI 36.   6. GI:   ~ GERD/ history of bleeding ulcer - continue on protonix   ~ Fatty liver/ history of alcohol abuse- seen on ultrasound. Patient reports she now only has 1 drink weekly to monthly. Consider CIWA protocol if indicated.   ~ Risk of PONV score = 3.  If > 2, anti-emetic intervention recommended.  7. : Post-menopausal bleeding. Continue Provera and procedure as above.    8. ID: Positive PCR strep on blood transfusion sample. Positive blood culture of parabacteroides 2/22. Treated as inpatient with Zosyn and discharged (2/28/19) on Augmentin - should complete course by tomorrow.      VTE risk: 0.26%    The patient is optimized for their procedure. AVS with information on surgery time/arrival time, meds and NPO status given by nursing staff.     Patient was discussed with anesthesia.     Tania Andres PA-C  Preoperative Assessment Center  [facility]  [facility]  Phone: [phone]  Fax: [phone]

## 2019-03-13 NOTE — PROGRESS NOTES
"Clinic Care Coordination Contact  [facility]/Voicemail    Referral Source: IP Handoff  --Patient previously scheduled with PCP on 3/8/19 but cancelled appointment; not yet rescheduled.  --Follow-up call to patient to get updates on status and assess need for additional support and resources.    **Scheduled tomorrow 3/14/19 for \"Laparascopic Removal of Uterus, Cervix, Both Ovaries And Fallopian Tubes\"    Clinical Data: Care Coordinator Outreach  Outreach attempted x 1.  Left message on voicemail with call back information and requested return call.  Plan: Care Coordinator mailed out care coordination introduction letter on 3/1/19.     Given patient's planned surgery tomorrow 3/14/19, CCC RN will defer next outreach attempt for approximately 1 week.    Kyrie Carrillo RN  Clinic Care Coordinator  [facility]  Ph: [phone]  "

## 2019-03-13 NOTE — PATIENT INSTRUCTIONS
Preparing for Your Surgery      Name:  Josiane Dasilva   MRN:  [id number]   :  1960   Today's Date:  3/13/2019     Arriving for surgery:  Surgery date:  2019  Arrival time:  11:00 am  Please come to:       [facility] [address]  [address] [place]  [address]    -   parking is available in front of the hospital from 5:15 am to 8:00 pm    -  Stop at the Information Desk in the lobby    -   Inform the information person that you are here for surgery. An escort to 3c will be provided. If you would not like an escort, please proceed to 3C on the 3rd floor. [phone]     What can I eat or drink?  -  You may have solid food or milk products until 8 hours prior to your surgery. (5 am)  -  You may have water, apple juice or 7up/Sprite until 2 hours prior to your surgery.(until 11 am arrival time)    Which medicines can I take?        Stop Aspirin, vitamins and supplements one week prior to surgery.      Hold Ibuprofen for 24 hours and/or Naproxen for 48 hours prior to surgery.     -  Do NOT take these medications in the morning, the day of surgery:      Lisinopril       -  Please take these medications the day of surgery:     Allegra      Gabapentin      Medroxyprogesterone  (provera)     Pantoprazole      Amoxicillin Clavulanate (Augmentin)      How do I prepare myself?  -  Take two showers: one the night before surgery; and one the morning of surgery.         Use Scrubcare or Hibiclens to wash from neck down.  You may use your own shampoo and conditioner. No other hair products.   -  Do NOT use lotion, powder, deodorant, or antiperspirant the day of your surgery.  -  Do NOT wear any makeup, fingernail polish or jewelry.  - Do not bring your own medications to the hospital, except for inhalers and eye drops.  -  Bring your ID and insurance card.    Questions or Concerns:  -If you have questions or concerns regarding the day of surgery, please call  [phone].       -For questions after surgery please call your surgeons office.       Influenza visitor restrictions are in force at this time.  The hospital is prohibiting the following visitors:  -Any sick persons regardless of age  -Anyone with known exposure to a communicable illness  -Anyone under the age of 5      AFTER YOUR SURGERY  Breathing exercises   Breathing exercises help you recover faster. Take deep breaths and let the air out slowly. This will:     Help you wake up after surgery.    Help prevent complications like pneumonia.  Preventing complications will help you go home sooner.   We may give you a breathing device (incentive spirometer) to encourage you to breathe deeply.   Nausea and vomiting   You may feel sick to your stomach after surgery; if so, let your nurse know.    Pain control:  After surgery, you may have pain. Our goal is to help you manage your pain. Pain medicine will help you feel comfortable enough to do activities that will help you heal.  These activities may include breathing exercises, walking and physical therapy.   To help your health care team treat your pain we will ask: 1) If you have pain  2) where it is located 3) describe your pain in your words  Methods of pain control include medications given by mouth, vein or by nerve block for some surgeries.  We may give you a pain control pump that will:  1) Deliver the medicine through a tube placed in your vein  2) Control the amount of medicine you receive  3) Allow you to push a button to deliver a dose of pain medicine  Sequential Compression Device (SCD) or Pneumo Boots:  You may need to wear SCD S on your legs or feet. These are wraps connected to a machine that pumps in air and releases it. The repeated pumping helps prevent blood clots from forming.

## 2019-03-14 ENCOUNTER — ANESTHESIA (OUTPATIENT)
Dept: SURGERY | Facility: CLINIC | Age: 59
End: 2019-03-14
Payer: COMMERCIAL

## 2019-03-14 ENCOUNTER — HOSPITAL ENCOUNTER (OUTPATIENT)
Facility: CLINIC | Age: 59
Discharge: NURSING, REHAB OR LONG-TERM CARE | End: 2019-03-16
Attending: OBSTETRICS & GYNECOLOGY | Admitting: OBSTETRICS & GYNECOLOGY
Payer: COMMERCIAL

## 2019-03-14 ENCOUNTER — SURGERY (OUTPATIENT)
Age: 59
End: 2019-03-14
Payer: COMMERCIAL

## 2019-03-14 DIAGNOSIS — N95.0 POST-MENOPAUSAL BLEEDING: ICD-10-CM

## 2019-03-14 DIAGNOSIS — Z90.710 S/P TOTAL HYSTERECTOMY: Primary | ICD-10-CM

## 2019-03-14 LAB
ALBUMIN SERPL-MCNC: 2.6 G/DL (ref 3.4–5)
ALP SERPL-CCNC: 118 U/L (ref 40–150)
ALT SERPL W P-5'-P-CCNC: 17 U/L (ref 0–50)
ANION GAP SERPL CALCULATED.3IONS-SCNC: 9 MMOL/L (ref 3–14)
AST SERPL W P-5'-P-CCNC: 36 U/L (ref 0–45)
B-HCG SERPL-ACNC: <1 IU/L (ref 0–5)
BASOPHILS # BLD AUTO: 0 10E9/L (ref 0–0.2)
BASOPHILS NFR BLD AUTO: 0.4 %
BILIRUB SERPL-MCNC: 0.7 MG/DL (ref 0.2–1.3)
BUN SERPL-MCNC: 6 MG/DL (ref 7–30)
CALCIUM SERPL-MCNC: 8.8 MG/DL (ref 8.5–10.1)
CHLORIDE SERPL-SCNC: 110 MMOL/L (ref 94–109)
CO2 SERPL-SCNC: 22 MMOL/L (ref 20–32)
CREAT SERPL-MCNC: 0.82 MG/DL (ref 0.52–1.04)
DIFFERENTIAL METHOD BLD: ABNORMAL
EOSINOPHIL # BLD AUTO: 0.1 10E9/L (ref 0–0.7)
EOSINOPHIL NFR BLD AUTO: 0.8 %
ERYTHROCYTE [DISTWIDTH] IN BLOOD BY AUTOMATED COUNT: 18.5 % (ref 10–15)
GFR SERPL CREATININE-BSD FRML MDRD: 79 ML/MIN/{1.73_M2}
GLUCOSE BLDC GLUCOMTR-MCNC: 93 MG/DL (ref 70–99)
GLUCOSE SERPL-MCNC: 104 MG/DL (ref 70–99)
HCT VFR BLD AUTO: 34.5 % (ref 35–47)
HGB BLD-MCNC: 10.2 G/DL (ref 11.7–15.7)
IMM GRANULOCYTES # BLD: 0 10E9/L (ref 0–0.4)
IMM GRANULOCYTES NFR BLD: 0.4 %
LYMPHOCYTES # BLD AUTO: 1 10E9/L (ref 0.8–5.3)
LYMPHOCYTES NFR BLD AUTO: 13 %
MCH RBC QN AUTO: 26 PG (ref 26.5–33)
MCHC RBC AUTO-ENTMCNC: 29.6 G/DL (ref 31.5–36.5)
MCV RBC AUTO: 88 FL (ref 78–100)
MONOCYTES # BLD AUTO: 0.5 10E9/L (ref 0–1.3)
MONOCYTES NFR BLD AUTO: 5.7 %
NEUTROPHILS # BLD AUTO: 6.3 10E9/L (ref 1.6–8.3)
NEUTROPHILS NFR BLD AUTO: 79.7 %
NRBC # BLD AUTO: 0 10*3/UL
NRBC BLD AUTO-RTO: 0 /100
PLATELET # BLD AUTO: 279 10E9/L (ref 150–450)
POTASSIUM SERPL-SCNC: 3.5 MMOL/L (ref 3.4–5.3)
PROT SERPL-MCNC: 7.4 G/DL (ref 6.8–8.8)
RBC # BLD AUTO: 3.92 10E12/L (ref 3.8–5.2)
SODIUM SERPL-SCNC: 142 MMOL/L (ref 133–144)
WBC # BLD AUTO: 7.9 10E9/L (ref 4–11)

## 2019-03-14 PROCEDURE — 25000565 ZZH ISOFLURANE, EA 15 MIN: Performed by: OBSTETRICS & GYNECOLOGY

## 2019-03-14 PROCEDURE — 84702 CHORIONIC GONADOTROPIN TEST: CPT | Performed by: OBSTETRICS & GYNECOLOGY

## 2019-03-14 PROCEDURE — 88309 TISSUE EXAM BY PATHOLOGIST: CPT | Performed by: OBSTETRICS & GYNECOLOGY

## 2019-03-14 PROCEDURE — 36415 COLL VENOUS BLD VENIPUNCTURE: CPT | Performed by: OBSTETRICS & GYNECOLOGY

## 2019-03-14 PROCEDURE — 00000160 ZZHCL STATISTIC H-SPECIAL HANDLING: Performed by: OBSTETRICS & GYNECOLOGY

## 2019-03-14 PROCEDURE — P9041 ALBUMIN (HUMAN),5%, 50ML: HCPCS | Performed by: NURSE ANESTHETIST, CERTIFIED REGISTERED

## 2019-03-14 PROCEDURE — 00000146 ZZHCL STATISTIC GLUCOSE BY METER IP

## 2019-03-14 PROCEDURE — 25000128 H RX IP 250 OP 636: Performed by: NURSE ANESTHETIST, CERTIFIED REGISTERED

## 2019-03-14 PROCEDURE — 25000125 ZZHC RX 250: Performed by: NURSE ANESTHETIST, CERTIFIED REGISTERED

## 2019-03-14 PROCEDURE — 25000128 H RX IP 250 OP 636: Performed by: ANESTHESIOLOGY

## 2019-03-14 PROCEDURE — 88112 CYTOPATH CELL ENHANCE TECH: CPT | Performed by: OBSTETRICS & GYNECOLOGY

## 2019-03-14 PROCEDURE — 36000062 ZZH SURGERY LEVEL 4 1ST 30 MIN - UMMC: Performed by: OBSTETRICS & GYNECOLOGY

## 2019-03-14 PROCEDURE — 58573 TLH W/T/O UTERUS OVER 250 G: CPT | Performed by: OBSTETRICS & GYNECOLOGY

## 2019-03-14 PROCEDURE — 88305 TISSUE EXAM BY PATHOLOGIST: CPT | Performed by: OBSTETRICS & GYNECOLOGY

## 2019-03-14 PROCEDURE — 40000141 ZZH STATISTIC PERIPHERAL IV START W/O US GUIDANCE

## 2019-03-14 PROCEDURE — 37000008 ZZH ANESTHESIA TECHNICAL FEE, 1ST 30 MIN: Performed by: OBSTETRICS & GYNECOLOGY

## 2019-03-14 PROCEDURE — 25000132 ZZH RX MED GY IP 250 OP 250 PS 637: Performed by: ANESTHESIOLOGY

## 2019-03-14 PROCEDURE — 25000132 ZZH RX MED GY IP 250 OP 250 PS 637: Performed by: OBSTETRICS & GYNECOLOGY

## 2019-03-14 PROCEDURE — 12000001 ZZH R&B MED SURG/OB UMMC

## 2019-03-14 PROCEDURE — 71000014 ZZH RECOVERY PHASE 1 LEVEL 2 FIRST HR: Performed by: OBSTETRICS & GYNECOLOGY

## 2019-03-14 PROCEDURE — 85025 COMPLETE CBC W/AUTO DIFF WBC: CPT | Performed by: OBSTETRICS & GYNECOLOGY

## 2019-03-14 PROCEDURE — 00000155 ZZHCL STATISTIC H-CELL BLOCK W/STAIN: Performed by: OBSTETRICS & GYNECOLOGY

## 2019-03-14 PROCEDURE — 80053 COMPREHEN METABOLIC PANEL: CPT | Performed by: OBSTETRICS & GYNECOLOGY

## 2019-03-14 PROCEDURE — 27210794 ZZH OR GENERAL SUPPLY STERILE: Performed by: OBSTETRICS & GYNECOLOGY

## 2019-03-14 PROCEDURE — 37000009 ZZH ANESTHESIA TECHNICAL FEE, EACH ADDTL 15 MIN: Performed by: OBSTETRICS & GYNECOLOGY

## 2019-03-14 PROCEDURE — 88331 PATH CONSLTJ SURG 1 BLK 1SPC: CPT | Performed by: OBSTETRICS & GYNECOLOGY

## 2019-03-14 PROCEDURE — 71000015 ZZH RECOVERY PHASE 1 LEVEL 2 EA ADDTL HR: Performed by: OBSTETRICS & GYNECOLOGY

## 2019-03-14 PROCEDURE — 25000128 H RX IP 250 OP 636: Performed by: OBSTETRICS & GYNECOLOGY

## 2019-03-14 PROCEDURE — 25800030 ZZH RX IP 258 OP 636: Performed by: NURSE ANESTHETIST, CERTIFIED REGISTERED

## 2019-03-14 PROCEDURE — 36000064 ZZH SURGERY LEVEL 4 EA 15 ADDTL MIN - UMMC: Performed by: OBSTETRICS & GYNECOLOGY

## 2019-03-14 PROCEDURE — 88332 PATH CONSLTJ SURG EA ADD BLK: CPT | Performed by: OBSTETRICS & GYNECOLOGY

## 2019-03-14 PROCEDURE — 40000170 ZZH STATISTIC PRE-PROCEDURE ASSESSMENT II: Performed by: OBSTETRICS & GYNECOLOGY

## 2019-03-14 RX ORDER — LIDOCAINE HYDROCHLORIDE 20 MG/ML
INJECTION, SOLUTION INFILTRATION; PERINEURAL PRN
Status: DISCONTINUED | OUTPATIENT
Start: 2019-03-14 | End: 2019-03-14

## 2019-03-14 RX ORDER — ONDANSETRON 2 MG/ML
INJECTION INTRAMUSCULAR; INTRAVENOUS PRN
Status: DISCONTINUED | OUTPATIENT
Start: 2019-03-14 | End: 2019-03-14

## 2019-03-14 RX ORDER — NALOXONE HYDROCHLORIDE 0.4 MG/ML
.1-.4 INJECTION, SOLUTION INTRAMUSCULAR; INTRAVENOUS; SUBCUTANEOUS
Status: DISCONTINUED | OUTPATIENT
Start: 2019-03-14 | End: 2019-03-15 | Stop reason: HOSPADM

## 2019-03-14 RX ORDER — SODIUM CHLORIDE, SODIUM LACTATE, POTASSIUM CHLORIDE, CALCIUM CHLORIDE 600; 310; 30; 20 MG/100ML; MG/100ML; MG/100ML; MG/100ML
INJECTION, SOLUTION INTRAVENOUS CONTINUOUS
Status: DISCONTINUED | OUTPATIENT
Start: 2019-03-14 | End: 2019-03-15 | Stop reason: HOSPADM

## 2019-03-14 RX ORDER — ONDANSETRON 2 MG/ML
4 INJECTION INTRAMUSCULAR; INTRAVENOUS EVERY 30 MIN PRN
Status: DISCONTINUED | OUTPATIENT
Start: 2019-03-14 | End: 2019-03-15 | Stop reason: HOSPADM

## 2019-03-14 RX ORDER — KETAMINE HYDROCHLORIDE 10 MG/ML
INJECTION, SOLUTION INTRAMUSCULAR; INTRAVENOUS PRN
Status: DISCONTINUED | OUTPATIENT
Start: 2019-03-14 | End: 2019-03-14

## 2019-03-14 RX ORDER — CEFAZOLIN SODIUM 2 G/100ML
2 INJECTION, SOLUTION INTRAVENOUS
Status: COMPLETED | OUTPATIENT
Start: 2019-03-14 | End: 2019-03-14

## 2019-03-14 RX ORDER — ACETAMINOPHEN 325 MG/1
975 TABLET ORAL ONCE
Status: COMPLETED | OUTPATIENT
Start: 2019-03-14 | End: 2019-03-14

## 2019-03-14 RX ORDER — HYDROMORPHONE HYDROCHLORIDE 1 MG/ML
.3-.5 INJECTION, SOLUTION INTRAMUSCULAR; INTRAVENOUS; SUBCUTANEOUS EVERY 10 MIN PRN
Status: DISCONTINUED | OUTPATIENT
Start: 2019-03-14 | End: 2019-03-15 | Stop reason: HOSPADM

## 2019-03-14 RX ORDER — ONDANSETRON 4 MG/1
4 TABLET, ORALLY DISINTEGRATING ORAL EVERY 30 MIN PRN
Status: DISCONTINUED | OUTPATIENT
Start: 2019-03-14 | End: 2019-03-15 | Stop reason: HOSPADM

## 2019-03-14 RX ORDER — HEPARIN SODIUM 5000 [USP'U]/.5ML
5000 INJECTION, SOLUTION INTRAVENOUS; SUBCUTANEOUS
Status: COMPLETED | OUTPATIENT
Start: 2019-03-14 | End: 2019-03-14

## 2019-03-14 RX ORDER — GABAPENTIN 300 MG/1
300 CAPSULE ORAL ONCE
Status: COMPLETED | OUTPATIENT
Start: 2019-03-14 | End: 2019-03-14

## 2019-03-14 RX ORDER — ESMOLOL HYDROCHLORIDE 10 MG/ML
INJECTION INTRAVENOUS PRN
Status: DISCONTINUED | OUTPATIENT
Start: 2019-03-14 | End: 2019-03-14

## 2019-03-14 RX ORDER — PHENAZOPYRIDINE HYDROCHLORIDE 200 MG/1
200 TABLET, FILM COATED ORAL ONCE
Status: COMPLETED | OUTPATIENT
Start: 2019-03-14 | End: 2019-03-14

## 2019-03-14 RX ORDER — FENTANYL CITRATE 50 UG/ML
25-50 INJECTION, SOLUTION INTRAMUSCULAR; INTRAVENOUS EVERY 5 MIN PRN
Status: DISCONTINUED | OUTPATIENT
Start: 2019-03-14 | End: 2019-03-15 | Stop reason: HOSPADM

## 2019-03-14 RX ORDER — PROPOFOL 10 MG/ML
INJECTION, EMULSION INTRAVENOUS PRN
Status: DISCONTINUED | OUTPATIENT
Start: 2019-03-14 | End: 2019-03-14

## 2019-03-14 RX ORDER — ALBUMIN, HUMAN INJ 5% 5 %
SOLUTION INTRAVENOUS CONTINUOUS PRN
Status: DISCONTINUED | OUTPATIENT
Start: 2019-03-14 | End: 2019-03-14

## 2019-03-14 RX ORDER — CEFAZOLIN SODIUM 1 G/3ML
1 INJECTION, POWDER, FOR SOLUTION INTRAMUSCULAR; INTRAVENOUS SEE ADMIN INSTRUCTIONS
Status: DISCONTINUED | OUTPATIENT
Start: 2019-03-14 | End: 2019-03-14 | Stop reason: HOSPADM

## 2019-03-14 RX ORDER — SODIUM CHLORIDE, SODIUM LACTATE, POTASSIUM CHLORIDE, CALCIUM CHLORIDE 600; 310; 30; 20 MG/100ML; MG/100ML; MG/100ML; MG/100ML
INJECTION, SOLUTION INTRAVENOUS CONTINUOUS
Status: DISCONTINUED | OUTPATIENT
Start: 2019-03-14 | End: 2019-03-16 | Stop reason: HOSPADM

## 2019-03-14 RX ORDER — MEPERIDINE HYDROCHLORIDE 50 MG/ML
12.5 INJECTION INTRAMUSCULAR; INTRAVENOUS; SUBCUTANEOUS
Status: DISCONTINUED | OUTPATIENT
Start: 2019-03-14 | End: 2019-03-15 | Stop reason: HOSPADM

## 2019-03-14 RX ORDER — SODIUM CHLORIDE, SODIUM LACTATE, POTASSIUM CHLORIDE, CALCIUM CHLORIDE 600; 310; 30; 20 MG/100ML; MG/100ML; MG/100ML; MG/100ML
INJECTION, SOLUTION INTRAVENOUS CONTINUOUS PRN
Status: DISCONTINUED | OUTPATIENT
Start: 2019-03-14 | End: 2019-03-14

## 2019-03-14 RX ORDER — FENTANYL CITRATE 50 UG/ML
INJECTION, SOLUTION INTRAMUSCULAR; INTRAVENOUS PRN
Status: DISCONTINUED | OUTPATIENT
Start: 2019-03-14 | End: 2019-03-14

## 2019-03-14 RX ORDER — HYDRALAZINE HYDROCHLORIDE 20 MG/ML
2.5-5 INJECTION INTRAMUSCULAR; INTRAVENOUS EVERY 10 MIN PRN
Status: DISCONTINUED | OUTPATIENT
Start: 2019-03-14 | End: 2019-03-15 | Stop reason: HOSPADM

## 2019-03-14 RX ORDER — ACETAMINOPHEN 325 MG/1
650 TABLET ORAL EVERY 6 HOURS
Status: DISCONTINUED | OUTPATIENT
Start: 2019-03-14 | End: 2019-03-16 | Stop reason: HOSPADM

## 2019-03-14 RX ORDER — LABETALOL 20 MG/4 ML (5 MG/ML) INTRAVENOUS SYRINGE
10
Status: DISCONTINUED | OUTPATIENT
Start: 2019-03-14 | End: 2019-03-15 | Stop reason: HOSPADM

## 2019-03-14 RX ADMIN — ALBUMIN HUMAN: 0.05 INJECTION, SOLUTION INTRAVENOUS at 18:35

## 2019-03-14 RX ADMIN — PROPOFOL 140 MG: 10 INJECTION, EMULSION INTRAVENOUS at 18:15

## 2019-03-14 RX ADMIN — Medication 0.5 MG: at 22:53

## 2019-03-14 RX ADMIN — ESMOLOL HYDROCHLORIDE 10 MG: 10 INJECTION, SOLUTION INTRAVENOUS at 21:11

## 2019-03-14 RX ADMIN — FENTANYL CITRATE 25 MCG: 50 INJECTION INTRAMUSCULAR; INTRAVENOUS at 22:39

## 2019-03-14 RX ADMIN — KETAMINE HYDROCHLORIDE 20 MG: 10 INJECTION, SOLUTION INTRAMUSCULAR; INTRAVENOUS at 19:22

## 2019-03-14 RX ADMIN — ESMOLOL HYDROCHLORIDE 10 MG: 10 INJECTION, SOLUTION INTRAVENOUS at 20:48

## 2019-03-14 RX ADMIN — LIDOCAINE HYDROCHLORIDE 100 MG: 20 INJECTION, SOLUTION INFILTRATION; PERINEURAL at 18:15

## 2019-03-14 RX ADMIN — Medication 0.5 MG: at 23:27

## 2019-03-14 RX ADMIN — SUGAMMADEX 200 MG: 100 INJECTION, SOLUTION INTRAVENOUS at 22:00

## 2019-03-14 RX ADMIN — FENTANYL CITRATE 100 MCG: 50 INJECTION, SOLUTION INTRAMUSCULAR; INTRAVENOUS at 18:24

## 2019-03-14 RX ADMIN — HEPARIN SODIUM 5000 UNITS: 5000 INJECTION, SOLUTION INTRAVENOUS; SUBCUTANEOUS at 17:09

## 2019-03-14 RX ADMIN — SODIUM CHLORIDE, POTASSIUM CHLORIDE, SODIUM LACTATE AND CALCIUM CHLORIDE: 600; 310; 30; 20 INJECTION, SOLUTION INTRAVENOUS at 21:38

## 2019-03-14 RX ADMIN — KETAMINE HYDROCHLORIDE 20 MG: 10 INJECTION, SOLUTION INTRAMUSCULAR; INTRAVENOUS at 18:59

## 2019-03-14 RX ADMIN — CEFAZOLIN 1 G: 1 INJECTION, POWDER, FOR SOLUTION INTRAMUSCULAR; INTRAVENOUS at 20:42

## 2019-03-14 RX ADMIN — KETAMINE HYDROCHLORIDE 10 MG: 10 INJECTION, SOLUTION INTRAMUSCULAR; INTRAVENOUS at 19:41

## 2019-03-14 RX ADMIN — MIDAZOLAM 2 MG: 1 INJECTION INTRAMUSCULAR; INTRAVENOUS at 18:15

## 2019-03-14 RX ADMIN — ACETAMINOPHEN 650 MG: 325 TABLET, FILM COATED ORAL at 23:25

## 2019-03-14 RX ADMIN — FENTANYL CITRATE 50 MCG: 50 INJECTION INTRAMUSCULAR; INTRAVENOUS at 22:29

## 2019-03-14 RX ADMIN — ESMOLOL HYDROCHLORIDE 20 MG: 10 INJECTION, SOLUTION INTRAVENOUS at 21:50

## 2019-03-14 RX ADMIN — GABAPENTIN 300 MG: 300 CAPSULE ORAL at 17:09

## 2019-03-14 RX ADMIN — ROCURONIUM BROMIDE 50 MG: 10 INJECTION INTRAVENOUS at 18:15

## 2019-03-14 RX ADMIN — SODIUM CHLORIDE, POTASSIUM CHLORIDE, SODIUM LACTATE AND CALCIUM CHLORIDE: 600; 310; 30; 20 INJECTION, SOLUTION INTRAVENOUS at 18:15

## 2019-03-14 RX ADMIN — PHENAZOPYRIDINE HYDROCHLORIDE 200 MG: 200 TABLET, FILM COATED ORAL at 17:09

## 2019-03-14 RX ADMIN — ACETAMINOPHEN 975 MG: 325 TABLET, FILM COATED ORAL at 17:09

## 2019-03-14 RX ADMIN — ROCURONIUM BROMIDE 10 MG: 10 INJECTION INTRAVENOUS at 19:47

## 2019-03-14 RX ADMIN — ESMOLOL HYDROCHLORIDE 20 MG: 10 INJECTION, SOLUTION INTRAVENOUS at 21:26

## 2019-03-14 RX ADMIN — ESMOLOL HYDROCHLORIDE 10 MG: 10 INJECTION, SOLUTION INTRAVENOUS at 21:06

## 2019-03-14 RX ADMIN — CEFAZOLIN SODIUM 2 G: 2 INJECTION, SOLUTION INTRAVENOUS at 18:42

## 2019-03-14 RX ADMIN — ROCURONIUM BROMIDE 10 MG: 10 INJECTION INTRAVENOUS at 19:22

## 2019-03-14 RX ADMIN — ONDANSETRON 4 MG: 2 INJECTION INTRAMUSCULAR; INTRAVENOUS at 20:09

## 2019-03-14 RX ADMIN — FENTANYL CITRATE 25 MCG: 50 INJECTION INTRAMUSCULAR; INTRAVENOUS at 22:25

## 2019-03-14 ASSESSMENT — MIFFLIN-ST. JEOR: SCORE: 1727.47

## 2019-03-14 NOTE — LETTER
Transition Communication Hand-off for Care Transitions to Next Level of Care Provider    Name: Josiane Dasilva  : 1960  MRN #: [id number]  Primary Care Provider: Carmen Arriaza     Primary Clinic: [address] [facility] [address]     Reason for Hospitalization:  Status post hysterectomy [Z90.710]  Admit Date/Time: 3/14/2019  2:15 PM  Discharge Date: 3/16/19  Payor Source: Payor: MEDICA / Plan: MEDICA ACCESS ABILITY MA / Product Type: HMO /     Readmission Assessment Measure (MERNA) Risk Score/category:  Average           Reason for Communication Hand-off Referral: Other Complex Psychosocial history    Discharge Plan: [facility], a [facility] TCU for PT/OT strengthening primarily for stairs and household tasks.     Concern for non-adherence with plan of care:   Y/N Y  Discharge Needs Assessment:  Needs      Most Recent Value   Equipment Currently Used at Home  walker, rolling [4WW]   Transitional Care  -- [[facility] ]   PAS Number  [id number]          Follow-up specialty is recommended: Yes    Follow-up plan:    Future Appointments   Date Time Provider Department Center   3/17/2019  6:00 AM Kim Sinclair PT [facility]   3/27/2019  2:30 PM [facility] [place] [facility]       Any outstanding tests or procedures:        Referrals     Future Labs/Procedures    Home care nursing referral     Comments:    Please fax discharge orders to [facility]    Ph:  [phone]    Fax: [phone]    Skilled home care RN for initial home safety evaluation and 1-3 times a week to evaluate medication management, nutrition and hydration evaluation, endurance evaluation, and general status evaluation after discharge from the acute care hospital setting.    Skilled home care Physical Therapist and Occupational Therapist to evaluate and treat in home setting after surgery and per recommendations of the inpatient Rehabilitation Consult Team.    Physical Therapy Adult  Consult     Comments:    Evaluate and treat as clinically indicated.    Reason:  Post-op recovery            KAYLIE Mccormack, YUMIKO   Surgical/Oncology Unit   Phone: [phone]  Pager: [phone]

## 2019-03-14 NOTE — LETTER
Health Information Management Services               Recipient: Alannah Velasquez Liaison for [facility]           Sender: Marita YUMIKO Andrews P:[phone]          Date: March 16, 2019  Patient Name:  Josiane Dasilva  Routing Message:  TCU referral - medically ready today.     KAYLIE Mccormack, YUMIKO  7C Surgical/Oncology Unit   Phone: [phone]  Pager: [phone]            The documents accompanying this notice contain confidential information belonging to the sender.  This information is intended only for the use of the individual or entity named above.  The authorized recipient of this information is prohibited from disclosing this information to any other party and is required to destroy the information after its stated need has been fulfilled, unless otherwise required by state law.      If you are not the intended recipient, you are hereby notified that any disclosure, copying, distribution or action taken in reliance on the contents of these documents is strictly prohibited. If you have received this document in error, please notify [facility] immediately at [phone].  You may return the document via fax ([phone]) or return mail  (Health Information Management, , [address] [place] [address]).

## 2019-03-14 NOTE — PROGRESS NOTES
SPIRITUAL HEALTH SERVICES  [facility]C   PRE-SURGERY VISIT    Had pre-surgery visit with pt.  Josiane shared concern for her children and her hope to return swiftly to work.  Provided spiritual support, prayer. Ongoing  support desired.    Fernando Jeffers  Staff   Pager [phone]

## 2019-03-15 ENCOUNTER — APPOINTMENT (OUTPATIENT)
Dept: PHYSICAL THERAPY | Facility: CLINIC | Age: 59
End: 2019-03-15
Attending: OBSTETRICS & GYNECOLOGY
Payer: COMMERCIAL

## 2019-03-15 PROBLEM — Z90.710 S/P TOTAL HYSTERECTOMY: Status: ACTIVE | Noted: 2019-03-15

## 2019-03-15 LAB
ERYTHROCYTE [DISTWIDTH] IN BLOOD BY AUTOMATED COUNT: 18 % (ref 10–15)
HCT VFR BLD AUTO: 32.2 % (ref 35–47)
HGB BLD-MCNC: 9.4 G/DL (ref 11.7–15.7)
MCH RBC QN AUTO: 26.4 PG (ref 26.5–33)
MCHC RBC AUTO-ENTMCNC: 29.2 G/DL (ref 31.5–36.5)
MCV RBC AUTO: 90 FL (ref 78–100)
PLATELET # BLD AUTO: 230 10E9/L (ref 150–450)
RBC # BLD AUTO: 3.56 10E12/L (ref 3.8–5.2)
WBC # BLD AUTO: 9 10E9/L (ref 4–11)

## 2019-03-15 PROCEDURE — 25800030 ZZH RX IP 258 OP 636: Performed by: STUDENT IN AN ORGANIZED HEALTH CARE EDUCATION/TRAINING PROGRAM

## 2019-03-15 PROCEDURE — 97530 THERAPEUTIC ACTIVITIES: CPT | Mod: GP

## 2019-03-15 PROCEDURE — 97116 GAIT TRAINING THERAPY: CPT | Mod: GP

## 2019-03-15 PROCEDURE — 25000132 ZZH RX MED GY IP 250 OP 250 PS 637: Performed by: OBSTETRICS & GYNECOLOGY

## 2019-03-15 PROCEDURE — 85027 COMPLETE CBC AUTOMATED: CPT | Performed by: STUDENT IN AN ORGANIZED HEALTH CARE EDUCATION/TRAINING PROGRAM

## 2019-03-15 PROCEDURE — 97162 PT EVAL MOD COMPLEX 30 MIN: CPT | Mod: GP

## 2019-03-15 PROCEDURE — 36415 COLL VENOUS BLD VENIPUNCTURE: CPT | Performed by: STUDENT IN AN ORGANIZED HEALTH CARE EDUCATION/TRAINING PROGRAM

## 2019-03-15 PROCEDURE — 99024 POSTOP FOLLOW-UP VISIT: CPT | Mod: GC | Performed by: OBSTETRICS & GYNECOLOGY

## 2019-03-15 PROCEDURE — 25800030 ZZH RX IP 258 OP 636: Performed by: OBSTETRICS & GYNECOLOGY

## 2019-03-15 RX ORDER — POLYETHYLENE GLYCOL 3350 17 G/17G
17 POWDER, FOR SOLUTION ORAL DAILY PRN
Status: DISCONTINUED | OUTPATIENT
Start: 2019-03-15 | End: 2019-03-16 | Stop reason: HOSPADM

## 2019-03-15 RX ORDER — NALOXONE HYDROCHLORIDE 0.4 MG/ML
.1-.4 INJECTION, SOLUTION INTRAMUSCULAR; INTRAVENOUS; SUBCUTANEOUS
Status: DISCONTINUED | OUTPATIENT
Start: 2019-03-15 | End: 2019-03-16 | Stop reason: HOSPADM

## 2019-03-15 RX ORDER — AMOXICILLIN 250 MG
2 CAPSULE ORAL 2 TIMES DAILY PRN
Status: DISCONTINUED | OUTPATIENT
Start: 2019-03-15 | End: 2019-03-16 | Stop reason: HOSPADM

## 2019-03-15 RX ORDER — IBUPROFEN 600 MG/1
600 TABLET, FILM COATED ORAL EVERY 6 HOURS
Status: DISCONTINUED | OUTPATIENT
Start: 2019-03-15 | End: 2019-03-15

## 2019-03-15 RX ORDER — SIMETHICONE 80 MG
80 TABLET,CHEWABLE ORAL 4 TIMES DAILY PRN
Status: DISCONTINUED | OUTPATIENT
Start: 2019-03-15 | End: 2019-03-16 | Stop reason: HOSPADM

## 2019-03-15 RX ORDER — OXYCODONE HYDROCHLORIDE 5 MG/1
5-10 TABLET ORAL
Status: DISCONTINUED | OUTPATIENT
Start: 2019-03-15 | End: 2019-03-16 | Stop reason: HOSPADM

## 2019-03-15 RX ORDER — AMOXICILLIN 250 MG
1 CAPSULE ORAL 2 TIMES DAILY PRN
Status: DISCONTINUED | OUTPATIENT
Start: 2019-03-15 | End: 2019-03-16 | Stop reason: HOSPADM

## 2019-03-15 RX ORDER — GABAPENTIN 300 MG/1
300 CAPSULE ORAL AT BEDTIME
Status: DISCONTINUED | OUTPATIENT
Start: 2019-03-15 | End: 2019-03-16 | Stop reason: HOSPADM

## 2019-03-15 RX ORDER — LIDOCAINE 40 MG/G
CREAM TOPICAL
Status: DISCONTINUED | OUTPATIENT
Start: 2019-03-15 | End: 2019-03-16 | Stop reason: HOSPADM

## 2019-03-15 RX ORDER — IBUPROFEN 200 MG
400 TABLET ORAL EVERY 6 HOURS PRN
Status: DISCONTINUED | OUTPATIENT
Start: 2019-03-15 | End: 2019-03-16 | Stop reason: HOSPADM

## 2019-03-15 RX ORDER — LISINOPRIL 20 MG/1
20 TABLET ORAL EVERY MORNING
Status: DISCONTINUED | OUTPATIENT
Start: 2019-03-15 | End: 2019-03-16 | Stop reason: HOSPADM

## 2019-03-15 RX ORDER — BISACODYL 10 MG
10 SUPPOSITORY, RECTAL RECTAL DAILY
Status: DISCONTINUED | OUTPATIENT
Start: 2019-03-15 | End: 2019-03-16 | Stop reason: HOSPADM

## 2019-03-15 RX ADMIN — OXYCODONE HYDROCHLORIDE 5 MG: 5 TABLET ORAL at 16:53

## 2019-03-15 RX ADMIN — BISACODYL 10 MG: 10 SUPPOSITORY RECTAL at 09:11

## 2019-03-15 RX ADMIN — MAGNESIUM HYDROXIDE 15 ML: 400 SUSPENSION ORAL at 09:11

## 2019-03-15 RX ADMIN — GABAPENTIN 300 MG: 300 CAPSULE ORAL at 03:18

## 2019-03-15 RX ADMIN — GABAPENTIN 300 MG: 300 CAPSULE ORAL at 22:14

## 2019-03-15 RX ADMIN — ACETAMINOPHEN 650 MG: 325 TABLET, FILM COATED ORAL at 10:32

## 2019-03-15 RX ADMIN — SENNOSIDES AND DOCUSATE SODIUM 2 TABLET: 8.6; 5 TABLET ORAL at 14:04

## 2019-03-15 RX ADMIN — LISINOPRIL 20 MG: 20 TABLET ORAL at 09:11

## 2019-03-15 RX ADMIN — OXYCODONE HYDROCHLORIDE 5 MG: 5 TABLET ORAL at 03:18

## 2019-03-15 RX ADMIN — OXYCODONE HYDROCHLORIDE 5 MG: 5 TABLET ORAL at 06:06

## 2019-03-15 RX ADMIN — OXYCODONE HYDROCHLORIDE 5 MG: 5 TABLET ORAL at 13:45

## 2019-03-15 RX ADMIN — ACETAMINOPHEN 650 MG: 325 TABLET, FILM COATED ORAL at 16:53

## 2019-03-15 RX ADMIN — SODIUM CHLORIDE, POTASSIUM CHLORIDE, SODIUM LACTATE AND CALCIUM CHLORIDE: 600; 310; 30; 20 INJECTION, SOLUTION INTRAVENOUS at 05:26

## 2019-03-15 RX ADMIN — SODIUM CHLORIDE, POTASSIUM CHLORIDE, SODIUM LACTATE AND CALCIUM CHLORIDE 1000 ML: 600; 310; 30; 20 INJECTION, SOLUTION INTRAVENOUS at 23:01

## 2019-03-15 RX ADMIN — ACETAMINOPHEN 650 MG: 325 TABLET, FILM COATED ORAL at 04:52

## 2019-03-15 RX ADMIN — SODIUM CHLORIDE, POTASSIUM CHLORIDE, SODIUM LACTATE AND CALCIUM CHLORIDE 1000 ML: 600; 310; 30; 20 INJECTION, SOLUTION INTRAVENOUS at 06:10

## 2019-03-15 RX ADMIN — OXYCODONE HYDROCHLORIDE 5 MG: 5 TABLET ORAL at 08:33

## 2019-03-15 RX ADMIN — ACETAMINOPHEN 650 MG: 325 TABLET, FILM COATED ORAL at 22:14

## 2019-03-15 ASSESSMENT — PAIN DESCRIPTION - DESCRIPTORS
DESCRIPTORS: ACHING;CRAMPING
DESCRIPTORS: CRAMPING
DESCRIPTORS: CRAMPING
DESCRIPTORS: SHARP
DESCRIPTORS: CRAMPING
DESCRIPTORS: CONSTANT

## 2019-03-15 ASSESSMENT — ACTIVITIES OF DAILY LIVING (ADL)
ADLS_ACUITY_SCORE: 20
ADLS_ACUITY_SCORE: 17

## 2019-03-15 ASSESSMENT — MIFFLIN-ST. JEOR: SCORE: 1751.94

## 2019-03-15 NOTE — DISCHARGE SUMMARY
Gynecologic Oncology Discharge Summary    Josiane Dasilva  [id number]    Admit Date: 3/14/2019  Discharge Date: 03/16/19   Admitting Provider: Mary Roberto MD  Discharge Provider: Mary Roberto MD     Admission Dx:   -postmenopausal bleeding   - thickened endometrium  - obesity   - HTN  - Hx alcohol abuse     Discharge Dx:  -same s/p surgery     Patient Active Problem List   Diagnosis     Hypertension; goal <140/90     Anxiety     Depressive disorder     Alcohol abuse     Fatty liver     Ankle pain, chronic     Neuropathy     Irregular menstrual cycle     Seasonal allergic rhinitis, unspecified allergic rhinitis trigger     Slow transit constipation     Bilateral back pain, unspecified back location, unspecified chronicity     Subclinical hypothyroidism     Morbid obesity (H)     Stress incontinence of urine     Idiopathic chronic gout of multiple sites without tophus     Post-menopausal bleeding     Anemia     Acute on chronic blood loss anemia (H)     Esophageal varices without bleeding (H)     BRBPR (bright red blood per rectum)     Acquired acanthosis nigricans     Closed bimalleolar fracture     Family history of diabetes mellitus     Gouty arthropathy     Pain in joint, lower leg     Peptic ulcer     Posttraumatic stress disorder     Symptomatic menopausal or female climacteric states     Wheezing     Status post hysterectomy       Procedures: Total laparoscopic hysterectomy, bilateral salpingo-oophorectomy, cystoscopy, repair of vaginal laceration      Prior to Admission Medications:  Medications Prior to Admission   Medication Sig Dispense Refill Last Dose     fexofenadine (ALLEGRA) 180 MG tablet Take 1 tablet (180 mg) by mouth daily (Patient taking differently: Take 180 mg by mouth daily as needed ) 90 tablet 3 Past Week at Unknown time     gabapentin (NEURONTIN) 300 MG capsule Take 1 capsule (300 mg) by mouth At Bedtime 90 capsule 3 Past Week at Unknown time     lisinopril (PRINIVIL/ZESTRIL) 20 MG  tablet Take 1 tablet (20 mg) by mouth daily (Patient taking differently: Take 20 mg by mouth every morning ) 30 tablet 0 Past Week at Unknown time     medroxyPROGESTERone (PROVERA) 10 MG tablet Take 1 tablet (10 mg) by mouth daily (Patient taking differently: Take 10 mg by mouth every morning ) 21 tablet 0 Past Week at Unknown time     multivitamin w/minerals (THERA-VIT-M) tablet Take 1 tablet by mouth daily (Patient taking differently: Take 1 tablet by mouth daily On hold for surgery) 30 each 0 Past Week at Unknown time     pantoprazole (PROTONIX) 40 MG EC tablet Take 1 tablet (40 mg) by mouth every morning (before breakfast) 30 tablet 0 Past Week at Unknown time     polyethylene glycol (MIRALAX) powder Take 17 g (1 capful) by mouth daily as needed for constipation 510 g 1 Past Week at Unknown time     witch hazel-glycerin (TUCKS) pad Place rectally daily as needed for hemorrhoids (Patient taking differently: Place rectally daily as needed for hemorrhoids Hasn't pick these up yet.) 40 each 0 Past Week at Unknown time     [] amoxicillin-clavulanate (AUGMENTIN) 875-125 MG tablet Take 1 tablet by mouth 2 times daily for 9 days 18 tablet 0 Taking     order for DME Equipment being ordered: Standard cane 1 each 0 More than a month at Unknown time     order for DME Equipment being ordered: Incontinence pads 100 each 5 More than a month at Unknown time       Discharge Medications:     Review of your medicines      START taking      Dose / Directions   acetaminophen 325 MG tablet  Commonly known as:  TYLENOL      Dose:  325-650 mg  Take 1-2 tablets (325-650 mg) by mouth every 4 hours as needed for mild pain  Quantity:  100 tablet  Refills:  0     ibuprofen 200 MG tablet  Commonly known as:  ADVIL/MOTRIN      Dose:  200-400 mg  Take 1-2 tablets (200-400 mg) by mouth every 6 hours as needed for moderate pain  Quantity:  60 tablet  Refills:  0     oxyCODONE 5 MG tablet  Commonly known as:  ROXICODONE      Dose:  5  mg  Take 1 tablet (5 mg) by mouth every 6 hours as needed (pain control or improvement in physical function.  Hold dose for analgesics side effects.)  Quantity:  15 tablet  Refills:  0     senna-docusate 8.6-50 MG tablet  Commonly known as:  SENOKOT-S/PERICOLACE      Dose:  1 tablet  Take 1 tablet by mouth 2 times daily as needed for constipation  Quantity:  60 tablet  Refills:  0        CONTINUE these medicines which may have CHANGED, or have new prescriptions. If we are uncertain of the size of tablets/capsules you have at home, strength may be listed as something that might have changed.      Dose / Directions   fexofenadine 180 MG tablet  Commonly known as:  ALLEGRA  This may have changed:      when to take this    reasons to take this  Used for:  Allergic rhinitis due to animal hair and dander, unspecified chronicity      Dose:  180 mg  Take 1 tablet (180 mg) by mouth daily  Quantity:  90 tablet  Refills:  3     lisinopril 20 MG tablet  Commonly known as:  PRINIVIL/ZESTRIL  This may have changed:  when to take this  Used for:  Hypertension, unspecified type      Dose:  20 mg  Take 1 tablet (20 mg) by mouth daily  Quantity:  30 tablet  Refills:  0     multivitamin w/minerals tablet  This may have changed:  additional instructions  Used for:  Alcohol abuse      Dose:  1 tablet  Take 1 tablet by mouth daily  Quantity:  30 each  Refills:  0     witch hazel-glycerin pad  Commonly known as:  TUCKS  This may have changed:  additional instructions  Used for:  External hemorrhoids      Place rectally daily as needed for hemorrhoids  Quantity:  40 each  Refills:  0        CONTINUE these medicines which have NOT CHANGED      Dose / Directions   gabapentin 300 MG capsule  Commonly known as:  NEURONTIN  Used for:  Neuropathy      Dose:  300 mg  Take 1 capsule (300 mg) by mouth At Bedtime  Quantity:  90 capsule  Refills:  3     order for DME  Used for:  Bilateral low back pain without sciatica, unspecified chronicity       Equipment being ordered: Standard cane  Quantity:  1 each  Refills:  0     order for DME  Used for:  Stress incontinence of urine      Equipment being ordered: Incontinence pads  Quantity:  100 each  Refills:  5     pantoprazole 40 MG EC tablet  Commonly known as:  PROTONIX  Used for:  Alcohol abuse      Dose:  40 mg  Take 1 tablet (40 mg) by mouth every morning (before breakfast)  Quantity:  30 tablet  Refills:  0     polyethylene glycol powder  Commonly known as:  MIRALAX  Used for:  Slow transit constipation      Dose:  1 capful  Take 17 g (1 capful) by mouth daily as needed for constipation  Quantity:  510 g  Refills:  1        STOP taking    amoxicillin-clavulanate 875-125 MG tablet  Commonly known as:  AUGMENTIN        medroxyPROGESTERone 10 MG tablet  Commonly known as:  PROVERA              Where to get your medicines      Some of these will need a paper prescription and others can be bought over the counter. Ask your nurse if you have questions.    Bring a paper prescription for each of these medications    oxyCODONE 5 MG tablet           Consultations:  Physical therapy  Social work     Brief History of Illness:  Josiane Dasilva is a 58 year old who presented to an OSH with significant anemia from PMB with a Hb of 6.1.  She underwent a pelvic US which showed a fibroid uterus with a thickened EMS of 11.4 mm.  She was admitted and underwent transfusion.  She was also started on Provera which improved her bleeding significantly.  A CT A/P was negative for metastatic disease.  Following a thorough discussion of the risks, benefits, indications and alternatives she consented to the above procedure.      Hospital Course:  The patient underwent the above procedure and was admitted overnight due to the late time that the surgery was over that night and lack of social support at home. She was meeting all post op goals: pain was controlled, tolerating PO, ambulating and voiding with no issues. Physical therapy was  consulted and recommended TCU disposition, which was available on 3/17.     Discharge Instructions and Follow up:  Ms. Josiane Dasilva was discharged from the hospital with follow up with Dr. Roberto on 3/27.    Discharge Diet: Regular  Discharge Activity: No lifting, driving, or strenuous exercise for 6 week(s)    Discharge Disposition:  Discharged to TCU per PT recommendations.     Discharge Staff: Felisa Gardner MD  OB/Gyn PGY-2  3/16/2019    Mary Roberto MD  Gynecologic Oncology  [facility]

## 2019-03-15 NOTE — PLAN OF CARE
Discharge Planner PT  7C  Patient plan for discharge: home   Current status: Initiated and completed PT evaluation. Treatment indicated post op. Pt is POD #1 where pain is pt's greatest barrier at this time to her functional mobility. Min A for supine <> EOB with cues for log roll technique to help decrease pain. STS from various surface heights, CGA-Je with B UE support. Increased time to complete transfers and changes in position 2/2 to increased pain. Ambulated ~150ft with 180 turn  + FWW, SBA: intermittent standing rest break to improve posture and for pain, steady on feet with substantial decrease in gait speed. No stairs initiated today d/t poor activity tolerance.    Encourage at least 4 walks/day with FWW, SBA.     Pt scheduled to be seen 1x/over the weekend by PT to help with discharge planning.    Barriers to return to prior living situation: medical status, pain, activity tolerance, lives alone, stairs  Recommendations for discharge: TCU vs home with HH PT (though pt did report that she will most likely decline a rehab stay therefore, PT anticipate that IP PT to rehab for home disch)  Rationale for recommendations: Pt is mobilizing well on POD #1 where PT anticipates disch home however, since pt lives alone and has ~8 SIL apartment she may need a rehab stay. Disch recs are dependent on LOS and progression with IP PT. PT will update recs as able.        Entered by: Natalie Wallace 03/15/2019 11:30 AM

## 2019-03-15 NOTE — UTILIZATION REVIEW
Admission Status; Secondary Review Determination   Under the authority of the Utilization Management Committee, the utilization review process indicated a secondary review on the above patient. The review outcome is based on review of the medical records, discussions with staff, and applying clinical experience noted on the date of the review.     (x) Outpatient Status with extended recovery is appropriate - This patient does not meet hospital inpatient criteria. If this patient's primary payer is Medicare and was admitted as an inpatient, Condition Code 44 should be used and patient status changed to outpatient recovery.    RATIONALE FOR DETERMINATION   58 years old woman who underwent a Total laparoscopic hysterectomy, bilateral salpingo-oophorectomy, cystoscopy, repair of vaginal laceration. Patient was admitted to the hospital after the procedure. Patient has Medicare and the procedure is not on the CMS inpatient list. No documented complications or unexpected recovery. Patient can be safely  monitored for bleeding and recover in outpatient/extended recovery setting.   The severity of illness, intensity of service provided, expected LOS and risk for adverse outcome doesn't meet inpatient hospital admission.     The information on this document is developed by the utilization review team in order for the business office to ensure compliance. This only denotes the appropriateness of proper admission status and does not reflect the quality of care rendered.   The definitions of Inpatient Status and Observation Status used in making the determination above are those provided in the CMS Coverage Manual, Chapter 1 and Chapter 6, section 70.4.     Sincerely,   Harry Villalobos MD    Physician Advisor  Utilization Review/ Case Management  [facility].

## 2019-03-15 NOTE — PROGRESS NOTES
" 03/15/19 1225   Quick Adds   Type of Visit Initial PT Evaluation   Living Environment   Lives With alone   Living Arrangements apartment   Home Accessibility stairs to enter home   Number of Stairs, Main Entrance 8   Stair Railings, Main Entrance railings on both sides of stairs   Transportation Anticipated family or friend will provide   Self-Care   Usual Activity Tolerance good   Current Activity Tolerance fair   Regular Exercise No   Equipment Currently Used at Home walker, rolling  (4WW)   Functional Level Prior   Ambulation 1-->assistive equipment   Transferring 1-->assistive equipment   Toileting 1-->assistive equipment   Bathing 1-->assistive equipment   Communication 0-->understands/communicates without difficulty   Swallowing 0-->swallows foods/liquids without difficulty   Cognition 0 - no cognition issues reported   Fall history within last six months yes   Number of times patient has fallen within last six months 1  (fell onto L hip in January)   Which of the above functional risks had a recent onset or change? fall history;ambulation;transferring;toileting;bathing;dressing   Prior Functional Level Comment Pt was previously Ruth with ADLs and functional mobility.    General Information   Onset of Illness/Injury or Date of Surgery - Date 03/14/19   Referring Physician Zeynep Alvarez MD   Patient/Family Goals Statement Disch home   Pertinent History of Current Problem (include personal factors and/or comorbidities that impact the POC) Per chart: \"58 year old POD#1 s/p TLH, BSO for postmenopausal bleeding . Recovering appropriately though has had low UOP.\"   Precautions/Limitations fall precautions;abdominal precautions  (follow abdominal precautions 2/2 pain)   General Observations Pt is UP CGA + FWW.    General Info Comments Activity: ambulate with A   Cognitive Status Examination   Orientation orientation to person, place and time   Level of Consciousness alert   Follows Commands and Answers " Questions 100% of the time   Personal Safety and Judgment intact   Memory intact   Pain Assessment   Patient Currently in Pain (yes - post op incisional pain and L hip pain)   Posture    Posture Forward head position;Protracted shoulders;Kyphosis   Range of Motion (ROM)   ROM Comment WFL for functional mobility   Strength   Strength Comments Decreased gross strength   Bed Mobility   Bed Mobility Comments min A with use of bed rails   Transfer Skills   Transfer Comments min A from lower surfaces with B UE support; EOB SBA + FWW   Gait   Gait Comments CGA-SBA with walker   Balance   Balance Comments Impaired - use of FWW   General Therapy Interventions   Planned Therapy Interventions bed mobility training;neuromuscular re-education;strengthening;transfer training;risk factor education;home program guidelines;gait training   Clinical Impression   Criteria for Skilled Therapeutic Intervention yes, treatment indicated   PT Diagnosis Impaired functional mobility post op   Influenced by the following impairments Increased pain, decreased activity tolerance, decreased gross strength, balance deficits   Functional limitations due to impairments Increased A, use of FWW vs a 4WW, frequent standing rest breaks   Clinical Presentation Evolving/Changing   Clinical Presentation Rationale Chart review and clinical judgement   Clinical Decision Making (Complexity) Moderate complexity   Therapy Frequency` daily   Predicted Duration of Therapy Intervention (days/wks) days   Anticipated Discharge Disposition Home with Home Therapy;Home with Assist;Transitional Care Facility   Risk & Benefits of therapy have been explained Yes   Patient, Family & other staff in agreement with plan of care Yes   Clinical Impression Comments Evaluation completed. Treatment indicated d/t post op pain. Dependent on LOS, progression during IP PT, and pain management pt may be safe to disch home. Pt has 8 stairs to enter home with B railing that pt needs to  "complete in order to go home. Pt lives alone therefore, she must be fairly IND with ADLs and Ruth with functional mobility in order to safely disch home. Pt did say that she will most likely decline a rehab stay therefore, PT anticipate rehabing pt for a disch home.   [facility] TM \"6 Clicks\"   2016, Trustees of [facility], under license to [employer].  All rights reserved.   6 Clicks Short Forms Basic Mobility Inpatient Short Form   [facility]  \"6 Clicks\" V.2 Basic Mobility Inpatient Short Form   1. Turning from your back to your side while in a flat bed without using bedrails? 3 - A Little   2. Moving from lying on your back to sitting on the side of a flat bed without using bedrails? 3 - A Little   3. Moving to and from a bed to a chair (including a wheelchair)? 3 - A Little   4. Standing up from a chair using your arms (e.g., wheelchair, or bedside chair)? 3 - A Little   5. To walk in hospital room? 3 - A Little   6. Climbing 3-5 steps with a railing? 3 - A Little   Basic Mobility Raw Score (Score out of 24.Lower scores equate to lower levels of function) 18   Total Evaluation Time   Total Evaluation Time (Minutes) 10     "

## 2019-03-15 NOTE — PROGRESS NOTES
"Gynecologic Oncology Progress Note       S: The patient is feeling well overall. Does have pain with ambulating but prefers to only take tylenol. Did end up taking 1x oxycodone overnight. She has voided but only had 125cc of concentrated urine. She has ambulated and is tolerating PO. Denies CP, SOB, nausea or vomiting. She did have some vaginal bleeding and changed her pad twice overnight. It was not soaked but it was more than spotting.     O:  /78   Pulse 88   Temp 98  F (36.7  C) (Oral)   Resp 16   Ht 1.734 m (5' 8.25\")   Wt 110 kg (242 lb 8.1 oz)   LMP 04/07/2014   SpO2 97%   BMI 36.60 kg/m     Gen: NAD   Cardio: rrr  Resp: CTAB  Abdomen: soft, moderately distended, normal bowel sounds. Appropriately tender to palpation  Incision: port sites CDI  Extremities: mild symmetric edema bilaterally    A: 58 year old POD#1 s/p TLH, BSO for postmenopausal bleeding . Recovering appropriately though has had low UOP.    Dz: benign   FEN: LR 100cc/hr overnight  Pain: tylenol and oxycodone PRN   Heme: Hgb 10.2, hx thrombocytopenia but platelets 279 at time of surgery    - repeat cbc pending this morning   CV: hypertension, home lisinopril ordered   Pulm: NA   GI: miralax and senna PRN   : s/p jj, f/u void   ID: pre op abx   Endo: NI   Psych/Neuro: hx alcoholism. Reports no recent use, will monitor for withdrawal sx  PPX: SCDs  Dispo: likely POD#1    Katty Ayala MD  3/14/2019 10:01 PM    IDalton personally examined and evaluated this patient on 03/15/19.  I discussed the patient with the resident and care team, and agree with the assessment and plan of care as documented in the residents note above.    I personally reviewed vital signs, laboratory values and imaging results.    Pt doing well post-op.  Given significant mobility issues at baseline, will have PT/OT assess for safe discharge plan.  Otherwise routine post-op cares.  Discharge pending rehab evaluation.    Mary Roberto, " MD  Gynecologic Oncology  [facility]

## 2019-03-15 NOTE — ANESTHESIA CARE TRANSFER NOTE
Patient: Josiane Dasilva    Procedure(s):  Laparoscopic Total Hysterectomy, Bilateral Salpingo-Oophorectomy, and Repair of Vaginal Laceration  Cystoscopy    Diagnosis: Post-menopausal Bleeding  Diagnosis Additional Information: No value filed.    Anesthesia Type:   No value filed.     Note:  Airway :Face Mask  Patient transferred to:PACU  Handoff Report: Identifed the Patient, Identified the Reponsible Provider, Reviewed the pertinent medical history, Discussed the surgical course, Reviewed Intra-OP anesthesia mangement and issues during anesthesia, Set expectations for post-procedure period and Allowed opportunity for questions and acknowledgement of understanding      Vitals: (Last set prior to Anesthesia Care Transfer)    CRNA VITALS  3/14/2019 2137 - 3/14/2019 2211      3/14/2019             Pulse:  87    SpO2:  99 %    Resp Rate (observed):  3  (Abnormal)                 Electronically Signed By: KELLY Fuchs CRNA  March 14, 2019  10:11 PM

## 2019-03-15 NOTE — CONSULTS
"Social Work: Assessment with Discharge Plan    Patient Name:  Etienne Buenrostro  :  1960  Age:  58 year old  MRN:  [id number]  Completed assessment with:  Pt and sister at bedside    Presenting Information   Reason for Referral:  Discharge plan  Date of Intake:  March 15, 2019  Referral Source:  Physician  Decision Maker:  Pt at baseline  Alternate Decision Maker: Not discussed at this time.  Health Care Directive: Not discussed at this time  Living Situation:  Apartment w/ 1 stair to enter building and 1 flight of stairs to get to her apt on the second floor  Previous Functional Status:  Independent w/ limitations. See SW note from 3/5/19  Patient and family understanding of hospitalization:  Pt admitted following procedure  Adjustment to Illness:  Pt states she is feeling highly fatigued.     Physical Health  Reason for Admission:    1. S/P total hysterectomy    2. Post-menopausal bleeding      Services Needed/Recommended:  TCU    Mental Health/Chemical Dependency  Diagnosis: Anxiety, Depression, Alcohol use. Patient states \"i've been clean for 19 years of drugs and i'm working on alcohol\"  Support/Services in Place:  None  Services Needed/Recommended:  None identified at this time.     Support System  Significant relationship at present time:  Sister Laine  Family of origin is available for support:  Son had been staying with her and is not currently. Pt is not aware of where he is at the moment.   Other support available:  States sisters do not live locally (Laine states she lives in [place], [place])  Gaps in support system: could benefit from community support services.  Patient is caregiver to:  None     Provider Information   Primary Care Physician:  Carmen Arriaza   [phone]   Clinic:  [facility] [address] [place] [id number]      :  None    Insurance:    Payor/Plan Subscriber Name Rel Member # Group #   MEDICA - MEDICA ACCES* ETIENNE BUENROSTRO  [id number]       [id number] "       Discharge Plan   Patient and family discharge goal:  TCU  Provided education on discharge plan:  YES  Patient agreeable to discharge plan:  YES  A list of Medicare Certified Facilities was provided to the patient and/or family to encourage patient choice. Patient's choices for facility are:    -[facility]  Will NH provide Skilled rehabilitation or complex medical:  YES  General information regarding anticipated insurance coverage and possible out of pocket cost was discussed. Patient and patient's family are aware patient may incur the cost of transportation to the facility, pending insurance payment: YES  Barriers to discharge:  Medical stability - likely ready tomorrow.     Discharge Recommendations   Anticipated Disposition:  Facility:  TBD  Transportation Needs:  Medical:  Wheelchair  Name of Transportation Company and Phone:  [employer] (Ph: [phone])    Additional comments   May need O2 for transport.     Referrals sent to the following TCUs:  -[facility]   [facility]    SW spoke w/ Ron Kuo re: referral. He states to follow up with Ron Jaffe (P:[phone]) on the weekend for placement.     KAYLIE Mccormack, YUMIKO  7C Surgical/Oncology Unit   Phone: [phone]  Pager: [phone]

## 2019-03-15 NOTE — ANESTHESIA POSTPROCEDURE EVALUATION
Anesthesia POST Procedure Evaluation    Patient: Josiane Dasilva   MRN:     [id number] Gender:   female   Age:    58 year old :      1960        Preoperative Diagnosis: Post-menopausal Bleeding   Procedure(s):  Laparoscopic Total Hysterectomy, Bilateral Salpingo-Oophorectomy, and Repair of Vaginal Laceration  Cystoscopy   Postop Comments: No value filed.       Anesthesia Type:  General    Reportable Event: NO     PAIN: Uncomplicated   Sign Out status: Comfortable, Well controlled pain     PONV: No PONV   Sign Out status:  No Nausea or Vomiting     Neuro/Psych: Uneventful perioperative course   Sign Out Status: Preoperative baseline; Age appropriate mentation     Airway/Resp.: Uneventful perioperative course   Sign Out Status: Non labored breathing, age appropriate RR; Resp. Status within EXPECTED Parameters     CV: Uneventful perioperative course   Sign Out status: Appropriate BP and perfusion indices; Appropriate HR/Rhythm     Disposition:   Sign Out in:  PACU  Disposition:  Floor  Recovery Course: Uneventful  Follow-Up: Not required           Last Anesthesia Record Vitals:  CRNA VITALS  3/14/2019 2137 - 3/14/2019 2237      3/14/2019             EKG:  Sinus rhythm          Last PACU/Preop Vitals:  Vitals:    19 1700 19 2210 19 2225   BP:   148/82   Pulse:      Resp:  20 20   Temp:  36.5  C (97.7  F)    SpO2: 100%  100%         Electronically Signed By: Virgil Menjivar MD, 2019, 10:41 PM

## 2019-03-15 NOTE — PROGRESS NOTES
Care Coordinator - Discharge Planning    Admission Date/Time:  3/14/2019  Attending MD:  Mary Ash*     Data  Date of initial CC assessment:  Patient has been followed since day of admission.  Chart reviewed, discussed with interdisciplinary team.   Patient was admitted for:   1. S/P total hysterectomy    2. Post-menopausal bleeding         Assessment   The following home care plans of care have been arranged on behalf of patient and for patient if she does not qualify for TCU admission on day of discharge:    Please fax discharge orders to [facility]     Ph:  [phone]     Fax: [phone]     Skilled home care RN for initial home safety evaluation and 1-3 times a week to evaluate medication management, nutrition and hydration evaluation, endurance evaluation, and general status evaluation after discharge from the acute care hospital setting.     Skilled home care Physical Therapist and Occupational Therapist to evaluate and treat in home setting after surgery and per recommendations of the inpatient Rehabilitation Consult Team.     Coordination of Care and Referrals: Provided patient/family with options for home care agency of choice.      Plan  Anticipated Discharge Date:  To be determined.  Anticipated Discharge Plan:  TCU verses home care pending    CTS Handoff completed: (Clinic Letter)  To be sent    FUENTES Rivas.S.MICHELET., P.H.N.,R.N.         Pager

## 2019-03-15 NOTE — PLAN OF CARE
VS: AVSS on RA. Hypertensive   Neuro: WNL except generalized weakness  Resp: WNL. Capno in place   Cardiac: WNL  GI: Hypoactive BS. Denies passing flatus overnight. No BM overnight. Denies N/V  : Marginal UOP, MD aware- 1000cc LR bolus ordered. Small amount vaginal bleeding present-MD aware.  Diet: Tolerating clear liquids and usman crackers   LDA: Right PIV saline locked. Left PIC infusing IVMF and LR bolus. 3 lap sites dermabonded and PAPO. Vaginal incision UTV/JONATHAN. Michelle-pad changed 2x overnight   Activity: Ambulated to bathroom 1x and transferred to bedside commode 1x w/ assist x1  Pain: Abd pain somewhat controlled w/ heat packs, scheduled Tylenol and PRN Oxycodone 5mg.     Plan of Care: Continue w/ current POC. Monitor vaginal bleeding and UOP    Danya Anaya, RN on 3/15/2019 at 6:23 AM

## 2019-03-15 NOTE — OR NURSING
Patient grimaces with guarded respirations.  States sever abdominal pain.  Agrees to pain medication of narcotics at this time

## 2019-03-15 NOTE — OP NOTE
Gynecologic Oncology Operative Report    3/14/2019  Josiane Dasilva  [id number]    PREOPERATIVE DIAGNOSIS: Post-menopausal bleeding leading to significant anemia, thickened EMS, inability to obtain EMB    POSTOPERATIVE DIAGNOSIS: Benign endometrium on frozen section, final pathology pending    PROCEDURES: Total laparoscopic hysterectomy, bilateral salpingo-oophorectomy, cystoscopy, repair of vaginal laceration    SURGEON: Mary Roberto MD     ASSISTANTS:  Delmy Jade MD, PGY-3, Inessa Lorenzo.     ANESTHESIA: General endotracheal     ESTIMATED BLOOD LOSS: 100 cc     IV FLUIDS: 1000 ml crystalloid    URINE OUTPUT: 50 ml clear urine     INDICATIONS: Josiane Dasilva is a 58 year old who presented to an OSH with significant anemia from PMB with a Hb of 6.1.  She underwent a pelvic US which showed a fibroid uterus with a thickened EMS of 11.4 mm.  She was admitted and underwent transfusion.  She was also started on Provera which improved her bleeding significantly.  A CT A/P was negative for metastatic disease.  Following a thorough discussion of the risks, benefits, indications and alternatives she consented to the above procedure.    FINDINGS: On EUA the patient had a normal external genitalia and an enlarged but mobile uterus.  On laparoscopy the uterus was enlarged with several very large leiomyoma.  The bilateral adnexa were grossly normal in appearance.  The remainder of the abdominal and pelvic surveys were unremarkable.  Frozen section results showed benign findings.  On cystoscopy there was no evidence of bladder injury or foreign body and there was brisk efflux noted from the bilateral ureteral orifices.    SPECIMENS:   1.  Pelvic washings  2.  Uterus, cervix, bilateral ovaries and fallopian tubes    COMPLICATIONS: None.     CONDITION: Stable to PACU.     PROCEDURE:   Consent was reviewed with the patient in the preoperative setting and confirmed. She received prophylactic antibiotics. In  addition, she received heparin for venous thrombosis prevention. The patient was transferred to the operating room and placed in dorsal supine position. General anesthetic was obtained in the usual manner without noted difficulties. The patient was then positioned onto Kyle stirrups and an exam under anesthesia was performed with findings as described above.  The patient was prepped and draped for the above-mentioned procedure and Russell catheter was then placed under sterile techniques.  Timeout was called at which point the patient's name, procedure and operative site was confirmed by the operative team. Initially, the instruments for the vaginal manipulator were positioned, a speculum was placed in the vagina. The anterior lip of the cervix was grasped, the uterus sounded to 12 cm and cervix was serially dilated. The nanoMRare vaginal manipulator was then inserted and the vaginal balloon was then inserted to obtain intraabdominal pressure.     Attention was then turned to the upper abdomen. Initially, an incision was made at the umbilicus and the Veress needle introduced through this stab incision. The abdomen was insufflated with an opening pressure of 4 mmHg. The Veress needle was removed. The initial midline 5 mm port was inserted without difficulties and the initial survey revealed no damage to underlying structures.  The left lateral 12 mm and right lateral 5 mm ports were then placed under direct visualization without any injury noted to underlying structures. At this point, the patient was placed into steep Trendelenburg. The pelvis was inspected as well as the upper abdomen with findings as noted above. Pelvic washings were obtained and sent for cytology. Bowels were packed up into the upper abdomen with gentle traction.      Attention was then turned to the pelvis. The round ligaments were identified bilaterally. They were divided using cautery and the retroperitoneal space was entered by dissecting the  peritoneum lateral and cephalad to the IP ligaments. The ureters were identified and a defect was made underneath the IP ligament and above the ureter. The IP ligament was serially cauterized and then divided sharply. The rest of the peritoneum was skeletonized down to the level of the uterine arteries which were then cauterized and divided which required us to reposition the uterus several times in order to gain adequate visualization given the large size of the uterus.  The bladder flap was created using cautery down to just below the level of the uterine manipulator cup which did require us to continually manipulate the uterus to achieve adequate visualization. The rest of the parametrial tissue was dissected off of the uterus serially cauterized and divided sharply. A colpotomy was made on the anterior side and carried around to the posterior side of the uterine manipulator cup using the electrocautery which again required us to manipulate the uterus and reposition it several times so that we could see adequately.  Given the size of the uterus it did not fit out the vagina easily and thus it was placed into an EndoCatch bag and then we were able to bivalve the uterus as well as to remove several of the leiomyoma in order to remove it through the vagina.  It was then sent to pathology for frozen section analysis which did return as benign findings.      The vaginal cuff was then closed using the V-Lock suture and the EndoStitch device with good hemostasis and reapproximation.   Next, we performed cystoscopy using 30-degree angled scope and noted normal bladder mucosa, no evidence of foreign body and brisk efflux from the bilateral ureteral orifices. At this point, the vaginal balloon was removed from the vagina. We closed the fascia on the 12 mm incision using the Ottoniel-Hdz device. All laparoscopic instruments and ports were now removed and CO2 allowed to escape from the abdomen. Skin was reapproximated  with 4-0 Vicryl sutures and Dermabond applied. There was a small vaginal laceration at the right anterior vagina and the left lateral vaginal wall which were repaired with a figure of eight suture with good hemostasis. The patient tolerated the procedure well and was taken to the recovery room in stable condition.    Sponge, lap and needle counts were reported as correct x2 and instrument count was correct x1.     Mary Roberto MD  Gynecologic Oncology  [facility]

## 2019-03-15 NOTE — PLAN OF CARE
Pt VSS on room air. Capno on till 9 pm tonight. Pain managed by oxycodone and scheduled tylenol. Pt hesitant to use oxycodone but is taking 5 mg every few hours. Pt also complaining of pain from straining during a bm. PRN senna was given. Pt passing gas and stool. Voiding with adequate urine output. Lap sites on abd PAPO. No drainage. Small amounts of vaginal bleeding. Michelle pad change 1x. Assist of 1. Moved to outpatient status today.

## 2019-03-15 NOTE — PLAN OF CARE
Pt VSS on room air. Capno on until 2100. Pain managed with oxycodone and scheduled tylenol. + flatus and BM. Voiding with adequate urine output. Lap sites on abd PAPO. No drainage. Small amounts of vaginal bleeding. Michelle pad in place. Assist of 1. Tolerating diet. Outpatient status, waiting for TCU placement.

## 2019-03-16 ENCOUNTER — APPOINTMENT (OUTPATIENT)
Dept: PHYSICAL THERAPY | Facility: CLINIC | Age: 59
End: 2019-03-16
Attending: OBSTETRICS & GYNECOLOGY
Payer: COMMERCIAL

## 2019-03-16 VITALS
SYSTOLIC BLOOD PRESSURE: 145 MMHG | DIASTOLIC BLOOD PRESSURE: 63 MMHG | RESPIRATION RATE: 16 BRPM | BODY MASS INDEX: 38.66 KG/M2 | TEMPERATURE: 98.4 F | HEART RATE: 81 BPM | OXYGEN SATURATION: 100 % | WEIGHT: 255.1 LBS | HEIGHT: 68 IN

## 2019-03-16 PROCEDURE — 99024 POSTOP FOLLOW-UP VISIT: CPT | Mod: GC | Performed by: OBSTETRICS & GYNECOLOGY

## 2019-03-16 PROCEDURE — 25000132 ZZH RX MED GY IP 250 OP 250 PS 637: Performed by: OBSTETRICS & GYNECOLOGY

## 2019-03-16 PROCEDURE — 25800030 ZZH RX IP 258 OP 636: Performed by: OBSTETRICS & GYNECOLOGY

## 2019-03-16 PROCEDURE — 97530 THERAPEUTIC ACTIVITIES: CPT | Mod: GP | Performed by: PHYSICAL THERAPIST

## 2019-03-16 PROCEDURE — 97116 GAIT TRAINING THERAPY: CPT | Mod: GP | Performed by: PHYSICAL THERAPIST

## 2019-03-16 RX ORDER — AMOXICILLIN 250 MG
1 CAPSULE ORAL 2 TIMES DAILY PRN
Qty: 60 TABLET | Refills: 0 | DISCHARGE
Start: 2019-03-16 | End: 2021-07-22

## 2019-03-16 RX ORDER — IBUPROFEN 200 MG
200-400 TABLET ORAL EVERY 6 HOURS PRN
Qty: 60 TABLET | Refills: 0 | Status: ON HOLD | DISCHARGE
Start: 2019-03-16 | End: 2020-06-02

## 2019-03-16 RX ORDER — ACETAMINOPHEN 325 MG/1
325-650 TABLET ORAL EVERY 4 HOURS PRN
Qty: 100 TABLET | Refills: 0 | DISCHARGE
Start: 2019-03-16 | End: 2021-07-22

## 2019-03-16 RX ORDER — OXYCODONE HYDROCHLORIDE 5 MG/1
5 TABLET ORAL EVERY 6 HOURS PRN
Qty: 15 TABLET | Refills: 0 | Status: SHIPPED | OUTPATIENT
Start: 2019-03-16 | End: 2019-03-16

## 2019-03-16 RX ORDER — OXYCODONE HYDROCHLORIDE 5 MG/1
5 TABLET ORAL EVERY 6 HOURS PRN
Qty: 15 TABLET | Refills: 0 | Status: ON HOLD | OUTPATIENT
Start: 2019-03-16 | End: 2020-06-02

## 2019-03-16 RX ADMIN — ACETAMINOPHEN 650 MG: 325 TABLET, FILM COATED ORAL at 04:32

## 2019-03-16 RX ADMIN — OXYCODONE HYDROCHLORIDE 5 MG: 5 TABLET ORAL at 00:05

## 2019-03-16 RX ADMIN — SODIUM CHLORIDE, POTASSIUM CHLORIDE, SODIUM LACTATE AND CALCIUM CHLORIDE: 600; 310; 30; 20 INJECTION, SOLUTION INTRAVENOUS at 04:32

## 2019-03-16 RX ADMIN — OXYCODONE HYDROCHLORIDE 5 MG: 5 TABLET ORAL at 11:35

## 2019-03-16 RX ADMIN — LISINOPRIL 20 MG: 20 TABLET ORAL at 08:47

## 2019-03-16 RX ADMIN — ACETAMINOPHEN 650 MG: 325 TABLET, FILM COATED ORAL at 11:35

## 2019-03-16 ASSESSMENT — MIFFLIN-ST. JEOR: SCORE: 1784.6

## 2019-03-16 ASSESSMENT — PAIN DESCRIPTION - DESCRIPTORS: DESCRIPTORS: INTERMITTENT

## 2019-03-16 NOTE — PROGRESS NOTES
"Gynecologic Oncology Progress Note        S: The patient is feeling well overall. Pain well controlled.  No nausea or vomiting. No chest pain, SOB. Ambulating without difficulty. Voiding spontaneously. Feeling ready to go today, would prefer TCU.     O:  /74 (BP Location: Left arm)   Pulse 88   Temp 97.4  F (36.3  C) (Oral)   Resp 18   Ht 1.734 m (5' 8.25\")   Wt 112.4 kg (247 lb 14.4 oz)   LMP 04/07/2014   SpO2 100%   BMI 37.42 kg/m       Gen: NAD   Cardio: reg rate, well perfused  Resp: no increased work of breathing  Abdomen: soft, moderately distended, Appropriately tender to palpation  Incision: port sites CDI  Extremities: mild symmetric edema bilaterally    I/O:  I/O this shift:  In: 3298.33 [P.O.:100; I.V.:2198.33; IV Piggyback:1000]  Out: 275 [Urine:275]      Intake/Output Summary (Last 24 hours) at 3/16/2019 0651  Last data filed at 3/16/2019 0428  Gross per 24 hour   Intake 3420 ml   Output 550 ml   Net 2870 ml          A: 58 year old POD#2 s/p TLH, BSO for postmenopausal bleeding, doing well but concerns for ability to recover without home health or TCU     Dz: benign   FEN:  ml/hr, had been borderline low and s/p bolus with some improvement overnight, will continue to monitor and discontinue IVF when able  Pain: tylenol and oxycodone PRN   Heme: Hgb 10.2> 9.4, asymptomatic  CV: hypertension, home lisinopril ordered   Pulm: NA   GI: miralax and senna PRN   : s/p jj, voiding  ID: pre op abx   Endo: NI   Psych/Neuro: hx alcoholism. Reports no recent use, will monitor for withdrawal sx  PPX: SCDs  Dispo: likely to discharge to TCU or home with home health later today     Rocio Hess PGY4  3/16/2019 6:52 AM      IDalton personally examined and evaluated this patient on 03/16/19.  I discussed the patient with the resident and care team, and agree with the assessment and plan of care as documented in the residents note above.    I personally reviewed vital signs, " laboratory values and imaging results.    Pt doing overall well but has baseline mobility issues.  Await PT/OT final recommendations regarding discharge status but likely discharge today.    Mary Roberto MD  Gynecologic Oncology  [facility]

## 2019-03-16 NOTE — PROGRESS NOTES
Transition Planning Update    D:  [facility] was updated that Ms. Dasilva will discharge to a TCU setting and will no longer need home care services at this time of discharge.  A/P:  Patient is discharging today to a TCU setting.  Social Work team has finalized transition needs.    Joseline Trejo, FUENTES.S.MICHELET., P.H.N.,R.N.         Pager

## 2019-03-16 NOTE — PLAN OF CARE
Observation Status    VS: AVSS on RA. HTN at baseline   Neuro: WNL except generalized weakness w/ mildly impaired mobility   Resp: WNL  Cardiac: WNL  GI: Hypoactive BS. +passing flatus. No BM overnight. Denies N/V  : Adequate but low UOP   Diet: Tolerating regular diet   LDA: Left PIV infusing IVMF   Activity: Up w/ SBA. Ambulated to bathroom 2x overnight   Pain: Abd pain managed w/ PRN Oxycodone 5mg and scheduled Tylenol     Plan of Care: Continue w/ current POC. Tentative discharge to home w/ home health vs TCU pending PT recs (per MD note)    Danya Anaya, RN on 3/16/2019 at 5:11 AM

## 2019-03-16 NOTE — PLAN OF CARE
Status: POD #1 Laparoscopic Total Hysterectomy, Bilateral Salpingo-Oophorectomy, and Repair of Vaginal Laceration.   VS: VSS on RA.   Neuros: A&Ox4.   GI: Tolerating regular diet. LBM 3/15.  : Voiding spontaneously. LR bolus running.?  IV: L PIV w/ LR @ 100 ml/hr.   Activity: Up w/ Ax1 and walker. PT following.   Pain: Denies.  Respiratory/Trach: No issues.   Skin: 3 lap sites w/ liquid bandage. Abdominal binder on. Minimal vaginal bleeding this shift, jenny-pad in place.     Plan of care: Awaiting TCU placement. Continue to monitor and follow POC.

## 2019-03-16 NOTE — PROGRESS NOTES
Social Work Services Progress Note    Hospital Day: 2  Date of Initial Social Work Evaluation:  3/15/19  Collaborated with:  Pt and sister at bedside, medical team, charge nurse    Data:  SW involved for discharge planning -TCU recommended.    Per medical team, Pt is medically stable for discharge today and is no longer on O2. Per [facility] liaison Alannah (P: [phone]; F:[phone]) Pt is clinically accepted for admission, but no female beds available at this location. Pt is accepted at [facility] in [place] today if Pt is agreeable. Ron boggs reports no auth required for [facility] plan. SW spoke w/ Pt and her sister this AM re: the following referral updates. Patient and sister are agreeable to [facility] discharge today. KAYA paged primary team who completed discharge orders.     Referrals sent to the following TCUs:  -[facility] - clinically accepted for admission; no beds available.   -[facility] - referral has not yet been reviewed.   -[facility] - reports will evaluate on Mon 3/18/19    Discharge location: [facility] [address] [place] [address]  F: [phone]    KAYA faxed discharge orders and script to F: [phone] per Ron potterison request. In preparation for discharge, [employer] (Ph: [phone]) WC ride arranged and the earliest ride available is 5:45 PM today.     Intervention:  Discharge planning    Assessment:  Pt is open to SW visit and agreeable to TCU placement. Patient requires increased time to comprehend and make decisions as well as relies on her sister for support. Patient is agreeable to TCU placement though expressing anxiety with the unknown. SW and sister provided assurance and reviewed discharge information. Pt is agreeable that she is medically stable for discharge today.     Plan:    Anticipated Disposition:  Facility:  [facility] TCU    Barriers to  d/c plan:  Rehab placement    Follow Up:  SW to f/u & assist as needed.    KAYLIE Mccormack, LGSW   Surgical/Oncology Unit   Phone: [phone]  Pager: [phone]

## 2019-03-16 NOTE — PLAN OF CARE
Discharge Planner PT   Patient plan for discharge: TCU  Current status: min assist for supine to/from sit with use of rails; SBA for sit to/from stand from EOB, armchair with use of WW; SBA to amb with  ft x 2; CGA to ascend/descend 4 steps with 2 rails  Barriers to return to prior living situation: 9 steps to enter home; lives alone; pain present  Recommendations for discharge: TCU  Rationale for recommendations: will benefit from continued skilled PT intervention to address mobility deficits, improve strength & activity tolerance       Entered by: Geovanna Arias 03/16/2019 9:56 AM

## 2019-03-16 NOTE — PLAN OF CARE
AVSS, pain in good control on oral med. Out of room with PT this am, per assessment will need TCU stay. Tolerting regular diet and activity out in stone with 1 assist and walker.  Abdomen lap incisions intact. Small amount of vaginal spotting, voiding adequate. Sister here. All agree is ready to go to [facility] transport coming 1745 today.

## 2019-03-17 LAB
BLD PROD TYP BPU: NORMAL
BLD PROD TYP BPU: NORMAL
BLD UNIT ID BPU: 0
BLD UNIT ID BPU: 0
BLOOD PRODUCT CODE: NORMAL
BLOOD PRODUCT CODE: NORMAL
BPU ID: NORMAL
BPU ID: NORMAL
TRANSFUSION STATUS PATIENT QL: NORMAL

## 2019-03-17 NOTE — PLAN OF CARE
Physical Therapy Discharge Summary    Reason for therapy discharge:    Discharged to transitional care facility.    Progress towards therapy goal(s). See goals on Care Plan in [facility] electronic health record for goal details.  Goals not met.  Barriers to achieving goals:   discharge from facility.    Therapy recommendation(s):    Continued therapy is recommended.  Rationale/Recommendations:  Pt will benefit from continued bed mobility, gait and stair training.

## 2019-03-17 NOTE — PLAN OF CARE
"Pt ready for discharge, awaiting EMS ride to TCU. RN report handed off to \"Raj\" from receiving TCU facility. AVS gone over and signed by pt. Pt agreeable to discharge and plans to leave via wheelchair. All personal belongings sent with patient.   "

## 2019-03-18 ENCOUNTER — PATIENT OUTREACH (OUTPATIENT)
Dept: CARE COORDINATION | Facility: CLINIC | Age: 59
End: 2019-03-18

## 2019-03-18 ASSESSMENT — ACTIVITIES OF DAILY LIVING (ADL): DEPENDENT_IADLS:: TRANSPORTATION

## 2019-03-18 NOTE — PROGRESS NOTES
Clinic Care Coordination Contact  Care Coordination Transition Communication    Referral Source: IP Handoff    Clinical Data: Patient was hospitalized at [facility] from 3/14/19 to 3/16/19 with diagnosis of s/p hysterectomy.     Transition to Facility:              Facility Name: [facility]              Contact name and phone number/fax: P: [phone]; F: [phone]    Fax sent to facility containing CCC RN contact information and request for notification when patient discharging.    Plan: RN/SW Care Coordinator will await notification from facility staff informing RN/SW Care Coordinator of patient's discharge plans/needs. RN/SW Care Coordinator will review chart and outreach to facility staff every 4 weeks and as needed.     Kyrie Carrillo, RN  Clinic Care Coordinator  [facility]  Ph: [phone]

## 2019-03-18 NOTE — LETTER
[facility]   To:   [facility]CU          Please give to facility    From:   Kyrie Carrillo RN Care Coordinator [facility]     Patient Name:  Josiane Dasliva YOB: 1960   Admit date: 3/16/19      *Information Needed:  Please contact me when the patient will discharge (or if they will move to long term care)- include the discharge date, disposition, and main diagnosis   - If the patient is discharged with home care services, please provide the name of the agency    Also- Please inform me if a care conference is being held.   Phone, Fax or Email with information       Thank You,   Kyrie Carrillo, RN  Clinic Care Coordinator  [facility]  Ph: [phone]  [email or web address]

## 2019-03-19 LAB — COPATH REPORT: NORMAL

## 2019-03-22 ENCOUNTER — PATIENT OUTREACH (OUTPATIENT)
Dept: ONCOLOGY | Facility: CLINIC | Age: 59
End: 2019-03-22

## 2019-03-22 NOTE — PROGRESS NOTES
Pt s/p Laparoscopic TLH, BSO, Cysto, and repair of vaginal laceration on 3/14/19 with Dr Roberto.  Pt discharged to TCU and will follow up with MD in clinic for post op visit on 3/27/19.    Leslie Izquierdo RN, BSN, OCN

## 2019-03-26 NOTE — PROGRESS NOTES
Consult Notes on Referred Patient            RE: Josiane Dasilva  : 1960  GLEN: Mar 27, 2019       HPI:  Josiane Dasilva is 58 year old with benign leiomyoma. She is unaccompanied today.  She is doing well post-operatively.  Eating and drinking normally.  Normal bowel and bladder function.  Minimal pain.  Is at rehab until Monday and is doing well there.    Clinical Course:    Patient had been experiencing heavy PMB for the past 3 months.  She was found to be anemic with Hb at 6.1 and thus was admitted and transfused 2 units PRBC's with appropriate rise in her Hb.  She was started on Provera in the hospital and since then her bleeding has improved significantly.  She was also noted to have strep species in the donated blood and thus has been on antibiotics which she will complete soon.  She notes she feels much better following the blood transfusion.  She is quite immobile at baseline, however and uses a walker to get around.  She has also had a flare of gout in her right ankle which is causing her significant pain and impairing her mobility further.    19:  US Pelvis:  Uterus measures 12.5 x 8.1 x 7.6 cm with 3 leiomyoma measuring 3-4 cm.  EMS 11.4 mm.  Non-visualization of the bilateral ovaries    3/14/19: Total laparoscopic hysterectomy, bilateral salpingo-oophorectomy, cystoscopy, repair of vaginal laceration   Pathology:  Benign      Review of Systems:  Systemic           no weight changes; no fever; no chills; no night sweats; no appetite changes  Skin           no rashes, or lesions  Eye           no irritation; no changes in vision  Maria Guadalupe-Laryngeal           no dysphagia; no hoarseness   Pulmonary    no cough; no shortness of breath  Cardiovascular    no chest pain; no palpitations  Gastrointestinal    no diarrhea; no constipation; no abdominal pain; no changes in bowel  habits; no blood in stool  Genitourinary   no urinary frequency; no urinary urgency; no dysuria; no pain; no abnormal vaginal  discharge; no abnormal vaginal bleeding  Breast    no breast discharge; no breast changes; no breast pain  Musculoskeletal    no myalgias; no arthralgias; no back pain  Psychiatric           no depressed mood; no anxiety    Hematologic            no tender lymph nodes; no noticeable swellings or lumps   Endocrine    no hot flashes; no heat/cold intolerance         Neurological   no tremor; no numbness and tingling; no headaches; + difficulty sleeping; + mood changes; + difficulty walking    Obstetrics and Gynecology History:  ,  x 3  No HRT use      Past Medical History:  Past Medical History:   Diagnosis Date     Alcohol abuse      Anxiety      Depressive disorder      Fatty liver      GERD (gastroesophageal reflux disease)      Gout      Hypertension      Neuropathy      Obesity      Post-menopausal bleeding        Past Surgical History:  Past Surgical History:   Procedure Laterality Date     APPENDECTOMY       COLONOSCOPY  2014    Procedure: COMBINED COLONOSCOPY, SINGLE BIOPSY/POLYPECTOMY BY BIOPSY;  Surgeon: Mike Mancuso MD;  Location:  GI     CYSTOSCOPY N/A 3/14/2019    Procedure: Cystoscopy;  Surgeon: Mary Ash MD;  Location:  OR     ESOPHAGOSCOPY, GASTROSCOPY, DUODENOSCOPY (EGD), COMBINED  2014    Procedure: COMBINED ESOPHAGOSCOPY, GASTROSCOPY, DUODENOSCOPY (EGD), BIOPSY SINGLE OR MULTIPLE;  Surgeon: Mike Mancuso MD;  Location: [facility]     LAPAROSCOPIC HYSTERECTOMY TOTAL, BILATERAL SALPINGO-OOPHORECTOMY, NODE DISSECTION, COMBINED N/A 3/14/2019    Procedure: Laparoscopic Total Hysterectomy, Bilateral Salpingo-Oophorectomy, and Repair of Vaginal Laceration;  Surgeon: Mary Ash MD;  Location:  OR     ORTHOPEDIC SURGERY      left ankle s/p bimalleolar fracture 2014     OTHER SURGICAL HISTORY      [place] I as teenager secondary to ulcer     TUBAL LIGATION         Health Maintenance:  Last Pap Smear: No need for further pap smear  exams as s/p hysterectomy  She has not had a history of abnormal Pap smears.    Last Mammogram: 4/3/17              Result: needed follow up US which was benign      She has not had a history of abnormal mammograms.    Last Colonoscopy: 14              Result: polyps- repeat 5 years      Current Medications:   has a current medication list which includes the following prescription(s): acetaminophen, fexofenadine, gabapentin, ibuprofen, lisinopril, multivitamin w/minerals, order for dme, order for dme, oxycodone, polyethylene glycol, senna-docusate, witch hazel-glycerin, and pantoprazole.     Allergies:   Asa [aspirin] and Nsaids      Social History:  Social History     Socioeconomic History     Marital status: Single     Spouse name: Not on file     Number of children: Not on file     Years of education: Not on file     Highest education level: Not on file   Occupational History     Not on file   Social Needs     Financial resource strain: Not on file     Food insecurity:     Worry: Not on file     Inability: Not on file     Transportation needs:     Medical: Not on file     Non-medical: Not on file   Tobacco Use     Smoking status: Former Smoker     Packs/day: 0.00     Years: 10.00     Pack years: 0.00     Last attempt to quit: 2005     Years since quittin.2     Smokeless tobacco: Never Used   Substance and Sexual Activity     Alcohol use: Yes     Alcohol/week: 0.6 oz     Types: 1 Standard drinks or equivalent per week     Comment: 1 drink weekly/monthly     Drug use: No     Sexual activity: No     Partners: Male   Lifestyle     Physical activity:     Days per week: Not on file     Minutes per session: Not on file     Stress: Not on file   Relationships     Social connections:     Talks on phone: Not on file     Gets together: Not on file     Attends Confucianism service: Not on file     Active member of club or organization: Not on file     Attends meetings of clubs or organizations: Not on file      Relationship status: Not on file     Intimate partner violence:     Fear of current or ex partner: Not on file     Emotionally abused: Not on file     Physically abused: Not on file     Forced sexual activity: Not on file   Other Topics Concern     Parent/sibling w/ CABG, MI or angioplasty before 65F 55M? Not Asked   Social History Narrative     Not on file       Lives alone, feels safe at home.  Is on disability.  She has 3 sons, one of whom lives locally, however he is not very available to assist her.  Does not have an advanced directive on file and would like her sons to be her POA.  Full Code  She has a significant history of alcohol abuse but reports that lately she has cut back significantly and has not been drinking the past several weeks.  She has a remote history of drug abuse as well but has not used in 30 years.    Family History:   The patient's family history is significant for.  Family History   Problem Relation Age of Onset     Thyroid Disease Mother      Hypertension Father      Thyroid Disease Sister          Physical Exam:   /76   Pulse 73   Temp 98.1  F (36.7  C) (Tympanic)   Resp 16   Wt 111.8 kg (246 lb 6.4 oz)   LMP 04/07/2014   SpO2 96%   BMI 37.19 kg/m    Body mass index is 37.19 kg/m .    General Appearance: healthy and alert, no distress     Gastrointestinal:       abdomen soft, non-tender, non-distended, no organomegaly or masses, port sites without erythema/induration or drainage    Genitourinary: External genitalia and urethral meatus appears normal.  Vagina is smooth with a well healing vaginal cuff and vaginal laceration      Assessment:    Josiane Dasilva is a 58 year old woman with a diagnosis of benign leiomyoma.     A total of 30 minutes was spent with the patient, >50% of which were spent in counseling the patient and/or treatment planning, this was separate from the 5 minutes spent on post-operative cares.      Plan:     1.)    Benign leiomyoma.  Pathology was  reviewed and she was provided with a copy of her results.  Given the benign findings she does not need further follow up with gynecologic oncology unless problems arise.  She does not need further pap smear exams.  She should continue to have routine care with her PCP.    2.) History of EtOH abuse, possible cirrhosis due to history of thrombocytopenia.  Stable and no issues in the post-operative period    3.) Significant mobility issues and poor social support.  She is doing well at the Rehab center.    4.) Genetic risk factors were assessed and the patient does not meet the qualifications for a referral.      5.) Labs and/or tests ordered include:  Mammogram     6.) Health maintenance issues addressed today include pt is due for a mammogram which she will schedule        Thank you for allowing us to participate in the care of your patient.         Sincerely,    Mary Roberto MD  Gynecologic Oncology  [facility]       CC

## 2019-03-27 ENCOUNTER — ONCOLOGY VISIT (OUTPATIENT)
Dept: ONCOLOGY | Facility: CLINIC | Age: 59
End: 2019-03-27
Attending: OBSTETRICS & GYNECOLOGY
Payer: COMMERCIAL

## 2019-03-27 ENCOUNTER — HOSPITAL ENCOUNTER (OUTPATIENT)
Facility: CLINIC | Age: 59
Setting detail: SPECIMEN
End: 2019-03-27
Attending: OBSTETRICS & GYNECOLOGY
Payer: COMMERCIAL

## 2019-03-27 VITALS
BODY MASS INDEX: 37.19 KG/M2 | OXYGEN SATURATION: 96 % | TEMPERATURE: 98.1 F | DIASTOLIC BLOOD PRESSURE: 76 MMHG | SYSTOLIC BLOOD PRESSURE: 143 MMHG | RESPIRATION RATE: 16 BRPM | HEART RATE: 73 BPM | WEIGHT: 246.4 LBS

## 2019-03-27 DIAGNOSIS — Z12.31 VISIT FOR SCREENING MAMMOGRAM: Primary | ICD-10-CM

## 2019-03-27 PROCEDURE — G0463 HOSPITAL OUTPT CLINIC VISIT: HCPCS

## 2019-03-27 PROCEDURE — 99214 OFFICE O/P EST MOD 30 MIN: CPT | Mod: 24 | Performed by: OBSTETRICS & GYNECOLOGY

## 2019-03-27 ASSESSMENT — PAIN SCALES - GENERAL: PAINLEVEL: EXTREME PAIN (8)

## 2019-03-27 NOTE — NURSING NOTE
"Oncology Rooming Note    March 27, 2019 2:35 PM   Josiane Dasilva is a 58 year old female who presents for:    Chief Complaint   Patient presents with     Oncology Clinic Visit     post op     Initial Vitals: /76   Pulse 73   Temp 98.1  F (36.7  C) (Tympanic)   Resp 16   Wt 111.8 kg (246 lb 6.4 oz)   LMP 04/07/2014   SpO2 96%   BMI 37.19 kg/m   Estimated body mass index is 37.19 kg/m  as calculated from the following:    Height as of 3/14/19: 1.734 m (5' 8.25\").    Weight as of this encounter: 111.8 kg (246 lb 6.4 oz). Body surface area is 2.32 meters squared.  Extreme Pain (8) Comment: Data Unavailable   Patient's last menstrual period was 04/07/2014.  Allergies reviewed: Yes  Medications reviewed: Yes    Medications: Medication refills not needed today.  Pharmacy name entered into [facility]: [id number] [address] [place] [address]    Clinical concerns: post op f/u  - staets has 2 doeses of amox clave left. Would like ortho referral       Joana Zhang CMA              "

## 2019-03-27 NOTE — LETTER
3/27/2019         RE: Josiane Dasilva  [address]  [address]  [facility] [id number]        Dear Colleague,    Thank you for referring your patient, Josiane Dasilva, to the [facility]. Please see a copy of my visit note below.    Consult Notes on Referred Patient            RE: Josiane Dasilva  : 1960  GLEN: Mar 27, 2019       HPI:  Josiane Dasilva is 58 year old with benign leiomyoma. She is unaccompanied today.  She is doing well post-operatively.  Eating and drinking normally.  Normal bowel and bladder function.  Minimal pain.  Is at rehab until Monday and is doing well there.    Clinical Course:    Patient had been experiencing heavy PMB for the past 3 months.  She was found to be anemic with Hb at 6.1 and thus was admitted and transfused 2 units PRBC's with appropriate rise in her Hb.  She was started on Provera in the hospital and since then her bleeding has improved significantly.  She was also noted to have strep species in the donated blood and thus has been on antibiotics which she will complete soon.  She notes she feels much better following the blood transfusion.  She is quite immobile at baseline, however and uses a walker to get around.  She has also had a flare of gout in her right ankle which is causing her significant pain and impairing her mobility further.    19:  US Pelvis:  Uterus measures 12.5 x 8.1 x 7.6 cm with 3 leiomyoma measuring 3-4 cm.  EMS 11.4 mm.  Non-visualization of the bilateral ovaries    3/14/19: Total laparoscopic hysterectomy, bilateral salpingo-oophorectomy, cystoscopy, repair of vaginal laceration   Pathology:  Benign      Review of Systems:  Systemic           no weight changes; no fever; no chills; no night sweats; no appetite changes  Skin           no rashes, or lesions  Eye           no irritation; no changes in vision  Maria Guadalupe-Laryngeal           no dysphagia; no hoarseness   Pulmonary    no cough; no shortness of  breath  Cardiovascular    no chest pain; no palpitations  Gastrointestinal    no diarrhea; no constipation; no abdominal pain; no changes in bowel  habits; no blood in stool  Genitourinary   no urinary frequency; no urinary urgency; no dysuria; no pain; no abnormal vaginal discharge; no abnormal vaginal bleeding  Breast    no breast discharge; no breast changes; no breast pain  Musculoskeletal    no myalgias; no arthralgias; no back pain  Psychiatric           no depressed mood; no anxiety    Hematologic            no tender lymph nodes; no noticeable swellings or lumps   Endocrine    no hot flashes; no heat/cold intolerance         Neurological   no tremor; no numbness and tingling; no headaches; + difficulty sleeping; + mood changes; + difficulty walking    Obstetrics and Gynecology History:  ,  x 3  No HRT use      Past Medical History:  Past Medical History:   Diagnosis Date     Alcohol abuse      Anxiety      Depressive disorder      Fatty liver      GERD (gastroesophageal reflux disease)      Gout      Hypertension      Neuropathy      Obesity      Post-menopausal bleeding        Past Surgical History:  Past Surgical History:   Procedure Laterality Date     APPENDECTOMY       COLONOSCOPY  2014    Procedure: COMBINED COLONOSCOPY, SINGLE BIOPSY/POLYPECTOMY BY BIOPSY;  Surgeon: Mike Mancuso MD;  Location: [facility]     CYSTOSCOPY N/A 3/14/2019    Procedure: Cystoscopy;  Surgeon: Mary Ash MD;  Location: [facility] OR     ESOPHAGOSCOPY, GASTROSCOPY, DUODENOSCOPY (EGD), COMBINED  2014    Procedure: COMBINED ESOPHAGOSCOPY, GASTROSCOPY, DUODENOSCOPY (EGD), BIOPSY SINGLE OR MULTIPLE;  Surgeon: Mike Mancuso MD;  Location: [facility]     LAPAROSCOPIC HYSTERECTOMY TOTAL, BILATERAL SALPINGO-OOPHORECTOMY, NODE DISSECTION, COMBINED N/A 3/14/2019    Procedure: Laparoscopic Total Hysterectomy, Bilateral Salpingo-Oophorectomy, and Repair of Vaginal Laceration;  Surgeon: Felisa Hoffman  Mary Kennedy MD;  Location: [facility] OR     ORTHOPEDIC SURGERY      left ankle s/p bimalleolar fracture 2014     OTHER SURGICAL HISTORY      Billroth I as teenager secondary to ulcer     TUBAL LIGATION         Health Maintenance:  Last Pap Smear: No need for further pap smear exams as s/p hysterectomy  She has not had a history of abnormal Pap smears.    Last Mammogram: 4/3/17              Result: needed follow up US which was benign      She has not had a history of abnormal mammograms.    Last Colonoscopy: 14              Result: polyps- repeat 5 years      Current Medications:   has a current medication list which includes the following prescription(s): acetaminophen, fexofenadine, gabapentin, ibuprofen, lisinopril, multivitamin w/minerals, order for dme, order for dme, oxycodone, polyethylene glycol, senna-docusate, witch hazel-glycerin, and pantoprazole.     Allergies:   Asa [aspirin] and Nsaids      Social History:  Social History     Socioeconomic History     Marital status: Single     Spouse name: Not on file     Number of children: Not on file     Years of education: Not on file     Highest education level: Not on file   Occupational History     Not on file   Social Needs     Financial resource strain: Not on file     Food insecurity:     Worry: Not on file     Inability: Not on file     Transportation needs:     Medical: Not on file     Non-medical: Not on file   Tobacco Use     Smoking status: Former Smoker     Packs/day: 0.00     Years: 10.00     Pack years: 0.00     Last attempt to quit: 2005     Years since quittin.2     Smokeless tobacco: Never Used   Substance and Sexual Activity     Alcohol use: Yes     Alcohol/week: 0.6 oz     Types: 1 Standard drinks or equivalent per week     Comment: 1 drink weekly/monthly     Drug use: No     Sexual activity: No     Partners: Male   Lifestyle     Physical activity:     Days per week: Not on file     Minutes per session: Not on file     Stress: Not  on file   Relationships     Social connections:     Talks on phone: Not on file     Gets together: Not on file     Attends Buddhist service: Not on file     Active member of club or organization: Not on file     Attends meetings of clubs or organizations: Not on file     Relationship status: Not on file     Intimate partner violence:     Fear of current or ex partner: Not on file     Emotionally abused: Not on file     Physically abused: Not on file     Forced sexual activity: Not on file   Other Topics Concern     Parent/sibling w/ CABG, MI or angioplasty before 65F 55M? Not Asked   Social History Narrative     Not on file       Lives alone, feels safe at home.  Is on disability.  She has 3 sons, one of whom lives locally, however he is not very available to assist her.  Does not have an advanced directive on file and would like her sons to be her POA.  Full Code  She has a significant history of alcohol abuse but reports that lately she has cut back significantly and has not been drinking the past several weeks.  She has a remote history of drug abuse as well but has not used in 30 years.    Family History:   The patient's family history is significant for.  Family History   Problem Relation Age of Onset     Thyroid Disease Mother      Hypertension Father      Thyroid Disease Sister          Physical Exam:   /76   Pulse 73   Temp 98.1  F (36.7  C) (Tympanic)   Resp 16   Wt 111.8 kg (246 lb 6.4 oz)   LMP 04/07/2014   SpO2 96%   BMI 37.19 kg/m     Body mass index is 37.19 kg/m .    General Appearance: healthy and alert, no distress     Gastrointestinal:       abdomen soft, non-tender, non-distended, no organomegaly or masses, port sites without erythema/induration or drainage    Genitourinary: External genitalia and urethral meatus appears normal.  Vagina is smooth with a well healing vaginal cuff and vaginal laceration      Assessment:    Josiane Dasilva is a 58 year old woman with a diagnosis of  benign leiomyoma.     A total of 30 minutes was spent with the patient, >50% of which were spent in counseling the patient and/or treatment planning, this was separate from the 5 minutes spent on post-operative cares.      Plan:     1.)    Benign leiomyoma.  Pathology was reviewed and she was provided with a copy of her results.  Given the benign findings she does not need further follow up with gynecologic oncology unless problems arise.  She does not need further pap smear exams.  She should continue to have routine care with her PCP.    2.) History of EtOH abuse, possible cirrhosis due to history of thrombocytopenia.  Stable and no issues in the post-operative period    3.) Significant mobility issues and poor social support.  She is doing well at the Rehab center.    4.) Genetic risk factors were assessed and the patient does not meet the qualifications for a referral.      5.) Labs and/or tests ordered include:  Mammogram     6.) Health maintenance issues addressed today include pt is due for a mammogram which she will schedule        Thank you for allowing us to participate in the care of your patient.         Sincerely,    Mary Roberto MD  Gynecologic Oncology  [facility]       CC           Again, thank you for allowing me to participate in the care of your patient.        Sincerely,        Dalton Roberto MD

## 2019-04-02 LAB — COPATH REPORT: NORMAL

## 2019-04-16 ENCOUNTER — PATIENT OUTREACH (OUTPATIENT)
Dept: CARE COORDINATION | Facility: CLINIC | Age: 59
End: 2019-04-16

## 2019-04-16 ASSESSMENT — ACTIVITIES OF DAILY LIVING (ADL): DEPENDENT_IADLS:: TRANSPORTATION

## 2019-04-16 NOTE — PROGRESS NOTES
Clinic Care Coordination Contact  [facility]/Voicemail    Referral Source: IP Handoff  Per chart review, patient s/p total hysterectomy 3/14/19.    Clinical Data: Care Coordinator Outreach  Outreach attempted x 1.  Left message on voicemail with call back information and requested return call.  Plan: Care Coordinator mailed out care coordination introduction letter on 3/1/19. Care Coordinator will try to reach patient again in 3-5 business days.    Kyrie Carrillo RN  Clinic Care Coordinator  [facility]  Ph: [phone]

## 2019-04-23 ASSESSMENT — ACTIVITIES OF DAILY LIVING (ADL): DEPENDENT_IADLS:: TRANSPORTATION

## 2019-04-23 NOTE — PROGRESS NOTES
Clinic Care Coordination Contact  [facility]/Voicemail    Referral Source: IP Handoff  Per chart review, patient s/p total hysterectomy 3/14/19.    Clinical Data: Care Coordinator Outreach  Outreach attempted x 2.  No voicemail option; unable to leave message.  Plan: Care Coordinator mailed out care coordination introduction letter on 3/1/19. Care Coordinator will try to reach patient again in approximately 2 weeks.    Kyrie Carrillo RN  Clinic Care Coordinator  [facility]  Ph: [phone]

## 2019-05-10 ENCOUNTER — PATIENT OUTREACH (OUTPATIENT)
Dept: CARE COORDINATION | Facility: CLINIC | Age: 59
End: 2019-05-10

## 2019-05-10 ASSESSMENT — ACTIVITIES OF DAILY LIVING (ADL): DEPENDENT_IADLS:: TRANSPORTATION

## 2019-05-10 NOTE — PROGRESS NOTES
Clinic Care Coordination Contact    Clinic Care Coordination Contact  OUTREACH    Referral Information:  Referral Source: IP Handoff  Primary Diagnosis: Psychosocial  Chief Complaint   Patient presents with     Clinic Care Coordination - Follow-up     check-in post-surgery     Clinical Concerns:  CCC RN outreach to patient to get updates on status since her hysterectomy 3/14/19.    The patient reports to be doing fairly well.  She stated she had recent Surgery follow-up and was told she needs to be patient with her recovery.  The patient stated she is trying to take it easy, but today has been cleaning her house.  CCC RN encouraged the patient to follow the guidelines her Surgery team gave her, but let her know it's ok to be somewhat active while recovering, as long as she isn't experiencing new/worsening pain or other symptoms; she stated understanding.    At this time, the patient otherwise denies questions or concerns.  She denied need for additional assistance from Care Coordination at this time, but did agree to allow CCC RN help her to schedule a follow-up PCP appointment prior to end of call.  She agreed to contact CCC RN should any new questions or concerns arise.    Pain  The patient reported she does continue to have some pain s/p hysterectomy, but she stopped taking her oxycodone because it was making her constipated.  She stated the pain is tolerable at this time.    Functional Status:  Dependent ADLs:: Ambulation-cane, Ambulation-walker  Dependent IADLs:: Transportation  Mobility Status: Independent w/Device    Living Situation:  Current living arrangement:: I live alone  Type of residence:: Apartment    Transportation:  Transportation means:: Medical transport, Friend     Psychosocial:  Informal Support system: Children     Resources and Interventions:  Equipment Currently Used at Home: walker, standard, cane, straight    Patient/Caregiver understanding: Yes    Future Appointments              In 3  Willi Jin MD [facility]        Plan: The patient will attend her upcoming PCP appointment as scheduled.  The patient will continue monitoring her activity while she is healing s/p hysterectomy and will contact her Surgery team with any concerns.  The patient will contact CCC RN with additional questions/issues.  CCC RN will make no further scheduled patient outreaches at this time but will remain available should any patient needs arise.    Kyrie Carrillo RN  Clinic Care Coordinator  [facility]  Ph: [phone]

## 2019-06-17 PROBLEM — J30.2 SEASONAL ALLERGIC RHINITIS: Chronic | Status: ACTIVE | Noted: 2017-04-16

## 2020-05-25 ENCOUNTER — APPOINTMENT (OUTPATIENT)
Dept: GENERAL RADIOLOGY | Facility: CLINIC | Age: 60
End: 2020-05-25
Attending: NURSE PRACTITIONER
Payer: COMMERCIAL

## 2020-05-25 ENCOUNTER — HOSPITAL ENCOUNTER (EMERGENCY)
Facility: CLINIC | Age: 60
Discharge: HOME | End: 2020-05-25
Attending: NURSE PRACTITIONER | Admitting: NURSE PRACTITIONER
Payer: COMMERCIAL

## 2020-05-25 ENCOUNTER — APPOINTMENT (OUTPATIENT)
Dept: CT IMAGING | Facility: CLINIC | Age: 60
End: 2020-05-25
Attending: NURSE PRACTITIONER
Payer: COMMERCIAL

## 2020-05-25 VITALS
WEIGHT: 210 LBS | HEART RATE: 92 BPM | OXYGEN SATURATION: 100 % | DIASTOLIC BLOOD PRESSURE: 75 MMHG | BODY MASS INDEX: 31.83 KG/M2 | RESPIRATION RATE: 18 BRPM | TEMPERATURE: 98.4 F | SYSTOLIC BLOOD PRESSURE: 146 MMHG | HEIGHT: 68 IN

## 2020-05-25 DIAGNOSIS — R94.31 PROLONGED Q-T INTERVAL ON ECG: ICD-10-CM

## 2020-05-25 DIAGNOSIS — E87.1 HYPONATREMIA: ICD-10-CM
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
## 2020-05-25 DIAGNOSIS — D69.6 THROMBOCYTOPENIA (H): ICD-10-CM

## 2020-05-25 LAB
ALBUMIN SERPL-MCNC: 2.7 G/DL (ref 3.4–5)
ALBUMIN UR-MCNC: 10 MG/DL
ALP SERPL-CCNC: 229 U/L (ref 40–150)
ALT SERPL W P-5'-P-CCNC: 21 U/L (ref 0–50)
AMPHETAMINES UR QL SCN: NEGATIVE
ANION GAP SERPL CALCULATED.3IONS-SCNC: 8 MMOL/L (ref 3–14)
APAP SERPL-MCNC: <2 MG/L (ref 10–20)
APPEARANCE UR: ABNORMAL
AST SERPL W P-5'-P-CCNC: 44 U/L (ref 0–45)
BARBITURATES UR QL: NEGATIVE
BASOPHILS # BLD AUTO: 0 10E9/L (ref 0–0.2)
BASOPHILS NFR BLD AUTO: 0.5 %
BENZODIAZ UR QL: NEGATIVE
BILIRUB SERPL-MCNC: 1.3 MG/DL (ref 0.2–1.3)
BILIRUB UR QL STRIP: ABNORMAL
BUN SERPL-MCNC: 8 MG/DL (ref 7–30)
CALCIUM SERPL-MCNC: 9.1 MG/DL (ref 8.5–10.1)
CANNABINOIDS UR QL SCN: NEGATIVE
CHLORIDE SERPL-SCNC: 102 MMOL/L (ref 94–109)
CK SERPL-CCNC: 40 U/L (ref 30–225)
CO2 SERPL-SCNC: 19 MMOL/L (ref 20–32)
COCAINE UR QL: NEGATIVE
COLOR UR AUTO: ABNORMAL
CREAT SERPL-MCNC: 0.8 MG/DL (ref 0.52–1.04)
DIFFERENTIAL METHOD BLD: ABNORMAL
EOSINOPHIL # BLD AUTO: 0.1 10E9/L (ref 0–0.7)
EOSINOPHIL NFR BLD AUTO: 0.9 %
ERYTHROCYTE [DISTWIDTH] IN BLOOD BY AUTOMATED COUNT: 14.1 % (ref 10–15)
ETHANOL SERPL-MCNC: <0.01 G/DL
GFR SERPL CREATININE-BSD FRML MDRD: 80 ML/MIN/{1.73_M2}
GLUCOSE SERPL-MCNC: 114 MG/DL (ref 70–99)
GLUCOSE UR STRIP-MCNC: NEGATIVE MG/DL
HCT VFR BLD AUTO: 33.3 % (ref 35–47)
HGB BLD-MCNC: 11.4 G/DL (ref 11.7–15.7)
HGB UR QL STRIP: ABNORMAL
HYALINE CASTS #/AREA URNS LPF: 5 /LPF (ref 0–2)
IMM GRANULOCYTES # BLD: 0 10E9/L (ref 0–0.4)
IMM GRANULOCYTES NFR BLD: 0.5 %
INTERPRETATION ECG - MUSE: NORMAL
KETONES UR STRIP-MCNC: 10 MG/DL
LEUKOCYTE ESTERASE UR QL STRIP: ABNORMAL
LYMPHOCYTES # BLD AUTO: 1.1 10E9/L (ref 0.8–5.3)
LYMPHOCYTES NFR BLD AUTO: 14.2 %
MCH RBC QN AUTO: 29.8 PG (ref 26.5–33)
MCHC RBC AUTO-ENTMCNC: 34.2 G/DL (ref 31.5–36.5)
MCV RBC AUTO: 87 FL (ref 78–100)
MONOCYTES # BLD AUTO: 0.4 10E9/L (ref 0–1.3)
MONOCYTES NFR BLD AUTO: 5.1 %
MUCOUS THREADS #/AREA URNS LPF: PRESENT /LPF
NEUTROPHILS # BLD AUTO: 6 10E9/L (ref 1.6–8.3)
NEUTROPHILS NFR BLD AUTO: 78.8 %
NITRATE UR QL: NEGATIVE
NRBC # BLD AUTO: 0 10*3/UL
NRBC BLD AUTO-RTO: 0 /100
OPIATES UR QL SCN: NEGATIVE
PCP UR QL SCN: NEGATIVE
PH UR STRIP: 5 PH (ref 5–7)
PLATELET # BLD AUTO: 88 10E9/L (ref 150–450)
POTASSIUM SERPL-SCNC: 4.1 MMOL/L (ref 3.4–5.3)
PROT SERPL-MCNC: 7.9 G/DL (ref 6.8–8.8)
RBC # BLD AUTO: 3.83 10E12/L (ref 3.8–5.2)
RBC #/AREA URNS AUTO: 2 /HPF (ref 0–2)
SALICYLATES SERPL-MCNC: <2 MG/DL
SODIUM SERPL-SCNC: 129 MMOL/L (ref 133–144)
SOURCE: ABNORMAL
SP GR UR STRIP: 1.02 (ref 1–1.03)
SQUAMOUS #/AREA URNS AUTO: 6 /HPF (ref 0–1)
UROBILINOGEN UR STRIP-MCNC: 8 MG/DL (ref 0–2)
WBC # BLD AUTO: 7.6 10E9/L (ref 4–11)
WBC #/AREA URNS AUTO: 1 /HPF (ref 0–5)

## 2020-05-25 PROCEDURE — 80329 ANALGESICS NON-OPIOID 1 OR 2: CPT | Performed by: NURSE PRACTITIONER

## 2020-05-25 PROCEDURE — 96360 HYDRATION IV INFUSION INIT: CPT

## 2020-05-25 PROCEDURE — 70450 CT HEAD/BRAIN W/O DYE: CPT

## 2020-05-25 PROCEDURE — 99285 EMERGENCY DEPT VISIT HI MDM: CPT | Mod: 25

## 2020-05-25 PROCEDURE — 80320 DRUG SCREEN QUANTALCOHOLS: CPT | Performed by: NURSE PRACTITIONER

## 2020-05-25 PROCEDURE — 93005 ELECTROCARDIOGRAM TRACING: CPT

## 2020-05-25 PROCEDURE — 72100 X-RAY EXAM L-S SPINE 2/3 VWS: CPT

## 2020-05-25 PROCEDURE — 25800030 ZZH RX IP 258 OP 636: Performed by: NURSE PRACTITIONER

## 2020-05-25 PROCEDURE — 80053 COMPREHEN METABOLIC PANEL: CPT | Performed by: NURSE PRACTITIONER

## 2020-05-25 PROCEDURE — 81001 URINALYSIS AUTO W/SCOPE: CPT | Mod: XU | Performed by: NURSE PRACTITIONER

## 2020-05-25 PROCEDURE — 85025 COMPLETE CBC W/AUTO DIFF WBC: CPT | Performed by: NURSE PRACTITIONER

## 2020-05-25 PROCEDURE — 82550 ASSAY OF CK (CPK): CPT | Performed by: NURSE PRACTITIONER

## 2020-05-25 PROCEDURE — 80307 DRUG TEST PRSMV CHEM ANLYZR: CPT | Performed by: NURSE PRACTITIONER

## 2020-05-25 PROCEDURE — 72072 X-RAY EXAM THORAC SPINE 3VWS: CPT

## 2020-05-25 RX ORDER — ACETAMINOPHEN 500 MG
1000 TABLET ORAL ONCE
Status: DISCONTINUED | OUTPATIENT
Start: 2020-05-25 | End: 2020-05-25 | Stop reason: HOSPADM

## 2020-05-25 RX ADMIN — SODIUM CHLORIDE 1000 ML: 9 INJECTION, SOLUTION INTRAVENOUS at 16:02

## 2020-05-25 ASSESSMENT — ENCOUNTER SYMPTOMS
FEVER: 0
SHORTNESS OF BREATH: 0
LIGHT-HEADEDNESS: 0
ARTHRALGIAS: 0
COUGH: 0
CONFUSION: 1
ABDOMINAL PAIN: 0
ROS GI COMMENTS: DENIES BOWEL INCONTINENCE
BACK PAIN: 1
DIZZINESS: 0
NECK PAIN: 0

## 2020-05-25 ASSESSMENT — MIFFLIN-ST. JEOR: SCORE: 1571.05

## 2020-05-25 NOTE — ED TRIAGE NOTES
Patient slipped and fell this morning, co back pain. Went to neighbors and they noticed she was confused. Per EMS, pt alert to self only. Did see a lot of beer cans laying around. Patient states she just started drinking when she fell.

## 2020-05-25 NOTE — ED PROVIDER NOTES
History     Chief Complaint:    Fall    The history is provided by the patient. The history is limited by the condition of the patient.      Josiane Dasilva is a 59 year old female with a history of hypertension, alcohol abuse, and cocaine abuse in remission who arrived via EMS and presents after a fall.  The patient states she was coming back to her bed from the restroom and reports slipping on the floor and falling straight backwards onto the hardwood floor in her bedroom this morning after her son had already left for work. She denies hitting her head, losing consciousness, or losing control of her bladder or bowels. She denies feeling lightheaded or dizzy or having chest pain or palpitations bbefore the fall. The patient was unable to get up and stayed on the floor for an unknown amount of time. She states that eventually she was able to get up and call a friend, who was concerned about her. 911 was called as the patient seemed confused. Here in the ED, the patient denies any chest pain, shortness of breath, cough, fevers, hip pain, knee pain, abdominal pain, arm pain, or neck pain. She endorses lower back pain. She has not had any medications for pain alleviation yet. The patient was able to ambulate afterwards without issues.  Per EMS, the patient went to her neighbors after the fall and they called 911. Paramedics stated that she was alert to self only. They also noted seeing a lot of beer cans scattered on the floor. Of note: the patient denies drinking regularly, but does report drinking last night and again after her fall today.     Allergies:  Asa [Aspirin]  Nsaids   Ibuprofen    Medications:    Allegra  Gabapentin  Lisinopril   Oxycodone  Protonix  Miralax  Senokot-S  Tucks    Past Medical History:    Esophageal varices without bleeding  Anemia  Irregular menstrual cycle  Subclinical hypothyroidism  Seasonal allergic rhinitis  Peptic ulcer  Acquired acanthosis nigricans  Closed bimalleolar  "fracture  Alcohol abuse  Anxiety  Ankle pain, chronic  Depression  Fatty liver  GERD  Gout  Hypertension  Neuropathy  Obesity  PTSD  Acanthosis  History of homelessness  Cocaine abuse, in remission    Past Surgical History:   Appendectomy  Colonoscopy  Cystoscopy   EGD, combined  Laparoscopic hysterectomy total, BSO, node dissection, combined  Left ankle s/p bimalleolar fracture  Billroth 1 as teen secondary to ulcer  Tubal ligation    Family History:    Thyroid disease, arthritis - mother  Hypertension, diabetes, kidney disease - father  Thyroid disease - sister    Social History:  Negative for tobacco use - former smoker, quit 2006.  Positive for alcohol use - 1 drink/week.  Negative for drug use.  Marital Status:  Single [1]     Review of Systems   Constitutional: Negative for fever.   Respiratory: Negative for cough and shortness of breath.    Cardiovascular: Negative for chest pain.   Gastrointestinal: Negative for abdominal pain.        Denies bowel incontinence   Genitourinary:        Denies urine incontinence   Musculoskeletal: Positive for back pain. Negative for arthralgias (no hip pain or knee pain), gait problem and neck pain.        Denies arm pain   Neurological: Negative for dizziness, syncope and light-headedness.   Psychiatric/Behavioral: Positive for confusion.   All other systems reviewed and are negative.    Physical Exam     Patient Vitals for the past 24 hrs:   BP Temp Temp src Pulse Resp SpO2 Height Weight   05/25/20 1400 128/69 -- -- 98 -- 100 % -- --   05/25/20 1324 (!) 167/101 98.4  F (36.9  C) Oral 96 18 100 % 1.727 m (5' 8\") 95.3 kg (210 lb)     Physical Exam  General: Alert. Well kept.  HEENT:   Head: No facial asymmetry. No palpable scalp hematomas or bony step offs. No frontal or maxillary facial tenderness.   Eyes: Normal conjunctiva. No scleral icterus. PERRLA. EOMI. No raccoon s eyes.   Ears: Normal pinnae. Normal external auditory canals. Normal tympanic membranes. No hemotympanum " "bilaterally. No Delgado's signs.   Nose: No deformity. No nasal drainage.   Throat: Moist mucous membranes. No evidence for intraoral trauma.   Neck: No midline tenderness over cervical spine or paraspinal musculature. Normal range of motion.   Cardiac: Normal rate and regular rhythm. Normal heart sounds. 2+ radial and DP pulses.   Pulmonary: CTA bilaterally. Normal breath sounds. No wheezing, crackles, or rhonchi appreciated.   Abdomen: Soft, non-tender, non-distended. No rebound or guarding.   Neuro: GCS 15. Alert and oriented to person, situation, and \"memorial day\" but unable to state year or month. Cranial nerves II-XII intact. 5/5 strength equal bilateral upper and lower extremities.  Finger-nose-finger coordinated and equal bilateral. Heel shin smooth and equal bilateral. Visual fields bilateral without deficit.  MUSCULOSKELETAL: Normal gross range of motion of all 4 extremities. Midline tenderness over T3-5 and L2-5 without bruising.   Upper extremities: 5/5 symmetric strength and ROM with shoulder abduction and adduction, elbow flexion and extension, dorsi- and palmar-flexion, , compartments soft.   Lower extremities: 5/5 symmetric strength and ROM with dorsi- and plantar-flexion, knee flexion and extension, hip flexion, hip internal and external rotation, compartments soft.   SKIN: Skin is warm and dry. No rashes, petechiae or pallor. Normal appearance of visualized exposed skin.   PSYCH: Normal affect and mood. Good eye contact.    Emergency Department Course     ECG (14:40:29):  Rate 93 bpm. ND interval 136. QRS duration 86. QT/QTc 422/524. P-R-T axes 69 50 54. Normal sinus rhythm. Nonspecific T wave abnormality. Prolonged QT noted when compared to previous EKG dated 02/24/2019. Interpreted at 1505 by Salina Suarez DNP.    Imaging:  Radiology findings were communicated with the patient who voiced understanding of the findings.    XR  Thoracic Spine, 3 Views:   Mild degenerative changes in the " thoracic spine diffusely. There is no evidence of fracture or paraspinal soft tissue abnormality. Alignment is anatomic. Otherwise unremarkable, as per radiology.     XR  Lumbar Spine, 2-3 Views:   Slight convex right curvature. Alignment is otherwise anatomic. There is no evidence of fracture or pars interarticularis defect. Mild degenerative changes in the lower lumbar discs and facets. Otherwise negative, as per radiology.     CT Head w/o IV contrast:   1. No intracranial bleed or skull fractures are identified.   2. There is diffuse parenchymal volume loss.  White matter changes are present in the cerebral hemispheres that are consistent with small vessel ischemic disease in this age patient..   3. Probable small chronic lacunar type infarct in the region of the head of the right caudate nucleus, as per radiology.    Laboratory:  Laboratory findings were communicated with the patient who voiced understanding of the findings.    CBC: HGB 11.4, PLT 88 L o/w WNL (WBC 7.6)  CMP:  L, Carbon dioxide 19 L, Glucose 114 H, Albumin 2.7 L, Alkphos 229 H o/w WNL (Creatinine 0.80)  CK total: 40  Alcohol level blood: <0.01  Acetaminophen level: <2  Salicylate level: <2    UA: slightly cloudy, orange urine, urinebilin small, ketones 10, blood small, protein albumin 10, urobilinogen 8.0 H, leukocyte esterase small, squamous epithelial 6 H, mucous present, hyalines casts 5 H o/w negative  Drug abuse screen urine: All negative    Interventions:  1602: 0.9% sodium chloride BOLUS 1 L IV    Emergency Department Course:  Past medical records, nursing notes, and vitals reviewed.    1348: I performed an exam of the patient as documented above.     EKG obtained in the ED, see results above.     IV was inserted and blood was drawn for laboratory testing, results above.    The patient provided a urine sample here in the emergency department. This was sent for laboratory testing, findings above.    The patient was sent for a head CT,  a lumbar spine x-ray, and a thoracic spine x-stephanie while in the emergency department, results above.     1540: I spoke with Dr. Aguilar, who agreed to staff the patient with me.    1645: Patient rechecked and updated.    I personally reviewed the laboratory, EKG, and imaging results with the Patient and answered all related questions prior to discharge.    1730  the patient was able to ambulate from chair to bed and changed her clothes independently.    Findings and plan explained to the Patient. Patient discharged home with instructions regarding supportive care, medications, and reasons to return. The importance of close follow-up was reviewed.     Impression & Plan     Medical Decision Making:  Josiane Dasilva is a 59 year old female with history of alcoholism, hypertension, depression, who presents for evaluation after fall.  On initial examination the patient is complaining of mid thoracic and lumbar tenderness but states she otherwise feels well.  She has mild confusion as is unable to state the month and the year but does know the president, her location, the situation regarding her injury.  Head CT today shows no intracerebral bleeding, fracture, acute infarct.  There is a small chronic lacunar type infarct noted on CT.  Lumbar and thoracic spine x-rays are negative for fracture.  EKG shows no ischemia but does show prolonged QT.  Urine is negative for infection.  Urine drug screen negative.  Tylenol and aspirin levels negative.  Blood alcohol also negative.  Secondary to unknown duration on the floor a CK was obtained and was normal.  She has no sign of infectious process with a normal WBC.  She does have a mild thrombocytopenia at 88.  No signs of bleeding.  CMP also shows a sodium of 129.  This was treated with 1 L normal saline in the ED.  She has normal kidney function and a mild change in her alk phos.  She has had no abdominal pain or nausea vomiting.  The rest of her hepatic panel and bilirubin are  normal.  No indication for further work-up today.  She was able to ambulate in the room and get herself dressed prior to discharge.  She is appropriate for outpatient follow-up and was given a taxi ride home.  She will follow-up with primary care in 1 to 2 days.    Diagnosis:  (E87.1) Hyponatremia  (W19.XXXA) Fall, initial encounter  (R94.31) Prolonged Q-T interval on ECG  (D69.6) Thrombocytopenia (H)    Disposition:  Discharged to home.    Scribe Disclosure:  I, Shannon Pugh, am serving as a scribe at 1:50 PM on 5/25/2020 to document services personally performed by Salina Suarez DNP based on my observations and the provider's statements to me.     Shannon Pugh  5/25/2020    EMERGENCY DEPARTMENT       Salina Suarez CNP  05/25/20 [id number]

## 2020-05-25 NOTE — ED AVS SNAPSHOT
Emergency Department  [address] [place] [id number]  Phone:  [phone]  Fax:  [phone]                                    Josiane Dasilva   MRN: [id number]    Department:   Emergency Department   Date of Visit:  5/25/2020           After Visit Summary Signature Page    I have received my discharge instructions, and my questions have been answered. I have discussed any challenges I see with this plan with the nurse or doctor.    ..........................................................................................................................................  Patient/Patient Representative Signature      ..........................................................................................................................................  Patient Representative Print Name and Relationship to Patient    ..................................................               ................................................  Date                                   Time    ..........................................................................................................................................  Reviewed by Signature/Title    ...................................................              ..............................................  Date                                               Time          22EPIC Rev 08/18

## 2020-05-25 NOTE — ED PROVIDER NOTES
Emergency Department Attending Supervision Note  5/25/2020  3:52 PM      I evaluated this patient in conjunction with Salina Suarez, CNP        Briefly, the patient with a history of hypertension, alcohol abuse, and cocaine abuse in remission who arrived via EMS and presents after a fall.  The patient reports slipping and falling straight backwards onto the hardwood floor in her bedroom this morning after her son had already left for work. She denies hitting her head, losing consciousness, or losing control of her bladder or bowels. She denies feeling lightheaded or dizzy before the fall. The patient was unable to get up and stayed on the floor for an unknown amount of time. She states that eventually she was able to get up and call a friend, who was concerned about her. 911 was called as the patient seemed confused.      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  She is confused to date and time.  Work-up was largely unremarkable.  Is able to carry on a fairly lucid and coherent conversation.  Not detecting any signs of stroke.  Patient looks safe for discharge.  She has no complaints.    Results:    ED course:    My impression is         Diagnosis    ICD-10-CM    1. Hyponatremia  E87.1 UA with Microscopic reflex to Culture     Drug abuse screen urine     Acetaminophen level     Acetaminophen level     Salicylate level     Salicylate level     CANCELED: Acetaminophen level     CANCELED: Salicylate level   2. Fall, initial encounter  W19.XXXA    3. Altered mental status  R41.82          Justen Dasilva MD, MD  05/25/20 0034

## 2020-05-25 NOTE — ED NOTES
Bed: ED22  Expected date:   Expected time:   Means of arrival:   Comments:  418  59 F back pain/fell earlier/confusion/etoh  1313

## 2020-05-29 ENCOUNTER — HOSPITAL ENCOUNTER (INPATIENT)
Facility: CLINIC | Age: 60
LOS: 2 days | Discharge: NURSING, REHAB OR LONG-TERM CARE | DRG: 438 | End: 2020-06-02
Attending: EMERGENCY MEDICINE | Admitting: HOSPITALIST
Payer: COMMERCIAL

## 2020-05-29 DIAGNOSIS — I10 HYPERTENSION, UNSPECIFIED TYPE: Primary | Chronic | ICD-10-CM

## 2020-05-29 DIAGNOSIS — D69.6 THROMBOCYTOPENIA (H): ICD-10-CM

## 2020-05-29 DIAGNOSIS — E87.6 HYPOKALEMIA: ICD-10-CM

## 2020-05-29 DIAGNOSIS — R53.1 GENERALIZED WEAKNESS: ICD-10-CM

## 2020-05-29 DIAGNOSIS — F41.9 ANXIETY: Chronic | ICD-10-CM

## 2020-05-29 DIAGNOSIS — K85.90 ACUTE PANCREATITIS, UNSPECIFIED COMPLICATION STATUS, UNSPECIFIED PANCREATITIS TYPE: ICD-10-CM

## 2020-05-29 LAB
ALBUMIN SERPL-MCNC: 2.6 G/DL (ref 3.4–5)
ALP SERPL-CCNC: 206 U/L (ref 40–150)
ALT SERPL W P-5'-P-CCNC: 21 U/L (ref 0–50)
ANION GAP SERPL CALCULATED.3IONS-SCNC: 9 MMOL/L (ref 3–14)
AST SERPL W P-5'-P-CCNC: 35 U/L (ref 0–45)
BASOPHILS # BLD AUTO: 0 10E9/L (ref 0–0.2)
BASOPHILS NFR BLD AUTO: 0.1 %
BILIRUB SERPL-MCNC: 1.8 MG/DL (ref 0.2–1.3)
BUN SERPL-MCNC: 13 MG/DL (ref 7–30)
CALCIUM SERPL-MCNC: 8.7 MG/DL (ref 8.5–10.1)
CHLORIDE SERPL-SCNC: 106 MMOL/L (ref 94–109)
CO2 SERPL-SCNC: 19 MMOL/L (ref 20–32)
CREAT SERPL-MCNC: 0.84 MG/DL (ref 0.52–1.04)
DIFFERENTIAL METHOD BLD: ABNORMAL
EOSINOPHIL # BLD AUTO: 0 10E9/L (ref 0–0.7)
EOSINOPHIL NFR BLD AUTO: 0.6 %
ERYTHROCYTE [DISTWIDTH] IN BLOOD BY AUTOMATED COUNT: 14.4 % (ref 10–15)
ETHANOL SERPL-MCNC: <0.01 G/DL
GFR SERPL CREATININE-BSD FRML MDRD: 76 ML/MIN/{1.73_M2}
GLUCOSE SERPL-MCNC: 136 MG/DL (ref 70–99)
HCT VFR BLD AUTO: 44.1 % (ref 35–47)
HGB BLD-MCNC: 15.2 G/DL (ref 11.7–15.7)
IMM GRANULOCYTES # BLD: 0 10E9/L (ref 0–0.4)
IMM GRANULOCYTES NFR BLD: 0.4 %
LIPASE SERPL-CCNC: 1732 U/L (ref 73–393)
LYMPHOCYTES # BLD AUTO: 0.8 10E9/L (ref 0.8–5.3)
LYMPHOCYTES NFR BLD AUTO: 11.4 %
MCH RBC QN AUTO: 30.3 PG (ref 26.5–33)
MCHC RBC AUTO-ENTMCNC: 34.5 G/DL (ref 31.5–36.5)
MCV RBC AUTO: 88 FL (ref 78–100)
MONOCYTES # BLD AUTO: 0.3 10E9/L (ref 0–1.3)
MONOCYTES NFR BLD AUTO: 4.4 %
NEUTROPHILS # BLD AUTO: 5.9 10E9/L (ref 1.6–8.3)
NEUTROPHILS NFR BLD AUTO: 83.1 %
NRBC # BLD AUTO: 0.1 10*3/UL
NRBC BLD AUTO-RTO: 1 /100
PLATELET # BLD AUTO: 54 10E9/L (ref 150–450)
POTASSIUM SERPL-SCNC: 3.3 MMOL/L (ref 3.4–5.3)
PROT SERPL-MCNC: 7.9 G/DL (ref 6.8–8.8)
RBC # BLD AUTO: 5.02 10E12/L (ref 3.8–5.2)
SODIUM SERPL-SCNC: 134 MMOL/L (ref 133–144)
TROPONIN I SERPL-MCNC: <0.015 UG/L (ref 0–0.04)
WBC # BLD AUTO: 7.1 10E9/L (ref 4–11)

## 2020-05-29 PROCEDURE — 25800030 ZZH RX IP 258 OP 636: Performed by: EMERGENCY MEDICINE

## 2020-05-29 PROCEDURE — 99220 ZZC INITIAL OBSERVATION CARE,LEVL III: CPT | Performed by: HOSPITALIST

## 2020-05-29 PROCEDURE — 83735 ASSAY OF MAGNESIUM: CPT | Performed by: EMERGENCY MEDICINE

## 2020-05-29 PROCEDURE — 83690 ASSAY OF LIPASE: CPT | Performed by: EMERGENCY MEDICINE

## 2020-05-29 PROCEDURE — 85025 COMPLETE CBC W/AUTO DIFF WBC: CPT | Performed by: EMERGENCY MEDICINE

## 2020-05-29 PROCEDURE — 80053 COMPREHEN METABOLIC PANEL: CPT | Performed by: EMERGENCY MEDICINE

## 2020-05-29 PROCEDURE — 93005 ELECTROCARDIOGRAM TRACING: CPT

## 2020-05-29 PROCEDURE — 80320 DRUG SCREEN QUANTALCOHOLS: CPT | Performed by: EMERGENCY MEDICINE

## 2020-05-29 PROCEDURE — 84484 ASSAY OF TROPONIN QUANT: CPT | Performed by: EMERGENCY MEDICINE

## 2020-05-29 PROCEDURE — 25000128 H RX IP 250 OP 636: Performed by: EMERGENCY MEDICINE

## 2020-05-29 PROCEDURE — 96365 THER/PROPH/DIAG IV INF INIT: CPT

## 2020-05-29 PROCEDURE — 99285 EMERGENCY DEPT VISIT HI MDM: CPT | Mod: 25

## 2020-05-29 RX ORDER — POTASSIUM CL/LIDO/0.9 % NACL 10MEQ/0.1L
10 INTRAVENOUS SOLUTION, PIGGYBACK (ML) INTRAVENOUS
Status: DISCONTINUED | OUTPATIENT
Start: 2020-05-29 | End: 2020-05-30

## 2020-05-29 RX ADMIN — Medication 10 MEQ: at 23:56

## 2020-05-29 RX ADMIN — SODIUM CHLORIDE 1000 ML: 9 INJECTION, SOLUTION INTRAVENOUS at 23:56

## 2020-05-29 ASSESSMENT — ENCOUNTER SYMPTOMS
FREQUENCY: 0
DYSURIA: 0
ACTIVITY CHANGE: 1
HEADACHES: 0
BACK PAIN: 0
ABDOMINAL PAIN: 0
FATIGUE: 1
SINUS PRESSURE: 1

## 2020-05-30 ENCOUNTER — APPOINTMENT (OUTPATIENT)
Dept: PHYSICAL THERAPY | Facility: CLINIC | Age: 60
DRG: 438 | End: 2020-05-30
Attending: HOSPITALIST
Payer: COMMERCIAL

## 2020-05-30 LAB
ALBUMIN SERPL-MCNC: 2.5 G/DL (ref 3.4–5)
ALBUMIN UR-MCNC: 10 MG/DL
ALP SERPL-CCNC: 211 U/L (ref 40–150)
ALT SERPL W P-5'-P-CCNC: 20 U/L (ref 0–50)
ANION GAP SERPL CALCULATED.3IONS-SCNC: 8 MMOL/L (ref 3–14)
APPEARANCE UR: ABNORMAL
AST SERPL W P-5'-P-CCNC: 34 U/L (ref 0–45)
BILIRUB SERPL-MCNC: 1.8 MG/DL (ref 0.2–1.3)
BILIRUB UR QL STRIP: ABNORMAL
BUN SERPL-MCNC: 10 MG/DL (ref 7–30)
CALCIUM SERPL-MCNC: 8.5 MG/DL (ref 8.5–10.1)
CHLORIDE SERPL-SCNC: 108 MMOL/L (ref 94–109)
CO2 SERPL-SCNC: 19 MMOL/L (ref 20–32)
COLOR UR AUTO: ABNORMAL
CREAT SERPL-MCNC: 0.73 MG/DL (ref 0.52–1.04)
ERYTHROCYTE [DISTWIDTH] IN BLOOD BY AUTOMATED COUNT: 14.5 % (ref 10–15)
GFR SERPL CREATININE-BSD FRML MDRD: 88 ML/MIN/{1.73_M2}
GLUCOSE SERPL-MCNC: 170 MG/DL (ref 70–99)
GLUCOSE UR STRIP-MCNC: NEGATIVE MG/DL
HCT VFR BLD AUTO: 30.4 % (ref 35–47)
HGB BLD-MCNC: 10.3 G/DL (ref 11.7–15.7)
HGB UR QL STRIP: NEGATIVE
INTERPRETATION ECG - MUSE: NORMAL
KETONES UR STRIP-MCNC: 5 MG/DL
LEUKOCYTE ESTERASE UR QL STRIP: NEGATIVE
LIPASE SERPL-CCNC: 1309 U/L (ref 73–393)
MAGNESIUM SERPL-MCNC: 2.6 MG/DL (ref 1.6–2.3)
MCH RBC QN AUTO: 30 PG (ref 26.5–33)
MCHC RBC AUTO-ENTMCNC: 33.9 G/DL (ref 31.5–36.5)
MCV RBC AUTO: 89 FL (ref 78–100)
MUCOUS THREADS #/AREA URNS LPF: PRESENT /LPF
NITRATE UR QL: NEGATIVE
PH UR STRIP: 5.5 PH (ref 5–7)
PLATELET # BLD AUTO: 74 10E9/L (ref 150–450)
POTASSIUM SERPL-SCNC: 4.1 MMOL/L (ref 3.4–5.3)
PROT SERPL-MCNC: 7.3 G/DL (ref 6.8–8.8)
RBC # BLD AUTO: 3.43 10E12/L (ref 3.8–5.2)
RBC #/AREA URNS AUTO: 0 /HPF (ref 0–2)
SODIUM SERPL-SCNC: 135 MMOL/L (ref 133–144)
SOURCE: ABNORMAL
SP GR UR STRIP: 1.03 (ref 1–1.03)
SQUAMOUS #/AREA URNS AUTO: 1 /HPF (ref 0–1)
UROBILINOGEN UR STRIP-MCNC: 4 MG/DL (ref 0–2)
WBC # BLD AUTO: 10.5 10E9/L (ref 4–11)
WBC #/AREA URNS AUTO: 1 /HPF (ref 0–5)

## 2020-05-30 PROCEDURE — G0378 HOSPITAL OBSERVATION PER HR: HCPCS

## 2020-05-30 PROCEDURE — 25000128 H RX IP 250 OP 636: Performed by: HOSPITALIST

## 2020-05-30 PROCEDURE — 25800030 ZZH RX IP 258 OP 636: Performed by: HOSPITALIST

## 2020-05-30 PROCEDURE — 97162 PT EVAL MOD COMPLEX 30 MIN: CPT | Mod: GP | Performed by: PHYSICAL THERAPIST

## 2020-05-30 PROCEDURE — 85027 COMPLETE CBC AUTOMATED: CPT | Performed by: HOSPITALIST

## 2020-05-30 PROCEDURE — 97530 THERAPEUTIC ACTIVITIES: CPT | Mod: GP | Performed by: PHYSICAL THERAPIST

## 2020-05-30 PROCEDURE — 80053 COMPREHEN METABOLIC PANEL: CPT | Performed by: HOSPITALIST

## 2020-05-30 PROCEDURE — 99222 1ST HOSP IP/OBS MODERATE 55: CPT | Mod: 95 | Performed by: PSYCHIATRY & NEUROLOGY

## 2020-05-30 PROCEDURE — 83690 ASSAY OF LIPASE: CPT | Performed by: HOSPITALIST

## 2020-05-30 PROCEDURE — 81001 URINALYSIS AUTO W/SCOPE: CPT | Performed by: EMERGENCY MEDICINE

## 2020-05-30 PROCEDURE — 99232 SBSQ HOSP IP/OBS MODERATE 35: CPT | Performed by: HOSPITALIST

## 2020-05-30 PROCEDURE — 96375 TX/PRO/DX INJ NEW DRUG ADDON: CPT

## 2020-05-30 PROCEDURE — 36415 COLL VENOUS BLD VENIPUNCTURE: CPT | Performed by: HOSPITALIST

## 2020-05-30 PROCEDURE — 25000132 ZZH RX MED GY IP 250 OP 250 PS 637: Performed by: HOSPITALIST

## 2020-05-30 RX ORDER — POTASSIUM CHLORIDE 29.8 MG/ML
20 INJECTION INTRAVENOUS
Status: DISCONTINUED | OUTPATIENT
Start: 2020-05-30 | End: 2020-06-02 | Stop reason: HOSPADM

## 2020-05-30 RX ORDER — POTASSIUM CHLORIDE 1.5 G/1.58G
20-40 POWDER, FOR SOLUTION ORAL
Status: DISCONTINUED | OUTPATIENT
Start: 2020-05-30 | End: 2020-06-02 | Stop reason: HOSPADM

## 2020-05-30 RX ORDER — ACETAMINOPHEN 650 MG/1
650 SUPPOSITORY RECTAL EVERY 4 HOURS PRN
Status: DISCONTINUED | OUTPATIENT
Start: 2020-05-30 | End: 2020-06-02 | Stop reason: HOSPADM

## 2020-05-30 RX ORDER — PROCHLORPERAZINE 25 MG
25 SUPPOSITORY, RECTAL RECTAL EVERY 12 HOURS PRN
Status: DISCONTINUED | OUTPATIENT
Start: 2020-05-30 | End: 2020-06-02 | Stop reason: HOSPADM

## 2020-05-30 RX ORDER — ONDANSETRON 2 MG/ML
4 INJECTION INTRAMUSCULAR; INTRAVENOUS EVERY 6 HOURS PRN
Status: DISCONTINUED | OUTPATIENT
Start: 2020-05-30 | End: 2020-05-30 | Stop reason: ALTCHOICE

## 2020-05-30 RX ORDER — OXYCODONE HYDROCHLORIDE 5 MG/1
5 TABLET ORAL EVERY 6 HOURS PRN
Status: DISCONTINUED | OUTPATIENT
Start: 2020-05-30 | End: 2020-06-02 | Stop reason: HOSPADM

## 2020-05-30 RX ORDER — POTASSIUM CHLORIDE 1500 MG/1
20-40 TABLET, EXTENDED RELEASE ORAL
Status: DISCONTINUED | OUTPATIENT
Start: 2020-05-30 | End: 2020-06-02 | Stop reason: HOSPADM

## 2020-05-30 RX ORDER — POTASSIUM CL/LIDO/0.9 % NACL 10MEQ/0.1L
10 INTRAVENOUS SOLUTION, PIGGYBACK (ML) INTRAVENOUS
Status: DISCONTINUED | OUTPATIENT
Start: 2020-05-30 | End: 2020-06-02 | Stop reason: HOSPADM

## 2020-05-30 RX ORDER — GABAPENTIN 300 MG/1
300 CAPSULE ORAL AT BEDTIME
Status: DISCONTINUED | OUTPATIENT
Start: 2020-05-30 | End: 2020-06-02 | Stop reason: HOSPADM

## 2020-05-30 RX ORDER — SODIUM CHLORIDE 9 MG/ML
INJECTION, SOLUTION INTRAVENOUS CONTINUOUS
Status: DISCONTINUED | OUTPATIENT
Start: 2020-05-30 | End: 2020-06-02

## 2020-05-30 RX ORDER — LISINOPRIL 10 MG/1
20 TABLET ORAL EVERY MORNING
Status: DISCONTINUED | OUTPATIENT
Start: 2020-05-30 | End: 2020-05-31

## 2020-05-30 RX ORDER — POTASSIUM CHLORIDE 7.45 MG/ML
10 INJECTION INTRAVENOUS
Status: DISCONTINUED | OUTPATIENT
Start: 2020-05-30 | End: 2020-06-02 | Stop reason: HOSPADM

## 2020-05-30 RX ORDER — ONDANSETRON 4 MG/1
4 TABLET, ORALLY DISINTEGRATING ORAL EVERY 6 HOURS PRN
Status: DISCONTINUED | OUTPATIENT
Start: 2020-05-30 | End: 2020-05-30 | Stop reason: ALTCHOICE

## 2020-05-30 RX ORDER — NALOXONE HYDROCHLORIDE 0.4 MG/ML
.1-.4 INJECTION, SOLUTION INTRAMUSCULAR; INTRAVENOUS; SUBCUTANEOUS
Status: DISCONTINUED | OUTPATIENT
Start: 2020-05-30 | End: 2020-06-02 | Stop reason: HOSPADM

## 2020-05-30 RX ORDER — LIDOCAINE 40 MG/G
CREAM TOPICAL
Status: DISCONTINUED | OUTPATIENT
Start: 2020-05-30 | End: 2020-06-02 | Stop reason: HOSPADM

## 2020-05-30 RX ORDER — PROCHLORPERAZINE MALEATE 10 MG
10 TABLET ORAL EVERY 6 HOURS PRN
Status: DISCONTINUED | OUTPATIENT
Start: 2020-05-30 | End: 2020-06-02 | Stop reason: HOSPADM

## 2020-05-30 RX ORDER — ACETAMINOPHEN 325 MG/1
650 TABLET ORAL EVERY 4 HOURS PRN
Status: DISCONTINUED | OUTPATIENT
Start: 2020-05-30 | End: 2020-06-02 | Stop reason: HOSPADM

## 2020-05-30 RX ADMIN — ONDANSETRON 4 MG: 2 INJECTION INTRAMUSCULAR; INTRAVENOUS at 11:04

## 2020-05-30 RX ADMIN — LISINOPRIL 20 MG: 10 TABLET ORAL at 07:48

## 2020-05-30 RX ADMIN — POTASSIUM CHLORIDE 40 MEQ: 1.5 POWDER, FOR SOLUTION ORAL at 04:12

## 2020-05-30 RX ADMIN — PROCHLORPERAZINE MALEATE 10 MG: 10 TABLET ORAL at 20:14

## 2020-05-30 RX ADMIN — SODIUM CHLORIDE: 9 INJECTION, SOLUTION INTRAVENOUS at 22:05

## 2020-05-30 RX ADMIN — POTASSIUM CHLORIDE 20 MEQ: 1.5 POWDER, FOR SOLUTION ORAL at 06:44

## 2020-05-30 RX ADMIN — SODIUM CHLORIDE: 9 INJECTION, SOLUTION INTRAVENOUS at 07:52

## 2020-05-30 RX ADMIN — GABAPENTIN 300 MG: 300 CAPSULE ORAL at 21:58

## 2020-05-30 RX ADMIN — SODIUM CHLORIDE: 9 INJECTION, SOLUTION INTRAVENOUS at 14:39

## 2020-05-30 NOTE — ED NOTES
Bed: ED21  Expected date: 5/29/20  Expected time: 10:12 PM  Means of arrival: Ambulance  Comments:  Jessica M3 59F weakness, confused

## 2020-05-30 NOTE — CONSULTS
Patient seen for initial psychiatric consultation. Refer to dictated note.    Moris Caballero, DO

## 2020-05-30 NOTE — PROGRESS NOTES
[facility]      OUTPATIENT PHYSICAL THERAPY EVALUATION  PLAN OF TREATMENT FOR OUTPATIENT REHABILITATION  (COMPLETE FOR INITIAL CLAIMS ONLY)  Patient's Last Name, First Name, HARMEETI.  YOB: 1960  Josiane Dasilva                        Provider's Name  [facility] Medical Record No.  [id number]                               Onset Date:  05/29/20   Start of Care Date:         Type:     _X_PT   ___OT   ___SLP Medical Diagnosis:                           PT Diagnosis:  difficulty with all mob, limited gait   Visits from SOC:  1   _________________________________________________________________________________  Plan of Treatment/Functional Goals    Planned Interventions:  ,    bed mobility training, gait training, strengthening, transfer training,       Goals: See Physical Therapy Goals on Care Plan in [employer] electronic health record.    Therapy Frequency: 5x/week  Predicted Duration of Therapy Intervention: 3 days  _________________________________________________________________________________    I CERTIFY THE NEED FOR THESE SERVICES FURNISHED UNDER        THIS PLAN OF TREATMENT AND WHILE UNDER MY CARE     (Physician co-signature of this document indicates review and certification of the therapy plan).                 ,      Referring Physician: Spencer Davidson            Initial Assessment        See Physical Therapy evaluation dated   in Epic electronic health record.

## 2020-05-30 NOTE — PROGRESS NOTES
05/30/20 1100   Quick Adds   Type of Visit Initial PT Evaluation   Living Environment   Lives With alone   Living Arrangements apartment   Home Accessibility no concerns;stairs to enter home   Number of Stairs, Main Entrance 10   Stair Railings, Main Entrance railing on right side (ascending)   Transportation Anticipated public transportation   Living Environment Comment son just recently moved in, in transition   Self-Care   Usual Activity Tolerance fair   Current Activity Tolerance poor   Regular Exercise No   Equipment Currently Used at Home walker, rolling   Functional Level Prior   Ambulation 1-->assistive equipment   Transferring 1-->assistive equipment   Toileting 0-->independent   Bathing 0-->independent   Communication 0-->understands/communicates without difficulty   Cognition 1 - attention or memory deficits   Fall history within last six months yes   Number of times patient has fallen within last six months 2   Prior Functional Level Comment uncertain of reialbity of PLOF with some memory impairment   General Information   Onset of Illness/Injury or Date of Surgery - Date 05/29/20   Referring Physician Spencer Davidson   Patient/Family Goals Statement none stated   Pertinent History of Current Problem (include personal factors and/or comorbidities that impact the POC) Josiane Dasilva is a 59 year old female with a history of depression/anxiety, thrombocytopenia, alcohol abuse, hypertension, and neuropathy who presented to the ER today with generalized weakness and altered mental status.  After evaluation in the ER, there was concern for mild pancreatitis and she was admitted to the hospitalist service for further evaluation and management.   Precautions/Limitations fall precautions   Weight-Bearing Status - LUE full weight-bearing   Weight-Bearing Status - RUE full weight-bearing   Weight-Bearing Status - LLE full weight-bearing   Weight-Bearing Status - RLE full weight-bearing   General Observations  pt reporting she just had emesis, only small amount of [place] RN in for [facility]   Cognitive Status Examination   Orientation person   Level of Consciousness alert   Follows Commands and Answers Questions able to follow single-step instructions   Personal Safety and Judgment at risk behaviors demonstrated;impulsive   Memory impaired   Pain Assessment   Patient Currently in Pain Yes, see Vital Sign flowsheet  (not rated, abdomin)   Strength   Strength Comments decreased strength B LE and trunk, moves slowly and with incresased effort, able to sit to stand iwth min A and UE support   Bed Mobility   Bed Mobility Comments bed mob with A for set up, bedding and pillows, use of rail to move supine to sit   Transfer Skills   Transfer Comments sit to stand wtih FWW with cues for safe hand placement, pt does not follow, requesting to use BSC, min A for transfer to commode   Gait   Gait Comments PT gait only 3' x 2 bed to commde with FWW and CGA   Balance   Balance Comments impaired standing balance requires B UE support and CGA for standing dynamic balance, seated balance is good   General Therapy Interventions   Planned Therapy Interventions bed mobility training;gait training;strengthening;transfer training   Clinical Impression   Criteria for Skilled Therapeutic Intervention yes, treatment indicated   PT Diagnosis difficulty with all mob, limited gait   Influenced by the following impairments pain, decreased strength, balance, act layo, anxiety, decreased cog   Functional limitations due to impairments impaired functional mob I and safety   Clinical Presentation Evolving/Changing   Clinical Presentation Rationale 3-5 deficits   Clinical Decision Making (Complexity) Moderate complexity   Therapy Frequency 5x/week   Predicted Duration of Therapy Intervention (days/wks) 3 days   Anticipated Discharge Disposition Transitional Care Facility   Risk & Benefits of therapy have been explained Yes   Patient, Family & other staff in  "agreement with plan of care Yes   [facility] TM \"6 Clicks\"   2016, Trustees of [facility], under license to [employer].  All rights reserved.   6 Clicks Short Forms Basic Mobility Inpatient Short Form   [facility]  \"6 Clicks\" V.2 Basic Mobility Inpatient Short Form   1. Turning from your back to your side while in a flat bed without using bedrails? 3 - A Little   2. Moving from lying on your back to sitting on the side of a flat bed without using bedrails? 3 - A Little   3. Moving to and from a bed to a chair (including a wheelchair)? 3 - A Little   4. Standing up from a chair using your arms (e.g., wheelchair, or bedside chair)? 3 - A Little   5. To walk in hospital room? 3 - A Little   6. Climbing 3-5 steps with a railing? 2 - A Lot   Basic Mobility Raw Score (Score out of 24.Lower scores equate to lower levels of function) 17   Total Evaluation Time   Total Evaluation Time (Minutes) 12     "

## 2020-05-30 NOTE — PROGRESS NOTES
RECEIVING UNIT ED HANDOFF REVIEW    ED Nurse Handoff Report was reviewed by: Niurka Hoffman RN on May 30, 2020 at 12:29 AM

## 2020-05-30 NOTE — PROGRESS NOTES
[facility]    Hospitalist Progress Note      Assessment & Plan   Josiane Dasilva is a 59 year old female was admitted on 5/29/2020 with PMH depression/anxiety, thrombocytopenia, alcohol abuse, hypertension, and neuropathy who presented to the ER today with generalized weakness and altered mental status.  After evaluation in the ER, there was concern for mild pancreatitis    Pancreatitis  Elevated bilirubin  - Denies abdominal pain. Appetite is poor.  - Lipase elevated at 1,732 in the ER.  - Bilirubin slightly elevated at 1.8. AST and ALT within normal limits.  - Clear liquid diet.  - IV fluids.  - If bilirubin/AST/ALT trend up, or no improvement of symptoms consider abdominal imaging.     Depression/anxiety  - Prior history of depression/anxiety. It appears she saw psychiatry/psychology in the past and has previously been on medication for this.  - Currently, she does not take any medications for anxiety/depression.  - She appears depressed, but denies feeling depressed. Does talk about a lot of stress in her life due to family issues, deaths in the family, not working, etc. Denies suicidal ideation.  - Consult Psych     Hypokalemia  - Replace and recheck per protocol.  - Magnesium WNL .     Thrombocytopenia  - Likely secondary to alcohol use.  - No obvious bleeding issues.  - Monitor     Alcohol abuse  - States she started cutting back on her alcohol use a couple weeks ago and has not had any alcohol since she was evaluated in the ER on 5/25.  - Blood alcohol level was < 0.01 in the ER.  - Seems to be at low risk of withdrawal at this point.  - Consult psychiatry as noted above.     Falls  - Fell on 5/25/20, denies any injuries from the fall. She was evaluated in the ER.  - CT head on 5/25 showed no acute intracranial bleeding or fractures.  - X-rays of lumbar and thoracic spine on 5/25 showed no acute fractures.  - She also fell again yesterday. She denies any pain.  - Consult PT regarding  "recurrent falls.     Malnutrition  - Decreased appetite and poor oral intake.  - Albumin 2.6.  - Consult nutrition.     Hypertension  - Blood pressure slightly elevated in the ER.  - She ran out of blood pressure medications 2-3 days ago.  - Will resume PTA lisinopril.  Monitor.      Neuropathy  - Continue PTA gabapentin 300 mg at bedtime    DVT Prophylaxis: Low Risk/Ambulatory with no VTE prophylaxis indicated  Code Status: Full Code  Expected discharge: 1-2 days pending improvement in symptoms, ability to take po's;  consultant evaluation, and therapy evaluation  Mireille Chaudhari MD  Text Page (7am - 6pm, M-F)    Interval History   Reports continued poor appetite, denies any further nausea.  No emesis.  No abdominal pain.  UA negative, afebrile.  On discussion regarding depression/anxiety/blues patient states she is \"stressed\" primarily worries regarding her family.  No suicidal ideation.  Cannot recall the nature of recent falls, see above, ED visits 5/25; reports no sequelae or residual i.e. no pain.    SH: No tobacco.  Lives in Apt with Son.  Retired NA      ROS: Complete ROS negative except as above.     -Data reviewed today: I reviewed all new labs and imaging results over the last 24 hours. I personally reviewed the EKG tracing showing sinus; no acute changes; long QT.  Discontinue PRN zofran [ has PRN compazine]    Physical Exam   Temp: 97.5  F (36.4  C) Temp src: Oral BP: (!) 146/97 Pulse: 84 Heart Rate: 84 Resp: 16 SpO2: 99 % O2 Device: None (Room air)    There were no vitals filed for this visit.  Vital Signs with Ranges  Temp:  [97.5  F (36.4  C)] 97.5  F (36.4  C)  Pulse:  [74-85] 84  Heart Rate:  [78-85] 84  Resp:  [16-18] 16  BP: (134-146)/(74-97) 146/97  SpO2:  [99 %-100 %] 99 %  No intake/output data recorded.    Constitutional:  NAD, alert, calm, cooperative.  Eyes:  PERRL, no conjunctivitis.  HEENT; head atraumatic.  Respiratory:  Clear, no rales or wheeze.  Respirations " nonlabored.  Cardiovascular:  Regular, no murmur appreciated.  GI:  Soft, nondistended, nontender.  No rebound, guarding or other peritoneal signs.  Skin: warm, dry.    Musculoskeletal: no lower extremity pitting edema present.  No active synovitis.  Neurologic:  Gross CN and motor strength testing intact, nonfocal.  Psychiatric:  Affect, narrow range.    Medications     sodium chloride 125 mL/hr at 05/30/20 0752       gabapentin  300 mg Oral At Bedtime     lisinopril  20 mg Oral QAM     sodium chloride (PF)  3 mL Intracatheter Q8H       Data   Recent Labs   Lab 05/30/20  0839 05/29/20  2233 05/25/20  1406   WBC 10.5 7.1 7.6   HGB 10.3* 15.2 11.4*   MCV 89 88 87   PLT 74* 54* 88*    134 129*   POTASSIUM 4.1 3.3* 4.1   CHLORIDE 108 106 102   CO2 19* 19* 19*   BUN 10 13 8   CR 0.73 0.84 0.80   ANIONGAP 8 9 8   JESSICA 8.5 8.7 9.1   * 136* 114*   ALBUMIN 2.5* 2.6* 2.7*   PROTTOTAL 7.3 7.9 7.9   BILITOTAL 1.8* 1.8* 1.3   ALKPHOS 211* 206* 229*   ALT 20 21 21   AST 34 35 44   LIPASE 1,309* 1,732*  --    TROPI  --  <0.015  --        No results found for this or any previous visit (from the past 24 hour(s)).      Discussed with Nursing and Patient today

## 2020-05-30 NOTE — PLAN OF CARE
DATE & TIME: 5/30/2020 7153-7584  Cognitive Concerns/ Orientation : Oriented to person and time. Confused.    BEHAVIOR & AGGRESSION TOOL COLOR: Green  CIWA SCORE: NA   ABNL VS/O2: /77. VSS on RA.   MOBILITY: Up with 2 to bedside commode.   PAIN MANAGMENT: Denies  DIET: Clear liquid diet. Requesting solid food.   BOWEL/BLADDER: Continent.   ABNL LAB/BG: Potassium 3.3. Recheck scheduled for 1045. Magnesium 2.6  DRAIN/DEVICES: PIV infusing NS at 125  TELEMETRY RHYTHM: NA  SKIN: CDI. Cracked heels and scarring.   TESTS/PROCEDURES: NA  D/C DAY/GOALS/PLACE: Pending improvement of mobility and confusion.   OTHER IMPORTANT INFO: Pt if confused and needs reorientation often, States she is sad and stressed and thinks this is why she is sick

## 2020-05-30 NOTE — ED PROVIDER NOTES
History     Chief Complaint:  Generalized Weakness and Altered Mental Status    The history is provided by the EMS personnel, the patient and medical records.      Josiane Dasilva is a 59 year old female who presents to the emergency department today via EMS for evaluation of altered mental status and generalized weakness. Per chart review, the patient was seen in the ED on 05/25 after slipping and falling on the floor. There was no loss of consciousness or injury to her head though she was unable to get up and stayed on the floor for an unknown amount of time until a friend called EMS. A broad workup was undertaken including imaging and lab work as noted below. She was noted to by hyponatremic at that time and was given one liter of saline prior to discharge to home.    EMS reports that the neighbor has been checking on her but today they noticed that she was appearing more weak and lethargic yesterday. The patient reports that he fell on May 25th and again yesterday onto her back. She states she is sore though there is no chest pain, headache, back pain, or abdominal pain. She denies dysuria or frequency of urination. She reports sinus pressure. She does report drinking beer though not recently. She reports that she is depressed with what is going on in the news.      CT Head 05/25/2020  IMPRESSION:   1. No intracranial bleed or skull fractures are identified.  2. There is diffuse parenchymal volume loss.  White matter changes are  present in the cerebral hemispheres that are consistent with small  vessel ischemic disease in this age patient..  3. Probable small chronic lacunar type infarct in the region of the  head of the right caudate nucleus.    XR Lumbar Spine 05/25/2020  Impression: Slight convex right curvature. Alignment is otherwise  anatomic. There is no evidence of fracture or pars interarticularis  defect. Mild degenerative changes in the lower lumbar discs and  facets. Otherwise negative.    XR  Thoracic Spine 05/25/2020  Impression: Mild degenerative changes in the thoracic spine diffusely.  There is no evidence of fracture or paraspinal soft tissue  abnormality. Alignment is anatomic. Otherwise unremarkable.    ED Visit 05/25/2020  CBC: HGB 11.4, PLT 88 L o/w WNL (WBC 7.6)  CMP:  L, Carbon dioxide 19 L, Glucose 114 H, Albumin 2.7 L, Alkphos 229 H o/w WNL (Creatinine 0.80)  CK total: 40  Alcohol level blood: <0.01  Acetaminophen level: <2  Salicylate level: <2     UA: slightly cloudy, orange urine, urinebilin small, ketones 10, blood small, protein albumin 10, urobilinogen 8.0 H, leukocyte esterase small, squamous epithelial 6 H, mucous present, hyalines casts 5 H o/w negative  Drug abuse screen urine: All negative    Allergies:  Aspirin  Nsaids     Medications:    Neurontin  Lisinopril  Protonix  Miralax  Senokot    Past Medical History:    Alcohol abuse  Anxiety  Depressive disorder  Fatty liver  Gastroesophageal reflux disease  Gout  Hypertension  Neuropathy  Obesity  Postmenopausal bleeding  Subclinical hypothyroidism  Seasonal allergic rhinitis  Chronic ankle pain  Irregular menstrual cycle  Slow transit constipation  Stress incontinence of urine  Anemia  Esophageal varices  Acquired acanthosis nigricans  Closed bimalleolar fracture  Peptic ulcer  Post traumatic stress disorder  Cocaine abuse    Past Surgical History:    Appendectomy  Colonoscopy  Cystoscopy  EGD  Laparoscopic hysterectomy total, bilateral salpingo-oophorectomy, node dissection, combined  Left ankle fracture repair  Billroth I as teenager  Tubal ligation    Family History:    Mother: thyroid disease, arthritis  Father: hypertension, diabetes, kidney disease  Sister: thyroid disease    Social History:  Smoking Status: Former Smoker   Years: 10  Smokeless Tobacco: Never Used  Alcohol Use: Positive  Drug Use: Negative  Marital Status:  Single      Review of Systems   Constitutional: Positive for activity change and fatigue.   HENT:  Positive for sinus pressure.    Cardiovascular: Negative for chest pain.   Gastrointestinal: Negative for abdominal pain.   Genitourinary: Negative for dysuria and frequency.   Musculoskeletal: Negative for back pain.   Neurological: Negative for headaches.   All other systems reviewed and are negative.    Physical Exam     Patient Vitals for the past 24 hrs:   BP Temp Temp src Pulse Heart Rate Resp SpO2   05/29/20 2315 135/75 -- -- -- -- -- 100 %   05/29/20 2226 139/80 97.5  F (36.4  C) Oral 85 85 16 99 %     Physical Exam  General:  Sitting on bed  HENT:  No obvious trauma to head  Right Ear:  External ear normal.   Left Ear:  External ear normal.   Nose:  Nose normal.   Eyes:  Conjunctivae and EOM are normal. Pupils are equal, round, and reactive.   Neck: Normal range of motion. Neck supple. No tracheal deviation present.   CV:  Normal heart sounds. No murmur heard.  Pulm/Chest: Effort normal and breath sounds normal.   Abd: Soft. No distension. There is no tenderness. There is no rigidity, no rebound and no guarding.   M/S: Normal range of motion.   Neuro: Alert. GCS 15. CN II-XII Grossly intact, no pronator drift, normal finger-nose-finger, visual fields intact by confrontation. Muscle strength is +5 proximal and distal in the bilateral upper and lower extremities. No dysarthria. Normal palm up, palm down.  Skin: Skin is warm and dry. No rash noted. Not diaphoretic.   Psych: Normal mood and affect. Behavior is normal.     Emergency Department Course     ECG:  ECG taken at 2239, ECG read at 2250  Normal sinus rhythm  Nonspecific T wave abnormality   Prolonged QT  Abnormal ECG  QT improved compared to prior EKG dated 5/25/2020  Rate 81 bpm. SC interval 128 ms. QRS duration 86 ms. QT/QTc 438/508 ms. P-R-T axes 55 19 40.    Laboratory:  Laboratory findings were communicated with the patient who voiced understanding of the findings.    CBC: WBC 7.1, HGB 15.2, PLT 54 (L)  CMP: Potassium 3.3 (L), Carbon Dioxide 19  (L), Glucose 136 (H), Bilirubin Total 1.8 (H), Albumin 2.6 (L), Alkphos 206 (H) o/w WNL (Creatinine 0.84)  Lipase: 1732 (H)  Troponin (Collected: 2233): <0.015   Alcohol Ethyl: <0.1    UA with Microscopic: pending    Interventions:  2356 Potassium Chloride 10 mEq with lidocaine IV  2356 NS 1,000 ml IV    Emergency Department Course:    2233 IV was inserted and blood was drawn for laboratory testing, results above.    2239 EKG obtained as noted above.    2300  Nursing notes and vitals reviewed. I performed an exam of the patient as documented above.     2340 I spoke with Dr. Davidson of the hosptialist service from [facility] regarding patient's presentation, findings, and plan of care.    Findings and plan explained to the patient who consents to admission. Discussed the patient with Dr. Davidson, who will admit the patient to an observation bed for further monitoring, evaluation, and treatment.    Impression & Plan      Medical Decision Making:  Josiane Dasilva is a very pleasant 59 year old female who presents to the emergency department today for evaluation of generalized weakness.  The patient was seen here on May 25 after a fall.  She admits to drinking alcohol on a regular basis.  The patient has a neighbor who is checked on her daily and felt that the patient is more weak.  The patient admits she has not gotten out of bed recently.  The patient has very slight epigastric pain but no focal tenderness on exam.  She has a benign, nonsurgical abdomen.  There has been no new falls.  Screening EKG and troponin are both negative.  I doubt this is cardiac.  The patient's liver enzymes are slightly elevated with a total bilirubin of 1.8 and her lipase is elevated as well concerning for pancreatitis.  This is more than likely alcohol induced pancreatitis and being that she has a nonsurgical abdomen, I do not believe an ultrasound is indicated at this time.  The patient has incidental findings of thrombocytopenia and  hypokalemia as well.  She has had these before.  The patient will be admitted to the hospital.  I spoke to Dr. Davidson, who has agreed to admit the patient for continued evaluation and treatment.    Diagnosis:    ICD-10-CM    1. Generalized weakness  R53.1    2. Acute pancreatitis, unspecified complication status, unspecified pancreatitis type  K85.90    3. Thrombocytopenia (H)  D69.6    4. Hypokalemia  E87.6      Disposition:   The patient is admitted into the care of Dr. Davidson.    Scribe Disclosure:  I, Dilcia Mix, am serving as a scribe at 10:32 PM on 5/29/2020 to document services personally performed by Ravi Bingham DO based on my observations and the provider's statements to me.    I, Sunita Norman, am serving as a scribe at 11:13 PM on 5/29/2020 to document services personally performed by Ravi Bingham DO based on my observations and the provider's statements to me.   EMERGENCY DEPARTMENT       Ravi Bingham DO  05/30/20 0001

## 2020-05-30 NOTE — ED NOTES
[facility]  ED Nurse Handoff Report    ED Chief complaint: Generalized Weakness and Altered Mental Status      ED Diagnosis:   Final diagnoses:   Generalized weakness   Acute pancreatitis, unspecified complication status, unspecified pancreatitis type   Thrombocytopenia (H)   Hypokalemia       Code Status: Full Code    Allergies:   Allergies   Allergen Reactions     Asa [Aspirin]      Stomach irritation (hx of PUD)     Nsaids      Stomach irritation (Hx of PUD)       Patient Story: .  Focused Assessment:  Patient here due to a fall. She is alert  But has confusion to date and time.she also c/o weakness.  She has hx of Etoh and cocaine abuse also HTN    Treatments and/or interventions provided: she was given potassium IVP for K of 3.3 and NS 1000  Patient's response to treatments and/or interventions: still infusing    To be done/followed up on inpatient unit:  .    Does this patient have any cognitive concerns?: Disoriented to time and Disoriented to place    Activity level - Baseline/Home:  Independent  Activity Level - Current:   Independent    Patient's Preferred language: English   Needed?: No    Isolation: None  Infection: Not Applicable  Bariatric?: No    Vital Signs:   Vitals:    05/29/20 2226 05/29/20 2315   BP: 139/80 135/75   Pulse: 85    Resp: 16    Temp: 97.5  F (36.4  C)    TempSrc: Oral    SpO2: 99% 100%       Cardiac Rhythm:     Was the PSS-3 completed:   Yes  What interventions are required if any?  none             Family Comments: patient lives with her son  OBS brochure/video discussed/provided to patient/family: Yes              Name of person given brochure if not patient: .              Relationship to patient: .    For the majority of the shift this patient's behavior was Green.   Behavioral interventions performed were none.    ED NURSE PHONE NUMBER: [phone]

## 2020-05-30 NOTE — CONSULTS
"[facility]    Psychiatry Consult     DATE OF ADMISSION:  5/29/2020  REQUESTING PROVIDER: Spencer Davidson MD    HPI:  Josiane Dasilva is a 59 year old female with a psychiatric history of alcohol use disorder, depression, and anxiety that presented to the hospital by way of EMS after feeling weak and fatigue in the context of having presented to the ED by way of EMS on 5/25/20 upon which she sustained a fall and was unable to get herself back up after having made efforts to cut back on her drinking the preceding two weeks. She was evaluated and discharged from the ED, however persistent complaints of fatigue, lethargy, and weakness inspired her return to the hospital. Investigation thus far has revealed pancreatitis, hypokalemia, HTN, malnutrition, and thrombocytopenia. With respect to her psychiatric diagnoses, she denied subjective feeling of depression of SI, though agreed to a variety of life stressors to include family issues, deaths in her family, and unemployment. She is not on any medication for mental health. Last interface with our consult service was in 2014, also for issues related to alcoholism.    Patient tells me \"I wasn't feeling good at home\" which led to her coming to the hospital. She agrees to consistent drinking \"ever since I was young\" but tells me \"it's like every blue moon now, I just drink beer, I used to drink hard alcohol when I was younger.\" She tells me she'll drink \"a couple 40s when I get urge.\" However pinning her down on specific drinking patterns was challenging. She ultimately settled on estimates of drinking once or twice a week, though couldn't provide much clarity on the discrepancy in endorsements made of making effrots to reduce her drinking a couple weeks ago. We went round on round on this to no avail.  She initially tells me she doesn't feel she needs help with her drinking, just laments stressors related to her family. She expresses concerns about her sons, " "\"they're young and stupid.\" Both are adults however. \"All I can do is pray for them.\"     Patient reports she lives alone and independently. She denies any issues with respect to caring for herself. She cooks and cleans. She denies drinking and driving, and notes she doesn't own a car. She affirms past dx of depression and anxiety, but at present \"I try not to let stuff get on my nerves.\" Finds she's been able to maintain reasonable sleep though notes her appetite has been down of late which may have relation to her pancreatitis.     After our discussion I inquired about her willingness to get some help with her drinking. She tells me \"I was trying to do something, but I guess I just gave up.\" Clarity on what she was trying to do wasn't achieved, but she does agree to meeting with a CD counselor for some potential referrals to assist in sobriety.        PAST MEDICAL HISTORY:  Past Medical History:   Diagnosis Date     Alcohol abuse      Anxiety      Depressive disorder      Fatty liver      GERD (gastroesophageal reflux disease)      Gout      Hypertension      Neuropathy      Obesity      Post-menopausal bleeding        FAMILY HISTORY:  Family History   Problem Relation Age of Onset     Thyroid Disease Mother      Hypertension Father      Thyroid Disease Sister        SOCIAL HISTORY:  Social History     Socioeconomic History     Marital status: Single     Spouse name: None     Number of children: None     Years of education: None     Highest education level: None   Occupational History     None   Social Needs     Financial resource strain: None     Food insecurity     Worry: None     Inability: None     Transportation needs     Medical: None     Non-medical: None   Tobacco Use     Smoking status: Former Smoker     Packs/day: 0.00     Years: 10.00     Pack years: 0.00     Last attempt to quit: 1/1/2005     Years since quitting: 15.4     Smokeless tobacco: Never Used   Substance and Sexual Activity     Alcohol use: " Yes     Alcohol/week: 1.0 standard drinks     Types: 1 Standard drinks or equivalent per week     Comment: 1 drink weekly/monthly     Drug use: No     Sexual activity: Never     Partners: Male   Lifestyle     Physical activity     Days per week: None     Minutes per session: None     Stress: None   Relationships     Social connections     Talks on phone: None     Gets together: None     Attends [employer]ism service: None     Active member of club or organization: None     Attends meetings of clubs or organizations: None     Relationship status: None     Intimate partner violence     Fear of current or ex partner: None     Emotionally abused: None     Physically abused: None     Forced sexual activity: None   Other Topics Concern     Parent/sibling w/ CABG, MI or angioplasty before 65F 55M? Not Asked   Social History Narrative     None       REVIEW OF SYSTEMS:  10 point review of systems completed and negative else reported in the HPI.    ALLERGIES:  Allergies   Allergen Reactions     Asa [Aspirin]      Stomach irritation (hx of PUD)     Nsaids      Stomach irritation (Hx of PUD)       PRIOR TO ADMISSION MEDICATIONS:  Prior to Admission Medications   Prescriptions Last Dose Informant Patient Reported? Taking?   acetaminophen (TYLENOL) 325 MG tablet   No No   Sig: Take 1-2 tablets (325-650 mg) by mouth every 4 hours as needed for mild pain   fexofenadine (ALLEGRA) 180 MG tablet  Self No No   Sig: Take 1 tablet (180 mg) by mouth daily   Patient taking differently: Take 180 mg by mouth daily as needed    gabapentin (NEURONTIN) 300 MG capsule  Self No No   Sig: Take 1 capsule (300 mg) by mouth At Bedtime   ibuprofen (ADVIL/MOTRIN) 200 MG tablet   No No   Sig: Take 1-2 tablets (200-400 mg) by mouth every 6 hours as needed for moderate pain   lisinopril (PRINIVIL/ZESTRIL) 20 MG tablet   No No   Sig: Take 1 tablet (20 mg) by mouth daily   Patient taking differently: Take 20 mg by mouth every morning    multivitamin w/minerals  (THERA-VIT-M) tablet   No No   Sig: Take 1 tablet by mouth daily   Patient taking differently: Take 1 tablet by mouth daily On hold for surgery   order for DME   No No   Sig: Equipment being ordered: Standard cane   order for DME   No No   Sig: Equipment being ordered: Incontinence pads   oxyCODONE (ROXICODONE) 5 MG tablet   No No   Sig: Take 1 tablet (5 mg) by mouth every 6 hours as needed (pain control or improvement in physical function.  Hold dose for analgesics side effects.)   pantoprazole (PROTONIX) 40 MG EC tablet   No No   Sig: Take 1 tablet (40 mg) by mouth every morning (before breakfast)   Patient not taking: Reported on 3/27/2019   polyethylene glycol (MIRALAX) powder  Self No No   Sig: Take 17 g (1 capful) by mouth daily as needed for constipation   senna-docusate (SENOKOT-S/PERICOLACE) 8.6-50 MG tablet   No No   Sig: Take 1 tablet by mouth 2 times daily as needed for constipation   witch hazel-glycerin (TUCKS) pad   No No   Sig: Place rectally daily as needed for hemorrhoids   Patient taking differently: Place rectally daily as needed for hemorrhoids Hasn't pick these up yet.      Facility-Administered Medications: None       LABS/VITALS:  Recent Results (from the past 24 hour(s))   CBC with platelets differential    Collection Time: 05/29/20 10:33 PM   Result Value Ref Range    WBC 7.1 4.0 - 11.0 10e9/L    RBC Count 5.02 3.8 - 5.2 10e12/L    Hemoglobin 15.2 11.7 - 15.7 g/dL    Hematocrit 44.1 35.0 - 47.0 %    MCV 88 78 - 100 fl    MCH 30.3 26.5 - 33.0 pg    MCHC 34.5 31.5 - 36.5 g/dL    RDW 14.4 10.0 - 15.0 %    Platelet Count 54 (L) 150 - 450 10e9/L    Diff Method Automated Method     % Neutrophils 83.1 %    % Lymphocytes 11.4 %    % Monocytes 4.4 %    % Eosinophils 0.6 %    % Basophils 0.1 %    % Immature Granulocytes 0.4 %    Nucleated RBCs 1 (H) 0 /100    Absolute Neutrophil 5.9 1.6 - 8.3 10e9/L    Absolute Lymphocytes 0.8 0.8 - 5.3 10e9/L    Absolute Monocytes 0.3 0.0 - 1.3 10e9/L    Absolute  Eosinophils 0.0 0.0 - 0.7 10e9/L    Absolute Basophils 0.0 0.0 - 0.2 10e9/L    Abs Immature Granulocytes 0.0 0 - 0.4 10e9/L    Absolute Nucleated RBC 0.1    Comprehensive metabolic panel    Collection Time: 05/29/20 10:33 PM   Result Value Ref Range    Sodium 134 133 - 144 mmol/L    Potassium 3.3 (L) 3.4 - 5.3 mmol/L    Chloride 106 94 - 109 mmol/L    Carbon Dioxide 19 (L) 20 - 32 mmol/L    Anion Gap 9 3 - 14 mmol/L    Glucose 136 (H) 70 - 99 mg/dL    Urea Nitrogen 13 7 - 30 mg/dL    Creatinine 0.84 0.52 - 1.04 mg/dL    GFR Estimate 76 >60 mL/min/[1.73_m2]    GFR Estimate If Black 88 >60 mL/min/[1.73_m2]    Calcium 8.7 8.5 - 10.1 mg/dL    Bilirubin Total 1.8 (H) 0.2 - 1.3 mg/dL    Albumin 2.6 (L) 3.4 - 5.0 g/dL    Protein Total 7.9 6.8 - 8.8 g/dL    Alkaline Phosphatase 206 (H) 40 - 150 U/L    ALT 21 0 - 50 U/L    AST 35 0 - 45 U/L   Lipase    Collection Time: 05/29/20 10:33 PM   Result Value Ref Range    Lipase 1,732 (H) 73 - 393 U/L   Troponin I    Collection Time: 05/29/20 10:33 PM   Result Value Ref Range    Troponin I ES <0.015 0.000 - 0.045 ug/L   Alcohol ethyl    Collection Time: 05/29/20 10:33 PM   Result Value Ref Range    Ethanol g/dL <0.01 <0.01 g/dL   Magnesium    Collection Time: 05/29/20 10:33 PM   Result Value Ref Range    Magnesium 2.6 (H) 1.6 - 2.3 mg/dL   EKG 12-lead, tracing only    Collection Time: 05/29/20 10:39 PM   Result Value Ref Range    Interpretation ECG Click View Image link to view waveform and result    UA with Microscopic    Collection Time: 05/30/20 12:18 AM   Result Value Ref Range    Color Urine Light Brown     Appearance Urine Slightly Cloudy     Glucose Urine Negative NEG^Negative mg/dL    Bilirubin Urine Small (A) NEG^Negative    Ketones Urine 5 (A) NEG^Negative mg/dL    Specific Gravity Urine 1.027 1.003 - 1.035    Blood Urine Negative NEG^Negative    pH Urine 5.5 5.0 - 7.0 pH    Protein Albumin Urine 10 (A) NEG^Negative mg/dL    Urobilinogen mg/dL 4.0 (H) 0.0 - 2.0 mg/dL     Nitrite Urine Negative NEG^Negative    Leukocyte Esterase Urine Negative NEG^Negative    Source Midstream Urine     WBC Urine 1 0 - 5 /HPF    RBC Urine 0 0 - 2 /HPF    Squamous Epithelial /HPF Urine 1 0 - 1 /HPF    Mucous Urine Present (A) NEG^Negative /LPF   Comprehensive metabolic panel    Collection Time: 05/30/20  8:39 AM   Result Value Ref Range    Sodium 135 133 - 144 mmol/L    Potassium 4.1 3.4 - 5.3 mmol/L    Chloride 108 94 - 109 mmol/L    Carbon Dioxide 19 (L) 20 - 32 mmol/L    Anion Gap 8 3 - 14 mmol/L    Glucose 170 (H) 70 - 99 mg/dL    Urea Nitrogen 10 7 - 30 mg/dL    Creatinine 0.73 0.52 - 1.04 mg/dL    GFR Estimate 88 >60 mL/min/[1.73_m2]    GFR Estimate If Black 102 >60 mL/min/[1.73_m2]    Calcium 8.5 8.5 - 10.1 mg/dL    Bilirubin Total 1.8 (H) 0.2 - 1.3 mg/dL    Albumin 2.5 (L) 3.4 - 5.0 g/dL    Protein Total 7.3 6.8 - 8.8 g/dL    Alkaline Phosphatase 211 (H) 40 - 150 U/L    ALT 20 0 - 50 U/L    AST 34 0 - 45 U/L   CBC with platelets    Collection Time: 05/30/20  8:39 AM   Result Value Ref Range    WBC 10.5 4.0 - 11.0 10e9/L    RBC Count 3.43 (L) 3.8 - 5.2 10e12/L    Hemoglobin 10.3 (L) 11.7 - 15.7 g/dL    Hematocrit 30.4 (L) 35.0 - 47.0 %    MCV 89 78 - 100 fl    MCH 30.0 26.5 - 33.0 pg    MCHC 33.9 31.5 - 36.5 g/dL    RDW 14.5 10.0 - 15.0 %    Platelet Count 74 (L) 150 - 450 10e9/L   Lipase    Collection Time: 05/30/20  8:39 AM   Result Value Ref Range    Lipase 1,309 (H) 73 - 393 U/L     B/P: 146/97, T: 97.5, P: 84, R: 16    MENTAL STATUS EXAM:  Appearance:  awake, alert  Eye Contact:  good  Speech:  clear, coherent  Language:Normal  Psychomotor Behavior:  no evidence of tardive dyskinesia, dystonia, or tics  Mood:  good  Affect:  appropriate and in normal range  Thought Process:  logical, linear and goal oriented no loose associations  Thought Content:  no evidence of suicidal ideation or homicidal ideation  Oriented to:  time, person, and place  Attention Span and Concentration:  intact  Recent  and Remote Memory:  fair  Fund of Knowledge: low-normal  Insight:  fair  Judgment:  intact    DIAGNOSES:  1. Alcohol use disorder  2. history of Depression  3. history of Anxiety    RECOMMENDATIONS:  Patient doesn't demonstrate a great deal of insight into her alcohol use and getting clarity on her current use patterns was a bit elusive, however she ultimately expresses openness and willingness to speak with a CD counselor for potential treatment referral considerations. I would defer to her judgement on whether she follows up on those recommendations as there is no indication to pursue commitment or more invasive measures. I don't see indication for initiation of antidepressant treatment as she is denying any active psychiatric pathology with respect to mood or anxiety, instead confining some of her stress to her family and sons and the paths they're taking in life, but she finds solace in brigitte and hasn't felt overly burdened by it all.     Attestation:  Patient has been seen and evaluated by me,  Moris Caballero, DO     Visit/Communication Style   VIRTUAL (VIDEO) communication was used to evaluate Josiane due to the COVID pandemic.    Josiane consented to the use of video communication: yes  Video START time: 1:19 pm, 5/30/2020  Video STOP time: 1:33 pm, 5/30/2020   Patient's location: [facility]   Provider's location during the visit: Home

## 2020-05-30 NOTE — PLAN OF CARE
Alert to self and place, forgetful about situation. Abdominal discomfort, distention. Nausea. Diarrhea, very small amounts x2. Generalized weakness, Up w/1 walker and GB to bedside commode. NS at 125ml/hr infusing. Psych consult completed today, no new orders.

## 2020-05-30 NOTE — ED TRIAGE NOTES
Pt was at [facility] on 5/25 for a fall, injuring her back. Neighbor has been checking on her and noticed that today she has been confused, weak and lethargic. Pt has not been eating or drinking well this past week as she has been in bed most of the week.

## 2020-05-30 NOTE — PLAN OF CARE
Discharge Planner PT   Patient plan for discharge: agreeable to TCU  Current status: PT eval completed, pt admitted after fall and AMS.  Pt reports she lives alone in an apt with 1 flight of stairs to enter, states her son recently moved into area and moved in with her but not certain how long he will stay, he is at work during the day.  Pt states she was I with ADL self cares and amb with FWW.    Pt currently requires min A for bed mob, transfers and amb 3'. Requesting and needing max A to don and doff pants, too fatigued for more mob after unsuccessful attempt on BSC, returned to bed with min A. Pt anxious with transfer and needs frequent cues for safety  Barriers to return to prior living situation: will bed alone, A for mob and ADL's, cog deficits, decreased safety awareness, decreased act layo  Recommendations for discharge: TCU  Rationale for recommendations: Pt will benefit from cont therapies to improve strength and act layo to progress functional mob I and safety to allow return to PLOF       Entered by: Mari Jackson 05/30/2020 11:52 AM

## 2020-05-30 NOTE — H&P
[facility]    History and Physical  Hospitalist       Date of Admission:  5/29/2020    Assessment & Plan   Josiane Dasilva is a 59 year old female with a history of depression/anxiety, thrombocytopenia, alcohol abuse, hypertension, and neuropathy who presented to the ER today with generalized weakness and altered mental status.  After evaluation in the ER, there was concern for mild pancreatitis and she was admitted to the hospitalist service for further evaluation and management.    Pancreatitis  Elevated bilirubin  - Denies abdominal pain. Appetite is poor.  - Lipase elevated at 1,732 in the ER.  - Bilirubin slightly elevated at 1.8. AST and ALT within normal limits.  - Mild epigastric tenderness.  - Clear liquid diet.  - IV fluids.  - Recheck labs in AM.  - If bilirubin/AST/ALT trend up, consider abdominal imaging.    Depression/anxiety  - Prior history of depression/anxiety. It appears she saw psychiatry/psychology in the past and has previously been on medication for this.  - Currently, she does not take any medications for anxiety/depression.  - She appears depressed, but denies feeling depressed. Does talk about a lot of stress in her life due to family issues, deaths in the family, not working, etc. Denies suicidal ideation.  - Will consult psychiatry regarding further evaluation and management.    Hypokalemia  - Replace and recheck per protocol.  - Check magnesium.    Thrombocytopenia  - Likely secondary to alcohol use.  - No obvious bleeding issues.  - Recheck in AM.    Alcohol abuse  - States she started cutting back on her alcohol use a couple weeks ago and has not had any alcohol since she was evaluated in the ER on 5/25.  - Blood alcohol level was < 0.01 in the ER.  - Seems to be at low risk of withdrawal at this point.  - Consult psychiatry as noted above.    Falls  - Fell on 5/25/20, denies any injuries from the fall. She was evaluated in the ER.  - CT head on 5/25 showed no acute  intracranial bleeding or fractures.  - X-rays of lumbar and thoracic spine on 5/25 showed no acute fractures.  - She also fell again yesterday. She denies any pain.  - Consult PT regarding recurrent falls.    Malnutrition  - Decreased appetite and poor oral intake.  - Albumin 2.6.  - Consult nutrition.    Hypertension  - Blood pressure slightly elevated in the ER.  - She ran out of blood pressure medications 2-3 days ago.  - Will resume PTA lisinopril.    Neuropathy  - Continue PTA gabapentin 300 mg at bedtime.    DVT Prophylaxis: Low Risk/Ambulatory with no VTE prophylaxis indicated  Code Status: Full Code  Expected discharge: 1-2 days pending improvement in symptoms, consultant evaluation, and therapy evaluation    Spencer Davidson MD    Primary Care Physician   Physician No Ref-Primary    Chief Complaint   Generalized weakness and altered mental status    History is obtained from the patient    History of Present Illness   Josiane Dasilva is a 59 year old female with a history of depression/anxiety, thrombocytopenia, alcohol abuse, hypertension, and neuropathy who presented to the ER today with generalized weakness and altered mental status.  She states she started cutting back on her alcohol use a couple weeks ago and she has not felt like herself since then.  On May 25, she fell at home.  She denies any injury from the fall.  She was unable to get up and eventually a friend called EMS and she was brought into the ER for evaluation.  CT head showed no acute abnormality.  X-rays of the lumbar and thoracic spine showed no acute fracture.  Labs are fairly unremarkable except for mild hyponatremia for which she received 1 L of normal saline.  She was discharged home from the ER.  She has not been feeling well since she was discharged home.  She has been feeling weak and fatigued.  She is basically been spending most of the day in bed.  A neighbor has been checking on her and felt that she was even more weak and  lethargic today and EMS was called again to bring her into the hospital for further evaluation and management.    In the ER, she was evaluated by Dr. Ravi Bingham.  Vitals were okay, blood pressure mildly elevated.  Labs revealed mild hypokalemia, mildly decreased bicarb, mildly elevated bilirubin and alkaline phosphatase, elevated lipase, and low platelets.  There is concern for mild pancreatitis.  She was given IV fluids and potassium.  The hospitalist service was contacted to bring her in the hospital for further evaluation management.    She denies any recent fevers, chills, sweats.  No chest pain, cough, shortness of breath, or nausea.  She she denies any abdominal pain.  No change in bowel habits or urinary symptoms.  Appetite has been poor.  She states she has been feeling stressed lately.  She repeatedly brought up that there had been several deaths recently in her family.  She talks about other family issues that have been stressing her out as well.  She also states that she has not been working, cooking, or helping other people and that also increases her stress.  She denies feeling depressed, but repeatedly talks about the stress in her life.  She states that she ran out of her blood pressure medications a couple days ago.  Denies any other recent changes with her medications.    Past Medical History    I have reviewed this patient's medical history and updated it with pertinent information if needed.   Past Medical History:   Diagnosis Date     Alcohol abuse      Anxiety      Depressive disorder      Fatty liver      GERD (gastroesophageal reflux disease)      Gout      Hypertension      Neuropathy      Obesity      Post-menopausal bleeding      Past Surgical History   I have reviewed this patient's surgical history and updated it with pertinent information if needed.  Past Surgical History:   Procedure Laterality Date     APPENDECTOMY       COLONOSCOPY  8/13/2014    Procedure: COMBINED COLONOSCOPY,  SINGLE BIOPSY/POLYPECTOMY BY BIOPSY;  Surgeon: Mike Mancuso MD;  Location:  GI     CYSTOSCOPY N/A 3/14/2019    Procedure: Cystoscopy;  Surgeon: Mary Ash MD;  Location: [facility]     ESOPHAGOSCOPY, GASTROSCOPY, DUODENOSCOPY (EGD), COMBINED  8/12/2014    Procedure: COMBINED ESOPHAGOSCOPY, GASTROSCOPY, DUODENOSCOPY (EGD), BIOPSY SINGLE OR MULTIPLE;  Surgeon: Mike Mancuso MD;  Location: [facility]     LAPAROSCOPIC HYSTERECTOMY TOTAL, BILATERAL SALPINGO-OOPHORECTOMY, NODE DISSECTION, COMBINED N/A 3/14/2019    Procedure: Laparoscopic Total Hysterectomy, Bilateral Salpingo-Oophorectomy, and Repair of Vaginal Laceration;  Surgeon: Mary Ash MD;  Location:  OR     ORTHOPEDIC SURGERY      left ankle s/p bimalleolar fracture April 2014     OTHER SURGICAL HISTORY      [place] I as teenager secondary to ulcer     TUBAL LIGATION       Prior to Admission Medications   Prior to Admission Medications   Prescriptions Last Dose Informant Patient Reported? Taking?   acetaminophen (TYLENOL) 325 MG tablet   No No   Sig: Take 1-2 tablets (325-650 mg) by mouth every 4 hours as needed for mild pain   fexofenadine (ALLEGRA) 180 MG tablet  Self No No   Sig: Take 1 tablet (180 mg) by mouth daily   Patient taking differently: Take 180 mg by mouth daily as needed    gabapentin (NEURONTIN) 300 MG capsule  Self No No   Sig: Take 1 capsule (300 mg) by mouth At Bedtime   ibuprofen (ADVIL/MOTRIN) 200 MG tablet   No No   Sig: Take 1-2 tablets (200-400 mg) by mouth every 6 hours as needed for moderate pain   lisinopril (PRINIVIL/ZESTRIL) 20 MG tablet   No No   Sig: Take 1 tablet (20 mg) by mouth daily   Patient taking differently: Take 20 mg by mouth every morning    multivitamin w/minerals (THERA-VIT-M) tablet   No No   Sig: Take 1 tablet by mouth daily   Patient taking differently: Take 1 tablet by mouth daily On hold for surgery   order for DME   No No   Sig: Equipment being ordered: Standard cane    order for DME   No No   Sig: Equipment being ordered: Incontinence pads   oxyCODONE (ROXICODONE) 5 MG tablet   No No   Sig: Take 1 tablet (5 mg) by mouth every 6 hours as needed (pain control or improvement in physical function.  Hold dose for analgesics side effects.)   pantoprazole (PROTONIX) 40 MG EC tablet   No No   Sig: Take 1 tablet (40 mg) by mouth every morning (before breakfast)   Patient not taking: Reported on 3/27/2019   polyethylene glycol (MIRALAX) powder  Self No No   Sig: Take 17 g (1 capful) by mouth daily as needed for constipation   senna-docusate (SENOKOT-S/PERICOLACE) 8.6-50 MG tablet   No No   Sig: Take 1 tablet by mouth 2 times daily as needed for constipation   witch hazel-glycerin (TUCKS) pad   No No   Sig: Place rectally daily as needed for hemorrhoids   Patient taking differently: Place rectally daily as needed for hemorrhoids Hasn't pick these up yet.      Facility-Administered Medications: None     Allergies   Allergies   Allergen Reactions     Asa [Aspirin]      Stomach irritation (hx of PUD)     Nsaids      Stomach irritation (Hx of PUD)     Social History   I have reviewed this patient's social history and updated it with pertinent information if needed. Josiane Dasilva  reports that she quit smoking about 15 years ago. She smoked 0.00 packs per day for 10.00 years. She has never used smokeless tobacco. She reports current alcohol use of about 1.0 standard drinks of alcohol per week. She reports that she does not use drugs.    Family History   I have reviewed this patient's family history and updated it with pertinent information if needed.   Family History   Problem Relation Age of Onset     Thyroid Disease Mother      Hypertension Father      Thyroid Disease Sister      Review of Systems   The 10 point Review of Systems is negative other than noted in the HPI or here.    Physical Exam   Temp: 97.5  F (36.4  C) Temp src: Oral BP: 135/75 Pulse: 85 Heart Rate: 85 Resp: 16 SpO2: 100 %  O2 Device: None (Room air)    Vital Signs with Ranges  Temp:  [97.5  F (36.4  C)] 97.5  F (36.4  C)  Pulse:  [85] 85  Heart Rate:  [85] 85  Resp:  [16] 16  BP: (135-139)/(75-80) 135/75  SpO2:  [99 %-100 %] 100 %  0 lbs 0 oz    Constitutional: awake, alert, cooperative, no apparent distress  Eyes: pupils equal, round and reactive to light, conjunctiva normal  ENT: oral pharynx with moist mucous membranes  Respiratory: clear to auscultation bilaterally, no crackles or wheezing  Cardiovascular: regular rate and rhythm, normal S1 and S2, no murmur noted  GI: normal bowel sounds, soft, non-distended, mild epigastric tenderness  Skin: warm, dry  Musculoskeletal: no lower extremity pitting edema present  Neurologic: awake, alert, not oriented. cranial nerves II-XII are grossly intact. motor is 5 out of 5 bilaterally. sensory is intact.  Psychiatric: flat affect, appears depressed    Data   Data reviewed today:  I personally reviewed no images or EKG's today.    Recent Labs   Lab 05/29/20  2233 05/25/20  1406   WBC 7.1 7.6   HGB 15.2 11.4*   MCV 88 87   PLT 54* 88*    129*   POTASSIUM 3.3* 4.1   CHLORIDE 106 102   CO2 19* 19*   BUN 13 8   CR 0.84 0.80   ANIONGAP 9 8   JESSICA 8.7 9.1   * 114*   ALBUMIN 2.6* 2.7*   PROTTOTAL 7.9 7.9   BILITOTAL 1.8* 1.3   ALKPHOS 206* 229*   ALT 21 21   AST 35 44   LIPASE 1,732*  --    TROPI <0.015  --      No results found for this or any previous visit (from the past 24 hour(s)).

## 2020-05-31 PROBLEM — K85.90 ACUTE PANCREATITIS: Status: ACTIVE | Noted: 2020-05-31

## 2020-05-31 LAB
ALBUMIN SERPL-MCNC: 2.2 G/DL (ref 3.4–5)
ALP SERPL-CCNC: 176 U/L (ref 40–150)
ALT SERPL W P-5'-P-CCNC: 20 U/L (ref 0–50)
ANION GAP SERPL CALCULATED.3IONS-SCNC: 9 MMOL/L (ref 3–14)
AST SERPL W P-5'-P-CCNC: 30 U/L (ref 0–45)
BASOPHILS # BLD AUTO: 0 10E9/L (ref 0–0.2)
BASOPHILS NFR BLD AUTO: 0.2 %
BILIRUB DIRECT SERPL-MCNC: 0.9 MG/DL (ref 0–0.2)
BILIRUB SERPL-MCNC: 1.7 MG/DL (ref 0.2–1.3)
BUN SERPL-MCNC: 5 MG/DL (ref 7–30)
CALCIUM SERPL-MCNC: 8.2 MG/DL (ref 8.5–10.1)
CHLORIDE SERPL-SCNC: 109 MMOL/L (ref 94–109)
CO2 SERPL-SCNC: 17 MMOL/L (ref 20–32)
CREAT SERPL-MCNC: 0.61 MG/DL (ref 0.52–1.04)
DIFFERENTIAL METHOD BLD: ABNORMAL
EOSINOPHIL # BLD AUTO: 0.1 10E9/L (ref 0–0.7)
EOSINOPHIL NFR BLD AUTO: 0.9 %
ERYTHROCYTE [DISTWIDTH] IN BLOOD BY AUTOMATED COUNT: 14.4 % (ref 10–15)
GFR SERPL CREATININE-BSD FRML MDRD: >90 ML/MIN/{1.73_M2}
GLUCOSE SERPL-MCNC: 142 MG/DL (ref 70–99)
HCT VFR BLD AUTO: 29.6 % (ref 35–47)
HGB BLD-MCNC: 9.7 G/DL (ref 11.7–15.7)
IMM GRANULOCYTES # BLD: 0.1 10E9/L (ref 0–0.4)
IMM GRANULOCYTES NFR BLD: 0.8 %
LIPASE SERPL-CCNC: 751 U/L (ref 73–393)
LYMPHOCYTES # BLD AUTO: 1 10E9/L (ref 0.8–5.3)
LYMPHOCYTES NFR BLD AUTO: 11.5 %
MCH RBC QN AUTO: 29.4 PG (ref 26.5–33)
MCHC RBC AUTO-ENTMCNC: 32.8 G/DL (ref 31.5–36.5)
MCV RBC AUTO: 90 FL (ref 78–100)
MONOCYTES # BLD AUTO: 0.5 10E9/L (ref 0–1.3)
MONOCYTES NFR BLD AUTO: 5.7 %
NEUTROPHILS # BLD AUTO: 7.2 10E9/L (ref 1.6–8.3)
NEUTROPHILS NFR BLD AUTO: 80.9 %
NRBC # BLD AUTO: 0 10*3/UL
NRBC BLD AUTO-RTO: 0 /100
PLATELET # BLD AUTO: 70 10E9/L (ref 150–450)
POTASSIUM SERPL-SCNC: 3.6 MMOL/L (ref 3.4–5.3)
PROT SERPL-MCNC: 6.5 G/DL (ref 6.8–8.8)
RBC # BLD AUTO: 3.3 10E12/L (ref 3.8–5.2)
SODIUM SERPL-SCNC: 135 MMOL/L (ref 133–144)
WBC # BLD AUTO: 8.9 10E9/L (ref 4–11)

## 2020-05-31 PROCEDURE — 25800030 ZZH RX IP 258 OP 636: Performed by: HOSPITALIST

## 2020-05-31 PROCEDURE — 99232 SBSQ HOSP IP/OBS MODERATE 35: CPT | Performed by: HOSPITALIST

## 2020-05-31 PROCEDURE — 80048 BASIC METABOLIC PNL TOTAL CA: CPT | Performed by: HOSPITALIST

## 2020-05-31 PROCEDURE — G0378 HOSPITAL OBSERVATION PER HR: HCPCS

## 2020-05-31 PROCEDURE — 83690 ASSAY OF LIPASE: CPT | Performed by: HOSPITALIST

## 2020-05-31 PROCEDURE — 40000007 ZZH STATISTIC ADULT CD FACE TO FACE-NO CHRG

## 2020-05-31 PROCEDURE — 12000000 ZZH R&B MED SURG/OB

## 2020-05-31 PROCEDURE — 85025 COMPLETE CBC W/AUTO DIFF WBC: CPT | Performed by: HOSPITALIST

## 2020-05-31 PROCEDURE — 80076 HEPATIC FUNCTION PANEL: CPT | Performed by: HOSPITALIST

## 2020-05-31 PROCEDURE — 25000132 ZZH RX MED GY IP 250 OP 250 PS 637: Performed by: HOSPITALIST

## 2020-05-31 PROCEDURE — 36415 COLL VENOUS BLD VENIPUNCTURE: CPT | Performed by: HOSPITALIST

## 2020-05-31 RX ORDER — LISINOPRIL 40 MG/1
40 TABLET ORAL EVERY MORNING
Status: DISCONTINUED | OUTPATIENT
Start: 2020-06-01 | End: 2020-06-02 | Stop reason: HOSPADM

## 2020-05-31 RX ORDER — MULTIPLE VITAMINS W/ MINERALS TAB 9MG-400MCG
1 TAB ORAL DAILY
Status: DISCONTINUED | OUTPATIENT
Start: 2020-05-31 | End: 2020-06-02 | Stop reason: HOSPADM

## 2020-05-31 RX ORDER — LISINOPRIL 20 MG/1
20 TABLET ORAL ONCE
Status: COMPLETED | OUTPATIENT
Start: 2020-05-31 | End: 2020-05-31

## 2020-05-31 RX ADMIN — SODIUM CHLORIDE: 9 INJECTION, SOLUTION INTRAVENOUS at 06:32

## 2020-05-31 RX ADMIN — SODIUM CHLORIDE: 9 INJECTION, SOLUTION INTRAVENOUS at 15:04

## 2020-05-31 RX ADMIN — GABAPENTIN 300 MG: 300 CAPSULE ORAL at 21:42

## 2020-05-31 RX ADMIN — LISINOPRIL 20 MG: 20 TABLET ORAL at 10:29

## 2020-05-31 RX ADMIN — MULTIPLE VITAMINS W/ MINERALS TAB 1 TABLET: TAB at 12:52

## 2020-05-31 RX ADMIN — LISINOPRIL 20 MG: 10 TABLET ORAL at 08:09

## 2020-05-31 ASSESSMENT — ACTIVITIES OF DAILY LIVING (ADL): ADLS_ACUITY_SCORE: 23

## 2020-05-31 ASSESSMENT — MIFFLIN-ST. JEOR: SCORE: 1625.5

## 2020-05-31 NOTE — PLAN OF CARE
Physical Therapy Discharge Summary    Reason for therapy discharge:    Defer further PT to next level of care as pt OBS status and safe DC recommendation has been determined    Progress towards therapy goal(s). See goals on Care Plan in [facility] electronic health record for goal details.  Defer to TCU for further skilled PT    Therapy recommendation(s):    Continued therapy is recommended.  Rationale/Recommendations:  TCU to improve strength, activity tolerance, functional mobility .

## 2020-05-31 NOTE — PLAN OF CARE
DATE & TIME: 5/30/20, 2300 - 9380   Cognitive Concerns/ Orientation : A&O x 2. Disoriented to time and situation   BEHAVIOR & AGGRESSION TOOL COLOR: Green  CIWA SCORE: N/a   ABNL VS/O2: BP elevated, other VSS on room air  MOBILITY: Assist x 1 with GB and walker  PAIN MANAGMENT: Denied  DIET: Clears  BOWEL/BLADDER: Continent in BSC  ABNL LAB/BG: N/a  DRAIN/DEVICES: PIV infusing at 125 ml/hr  TELEMETRY RHYTHM: N/a  SKIN: Dry, cracked. Scarred  TESTS/PROCEDURES: N/a  D/C DAY/GOALS/PLACE: 1-2 days pending progress  OTHER IMPORTANT INFO: N/a

## 2020-05-31 NOTE — PROVIDER NOTIFICATION
MD Notification    Notified Person: MD    Notified Person Name: Dr. Chaudhari    Notification Date/Time: 5/31/20 @ 0935    Notification Interaction: Text page    Purpose of Notification: Psych note recommending CD consult-do you want one while inpt?    Orders Received:    Comments:

## 2020-05-31 NOTE — CONSULTS
"CLINICAL NUTRITION SERVICES  -  ASSESSMENT NOTE      Recommendations Ordered by Registered Dietitian (RD):     Will send a Gelatein PLUS supplement with meals for added protein  Ordered multivitamin     Malnutrition:   % Weight Loss:  None noted - pt reports wt stable  % Intake:  </= 50% for >/= 5 days (severe malnutrition) - poor appetite past week  Subcutaneous Fat Loss:  Unable to observe  Muscle Loss:  Unable to observe  Fluid Retention:  None noted    Malnutrition Diagnosis: Unable to determine due to unable to complete NFPA (pt contact restrictions)         REASON FOR ASSESSMENT  Josiane Dasilva is a 59 year old female assessed by Registered Dietitian for Provider Order - concern for malnutrition      NUTRITION HISTORY  Chart reviewed  Spoke with pt via phone this morning - not very engaged in conversation (seemed a bit irritated)  Notes that she hasn't had an appetite for the past week with not feeling well  States that prior to this \"illness\" she was eating well  Typically eats 2 meals per day  Unable to give me specifics  Notes that she does like macaroni     No food allergies/intolerances      CURRENT NUTRITION ORDERS  Diet Order:     Clear Liquid     Pt tells me that she hasn't gotten any breakfast, yet note that a meal was sent earlier this morning (broth, juice, popsicle)  She would like me to order her something to eat      NUTRITION FOCUSED PHYSICAL ASSESSMENT FOR DIAGNOSING MALNUTRITION)    No:  Unable (dept policy for pt contact restrictions during COVID)                Observed:    Unable    Obtained from Chart/Interdisciplinary Team:  Pancreatitis  Depression, increased stress, anxiety  No edema     ANTHROPOMETRICS  Height: 5/8\"  Weight: (5/25 - ED visit) 95.3 kg / 210#  BMI: 31.9  Weight Status:  Obesity Grade I BMI 30-34.9  IBW: 63.6 kg  % IBW: 150%  Weight History: Pt states usual wt is ~210#      LABS  5/30: K 4.1           Lipase 1309 (H)           Alb 2.5 (L - of note, Alb is not a reliable " indicator of nutrition status.  Albumin is a negative acute phase protein and will be low in a state of inflammation.)    MEDICATIONS  IVF @ 125 mL/hr      ASSESSED NUTRITION NEEDS PER APPROVED PRACTICE GUIDELINES:    Dosing Weight 71.5 kg  (adjusted wt for overwt)  Estimated Energy Needs: 9426-5998 kcals (25-30 Kcal/Kg)  Justification: overweight  Estimated Protein Needs:  grams protein (1.2-1.5 g pro/Kg)  Justification: preservation of lean body mass  Estimated Fluid Needs: 180-2150  mL (1 mL/Kcal)  Justification: maintenance    MALNUTRITION:  % Weight Loss:  None noted - pt reports wt stable  % Intake:  </= 50% for >/= 5 days (severe malnutrition) - poor appetite past week  Subcutaneous Fat Loss:  Unable to observe  Muscle Loss:  Unable to observe  Fluid Retention:  None noted    Malnutrition Diagnosis: Unable to determine due to unable to complete NFPA (pt contact restrictions)      NUTRITION DIAGNOSIS:  Inadequate protein-energy intake related to diet restrictions as evidenced by pt on a clear liquid diet      NUTRITION INTERVENTIONS  Recommendations / Nutrition Prescription  Clear liquid (advance per MD)  Nutrition supplement  Daily multivitamin  .      Implementation  Nutrition education: Per Provider order if indicated   Will send a Gelatein PLUS supplement with meals for added protein  Ordered multivitamin  .      Nutrition Goals  Diet to be advanced in the next 48 hrs  .      MONITORING AND EVALUATION:  Progress towards goals will be monitored and evaluated per protocol and Practice Guidelines

## 2020-05-31 NOTE — PLAN OF CARE
Alert to self, disoriented to place, situation, and time intermittently. Up with A1-2. Slightly more weak versus yesterday. Lipase, liver fx improving. Diet upgraded to regular, tolerating. Denies nausea. IVF's @ 75cc/hr. BP elevated, Lisinopril increased to 40mg daily. Chem dependency consulted, see note. Plan to discharge to TCU when medically stable.

## 2020-05-31 NOTE — PLAN OF CARE
Summary:     DATE & TIME: 5/30/2020 2587-1173  Cognitive Concerns/ Orientation : Oriented to person and place. Forgetful  BEHAVIOR & AGGRESSION TOOL COLOR: Green  CIWA SCORE: NA   ABNL VS/O2: Elevated /93. Other VSS on room air.   MOBILITY: Ax1, walker and GB.   PAIN MANAGMENT: Denies  DIET: Clear liquid diet. C/o some nausea, dry heaving. PRN compazine given x1.   BOWEL/BLADDER: Continent. Up to bedside commode.    ABNL LAB/BG: Bilirubin 1.8, Hgb 10.3, Plts 74, Albumin 2.5  DRAIN/DEVICES: PIV infusing NS at 125  TELEMETRY RHYTHM: NA  SKIN: Dry, cracked heels and scarring.   TESTS/PROCEDURES: NA  D/C DAY/GOALS/PLACE: 1-2 days pending improvement, ability to eat more, and therapy eval.   OTHER IMPORTANT INFO: Psych consulted, recommends chemical dependency. PT following. Nutrition consulted for possible malnutrition.    Neighbor dropped off keys and wallet. Sent down to security.

## 2020-05-31 NOTE — CONSULTS
5/31/20 chem dep consult acknowledged.  Per EHR, patient is being recommended to TCU and at this time would not be appropriate for placement into substance use treatment.  Substance use evaluations are only valid for specific time and being it is unknown the length of stay at TCU facility, I would recommend patient schedule outpatient substance use evaluation following discharge from TCU facility to determine level of care recommendations.  Patient has [employer] so can schedule an outpatient evaluation for immediatly after discharge by calling [facility] ([phone]), or TCU facility can seek a mobile evaluation through [facility] or [facility].     [facility]  [phone]    [facility]  [phone]    Sherice Candelaria, [facility], LADC

## 2020-05-31 NOTE — PROGRESS NOTES
"[facility]    Hospitalist Progress Note      Assessment & Plan   Josiane Dasilva is a 59 year old female was admitted on 5/29/2020 with PMH depression/anxiety, thrombocytopenia, alcohol abuse, hypertension, and neuropathy who presented to the ER today with generalized weakness and altered mental status.  After evaluation in the ER, there was concern for mild pancreatitis    Pancreatitis  Elevated bilirubin  - Denies abdominal pain. Appetite is poor.  - Lipase elevated at 1,732 in the ER.  Downtrending, today 751, no nausea, emesis; continues poor intake, continue IVF.    -LFT and bilirubin now WNL.  -Currently clear liquid diet, advance as tolerated.  - IV fluids.     Depression/anxiety  - reports prior history of depression/anxiety. It appears she saw psychiatry/psychology in the past and has previously been on medication for this.  - Currently, she does not take any medications for anxiety/depression.  - She appears depressed, but denies feeling depressed. Does talk about a lot of stress in her life due to family issues, not working, etc. Denies suicidal ideation.  - Consult Psych, please see note.  No recommendations for antidepressants.     Hypokalemia  - Replace and recheck per protocol.  - Magnesium WNL .     Thrombocytopenia  - Likely secondary to alcohol use.  - No obvious bleeding issues.  - Monitor     Alcohol abuse  - States she started cutting back on her alcohol use a couple weeks ago and has not had any alcohol since she was evaluated in the ER on 5/25.  - Blood alcohol level was < 0.01 in the ER.  - Seems to be at low risk of withdrawal at this point.  -Concern regarding alcohol use, today she reports when she is \"stressed\" she takes in 3-4 shots of isatu and 2-cans of beer.  She drinks alone.  History of 2 falls within 1 week possibly related to alcohol intake.  Patient was receptive to chemical dependency treatment, will likely require intake on TCU discharge.  Seen by Psych, " "please see Psych note.     Falls  - Fell on 5/25/20, denies any injuries from the fall. She was evaluated in the ER.  - CT head on 5/25 showed no acute intracranial bleeding or fractures.  - X-rays of lumbar and thoracic spine on 5/25 showed no acute fractures.  - She also fell again 1 day PTA.. She denies any pain.  No indication of sequelae/residual from fall.  Monitor.  - Consult PT regarding recurrent falls/fall risk.  PT recommends discharge to TCU..     Malnutrition  - Decreased appetite and poor oral intake.  - Albumin 2.6.  -Current poor intake likely related to presentation with pancreatitis.,  For now continue IVF.  Advance diet as tolerated..     Hypertension  - Blood pressure slightly elevated in the ER.  - She ran out of blood pressure medications 2-3 days ago.  -Resume PTA lisinopril.  Continue hypertensive readings -181, DBP to 95 increase lisinopril to 40 mg.  Monitor.  Normal renal function/K..     Neuropathy  - Continue PTA gabapentin 300 mg at bedtime    DVT Prophylaxis: Low Risk/Ambulatory with no VTE prophylaxis indicated  Code Status: Full Code  Expected discharge: 1-2 days pending improvement in symptoms, ability to take po's, TCU placement.    Mireille Chaudhari MD  Text Page (7am - 6pm, M-F)    Interval History   Patient was sleepy today on encounter yet arousable.  Although she states that she did not eat breakfast, Nursing reports tray was present, minimal intake.  Patient denies abdominal pain, nausea, no emesis.  Further asked patient regarding her alcohol history.  Today she reports when she is \"stressed\" she takes in 3-4 shots of isatu and  2 to 3 cans of beer.  She drinks alone.  Not daily, her stressors she reports related to her children and their lives.  Seen by Psych.  Please see note.     SH: No tobacco.  Lives in Apt with Son.  Retired NA    ROS: Complete ROS negative except as above.     -Data reviewed today: I reviewed all new labs and imaging results over the last 24 " hours. I personally reviewed the EKG tracing showing sinus; no acute changes; long QT.  Discontinue PRN zofran [ has PRN compazine]    Physical Exam   Temp: 97.4  F (36.3  C) Temp src: Oral BP: (!) 181/95 Pulse: 88 Heart Rate: 90 Resp: 20 SpO2: 100 % O2 Device: None (Room air)    Vitals:    05/31/20 1033   Weight: 100.7 kg (222 lb 0.1 oz)     Vital Signs with Ranges  Temp:  [97.3  F (36.3  C)-97.7  F (36.5  C)] 97.4  F (36.3  C)  Pulse:  [88] 88  Heart Rate:  [79-90] 90  Resp:  [18-22] 20  BP: (134-181)/(75-95) 181/95  SpO2:  [100 %] 100 %  I/O last 3 completed shifts:  In: 2607 [P.O.:650; I.V.:1957]  Out: -     Constitutional:  NAD, sleepy yet arousable, calm, cooperative, answers questions.  Eyes:  PERRL, no conjunctivitis.  HEENT; head atraumatic.  Respiratory:  Clear, no rales or wheeze.  Respirations nonlabored.  Cardiovascular:  Regular, no murmur appreciated.  GI:  Soft, nondistended, nontender.  No rebound, guarding or other peritoneal signs.  Skin: warm, dry.    Musculoskeletal: no lower extremity pitting edema present.  No active synovitis.  Neurologic:  Gross CN and motor strength testing intact, nonfocal.  Psychiatric:  Affect, narrow range.    Medications     sodium chloride 125 mL/hr at 05/31/20 0752       gabapentin  300 mg Oral At Bedtime     [START ON 6/1/2020] lisinopril  40 mg Oral QAM     multivitamin w/minerals  1 tablet Oral Daily     sodium chloride (PF)  3 mL Intracatheter Q8H       Data   Recent Labs   Lab 05/31/20  0902 05/30/20  0839 05/29/20  2233   WBC 8.9 10.5 7.1   HGB 9.7* 10.3* 15.2   MCV 90 89 88   PLT 70* 74* 54*    135 134   POTASSIUM 3.6 4.1 3.3*   CHLORIDE 109 108 106   CO2 17* 19* 19*   BUN 5* 10 13   CR 0.61 0.73 0.84   ANIONGAP 9 8 9   JESSICA 8.2* 8.5 8.7   * 170* 136*   ALBUMIN 2.2* 2.5* 2.6*   PROTTOTAL 6.5* 7.3 7.9   BILITOTAL 1.7* 1.8* 1.8*   ALKPHOS 176* 211* 206*   ALT 20 20 21   AST 30 34 35   LIPASE 751* 1,309* 1,732*   TROPI  --   --  <0.015       No  results found for this or any previous visit (from the past 24 hour(s)).      Discussed with Nursing and Patient today

## 2020-05-31 NOTE — CONSULTS
Care Transition Initial Assessment -      Met with: Patient    Active Problems:    Pancreatitis       DATA  Lives With: alone   Living Arrangements: apartment  Quality of Family Relationships: involved  Description of Support System: Involved  Who is your support system?: Sibling(s)  Support Assessment: Adequate family and caregiver support.   Identified issues/concerns regarding health management: PT recommends TCU. Explained TCU. Per chart review, pt was at [facility] March 2019. Pt does not recall this stay, however, agrees to a referral to this facility. She reports her son is staying with her but identifies her sisters as her decision makers, if needed.       Quality of Family Relationships: involved  Transportation Anticipated: public transportation    ASSESSMENT  Cognitive Status: Disoriented to time and situation, per nursing. May need to involve family for discharge planning, pending progress.  Concerns to be addressed: On-going discharge planning.     PLAN  Financial costs for the patient includes: None.  Patient given options and choices for discharge: Yes.  Patient/family is agreeable to the plan? YES  Patient Goals and Preferences: TCU.  Patient anticipates discharging to: TCU.    KAYLIE Whipple, LGSW  [phone]  [facility]

## 2020-06-01 LAB
ALBUMIN SERPL-MCNC: 2 G/DL (ref 3.4–5)
ALP SERPL-CCNC: 191 U/L (ref 40–150)
ALT SERPL W P-5'-P-CCNC: 19 U/L (ref 0–50)
ANION GAP SERPL CALCULATED.3IONS-SCNC: 10 MMOL/L (ref 3–14)
AST SERPL W P-5'-P-CCNC: 29 U/L (ref 0–45)
BILIRUB DIRECT SERPL-MCNC: 0.7 MG/DL (ref 0–0.2)
BILIRUB SERPL-MCNC: 1.3 MG/DL (ref 0.2–1.3)
BUN SERPL-MCNC: 12 MG/DL (ref 7–30)
CALCIUM SERPL-MCNC: 8 MG/DL (ref 8.5–10.1)
CHLORIDE SERPL-SCNC: 108 MMOL/L (ref 94–109)
CO2 SERPL-SCNC: 17 MMOL/L (ref 20–32)
CREAT SERPL-MCNC: 0.74 MG/DL (ref 0.52–1.04)
GFR SERPL CREATININE-BSD FRML MDRD: 88 ML/MIN/{1.73_M2}
GLUCOSE SERPL-MCNC: 203 MG/DL (ref 70–99)
LIPASE SERPL-CCNC: 1119 U/L (ref 73–393)
PHOSPHATE SERPL-MCNC: 1.2 MG/DL (ref 2.5–4.5)
PHOSPHATE SERPL-MCNC: 2.6 MG/DL (ref 2.5–4.5)
POTASSIUM SERPL-SCNC: 3.5 MMOL/L (ref 3.4–5.3)
PROT SERPL-MCNC: 6.2 G/DL (ref 6.8–8.8)
SODIUM SERPL-SCNC: 135 MMOL/L (ref 133–144)

## 2020-06-01 PROCEDURE — 84100 ASSAY OF PHOSPHORUS: CPT | Performed by: HOSPITALIST

## 2020-06-01 PROCEDURE — 25800030 ZZH RX IP 258 OP 636: Performed by: HOSPITALIST

## 2020-06-01 PROCEDURE — 80076 HEPATIC FUNCTION PANEL: CPT | Performed by: HOSPITALIST

## 2020-06-01 PROCEDURE — 25000132 ZZH RX MED GY IP 250 OP 250 PS 637: Performed by: HOSPITALIST

## 2020-06-01 PROCEDURE — 25000125 ZZHC RX 250: Performed by: HOSPITALIST

## 2020-06-01 PROCEDURE — 25000128 H RX IP 250 OP 636: Performed by: HOSPITALIST

## 2020-06-01 PROCEDURE — 80048 BASIC METABOLIC PNL TOTAL CA: CPT | Performed by: HOSPITALIST

## 2020-06-01 PROCEDURE — 83690 ASSAY OF LIPASE: CPT | Performed by: HOSPITALIST

## 2020-06-01 PROCEDURE — 99232 SBSQ HOSP IP/OBS MODERATE 35: CPT | Performed by: HOSPITALIST

## 2020-06-01 PROCEDURE — 12000000 ZZH R&B MED SURG/OB

## 2020-06-01 PROCEDURE — 36415 COLL VENOUS BLD VENIPUNCTURE: CPT | Performed by: HOSPITALIST

## 2020-06-01 RX ADMIN — PROCHLORPERAZINE EDISYLATE 10 MG: 5 INJECTION INTRAMUSCULAR; INTRAVENOUS at 21:11

## 2020-06-01 RX ADMIN — GABAPENTIN 300 MG: 300 CAPSULE ORAL at 21:23

## 2020-06-01 RX ADMIN — POTASSIUM PHOSPHATE, MONOBASIC AND POTASSIUM PHOSPHATE, DIBASIC 20 MMOL: 224; 236 INJECTION, SOLUTION INTRAVENOUS at 14:59

## 2020-06-01 RX ADMIN — MULTIPLE VITAMINS W/ MINERALS TAB 1 TABLET: TAB at 08:29

## 2020-06-01 RX ADMIN — SODIUM CHLORIDE: 9 INJECTION, SOLUTION INTRAVENOUS at 03:16

## 2020-06-01 RX ADMIN — LISINOPRIL 40 MG: 40 TABLET ORAL at 08:29

## 2020-06-01 RX ADMIN — Medication 250 MG: at 18:24

## 2020-06-01 RX ADMIN — SODIUM CHLORIDE: 9 INJECTION, SOLUTION INTRAVENOUS at 21:15

## 2020-06-01 ASSESSMENT — ACTIVITIES OF DAILY LIVING (ADL)
ADLS_ACUITY_SCORE: 23
ADLS_ACUITY_SCORE: 22
ADLS_ACUITY_SCORE: 23

## 2020-06-01 NOTE — PLAN OF CARE
DATE & TIME: 6/1/20 0700-1930  Cognitive Concerns/ Orientation : A&O x3, disoriented situation and very forgetful.  BEHAVIOR & AGGRESSION TOOL COLOR: Green  CIWA SCORE: NA   ABNL VS/O2: VSS, O2 wnl RA   MOBILITY: Ax2, walker and GB to C  PAIN MANAGMENT: Denies  DIET: Full liquid diet. Denies nausea   BOWEL/BLADDER: Continent. Up to bedside commode frequently.   ABNL LAB/BG: lipase 1119 (751 on 5/31), Albumin 2.03, phosphorus 1.2  DRAIN/DEVICES: PIV infusing NS at 75ml/hr  TELEMETRY RHYTHM: NA  SKIN: Dry, cracked heels and scarring.   TESTS/PROCEDURES: Abd US tomorrow AM, NPO from midnight  D/C DAY/GOALS/PLACE: 1-2 days, Therapies recommending TCU. SW following.   OTHER IMPORTANT INFO: C/o worsening weakness, neuros intact. Got up to the bedside commode multiple times this shift. Phosphorus 1.2, started on protocol, currently being replaced, recheck tonight 2200 and tomorrow AM. Pt's son Mike and older sister Cassia updated.     Update 2789-2720  IVF decreased from 75 mL/hr to 50 mL/hr. Small brown emesis x1 after eating, denied nausea. NPO from midnight for abd US tomorrow AM.

## 2020-06-01 NOTE — PROGRESS NOTES
[facility]    Hospitalist Progress Note      Assessment & Plan   Josiane Dasilva is a 59 year old female was admitted on 5/29/2020 with PMH depression/anxiety, thrombocytopenia, alcohol abuse, hypertension, and neuropathy who presented to the ER today with generalized weakness and altered mental status.  After evaluation in the ER, there was concern for mild pancreatitis    Pancreatitis  Elevated bilirubin  Does have some epigastric pain  Plan  - ultrasound of the abdomen to ensure no stone disease  - tolerating oral diet  - lipase remains elevated, but given the tolerating the full diet will hold off on downgrading  - repeat LFTs in the AM  - IV fluids reduce to 50 ml/hr     Depression/anxiety  - reports prior history of depression/anxiety. It appears she saw psychiatry/psychology in the past and has previously been on medication for this.  - Currently, she does not take any medications for anxiety/depression.  - She appears depressed, but denies feeling depressed. Does talk about a lot of stress in her life due to family issues, not working, etc. Denies suicidal ideation.  - Consult Psych, please see note.  No recommendations for antidepressants       Hypokalemia/hypophosphatemia  - Replace and recheck per protocol.  - Magnesium WNL .     Thrombocytopenia  - Likely secondary to alcohol use.  - No obvious bleeding issues.  - Monitor     Alcohol abuse  - States she started cutting back on her alcohol use a couple weeks ago and has not had any alcohol since she was evaluated in the ER on 5/25.  - Blood alcohol level was < 0.01 in the ER.  - Seems to be at low risk of withdrawal at this point.  - Seen by Psych, please see Psych note. Seen by chem dep. Consider intake after discharge from TCU  - start thiamine     Falls/deconditioning  Likely secondary to alcohol use,   Fell on 5/25/20, denies any injuries from the fall. She was evaluated in the ER.  CT head on 5/25 showed no acute intracranial  bleeding or fractures.   X-rays of lumbar and thoracic spine on 5/25 showed no acute fractures.  She also fell again 1 day PTA.. She denies any pain.  No indication of sequelae/residual from fall.  Monitor.  Plan  - TCU as per PT     Malnutrition  - Decreased appetite and poor oral intake.  - Albumin 2.6.  -Current poor intake likely related to presentation with pancreatitis.,  For now continue IVF.  Advance diet as tolerated..     Hypertension  - Blood pressure slightly elevated in the ER.  - She ran out of blood pressure medications 2-3 days ago.  -Resume PTA lisinopril.  Continue hypertensive readings -181, DBP to 95 increase lisinopril to 40 mg.  Monitor.  Normal renal function/K..     Neuropathy  - Continue PTA gabapentin 300 mg at bedtime    DVT Prophylaxis: Low Risk/Ambulatory with no VTE prophylaxis indicated  Code Status: Full Code  Expected discharge: 1-2 days pending abdominal ultrasound, improvement in phosphorus    Tommie Barfield, DO  Text Page (7am - 6pm, M-F)    Interval History   Patient was confused today, but no focal issues    SH: No tobacco.  Lives in Apt with Son.  Retired NA    ROS: Complete ROS negative except as above.     -Data reviewed today: I reviewed all new labs and imaging results over the last 24 hours. I personally reviewed the EKG tracing showing sinus; no acute changes; long QT.  Discontinue PRN zofran [ has PRN compazine]    Physical Exam   Temp: 97.6  F (36.4  C) Temp src: Oral BP: 126/71   Heart Rate: 87 Resp: 18 SpO2: 94 % O2 Device: None (Room air)    Vitals:    05/31/20 1033   Weight: 100.7 kg (222 lb 0.1 oz)     Vital Signs with Ranges  Temp:  [97.5  F (36.4  C)-99  F (37.2  C)] 97.6  F (36.4  C)  Heart Rate:  [87-95] 87  Resp:  [18-20] 18  BP: (126-136)/(71-80) 126/71  SpO2:  [94 %-100 %] 94 %  I/O last 3 completed shifts:  In: 1020 [P.O.:720; I.V.:300]  Out: 425 [Urine:425]    Constitutional:  NAD, sleepy yet arousable, calm, cooperative, answers  questions.  Eyes:  PERRL, no conjunctivitis.  HEENT; head atraumatic.  Respiratory:  Clear, no rales or wheeze.  Respirations nonlabored.  Cardiovascular:  Regular, no murmur appreciated.  GI:  Soft, nondistended, nontender.  No rebound, guarding or other peritoneal signs.  Skin: warm, dry.    Musculoskeletal: no lower extremity pitting edema present.  No active synovitis.  Neurologic:  Gross CN and motor strength testing intact, nonfocal. Moves all extremities equally, about 4/5 throughout  Psychiatric:  Affect, narrow range.    Medications     sodium chloride 75 mL/hr at 06/01/20 0316       gabapentin  300 mg Oral At Bedtime     lisinopril  40 mg Oral QAM     multivitamin w/minerals  1 tablet Oral Daily     sodium chloride (PF)  3 mL Intracatheter Q8H       Data   Recent Labs   Lab 06/01/20  0955 05/31/20  0902 05/30/20  0839 05/29/20  2233   WBC  --  8.9 10.5 7.1   HGB  --  9.7* 10.3* 15.2   MCV  --  90 89 88   PLT  --  70* 74* 54*    135 135 134   POTASSIUM 3.5 3.6 4.1 3.3*   CHLORIDE 108 109 108 106   CO2 17* 17* 19* 19*   BUN 12 5* 10 13   CR 0.74 0.61 0.73 0.84   ANIONGAP 10 9 8 9   JESSICA 8.0* 8.2* 8.5 8.7   * 142* 170* 136*   ALBUMIN 2.0* 2.2* 2.5* 2.6*   PROTTOTAL 6.2* 6.5* 7.3 7.9   BILITOTAL 1.3 1.7* 1.8* 1.8*   ALKPHOS 191* 176* 211* 206*   ALT 19 20 20 21   AST 29 30 34 35   LIPASE 1,119* 751* 1,309* 1,732*   TROPI  --   --   --  <0.015       No results found for this or any previous visit (from the past 24 hour(s)).      Tommie Barfield DO  Hospitalist [facility]  Pager: [phone]

## 2020-06-01 NOTE — PROGRESS NOTES
SW:  D:  Spoke with admissin liaison for [facility].  She will call [facility] admissions and call writer back regarding [facility]'s ability to admit patient.    [facility].  Will paged MD to update him.    Update:  [facility] liaison notified patient will discharge tomorrow.  [facility] only had one bed available and liaison cannot guarantee the bed will be available tomorrow   Plan: Will speak with liaison tomorrow morning.  If the bed is no longer available writer will make other arrangements.    Bedside nurse asks that writer update patient's sister.

## 2020-06-01 NOTE — PLAN OF CARE
Summary:     DATE & TIME: 5/31-6/1 6766-4189  Cognitive Concerns/ Orientation : Oriented to person and place. Forgetful  BEHAVIOR & AGGRESSION TOOL COLOR: Green  CIWA SCORE: NA   ABNL VS/O2: Elevated BP Other VSS on room air.   MOBILITY: Ax2, walker and GB to [facility]   PAIN MANAGMENT: Denies  DIET: Full liquid diet. Denies nausea   BOWEL/BLADDER: Continent. Up to bedside commode.    ABNL LAB/BG: lipase 751, LFT improving (see results)  DRAIN/DEVICES: PIV infusing NS at 75ml/hr  TELEMETRY RHYTHM: NA  SKIN: Dry, cracked heels and scarring.   TESTS/PROCEDURES: NA  D/C DAY/GOALS/PLACE: 1-2 days pending improvement and mental status, Discharge to TCU  OTHER IMPORTANT INFO: Psych consulted. Chem dep consulted (see note). PT following. Nutrition consulted.

## 2020-06-02 ENCOUNTER — APPOINTMENT (OUTPATIENT)
Dept: ULTRASOUND IMAGING | Facility: CLINIC | Age: 60
DRG: 438 | End: 2020-06-02
Attending: HOSPITALIST
Payer: COMMERCIAL

## 2020-06-02 VITALS
BODY MASS INDEX: 33.65 KG/M2 | TEMPERATURE: 98.6 F | WEIGHT: 222 LBS | SYSTOLIC BLOOD PRESSURE: 132 MMHG | DIASTOLIC BLOOD PRESSURE: 69 MMHG | OXYGEN SATURATION: 100 % | HEIGHT: 68 IN | HEART RATE: 95 BPM | RESPIRATION RATE: 16 BRPM

## 2020-06-02 LAB
ALBUMIN SERPL-MCNC: 2 G/DL (ref 3.4–5)
ALP SERPL-CCNC: 206 U/L (ref 40–150)
ALT SERPL W P-5'-P-CCNC: 21 U/L (ref 0–50)
ANION GAP SERPL CALCULATED.3IONS-SCNC: 7 MMOL/L (ref 3–14)
AST SERPL W P-5'-P-CCNC: 27 U/L (ref 0–45)
BILIRUB SERPL-MCNC: 1 MG/DL (ref 0.2–1.3)
BUN SERPL-MCNC: 8 MG/DL (ref 7–30)
CALCIUM SERPL-MCNC: 8.3 MG/DL (ref 8.5–10.1)
CHLORIDE SERPL-SCNC: 111 MMOL/L (ref 94–109)
CO2 SERPL-SCNC: 19 MMOL/L (ref 20–32)
CREAT SERPL-MCNC: 0.65 MG/DL (ref 0.52–1.04)
ERYTHROCYTE [DISTWIDTH] IN BLOOD BY AUTOMATED COUNT: 14.8 % (ref 10–15)
GFR SERPL CREATININE-BSD FRML MDRD: >90 ML/MIN/{1.73_M2}
GLUCOSE SERPL-MCNC: 105 MG/DL (ref 70–99)
HCT VFR BLD AUTO: 26 % (ref 35–47)
HGB BLD-MCNC: 8.6 G/DL (ref 11.7–15.7)
MCH RBC QN AUTO: 29.6 PG (ref 26.5–33)
MCHC RBC AUTO-ENTMCNC: 33.1 G/DL (ref 31.5–36.5)
MCV RBC AUTO: 89 FL (ref 78–100)
PHOSPHATE SERPL-MCNC: 2.6 MG/DL (ref 2.5–4.5)
PLATELET # BLD AUTO: 89 10E9/L (ref 150–450)
POTASSIUM SERPL-SCNC: 3.9 MMOL/L (ref 3.4–5.3)
PROT SERPL-MCNC: 6.5 G/DL (ref 6.8–8.8)
RBC # BLD AUTO: 2.91 10E12/L (ref 3.8–5.2)
SODIUM SERPL-SCNC: 137 MMOL/L (ref 133–144)
WBC # BLD AUTO: 10.9 10E9/L (ref 4–11)

## 2020-06-02 PROCEDURE — 25000132 ZZH RX MED GY IP 250 OP 250 PS 637: Performed by: HOSPITALIST

## 2020-06-02 PROCEDURE — 80053 COMPREHEN METABOLIC PANEL: CPT | Performed by: HOSPITALIST

## 2020-06-02 PROCEDURE — 99239 HOSP IP/OBS DSCHRG MGMT >30: CPT | Performed by: HOSPITALIST

## 2020-06-02 PROCEDURE — 36415 COLL VENOUS BLD VENIPUNCTURE: CPT | Performed by: HOSPITALIST

## 2020-06-02 PROCEDURE — 84100 ASSAY OF PHOSPHORUS: CPT | Performed by: HOSPITALIST

## 2020-06-02 PROCEDURE — 85027 COMPLETE CBC AUTOMATED: CPT | Performed by: HOSPITALIST

## 2020-06-02 PROCEDURE — 76700 US EXAM ABDOM COMPLETE: CPT

## 2020-06-02 RX ORDER — LISINOPRIL 40 MG/1
40 TABLET ORAL EVERY MORNING
DISCHARGE
Start: 2020-06-03 | End: 2021-07-22

## 2020-06-02 RX ADMIN — Medication 250 MG: at 08:47

## 2020-06-02 RX ADMIN — MULTIPLE VITAMINS W/ MINERALS TAB 1 TABLET: TAB at 08:47

## 2020-06-02 RX ADMIN — LISINOPRIL 40 MG: 40 TABLET ORAL at 08:47

## 2020-06-02 ASSESSMENT — ACTIVITIES OF DAILY LIVING (ADL)
ADLS_ACUITY_SCORE: 22
ADLS_ACUITY_SCORE: 21

## 2020-06-02 NOTE — DISCHARGE SUMMARY
[facility]  Hospitalist Discharge Summary      Date of Admission:  5/29/2020  Date of Discharge:  6/2/2020  Discharging Provider: Tommie Barfield, DO      Discharge Diagnoses   Acute pancreatitis  Metabolic encephalopathy  Depression/anxiety  Alcoholism    Follow-ups Needed After Discharge   Follow-up Appointments     Follow Up and recommended labs and tests      Follow up with care home physician.  The following labs/tests are   recommended: cbc and bmp.          Consider neuropsychiatric testing in the future pending improvement in mild delirium    Unresulted Labs Ordered in the Past 30 Days of this Admission     No orders found from 4/29/2020 to 5/30/2020.      These results will be followed up by PCP    Discharge Disposition   Discharged to rehabilitation facility  Condition at discharge: Stable      Hospital Course   Pancreatitis  Elevated bilirubin  Does have some epigastric pain  US negative  Plan  - pain resolved  - tolerating oral diet  - lipase remains elevated, but given the tolerating the full diet will hold off on downgrading  - repeat LFTs in the AM  - IV fluids reduce to 50 ml/hr    Metabolic encephalopathy  Some confusion to date/place  Sister notes some confusion at baseline; she often helps the patient during previous hospitalizations with her symptomss  No focal neurological symptoms  CT head did reveal generalized atrophy but nothing acute  Plan  - continue thiamine  - consider neuropsychiatric testing down the road if no improvement  - complete alcohol cessation  - OT will need to follow     Depression/anxiety  reports prior history of depression/anxiety. It appears she saw psychiatry/psychology in the past and has previously been on medication for this.  Currently, she does not take any medications for anxiety/depression.  She appears depressed, but denies feeling depressed. Does talk about a lot of stress in her life due to family issues, not working, etc. Denies  suicidal ideation.  - Consult Psych, please see note.  No recommendations for antidepressants        Hypokalemia/hypophosphatemia  - Replace and recheck per protocol.  - Magnesium WNL .     Thrombocytopenia  - Likely secondary to alcohol use.  - No obvious bleeding issues.  - Monitor     Alcohol abuse  States she started cutting back on her alcohol use a couple weeks ago and has not had any alcohol since she was evaluated in the ER on 5/25.  \Blood alcohol level was < 0.01 in the ER.  No evidence of withdrawal  - Seen by Psych, please see Psych note. Seen by chem dep. Consider intake after discharge from TCU  - start thiamine     Falls/deconditioning  Likely secondary to alcohol use,   Fell on 5/25/20, denies any injuries from the fall. She was evaluated in the ER.  CT head on 5/25 showed no acute intracranial bleeding or fractures.   X-rays of lumbar and thoracic spine on 5/25 showed no acute fractures.  She also fell again 1 day PTA.. She denies any pain.  No indication of sequelae/residual from fall.  Monitor.  Plan  - TCU as per PT     Malnutrition  - Decreased appetite and poor oral intake.  - Albumin 2.6.  -appetite improving     Hypertension  - Blood pressure slightly elevated in the ER.  - She ran out of blood pressure medications 2-3 days ago.  -Resume PTA lisinopril.  Continue hypertensive readings -181, DBP to 95 increase lisinopril to 40 mg.  Monitor.  Normal renal function/K..     Neuropathy  - Continue PTA gabapentin 300 mg at bedtime   Pancreatitis  Elevated bilirubin  Does have some epigastric pain  Plan  - resoled     Depression/anxiety  - reports prior history of depression/anxiety. It appears she saw psychiatry/psychology in the past and has previously been on medication for this.  - Currently, she does not take any medications for anxiety/depression.  - She appears depressed, but denies feeling depressed. Does talk about a lot of stress in her life due to family issues, not working, etc.  Denies suicidal ideation.  - Consult Psych, please see note.  No recommendations for antidepressants        Hypokalemia/hypophosphatemia  - Replace and recheck per protocol.  - Magnesium WNL .     Thrombocytopenia  - Likely secondary to alcohol use.  - No obvious bleeding issues.  - Monitor     Alcohol abuse  - States she started cutting back on her alcohol use a couple weeks ago and has not had any alcohol since she was evaluated in the ER on 5/25.  - Blood alcohol level was < 0.01 in the ER.  - Seems to be at low risk of withdrawal at this point.  - Seen by Psych, please see Psych note. Seen by chem dep. Consider intake after discharge from TCU  - start thiamine     Falls/deconditioning  Likely secondary to alcohol use,   Fell on 5/25/20, denies any injuries from the fall. She was evaluated in the ER.  CT head on 5/25 showed no acute intracranial bleeding or fractures.   X-rays of lumbar and thoracic spine on 5/25 showed no acute fractures.  She also fell again 1 day PTA.. She denies any pain.  No indication of sequelae/residual from fall.  Monitor.  Plan  - TCU as per PT     Malnutrition  - Decreased appetite and poor oral intake.  - Albumin 2.6.  -Current poor intake likely related to presentation with pancreatitis.,  For now continue IVF.  Advance diet as tolerated..     Hypertension  - Blood pressure slightly elevated in the ER.  - She ran out of blood pressure medications 2-3 days ago.  -Resume PTA lisinopril.  Continue hypertensive readings -181, DBP to 95 increase lisinopril to 40 mg.  Monitor.  Normal renal function/K..     Neuropathy  - Continue PTA gabapentin 300 mg at bedtime       Consultations This Hospital Stay   PSYCHIATRY IP CONSULT  PHYSICAL THERAPY ADULT IP CONSULT  NUTRITION SERVICES ADULT IP CONSULT  CARE TRANSITION RN/SW IP CONSULT  CHEMICAL DEPENDENCY IP CONSULT  PHYSICAL THERAPY ADULT IP CONSULT  OCCUPATIONAL THERAPY ADULT IP CONSULT    Code Status   Full Code    Time Spent on this  Encounter   I, Tommie Barfield DO, personally saw the patient today and spent greater than 30 minutes discharging this patient.       Tommie Barfield DO  [facility]  ______________________________________________________________________    Physical Exam   Vital Signs: Temp: 98.6  F (37  C) Temp src: Oral BP: 132/69   Heart Rate: 88 Resp: 16 SpO2: 100 % O2 Device: None (Room air)    Weight: 222 lbs .05 oz  Constitutional:  NAD, sleepy yet arousable, calm, cooperative, answers questions.  Eyes:  PERRL, no conjunctivitis.  HEENT; head atraumatic.  Respiratory:  Clear, no rales or wheeze.  Respirations nonlabored.  Cardiovascular:  Regular, no murmur appreciated.  GI:  Soft, nondistended, nontender.  No rebound, guarding or other peritoneal signs.  Skin: warm, dry.    Musculoskeletal: no lower extremity pitting edema present.  No active synovitis.  Neurologic:  Gross CN and motor strength testing intact, nonfocal. Moves all extremities equally, about 4/5 throughout  Psychiatric:  Affect, narrow range.             Primary Care Physician   Physician No Ref-Primary    Discharge Orders      General info for SNF    Length of Stay Estimate: Short Term Care: Estimated # of Days <30  Condition at Discharge: Stable  Level of care:skilled   Rehabilitation Potential: Good  Admission H&P remains valid and up-to-date: Yes  Recent Chemotherapy: N/A  Use Nursing Home Standing Orders: N/A     Mantoux instructions    Give two-step Mantoux (PPD) Per Facility Policy Yes     Reason for your hospital stay    Pancreatitis  Mild confusion     Intake and output    Every shift     Follow Up and recommended labs and tests    Follow up with intermediate physician.  The following labs/tests are recommended: cbc and bmp.     Activity - Up with nursing assistance     Full Code     Physical Therapy Adult Consult    Evaluate and treat as clinically indicated.    Reason:  deconditioning     Occupational Therapy Adult Consult     Evaluate and treat as clinically indicated.    Reason:  deconditioning     Fall precautions     Advance Diet as Tolerated    Follow this diet upon discharge: Orders Placed This Encounter      Snacks/Supplements Adult: Other; Gel PLUS with meals (RD); With Meals      Regular Diet Adult       Significant Results and Procedures   Most Recent 3 CBC's:  Recent Labs   Lab Test 06/02/20  0743 05/31/20  0902 05/30/20  0839   WBC 10.9 8.9 10.5   HGB 8.6* 9.7* 10.3*   MCV 89 90 89   PLT 89* 70* 74*     Most Recent 3 BMP's:  Recent Labs   Lab Test 06/02/20  0743 06/01/20  0955 05/31/20  0902    135 135   POTASSIUM 3.9 3.5 3.6   CHLORIDE 111* 108 109   CO2 19* 17* 17*   BUN 8 12 5*   CR 0.65 0.74 0.61   ANIONGAP 7 10 9   JESSICA 8.3* 8.0* 8.2*   * 203* 142*     Most Recent 2 LFT's:  Recent Labs   Lab Test 06/02/20  0743 06/01/20  0955   AST 27 29   ALT 21 19   ALKPHOS 206* 191*   BILITOTAL 1.0 1.3   ,   Results for orders placed or performed during the hospital encounter of 05/29/20   US Abdomen Complete    Narrative    US ABDOMEN COMPLETE 6/2/2020 8:13 AM    CLINICAL HISTORY: abdominal pain, concern for possible pancreatitis    TECHNIQUE: Complete abdominal ultrasound.    COMPARISON: None.    FINDINGS:    GALLBLADDER: Normal. No gallstones, wall thickening, or  pericholecystic fluid. Negative sonographic Lawrence's sign.    BILE DUCTS: No intrahepatic biliary ductal dilatation. The common duct  measures 5-6 mm.    LIVER: 16.2 cm. Normal parenchyma with smooth contour. No focal mass.    RIGHT KIDNEY: 9.1 cm. Normal echogenicity with no hydronephrosis or  mass.     LEFT KIDNEY: 10.5 cm. Normal echogenicity with no hydronephrosis or  mass.     SPLEEN: 11.9 cm. Unremarkable.    PANCREAS: Distal body and tail not well seen. Visualized portions  unremarkable.    AORTA: Normal in caliber.     IVC: Normal where visualized.    No ascites.      Impression    IMPRESSION:  1.  No gross abnormalities in the visualized portions of  the pancreas.  The distal body and tail are not well-seen. If there is strong  clinical concern for acute pancreatitis, then this can be assessed by  CT.    2.  Negative for cholelithiasis or acute cholecystitis.    3.   No significant biliary tree dilation.    4.  Mild hepatic steatosis.    JIMI JIMENEZ MD       Discharge Medications   Current Discharge Medication List      START taking these medications    Details   Thiamine HCl (VITAMIN B1) 250 MG TABS Take 250 mg by mouth daily  Qty:      Associated Diagnoses: Anxiety         CONTINUE these medications which have CHANGED    Details   lisinopril (ZESTRIL) 40 MG tablet Take 1 tablet (40 mg) by mouth every morning  Qty:      Associated Diagnoses: Hypertension, unspecified type         CONTINUE these medications which have NOT CHANGED    Details   acetaminophen (TYLENOL) 325 MG tablet Take 1-2 tablets (325-650 mg) by mouth every 4 hours as needed for mild pain  Qty: 100 tablet, Refills: 0    Associated Diagnoses: S/P total hysterectomy      fexofenadine (ALLEGRA) 180 MG tablet Take 1 tablet (180 mg) by mouth daily  Qty: 90 tablet, Refills: 3    Associated Diagnoses: Allergic rhinitis due to animal hair and dander, unspecified chronicity      gabapentin (NEURONTIN) 300 MG capsule Take 1 capsule (300 mg) by mouth At Bedtime  Qty: 90 capsule, Refills: 3    Associated Diagnoses: Neuropathy      multivitamin w/minerals (THERA-VIT-M) tablet Take 1 tablet by mouth daily  Qty: 30 each, Refills: 0    Comments: Future refills by PCP Dr. Carmen Arriaza with phone number [phone].  Associated Diagnoses: Alcohol abuse      !! order for DME Equipment being ordered: Standard cane  Qty: 1 each, Refills: 0    Associated Diagnoses: Bilateral low back pain without sciatica, unspecified chronicity      !! order for DME Equipment being ordered: Incontinence pads  Qty: 100 each, Refills: 5    Associated Diagnoses: Stress incontinence of urine      pantoprazole (PROTONIX) 40  MG EC tablet Take 1 tablet (40 mg) by mouth every morning (before breakfast)  Qty: 30 tablet, Refills: 0    Comments: Future refills by PCP Dr. Carmen Arriaza with phone number [phone].  Associated Diagnoses: Alcohol abuse      polyethylene glycol (MIRALAX) powder Take 17 g (1 capful) by mouth daily as needed for constipation  Qty: 510 g, Refills: 1    Associated Diagnoses: Slow transit constipation      senna-docusate (SENOKOT-S/PERICOLACE) 8.6-50 MG tablet Take 1 tablet by mouth 2 times daily as needed for constipation  Qty: 60 tablet, Refills: 0    Associated Diagnoses: S/P total hysterectomy      witch hazel-glycerin (TUCKS) pad Place rectally daily as needed for hemorrhoids  Qty: 40 each, Refills: 0    Comments: Future refills by PCP Dr. Carmen Arriaza with phone number [phone].  Associated Diagnoses: External hemorrhoids       !! - Potential duplicate medications found. Please discuss with provider.      STOP taking these medications       ibuprofen (ADVIL/MOTRIN) 200 MG tablet Comments:   Reason for Stopping:         oxyCODONE (ROXICODONE) 5 MG tablet Comments:   Reason for Stopping:             Allergies   Allergies   Allergen Reactions     Asa [Aspirin]      Stomach irritation (hx of PUD)     Nsaids      Stomach irritation (Hx of PUD)

## 2020-06-02 NOTE — PLAN OF CARE
Cognitive Concerns/ Orientation : A&Ox 2-3, disoriented situation and very forgetful.  BEHAVIOR & AGGRESSION TOOL COLOR: Green  CIWA SCORE: NA   ABNL VS/O2: VSS, O2 wnl RA   MOBILITY: Ax2, walker and GB to C  PAIN MANAGMENT: Denies  DIET: Full liquid diet. C/o nausea; Compazine effective. NPO from midnight   BOWEL/BLADDER: Continent.   ABNL LAB/BG: lipase 1119 (751 on 5/31), Albumin 2.03, phosphorus 1.2 replaced; recheck 2.6  DRAIN/DEVICES: PIV infusing NS at 50ml/hr  TELEMETRY RHYTHM: NA  SKIN: Dry, cracked heels and scarring.   TESTS/PROCEDURES: Abd US this morning  D/C DAY/GOALS/PLACE: 1-2 days, Therapies recommending TCU. SW following.   OTHER IMPORTANT INFO: Continues to report feeling weak

## 2020-06-02 NOTE — PROGRESS NOTES
SW:  D:  Dr Barfield plans to discharge patient today.  He requests at 1100. Liaison for [facility] does have a vacancy for today.   [facility] medivan is available at 1245. Expense of medivan is covered by patient's [employer] product.  Updated patient and left a message asking for a return call from patient's sister Cassia.    Update at 1000. Spoke with sister Cassia.  She expressed pleasure with patient returning to [facility] with hopes that Josiane will be comfortable in returning to a familiar setting.  She is unable to help get clothes for patient due to transportation and not having access to patient's apartment. Sister given the address and phone number of [facility], the phone number of the TCU .    She is aware of no visiting at the care center.   Writer returned to patient's room to let her know writer had spoken with her sister Cassia regarding her discharge today.  Patient had a surprised look on her face and did not appear to remember her conversation with the hospitalist or myself earlier today.  She asked why she was going to the care center and writer explained the need for therapy to help with her mobility and memory.  Patient accepted the explanation and plan.    The orders and PAS  have been sent to the [facility] liaison.  PA approved.  Effective date: 6/2/20  PA reference #: [id number]  Pt. notified:   Sister Cassia

## 2020-06-02 NOTE — PLAN OF CARE
Discharge    Patient discharged to [facility] via w/c with [facility] at 1315  Care plan note  DATE & TIME: 6/2/20 8460-6084  Cognitive Concerns/ Orientation : A&O x3, disoriented situation and very forgetful.  BEHAVIOR & AGGRESSION TOOL COLOR: Green  CIWA SCORE: NA   ABNL VS/O2: VSS, O2 wnl RA   MOBILITY: Ax1-2, walker and GB to [facility] [place] [facility]  PAIN MANAGMENT: Denies  DIET: Full liquid diet. Denies nausea   BOWEL/BLADDER: Continent. Up to bedside commode frequently.   ABNL LAB/BG:   DRAIN/DEVICES: PIV access x1 removed  TELEMETRY RHYTHM: NA  SKIN: Dry, cracked heels and scarring.   TESTS/PROCEDURES:   D/C DAY/GOALS/PLACE: Discharged to [facility] today 6/2/20 at 1315, [facility]   OTHER IMPORTANT INFO: C/o worsening weakness, neuros intact. Got up to the bedside commode multiple times this shift. Went through AVS, pt verbalized understanding. Belongings from the security and in the room packed and sent with the pt. Pt's home medications, cells phones (a smart phone and small black phone), and envelop from the security (keys and wallet) all packed together in a large zip lock bag and requested the transport to hand it to the nurse taking over the pt at the facility.     Listed belongings gathered and returned to patient. Yes  Care Plan and Patient education resolved: Yes  Prescriptions if needed, hard copies sent with patient  NA  Home and hospital acquired medications returned to patient: Yes  Medication Bin checked and emptied on discharge Yes  Follow up appointment made for patient: Yes

## 2020-06-10 ENCOUNTER — RECORDS - HEALTHEAST (OUTPATIENT)
Dept: LAB | Facility: CLINIC | Age: 60
End: 2020-06-10

## 2020-06-10 LAB
ALBUMIN UR-MCNC: NEGATIVE MG/DL
APPEARANCE UR: CLEAR
BILIRUB UR QL STRIP: NEGATIVE
COLOR UR AUTO: YELLOW
GLUCOSE UR STRIP-MCNC: NEGATIVE MG/DL
HGB UR QL STRIP: NEGATIVE
KETONES UR STRIP-MCNC: NEGATIVE MG/DL
LEUKOCYTE ESTERASE UR QL STRIP: NEGATIVE
NITRATE UR QL: NEGATIVE
PH UR STRIP: 5.5 [PH] (ref 4.5–8)
SP GR UR STRIP: 1 (ref 1–1.03)
UROBILINOGEN UR STRIP-ACNC: NORMAL

## 2020-06-11 ENCOUNTER — RECORDS - HEALTHEAST (OUTPATIENT)
Dept: LAB | Facility: CLINIC | Age: 60
End: 2020-06-11

## 2020-06-11 LAB
ANION GAP SERPL CALCULATED.3IONS-SCNC: 7 MMOL/L (ref 5–18)
BUN SERPL-MCNC: 6 MG/DL (ref 8–22)
CALCIUM SERPL-MCNC: 8.8 MG/DL (ref 8.5–10.5)
CHLORIDE BLD-SCNC: 108 MMOL/L (ref 98–107)
CO2 SERPL-SCNC: 22 MMOL/L (ref 22–31)
CREAT SERPL-MCNC: 0.73 MG/DL (ref 0.6–1.1)
ERYTHROCYTE [DISTWIDTH] IN BLOOD BY AUTOMATED COUNT: 15.9 % (ref 11–14.5)
GFR SERPL CREATININE-BSD FRML MDRD: >60 ML/MIN/1.73M2
GLUCOSE BLD-MCNC: 114 MG/DL (ref 70–125)
HCT VFR BLD AUTO: 29.2 % (ref 35–47)
HGB BLD-MCNC: 8.9 G/DL (ref 12–16)
MCH RBC QN AUTO: 29.6 PG (ref 27–34)
MCHC RBC AUTO-ENTMCNC: 30.5 G/DL (ref 32–36)
MCV RBC AUTO: 97 FL (ref 80–100)
PLATELET # BLD AUTO: 190 THOU/UL (ref 140–440)
PMV BLD AUTO: 11.8 FL (ref 8.5–12.5)
POTASSIUM BLD-SCNC: 3.5 MMOL/L (ref 3.5–5)
RBC # BLD AUTO: 3.01 MILL/UL (ref 3.8–5.4)
SODIUM SERPL-SCNC: 137 MMOL/L (ref 136–145)
WBC: 6 THOU/UL (ref 4–11)

## 2020-06-13 LAB
BACTERIA SPEC CULT: ABNORMAL
BACTERIA SPEC CULT: ABNORMAL

## 2020-06-19 ENCOUNTER — RECORDS - HEALTHEAST (OUTPATIENT)
Dept: LAB | Facility: CLINIC | Age: 60
End: 2020-06-19

## 2020-06-19 LAB
ALBUMIN SERPL-MCNC: 2.6 G/DL (ref 3.5–5)
ALP SERPL-CCNC: 166 U/L (ref 45–120)
ALT SERPL W P-5'-P-CCNC: 15 U/L (ref 0–45)
AST SERPL W P-5'-P-CCNC: 31 U/L (ref 0–40)
BILIRUB DIRECT SERPL-MCNC: 0.4 MG/DL
BILIRUB SERPL-MCNC: 0.7 MG/DL (ref 0–1)
PROT SERPL-MCNC: 7.1 G/DL (ref 6–8)

## 2020-06-25 ENCOUNTER — RECORDS - HEALTHEAST (OUTPATIENT)
Dept: LAB | Facility: CLINIC | Age: 60
End: 2020-06-25

## 2020-06-25 LAB
ALBUMIN SERPL-MCNC: 2.8 G/DL (ref 3.5–5)
ALP SERPL-CCNC: 152 U/L (ref 45–120)
ALT SERPL W P-5'-P-CCNC: 14 U/L (ref 0–45)
ANION GAP SERPL CALCULATED.3IONS-SCNC: 9 MMOL/L (ref 5–18)
AST SERPL W P-5'-P-CCNC: 28 U/L (ref 0–40)
BILIRUB SERPL-MCNC: 0.9 MG/DL (ref 0–1)
BUN SERPL-MCNC: 10 MG/DL (ref 8–22)
CALCIUM SERPL-MCNC: 9.7 MG/DL (ref 8.5–10.5)
CHLORIDE BLD-SCNC: 104 MMOL/L (ref 98–107)
CO2 SERPL-SCNC: 25 MMOL/L (ref 22–31)
CREAT SERPL-MCNC: 0.77 MG/DL (ref 0.6–1.1)
GFR SERPL CREATININE-BSD FRML MDRD: >60 ML/MIN/1.73M2
GLUCOSE BLD-MCNC: 91 MG/DL (ref 70–125)
POTASSIUM BLD-SCNC: 3.5 MMOL/L (ref 3.5–5)
PROT SERPL-MCNC: 7.5 G/DL (ref 6–8)
SODIUM SERPL-SCNC: 138 MMOL/L (ref 136–145)

## 2020-07-14 ENCOUNTER — RECORDS - HEALTHEAST (OUTPATIENT)
Dept: LAB | Facility: CLINIC | Age: 60
End: 2020-07-14

## 2020-07-15 LAB
ANION GAP SERPL CALCULATED.3IONS-SCNC: 12 MMOL/L (ref 5–18)
BUN SERPL-MCNC: 11 MG/DL (ref 8–22)
CALCIUM SERPL-MCNC: 10 MG/DL (ref 8.5–10.5)
CHLORIDE BLD-SCNC: 107 MMOL/L (ref 98–107)
CO2 SERPL-SCNC: 24 MMOL/L (ref 22–31)
CREAT SERPL-MCNC: 0.77 MG/DL (ref 0.6–1.1)
GFR SERPL CREATININE-BSD FRML MDRD: >60 ML/MIN/1.73M2
GLUCOSE BLD-MCNC: 102 MG/DL (ref 70–125)
POTASSIUM BLD-SCNC: 3.8 MMOL/L (ref 3.5–5)
SODIUM SERPL-SCNC: 143 MMOL/L (ref 136–145)

## 2020-07-22 ENCOUNTER — RECORDS - HEALTHEAST (OUTPATIENT)
Dept: LAB | Facility: CLINIC | Age: 60
End: 2020-07-22

## 2020-07-22 LAB
SARS-COV-2 PCR COMMENT: NORMAL
SARS-COV-2 RNA SPEC QL NAA+PROBE: NEGATIVE
SARS-COV-2 VIRUS SPECIMEN SOURCE: NORMAL

## 2021-07-19 PROBLEM — J30.2 SEASONAL ALLERGIC RHINITIS: Chronic | Status: RESOLVED | Noted: 2017-04-16 | Resolved: 2021-07-19

## 2021-07-19 PROBLEM — E66.01 MORBID OBESITY (H): Chronic | Status: RESOLVED | Noted: 2018-07-02 | Resolved: 2021-07-19

## 2021-07-19 PROBLEM — Z90.710 S/P TOTAL HYSTERECTOMY: Status: RESOLVED | Noted: 2019-03-15 | Resolved: 2021-07-19

## 2021-07-19 PROBLEM — K85.90 PANCREATITIS: Status: RESOLVED | Noted: 2020-05-29 | Resolved: 2021-07-19

## 2021-07-19 PROBLEM — K85.90 ACUTE PANCREATITIS: Status: RESOLVED | Noted: 2020-05-31 | Resolved: 2021-07-19

## 2021-07-19 PROBLEM — D64.9 ANEMIA: Status: RESOLVED | Noted: 2019-02-22 | Resolved: 2021-07-19

## 2021-07-19 PROBLEM — D62 ACUTE ON CHRONIC BLOOD LOSS ANEMIA: Status: RESOLVED | Noted: 2019-02-24 | Resolved: 2021-07-19

## 2021-07-19 PROBLEM — I85.00 ESOPHAGEAL VARICES WITHOUT BLEEDING (H): Status: RESOLVED | Noted: 2019-02-28 | Resolved: 2021-07-19

## 2021-07-19 PROBLEM — K59.01 SLOW TRANSIT CONSTIPATION: Status: RESOLVED | Noted: 2017-04-16 | Resolved: 2021-07-19

## 2021-07-19 PROBLEM — G62.9 NEUROPATHY: Chronic | Status: RESOLVED | Noted: 2017-04-16 | Resolved: 2021-07-19

## 2021-07-19 PROBLEM — Z90.710 STATUS POST HYSTERECTOMY: Status: RESOLVED | Noted: 2019-03-14 | Resolved: 2021-07-19

## 2021-07-19 PROBLEM — K62.5 BRBPR (BRIGHT RED BLOOD PER RECTUM): Status: RESOLVED | Noted: 2019-02-28 | Resolved: 2021-07-19

## 2021-07-19 PROBLEM — M54.9 BILATERAL BACK PAIN, UNSPECIFIED BACK LOCATION, UNSPECIFIED CHRONICITY: Status: RESOLVED | Noted: 2017-04-16 | Resolved: 2021-07-19

## 2021-07-19 PROBLEM — N92.6 IRREGULAR MENSTRUAL CYCLE: Status: RESOLVED | Noted: 2017-04-16 | Resolved: 2021-07-19

## 2021-07-19 PROBLEM — N95.0 POST-MENOPAUSAL BLEEDING: Status: RESOLVED | Noted: 2019-02-22 | Resolved: 2021-07-19

## 2021-07-19 PROBLEM — M1A.09X0 IDIOPATHIC CHRONIC GOUT OF MULTIPLE SITES WITHOUT TOPHUS: Status: RESOLVED | Noted: 2018-07-04 | Resolved: 2021-07-19

## 2021-07-19 PROBLEM — N39.3 STRESS INCONTINENCE OF URINE: Status: RESOLVED | Noted: 2018-07-04 | Resolved: 2021-07-19

## 2021-07-19 PROBLEM — E03.8 SUBCLINICAL HYPOTHYROIDISM: Chronic | Status: RESOLVED | Noted: 2017-04-16 | Resolved: 2021-07-19

## 2021-07-22 ENCOUNTER — TELEPHONE (OUTPATIENT)
Dept: INTERNAL MEDICINE | Facility: CLINIC | Age: 61
End: 2021-07-22

## 2021-07-22 ENCOUNTER — OFFICE VISIT (OUTPATIENT)
Dept: INTERNAL MEDICINE | Facility: CLINIC | Age: 61
End: 2021-07-22
Payer: COMMERCIAL

## 2021-07-22 VITALS
WEIGHT: 286 LBS | BODY MASS INDEX: 43.35 KG/M2 | HEIGHT: 68 IN | HEART RATE: 86 BPM | SYSTOLIC BLOOD PRESSURE: 134 MMHG | OXYGEN SATURATION: 96 % | DIASTOLIC BLOOD PRESSURE: 82 MMHG | RESPIRATION RATE: 18 BRPM | TEMPERATURE: 98.2 F

## 2021-07-22 DIAGNOSIS — F33.42 RECURRENT MAJOR DEPRESSIVE DISORDER, IN FULL REMISSION (H): ICD-10-CM

## 2021-07-22 DIAGNOSIS — J30.2 SEASONAL ALLERGIES: Primary | ICD-10-CM

## 2021-07-22 DIAGNOSIS — Z12.31 ENCOUNTER FOR SCREENING MAMMOGRAM FOR BREAST CANCER: ICD-10-CM

## 2021-07-22 DIAGNOSIS — I10 BENIGN ESSENTIAL HYPERTENSION: ICD-10-CM

## 2021-07-22 DIAGNOSIS — J30.2 SEASONAL ALLERGIES: ICD-10-CM

## 2021-07-22 DIAGNOSIS — Z13.1 SCREENING FOR DIABETES MELLITUS: ICD-10-CM

## 2021-07-22 DIAGNOSIS — Z00.00 ROUTINE HISTORY AND PHYSICAL EXAMINATION OF ADULT: Primary | ICD-10-CM

## 2021-07-22 DIAGNOSIS — Z23 NEED FOR VACCINATION: ICD-10-CM

## 2021-07-22 DIAGNOSIS — Z13.220 SCREENING FOR CHOLESTEROL LEVEL: ICD-10-CM

## 2021-07-22 DIAGNOSIS — R32 URINARY INCONTINENCE, UNSPECIFIED TYPE: ICD-10-CM

## 2021-07-22 PROBLEM — E66.01 MORBID OBESITY (H): Status: ACTIVE | Noted: 2021-07-22

## 2021-07-22 PROCEDURE — 90750 HZV VACC RECOMBINANT IM: CPT | Performed by: INTERNAL MEDICINE

## 2021-07-22 PROCEDURE — 90472 IMMUNIZATION ADMIN EACH ADD: CPT | Performed by: INTERNAL MEDICINE

## 2021-07-22 PROCEDURE — 90471 IMMUNIZATION ADMIN: CPT | Performed by: INTERNAL MEDICINE

## 2021-07-22 PROCEDURE — 90714 TD VACC NO PRESV 7 YRS+ IM: CPT | Performed by: INTERNAL MEDICINE

## 2021-07-22 PROCEDURE — 99396 PREV VISIT EST AGE 40-64: CPT | Mod: 25 | Performed by: INTERNAL MEDICINE

## 2021-07-22 RX ORDER — LISINOPRIL 40 MG/1
40 TABLET ORAL EVERY MORNING
Qty: 90 TABLET | Refills: 3 | Status: SHIPPED | OUTPATIENT
Start: 2021-07-22 | End: 2022-04-12

## 2021-07-22 RX ORDER — FUROSEMIDE 20 MG
TABLET ORAL
COMMUNITY
Start: 2021-06-29 | End: 2021-07-22

## 2021-07-22 RX ORDER — LORATADINE 10 MG/1
10 TABLET ORAL DAILY
Qty: 90 TABLET | Refills: 3 | Status: SHIPPED | OUTPATIENT
Start: 2021-07-22 | End: 2022-04-12

## 2021-07-22 RX ORDER — GABAPENTIN 300 MG/1
300 CAPSULE ORAL AT BEDTIME
Qty: 90 CAPSULE | Refills: 3 | Status: SHIPPED | OUTPATIENT
Start: 2021-07-22 | End: 2022-04-12

## 2021-07-22 RX ORDER — TRAZODONE HYDROCHLORIDE 50 MG/1
TABLET, FILM COATED ORAL
COMMUNITY
Start: 2021-06-23 | End: 2021-07-22

## 2021-07-22 RX ORDER — FUROSEMIDE 20 MG
20 TABLET ORAL 2 TIMES DAILY
Qty: 180 TABLET | Refills: 3 | Status: SHIPPED | OUTPATIENT
Start: 2021-07-22 | End: 2021-10-04

## 2021-07-22 RX ORDER — TRAZODONE HYDROCHLORIDE 50 MG/1
50 TABLET, FILM COATED ORAL AT BEDTIME
Qty: 90 TABLET | Refills: 3 | Status: SHIPPED | OUTPATIENT
Start: 2021-07-22 | End: 2022-04-12

## 2021-07-22 RX ORDER — ACETAMINOPHEN 325 MG/1
325-650 TABLET ORAL EVERY 4 HOURS PRN
Qty: 100 TABLET | Refills: 0 | Status: SHIPPED | OUTPATIENT
Start: 2021-07-22 | End: 2021-08-31

## 2021-07-22 RX ORDER — FEXOFENADINE HCL 180 MG/1
180 TABLET ORAL DAILY
Qty: 90 TABLET | Refills: 3 | Status: SHIPPED | OUTPATIENT
Start: 2021-07-22 | End: 2021-07-22

## 2021-07-22 RX ORDER — FUROSEMIDE 20 MG
20 TABLET ORAL 2 TIMES DAILY
COMMUNITY
Start: 2021-07-22 | End: 2021-07-22

## 2021-07-22 ASSESSMENT — MIFFLIN-ST. JEOR: SCORE: 1910.79

## 2021-07-22 NOTE — PATIENT INSTRUCTIONS
TD and Shingrix #1 today.    #2 in 2-6 months.    ---    Please schedule mammogram and fasting labs.    ---

## 2021-07-22 NOTE — PROGRESS NOTES
ASSESSMENT/PLAN                                                       (Z00.00) Routine history and physical examination of adult  (primary encounter diagnosis)  Comment: PMH, PSH, FH, SH, medications, allergies, immunizations, and preventative health measures reviewed and updated as appropriate.  Plan: see below for plans.      (Z12.31) Encounter for screening mammogram for breast cancer  Plan: screening mammogram ordered - patient to schedule.     (Z23) Need for vaccination  Plan: TD and Shingrix #1 given today.     (Z13.220) Screening for cholesterol level  (Z13.1) Screening for diabetes mellitus  Plan: fasting labs ordered - patient to schedule.     (I10) Benign essential hypertension  Comment: well-controlled on current regimen.    Plan: continue present management; refills provided.     (J30.2) Seasonal allergies  Comment: well-controlled on current regimen.    Plan: continue present management; refills provided.     (F33.42) Recurrent major depressive disorder, in full remission (H)  Comment: well-controlled on current regimen.    Plan: continue present management; refills provided.     Carmen Arriaza MD   [address] [address]  T: [phone], F: [phone]    ADDENDUM 10/1/21  Patient suffers from frequent urinary incontinence and has open sores on the back of her legs that are infected. Patient needs both pull-ups and liners for protection.    (R32) Urinary incontinence, unspecified type  Plan: Pull-ups (4/day, 120/month) and liners (4/day, 120/month) required.    SUBJECTIVE                                                      Josiane Dasilva is a very pleasant 60 year old female who presents for a physical.    ROS:  Constitutional: no unintentional weight loss or gain reported; no fevers, chills, or sweats reported  Cardiovascular: no chest pain, palpitations, or edema reported  Respiratory: no cough, wheezing, shortness of breath, or dyspnea on exertion  reported  Gastrointestinal: no nausea, vomiting, constipation, diarrhea, or abdominal pain reported  Genitourinary: no urinary frequency, urgency, dysuria, or hematuria reported  Integumentary: no rash or pruritus reported  Musculoskeletal: no back pain, muscle pain, joint pain, or joint swelling reported  Neurologic: no focal weakness, numbness, or tingling reported  Hematologic: no easy bruising or bleeding reported  Endocrine: no heat or cold intolerance reported; no polyuria or polydipsia reported  Psychiatric: see PMH below    Past Medical History:   Diagnosis Date     Alcoholism /alcohol abuse (H)      Benign essential hypertension      RICO (generalized anxiety disorder)      History of peptic ulcer disease      MDD (major depressive disorder)      Morbid obesity (H)      Peripheral neuropathy      Poor short term memory      Past Surgical History:   Procedure Laterality Date     APPENDECTOMY       COLONOSCOPY  08/13/2014    Procedure: COMBINED COLONOSCOPY, SINGLE BIOPSY/POLYPECTOMY BY BIOPSY;  Surgeon: Mike Mancuso MD;  Location:  GI     CYSTOSCOPY N/A 03/14/2019    Procedure: Cystoscopy;  Surgeon: Mary Ash MD;  Location:  OR     ESOPHAGOSCOPY, GASTROSCOPY, DUODENOSCOPY (EGD), COMBINED  08/12/2014    Procedure: COMBINED ESOPHAGOSCOPY, GASTROSCOPY, DUODENOSCOPY (EGD), BIOPSY SINGLE OR MULTIPLE;  Surgeon: Mike Mancuso MD;  Location: [facility]     LAPAROSCOPIC HYSTERECTOMY TOTAL, BILATERAL SALPINGO-OOPHORECTOMY, NODE DISSECTION, COMBINED N/A 03/14/2019    Procedure: Laparoscopic Total Hysterectomy, Bilateral Salpingo-Oophorectomy, and Repair of Vaginal Laceration;  Surgeon: Mary Ash MD;  Location:  OR     OPEN REDUCTION INTERNAL FIXATION ANKLE Left 2014     OTHER SURGICAL HISTORY      [place] I as teenager secondary to ulcer     TUBAL LIGATION       Family History   Problem Relation Age of Onset     Diabetes Type 2  Father      Hypertension Father       Hypertension Paternal Aunt      Hypertension Paternal Uncle      Myocardial Infarction No family hx of      Cerebrovascular Disease No family hx of      Coronary Artery Disease Early Onset No family hx of      Breast Cancer No family hx of      Ovarian Cancer No family hx of      Colon Cancer No family hx of      Social History     Tobacco Use     Smoking status: Former Smoker     Packs/day: 0.00     Years: 10.00     Pack years: 0.00     Quit date: 2005     Years since quittin.5     Smokeless tobacco: Never Used   Substance and Sexual Activity     Alcohol use: Not Currently     Drug use: No     Sexual activity: Not Currently   Social History Narrative    Single. Lives in a group home.    3 adult sons.    4 grandchildren.    Walks with walker/wheelchair.     No Known Allergies     Current Outpatient Medications   Medication Sig     acetaminophen (TYLENOL) 325 MG tablet Take 1-2 tablets (325-650 mg) by mouth every 4 hours as needed for mild pain     fexofenadine (ALLEGRA) 180 MG tablet Take 1 tablet (180 mg) by mouth daily     furosemide (LASIX) 20 MG tablet Take 1 tablet (20 mg) by mouth 2 times daily     gabapentin (NEURONTIN) 300 MG capsule Take 1 capsule (300 mg) by mouth At Bedtime     lisinopril (ZESTRIL) 40 MG tablet Take 1 tablet (40 mg) by mouth every morning     multivitamin w/minerals (THERA-VIT-M) tablet Take 1 tablet by mouth daily (Patient taking differently: Take 1 tablet by mouth daily On hold for surgery)     traZODone (DESYREL) 50 MG tablet Take 1 tablet (50 mg) by mouth At Bedtime     Immunization History   Administered Date(s) Administered     COVID-19,PF,Moderna 2020, 2021     HepB-Adult 1998     Influenza (IIV3) PF 2013     Influenza Not Indicated - By Hx 2014     Influenza Vaccine IM Ages 6-35 Months 4 Valent (PF) 10/22/2020     Tdap (Adacel,Boostrix) 2011     PREVENTATIVE HEALTH                                                      BMI: morbidly  "obese  Blood pressure: well-controlled on current regimen   Breast CA screening: DUE  Cervical CA screening: n/a   Colon CA screening: DUE - patient declines  Lung CA screening: patient does not meet screening criteria  Dexa: not medically indicated at this time   Screening cholesterol: DUE  Screening diabetes: DUE  STD testing: not sexually active  Alcohol misuse screening: alcohol use reviewed - no intervention indicated at this time  Immunizations: reviewed; TD and Shingrix series DUE    OBJECTIVE                                                      /82 (BP Location: Left arm, Patient Position: Chair, Cuff Size: Adult Large)   Pulse 86   Temp 98.2  F (36.8  C) (Temporal)   Resp 18   Ht 1.727 m (5' 8\")   Wt 129.7 kg (286 lb)   LMP 04/07/2014   SpO2 96%   BMI 43.49 kg/m    Constitutional: well-appearing  Head, Ears, and Eyes: normocephalic; normal external auditory canal and pinna; tympanic membranes visualized and normal; normal lids and conjunctivae  Neck: supple, symmetric, no thyromegaly or lymphadenopathy  Respiratory: normal respiratory effort; clear to auscultation bilaterally  Cardiovascular: regular rate and rhythm; no edema  Musculoskeletal: normal gait and station  Psych: diminished judgment and insight; normal mood and affect; poor recent memory; diminished remote memory     ---  (Note was completed, in part, with [employer] voice-recognition software. Documentation was reviewed, but some grammatical, spelling, and word errors may remain.)    "

## 2021-07-22 NOTE — TELEPHONE ENCOUNTER
[facility] faxed in a request for an alternative for fexofenadine (ALLEGRA) 180 MG tablet. Covered alternatives are Cetirizine and Loratidine.    [facility] phone [phone]

## 2021-08-05 ENCOUNTER — LAB (OUTPATIENT)
Dept: LAB | Facility: CLINIC | Age: 61
End: 2021-08-05

## 2021-08-05 DIAGNOSIS — Z13.1 SCREENING FOR DIABETES MELLITUS: ICD-10-CM

## 2021-08-05 DIAGNOSIS — Z13.220 SCREENING FOR CHOLESTEROL LEVEL: ICD-10-CM

## 2021-08-05 PROCEDURE — 80061 LIPID PANEL: CPT

## 2021-08-05 PROCEDURE — 36415 COLL VENOUS BLD VENIPUNCTURE: CPT

## 2021-08-05 PROCEDURE — 80053 COMPREHEN METABOLIC PANEL: CPT

## 2021-08-06 LAB
ALBUMIN SERPL-MCNC: 3.5 G/DL (ref 3.4–5)
ALP SERPL-CCNC: 198 U/L (ref 40–150)
ALT SERPL W P-5'-P-CCNC: 63 U/L (ref 0–50)
ANION GAP SERPL CALCULATED.3IONS-SCNC: 3 MMOL/L (ref 3–14)
AST SERPL W P-5'-P-CCNC: 31 U/L (ref 0–45)
BILIRUB SERPL-MCNC: 0.3 MG/DL (ref 0.2–1.3)
BUN SERPL-MCNC: 21 MG/DL (ref 7–30)
CALCIUM SERPL-MCNC: 9.2 MG/DL (ref 8.5–10.1)
CHLORIDE BLD-SCNC: 110 MMOL/L (ref 94–109)
CHOLEST SERPL-MCNC: 206 MG/DL
CO2 SERPL-SCNC: 28 MMOL/L (ref 20–32)
CREAT SERPL-MCNC: 0.8 MG/DL (ref 0.52–1.04)
FASTING STATUS PATIENT QL REPORTED: NO
GFR SERPL CREATININE-BSD FRML MDRD: 80 ML/MIN/1.73M2
GLUCOSE BLD-MCNC: 118 MG/DL (ref 70–99)
HDLC SERPL-MCNC: 84 MG/DL
LDLC SERPL CALC-MCNC: 102 MG/DL
NONHDLC SERPL-MCNC: 122 MG/DL
POTASSIUM BLD-SCNC: 4 MMOL/L (ref 3.4–5.3)
PROT SERPL-MCNC: 7.6 G/DL (ref 6.8–8.8)
SODIUM SERPL-SCNC: 141 MMOL/L (ref 133–144)
TRIGL SERPL-MCNC: 99 MG/DL

## 2021-08-30 DIAGNOSIS — R52 MILD PAIN: Primary | ICD-10-CM

## 2021-08-31 RX ORDER — ACETAMINOPHEN 325 MG/1
TABLET ORAL
Qty: 100 TABLET | Refills: 0 | Status: SHIPPED | OUTPATIENT
Start: 2021-08-31 | End: 2021-09-23

## 2021-08-31 NOTE — TELEPHONE ENCOUNTER
Prescription approved per [facility] Refill Protocol.  Laverne Dunlap RN

## 2021-08-31 NOTE — TELEPHONE ENCOUNTER
Routing refill request to provider for review/approval because:  Drug not on the [facility] refill protocol   Laverne Dunlap RN  [facility]=

## 2021-09-12 ENCOUNTER — OFFICE VISIT (OUTPATIENT)
Dept: URGENT CARE | Facility: URGENT CARE | Age: 61
End: 2021-09-12
Payer: COMMERCIAL

## 2021-09-12 VITALS
BODY MASS INDEX: 48.38 KG/M2 | WEIGHT: 293 LBS | RESPIRATION RATE: 18 BRPM | DIASTOLIC BLOOD PRESSURE: 90 MMHG | OXYGEN SATURATION: 99 % | SYSTOLIC BLOOD PRESSURE: 150 MMHG | TEMPERATURE: 98.3 F | HEART RATE: 93 BPM

## 2021-09-12 DIAGNOSIS — L03.119 CELLULITIS OF LOWER EXTREMITY, UNSPECIFIED LATERALITY: Primary | ICD-10-CM

## 2021-09-12 DIAGNOSIS — L98.491 SKIN ULCER, LIMITED TO BREAKDOWN OF SKIN (H): ICD-10-CM

## 2021-09-12 PROCEDURE — 99213 OFFICE O/P EST LOW 20 MIN: CPT | Performed by: FAMILY MEDICINE

## 2021-09-12 RX ORDER — CEPHALEXIN 500 MG/1
500 CAPSULE ORAL 3 TIMES DAILY
Qty: 30 CAPSULE | Refills: 0 | Status: SHIPPED | OUTPATIENT
Start: 2021-09-12 | End: 2021-09-22

## 2021-09-13 NOTE — PROGRESS NOTES
SUBJECTIVE: Josiane Dasilva is a 60 year old female presenting with a chief complaint of sores lower bilateral legs.  Onset of symptoms was day(s) ago.  Course of illness is worsening.      Past Medical History:   Diagnosis Date     Alcoholism /alcohol abuse (H)      Benign essential hypertension      RICO (generalized anxiety disorder)      History of peptic ulcer disease      Impaired fasting glucose      MDD (major depressive disorder)      Morbid obesity (H)      Peripheral neuropathy      Poor short term memory      No Known Allergies  Social History     Tobacco Use     Smoking status: Former Smoker     Packs/day: 0.00     Years: 10.00     Pack years: 0.00     Quit date: 2005     Years since quittin.7     Smokeless tobacco: Never Used   Substance Use Topics     Alcohol use: Not Currently       ROS:  SKIN: no rash  GI: no vomiting    OBJECTIVE:  BP (!) 150/90   Pulse 93   Temp 98.3  F (36.8  C) (Oral)   Resp 18   Wt 144.3 kg (318 lb 3.2 oz)   LMP 2014   SpO2 99%   BMI 48.38 kg/m  GENERAL APPEARANCE: healthy, alert and no distress  SKIN: red tender lower posterior legs with superficial ulcers      ICD-10-CM    1. Cellulitis of lower extremity, unspecified laterality  L03.119 cephALEXin (KEFLEX) 500 MG capsule   2. Skin ulcer, limited to breakdown of skin (H)  L98.491 cephALEXin (KEFLEX) 500 MG capsule     dressings  Fluids/Rest, f/u if worse/not any better

## 2021-09-21 DIAGNOSIS — R52 MILD PAIN: ICD-10-CM

## 2021-09-23 RX ORDER — ACETAMINOPHEN 325 MG/1
TABLET ORAL
Qty: 100 TABLET | Refills: 11 | Status: ON HOLD | OUTPATIENT
Start: 2021-09-23 | End: 2022-07-15

## 2021-09-23 NOTE — TELEPHONE ENCOUNTER
Routing refill request to provider for review/approval because:  Okay to refill?   Laverne Dnulap RN  [facility]=

## 2021-09-25 ENCOUNTER — HOSPITAL ENCOUNTER (EMERGENCY)
Facility: CLINIC | Age: 61
Discharge: NURSING, REHAB OR LONG-TERM CARE | End: 2021-09-26
Attending: EMERGENCY MEDICINE | Admitting: EMERGENCY MEDICINE
Payer: COMMERCIAL

## 2021-09-25 DIAGNOSIS — D64.9 ANEMIA, UNSPECIFIED TYPE: ICD-10-CM

## 2021-09-25 DIAGNOSIS — R73.01 IMPAIRED FASTING GLUCOSE: ICD-10-CM

## 2021-09-25 DIAGNOSIS — G62.9 PERIPHERAL POLYNEUROPATHY: ICD-10-CM

## 2021-09-25 DIAGNOSIS — L97.301 ULCER OF ANKLE, UNSPECIFIED LATERALITY, LIMITED TO BREAKDOWN OF SKIN (H): Primary | ICD-10-CM

## 2021-09-25 LAB
ANION GAP SERPL CALCULATED.3IONS-SCNC: 4 MMOL/L (ref 3–14)
BASOPHILS # BLD AUTO: 0 10E3/UL (ref 0–0.2)
BASOPHILS NFR BLD AUTO: 0 %
BUN SERPL-MCNC: 25 MG/DL (ref 7–30)
CALCIUM SERPL-MCNC: 8.4 MG/DL (ref 8.5–10.1)
CHLORIDE BLD-SCNC: 110 MMOL/L (ref 94–109)
CO2 SERPL-SCNC: 26 MMOL/L (ref 20–32)
CREAT SERPL-MCNC: 0.92 MG/DL (ref 0.52–1.04)
EOSINOPHIL # BLD AUTO: 0.2 10E3/UL (ref 0–0.7)
EOSINOPHIL NFR BLD AUTO: 2 %
ERYTHROCYTE [DISTWIDTH] IN BLOOD BY AUTOMATED COUNT: 13.7 % (ref 10–15)
GFR SERPL CREATININE-BSD FRML MDRD: 67 ML/MIN/1.73M2
GLUCOSE BLD-MCNC: 102 MG/DL (ref 70–99)
HCT VFR BLD AUTO: 34 % (ref 35–47)
HGB BLD-MCNC: 10.4 G/DL (ref 11.7–15.7)
IMM GRANULOCYTES # BLD: 0 10E3/UL
IMM GRANULOCYTES NFR BLD: 0 %
LYMPHOCYTES # BLD AUTO: 1.7 10E3/UL (ref 0.8–5.3)
LYMPHOCYTES NFR BLD AUTO: 19 %
MCH RBC QN AUTO: 25.8 PG (ref 26.5–33)
MCHC RBC AUTO-ENTMCNC: 30.6 G/DL (ref 31.5–36.5)
MCV RBC AUTO: 84 FL (ref 78–100)
MONOCYTES # BLD AUTO: 0.6 10E3/UL (ref 0–1.3)
MONOCYTES NFR BLD AUTO: 7 %
NEUTROPHILS # BLD AUTO: 6.6 10E3/UL (ref 1.6–8.3)
NEUTROPHILS NFR BLD AUTO: 72 %
NRBC # BLD AUTO: 0 10E3/UL
NRBC BLD AUTO-RTO: 0 /100
PLATELET # BLD AUTO: 194 10E3/UL (ref 150–450)
POTASSIUM BLD-SCNC: 4.5 MMOL/L (ref 3.4–5.3)
RBC # BLD AUTO: 4.03 10E6/UL (ref 3.8–5.2)
SODIUM SERPL-SCNC: 140 MMOL/L (ref 133–144)
WBC # BLD AUTO: 9.2 10E3/UL (ref 4–11)

## 2021-09-25 PROCEDURE — 85025 COMPLETE CBC W/AUTO DIFF WBC: CPT | Performed by: EMERGENCY MEDICINE

## 2021-09-25 PROCEDURE — 80048 BASIC METABOLIC PNL TOTAL CA: CPT | Performed by: EMERGENCY MEDICINE

## 2021-09-25 PROCEDURE — 36415 COLL VENOUS BLD VENIPUNCTURE: CPT | Performed by: EMERGENCY MEDICINE

## 2021-09-25 PROCEDURE — 99283 EMERGENCY DEPT VISIT LOW MDM: CPT

## 2021-09-25 ASSESSMENT — MIFFLIN-ST. JEOR: SCORE: 2055.94

## 2021-09-25 ASSESSMENT — ENCOUNTER SYMPTOMS
WOUND: 1
FEVER: 0

## 2021-09-26 VITALS
SYSTOLIC BLOOD PRESSURE: 134 MMHG | TEMPERATURE: 97.5 F | RESPIRATION RATE: 20 BRPM | WEIGHT: 293 LBS | HEIGHT: 68 IN | DIASTOLIC BLOOD PRESSURE: 121 MMHG | OXYGEN SATURATION: 99 % | HEART RATE: 88 BPM | BODY MASS INDEX: 44.41 KG/M2

## 2021-09-26 PROCEDURE — 250N000013 HC RX MED GY IP 250 OP 250 PS 637: Performed by: EMERGENCY MEDICINE

## 2021-09-26 RX ORDER — HYDROCODONE BITARTRATE AND ACETAMINOPHEN 5; 325 MG/1; MG/1
1 TABLET ORAL ONCE
Status: COMPLETED | OUTPATIENT
Start: 2021-09-26 | End: 2021-09-26

## 2021-09-26 RX ADMIN — HYDROCODONE BITARTRATE AND ACETAMINOPHEN 1 TABLET: 5; 325 TABLET ORAL at 01:12

## 2021-09-26 RX ADMIN — AMOXICILLIN AND CLAVULANATE POTASSIUM 1 TABLET: 875; 125 TABLET, FILM COATED ORAL at 00:21

## 2021-09-26 ASSESSMENT — ENCOUNTER SYMPTOMS: MYALGIAS: 1

## 2021-09-26 NOTE — DISCHARGE INSTRUCTIONS
Need to follow-up in wound clinic to help with healing- call the [facility] healing institute to make appointment  In meantime, non stick dressing on wound daily  Start the antibiotics  Over the counter medications for pain as needed

## 2021-09-26 NOTE — ED NOTES
Bed: ED04  Expected date: 9/25/21  Expected time: 10:40 PM  Means of arrival: Ambulance  Comments:  Celia 517 50F infected ankle sores

## 2021-09-26 NOTE — ED PROVIDER NOTES
"  History   Chief Complaint:  Wound Check       HPI   Josiane Dasilva is a 60 year old female with history of hypertension who presents via EMS from a group home for worsening bilateral ankle sores. The patient was seen at  on 9/12 for these and started on Keflex. She states that she is unsure how long these sore have been there but they have not bothered her until recently when they started dressing them. The patient mentions that she had a blister in this area as well. Notes that she doesn't move much but she usually lays on her side. No reported fevers.    Review of Systems   Constitutional: Negative for fever.   Musculoskeletal: Positive for myalgias.   Skin: Positive for wound.   All other systems reviewed and are negative.    Allergies:  The patient has no known allergies.     Medications:  Lasix  Neurontin  Zestril  Desyrel    Past Medical History:    Alcoholism  Hypertension  Anxiety  PUD  Depression  Morbid obesity  Peripheral neuropathy  PTSD  Substance abuse  Pancreatitis  Thrombocytopenia  Metabolic encephalopathy    Past Surgical History:    Appendectomy  Colonoscopy  Cystoscopy  EGD combined  Hysterectomy  ORIF ankle  Tubal ligation    Family History:    Father: Hypertension, type 2 diabetes  Mother: Arthritis    Social History:  Patient presents to the ED via EMS.  Lives in a group home.     Physical Exam     Patient Vitals for the past 24 hrs:   BP Temp Temp src Pulse Resp SpO2 Height Weight   09/25/21 2254 (!) 159/75 97.5  F (36.4  C) Oral 90 20 99 % 1.727 m (5' 8\") 144.2 kg (318 lb)       Physical Exam  General: Sitting up in bed  Eyes:  The pupils are equal and round    Conjunctivae and sclerae are normal  ENT:    Wearing a mask  Neck:  Normal range of motion  CV:  Regular rate, regular rhythm    PT pulse 1+ bilaterally     Skin warm and well perfused   Resp:  Non labored breathing on room air    No tachypnea    No cough heard  GI:  Abdomen is soft, there is no rigidity    No distension    No " rebound tenderness     No abdominal tenderness  MS:  Normal muscular tone  Skin:  Superficial ulcers on bilateral ankles L>R. No drainage. No spreading erythema. See pictures below.   Neuro:   Awake, alert.      Speech is normal and fluent.    Face is symmetric.     Moves all extremities equally  Psych: Normal affect.  Appropriate interactions.                Emergency Department Course     Laboratory:   CBC: WBC: 9.2, HGB: 10.4 (L), PLT: 194    BMP: Glucose 102 (H), Chloride: 110 (H), Calcium: 8.4 (L), o/w WNL (Creatinine: 0.92)    Emergency Department Course:    Reviewed:  I reviewed nursing notes, vitals, past medical history and care everywhere    Assessments:  2246 I obtained history and examined the patient as noted above.     2348 I rechecked the patient and explained findings.     Interventions:  0021 Augmentin 1 tablet PO  0112 Norco 5-325 mg 1 tablet PO    Disposition:  The patient was discharged to home.     Impression & Plan     Medical Decision Making:  Josiane Dasilva is a 60 year old female who presented to the ED with wound check.  I see that patient was just on Keflex for her legs though now off the antibiotics.  Unclear if there was improvement of her legs from this.  The areas are superficial.  There is no spreading or surrounding erythema. I do think that she mainly needs wound care and wound clinic.  Given her medical history, will put her on antibiotics though lower concern for cellulitis and don't think hospitalization is indicated.  Doubt DVT.  No trauma to suggest fracture.    I placed wound care referral and discussed calling wound care clinic for follow-up.  No evidence of arterial occlusion, necrotizing fasciitis or compartment syndrome.    Diagnosis:    ICD-10-CM    1. Ulcer of ankle, unspecified laterality, limited to breakdown of skin (H)  L97.301 Wound Care Referral   2. Anemia, unspecified type  D64.9    3. Impaired fasting glucose  R73.01 Wound Care Referral   4. Peripheral  polyneuropathy  G62.9 Wound Care Referral       Discharge Medications:  New Prescriptions    AMOXICILLIN-CLAVULANATE (AUGMENTIN) 875-125 MG TABLET    Take 1 tablet by mouth 2 times daily for 7 days       Scribe Disclosure:  I, David Evans, am serving as a scribe at 10:48 PM on 9/25/2021 to document services personally performed by Pepper Bains MD based on my observations and the provider's statements to me.            Pepper Bains MD  09/26/21 0554

## 2021-09-26 NOTE — ED TRIAGE NOTES
Wounds to ankles bilaterally, pt lives at group home, staff called for pt to be brought in to have them looked at

## 2021-09-26 NOTE — ED NOTES
Group home staff ([facility]) notified regarding patient departure from ED.  Also update regarding pain medication Norco given.

## 2021-09-28 ENCOUNTER — TELEPHONE (OUTPATIENT)
Dept: WOUND CARE | Facility: CLINIC | Age: 61
End: 2021-09-28

## 2021-09-28 NOTE — TELEPHONE ENCOUNTER
Consult received via [email or web address] from provider in ER for ulcer of the leg/ankle   Please schedule with Dr. Park at [facility].

## 2021-10-04 ENCOUNTER — NURSE TRIAGE (OUTPATIENT)
Dept: INTERNAL MEDICINE | Facility: CLINIC | Age: 61
End: 2021-10-04

## 2021-10-04 ENCOUNTER — TELEPHONE (OUTPATIENT)
Dept: INTERNAL MEDICINE | Facility: CLINIC | Age: 61
End: 2021-10-04

## 2021-10-04 DIAGNOSIS — I10 BENIGN ESSENTIAL HYPERTENSION: ICD-10-CM

## 2021-10-04 DIAGNOSIS — L97.301 SKIN ULCER OF ANKLE, LIMITED TO BREAKDOWN OF SKIN, UNSPECIFIED LATERALITY (H): Primary | ICD-10-CM

## 2021-10-04 RX ORDER — FUROSEMIDE 20 MG
40 TABLET ORAL 2 TIMES DAILY
Qty: 360 TABLET | Refills: 3 | Status: SHIPPED | OUTPATIENT
Start: 2021-10-04 | End: 2022-04-29

## 2021-10-04 NOTE — TELEPHONE ENCOUNTER
New Rx sent in.    Another wound care referral has been placed (one was placed 9/25/21 but does not appear to have resulted in an appointment) - hopefully they will reach out to patient in the near future to schedule an evaluation.    Will hold off on additional antibiotics at this time.

## 2021-10-04 NOTE — TELEPHONE ENCOUNTER
"PCP: patient's manager at [facility] called, states patient having severe bilateral leg edema, legs are weeping.  states that they already  called EMS who states that the paramedics   \"did not want to take her\" and that the paramedics said that \"they can't do much\". Per care advice, ED is advised but the  wanted PCP opinion. Please advise.     Jens huber, Manager at [facility],     Patient was seen in ER recently  Finished abx.   Bad pitting edema, \"water leaking out of legs\"  Denies shortness of breath, \"she hasn't complained of any\"  \"shes in a lot of pain\"  Started: 4-5 days ago  Location: both, below the knees  Severity: \"its pretty bad\"- if she sits there, theres a puddle on the floor\"  Unsure of redness, no fever  \"the paramedics said that they cant do much\"  \"they did not want to take her\"    Callback: [phone]- okay to leave detailed.     Gonzalez Lipscomb RN  [facility]    Reason for Disposition    SEVERE swelling (e.g., swelling extends above knee, entire leg is swollen, weeping fluid)    Additional Information    Negative: Chest pain    Negative: Small area of swelling and followed an insect bite to the area    Negative: Followed a knee injury    Negative: Ankle or foot injury    Negative: Pregnant with leg swelling or edema    Negative: Difficulty breathing at rest    Negative: Entire foot is cool or blue in comparison to other side    Negative: Swelling of face, arm or hands (Exception: slight puffiness of fingers during hot weather)    Negative: Pregnant > 20 weeks and sudden weight gain (i.e., > 2 lbs, 1 kg in one week)    Protocols used: LEG SWELLING AND EDEMA-A-OH      "

## 2021-10-04 NOTE — TELEPHONE ENCOUNTER
Is she taking her Lasix 20mg twice a day?    If so, they should increase her dose to 40mg (two 20mg tabs taken together) twice daily.    Please schedule follow-up with me for next week (may use any virtual or morning chart review slots for an in-person vist)

## 2021-10-04 NOTE — LETTER
10/04/21      Josiane COLEMAN [place]  1960  [address]  [facility] [address]        To whom it may concern,    Patient suffers from frequent urinary incontinence and has open sores on the back of her legs that are infected. Patient needs both pull-ups and liners for protection.     Diagnosis: (R32) Urinary incontinence, unspecified type  Rx: Pull-ups (4/day, 120/month) and liners (4/day, 120/month).    Please contact me with any question or concerns.        Carmen Arriaza MD   [address] [address]  T: [phone], F: [phone]

## 2021-10-04 NOTE — TELEPHONE ENCOUNTER
"[facility] is calling, they need a \"offical letter\" about why she needs the pull ups and liners together they got the form you filled out.     Per Insurance.     Faxed to Jeannine- [phone] ( attention [employer])     Laverne Dunlap RN        "

## 2021-10-04 NOTE — TELEPHONE ENCOUNTER
She is currently taking Lasix 20 mg PO BID,     Can you send the increased dosage to [facility]  ( they will send written orders).     She finished the antibiotics last night, the wound does not seem better, should she seen you sooner or benefit from extended antibiotics treatment?     Could you place a referral for wound nurse?    Can we leave a detailed message on this number? YES  Phone number patient can be reached at: Other phone number:  Jensshua- [phone]    Laverne Dunlap RN  [facility] Triage

## 2021-10-05 NOTE — TELEPHONE ENCOUNTER
Left detailed message relaying response from Dr. Arriaza and phone number to call to schedule the referral     Marco Antonio Graham RN

## 2021-10-15 ENCOUNTER — TELEPHONE (OUTPATIENT)
Dept: INTERNAL MEDICINE | Facility: CLINIC | Age: 61
End: 2021-10-15

## 2021-10-15 NOTE — LETTER
October 15, 2021      Josiane Dasilva  1960  [address]  [facility] [address]               To whom it may concern,     Patient suffers from frequent urinary incontinence and has open sores on the back of her legs that are infected. Patient needs both pull-ups and liners for protection. Without liners she will leak through the pull-ups and infect her wounds      Diagnosis: (R32) Urinary incontinence, unspecified type  Rx: Pull-ups (4/day, 120/month) and liners (4/day, 120/month).     Please contact me with any question or concerns.           Carmen Arriaza MD   [place] [address] [address] [address]  T: [phone], F: [phone]

## 2021-10-15 NOTE — TELEPHONE ENCOUNTER
[facility] called     Received letter from PCP but needs more information regarding the incontinence    Needs justification for why the liners are needed      Needs to explain without liners will leak through the pullups and affect wounds    Pended a new note with updated verbiage     They are requesting it be faxed:      Fax: [phone]    Cayla RESENDIZ RN

## 2021-10-16 NOTE — TELEPHONE ENCOUNTER
Agree with letter and accepted.    Can you print and fax please? I can sign if needed.    Thank you.

## 2021-10-20 ENCOUNTER — HOSPITAL ENCOUNTER (OUTPATIENT)
Dept: WOUND CARE | Facility: CLINIC | Age: 61
Discharge: HOME | End: 2021-10-20
Attending: SURGERY | Admitting: SURGERY
Payer: COMMERCIAL

## 2021-10-20 VITALS
HEIGHT: 68 IN | TEMPERATURE: 98.2 F | SYSTOLIC BLOOD PRESSURE: 129 MMHG | HEART RATE: 108 BPM | BODY MASS INDEX: 48.35 KG/M2 | DIASTOLIC BLOOD PRESSURE: 69 MMHG | RESPIRATION RATE: 18 BRPM

## 2021-10-20 DIAGNOSIS — R73.01 IMPAIRED FASTING GLUCOSE: ICD-10-CM

## 2021-10-20 DIAGNOSIS — L97.301 ULCER OF ANKLE, UNSPECIFIED LATERALITY, LIMITED TO BREAKDOWN OF SKIN (H): ICD-10-CM

## 2021-10-20 DIAGNOSIS — L97.301 SKIN ULCER OF ANKLE, LIMITED TO BREAKDOWN OF SKIN, UNSPECIFIED LATERALITY (H): ICD-10-CM

## 2021-10-20 DIAGNOSIS — L97.912 ULCER OF RIGHT LOWER EXTREMITY WITH FAT LAYER EXPOSED (H): ICD-10-CM

## 2021-10-20 DIAGNOSIS — G62.9 PERIPHERAL POLYNEUROPATHY: ICD-10-CM

## 2021-10-20 DIAGNOSIS — I10 BENIGN ESSENTIAL HYPERTENSION: ICD-10-CM

## 2021-10-20 DIAGNOSIS — L97.922 ULCER OF LEFT LOWER EXTREMITY WITH FAT LAYER EXPOSED (H): ICD-10-CM

## 2021-10-20 PROCEDURE — 97602 WOUND(S) CARE NON-SELECTIVE: CPT

## 2021-10-20 PROCEDURE — 99203 OFFICE O/P NEW LOW 30 MIN: CPT | Performed by: SURGERY

## 2021-10-20 PROCEDURE — G0463 HOSPITAL OUTPT CLINIC VISIT: HCPCS | Mod: 25

## 2021-10-20 NOTE — ADDENDUM NOTE
Encounter addended by: Rafaela Arreola RN on: 10/20/2021 2:23 PM   Actions taken: Flowsheet accepted

## 2021-10-20 NOTE — DISCHARGE INSTRUCTIONS
[facility]  [address] [address]    Call us at [phone] if you have any questions about your wounds, have redness or swelling around your wound, have a fever of 101 or greater or if you have any other problems or concerns. We answer the phone Monday through Friday 8 am to 4 pm, please leave a message as we check the voicemail frequently throughout the day.     Josiane Dasilva      1960    Medications/supplements to aid in healin. Vitamin D3 10,000 iu per day  2. Kinsey C 1,000 mg take twice daily  3. Vitamin B Complex with folic acid take 1 tablet daily   4. Vitamin B 12 1000 mcg daily  5. Vein Formula 1 tablet daily order from [email or web address] or call [phone]  6. Add in 1 packet of Michael Supplement into your favorite beverage TWICE a day.    Wound care recommendations to Bilateral lower legs:  Wash lower legs daily with soap and water. Then apply yellow stripe edemawear from toes to knees. Then apply ABD over the edemawear and secure with roll gauze. Secure with tape. Change daily.    Compression: Apply clean Spandigrip G over the dressing in the AM. Ok to remove at night.  Remove compression dressing if toes turn blue and/or start to feel numb and is not relieved by elevating the leg for one hour.   Walk as much as you can. When you sit raise your ankle above your hips to promote wound healing.      HARMEET Park M.D. 2021    Wound Clinic follow up in 4 weeks    If you had a positive experience please indicate that on your patient satisfaction survey form that [facility] will be sending you.    It was a pleasure meeting with you today.  Thank you for allowing me and my team the privilege of caring for you today.  YOU are the reason we are here, and I truly hope we provided you with the excellent service you deserve.  Please let us know if there is anything else we can do for you so that we can be sure you  are leaving completely satisfied with your care experience.      If you have any billing related questions please call the [facility] Business office at [phone]. The clinic staff does not handle billing related matters..

## 2021-10-20 NOTE — PROGRESS NOTES
Patient arrived for wound care visit. Certified Wound Care Nurse time spent evaluating patient record, completed a full evaluation and documented wound(s) & jenny-wound skin; provided recommendation based on treatment plan. Applied dressing, reviewed discharge instructions, patient education, and discussed plan of care with appropriate medical team staff members and patient and/or family members.

## 2021-10-20 NOTE — PROGRESS NOTES
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  Surgical record and medications reviewed, she is not on amlodipine Norvasc.  She was evaluated in the emergency room on 2021, diagnosis of wounds, antibiotics initiated, noted to not have concerns for arterial occlusion, palpable pedal pulses.  PMH:   Past Medical History:   Diagnosis Date     Alcoholism /alcohol abuse (H)      Benign essential hypertension      RICO (generalized anxiety disorder)      History of peptic ulcer disease      Impaired fasting glucose      MDD (major depressive disorder)      Morbid obesity (H)      Peripheral neuropathy      Poor short term memory      Patient Active Problem List   Diagnosis     History of peptic ulcer disease     MDD (major depressive disorder)     RICO (generalized anxiety disorder)     Peripheral neuropathy     Benign essential hypertension     Alcoholism /alcohol abuse     Morbid obesity (H)     Poor short term memory     Impaired fasting glucose     Social Hx:   Social History     Socioeconomic History     Marital status: Single     Spouse name: Not on file     Number of children: Not on file     Years of education: Not on file     Highest education level: Not on file   Occupational History     Not on file   Tobacco Use     Smoking status: Former Smoker     Packs/day: 0.00     Years: 10.00     Pack years: 0.00     Quit date: 2005     Years since quittin.8     Smokeless tobacco: Never Used   Substance and Sexual Activity     Alcohol use: Not Currently     Drug use: No     Sexual activity: Not Currently   Other Topics Concern     Parent/sibling  w/ CABG, MI or angioplasty before 65F 55M? Not Asked   Social History Narrative    Single. Lives in a group home.     3 adult sons.    4 grandchildren.    Walks with walker/wheelchair.     Social Determinants of Health     Financial Resource Strain:      Difficulty of Paying Living Expenses:    Food Insecurity:      Worried About Running Out of Food in the Last Year:      Ran Out of Food in the Last Year:    Transportation Needs:      Lack of Transportation (Medical):      Lack of Transportation (Non-Medical):    Physical Activity:      Days of Exercise per Week:      Minutes of Exercise per Session:    Stress:      Feeling of Stress :    Social Connections:      Frequency of Communication with Friends and Family:      Frequency of Social Gatherings with Friends and Family:      Attends Alevism Services:      Active Member of Clubs or Organizations:      Attends Club or Organization Meetings:      Marital Status:    Intimate Partner Violence:      Fear of Current or Ex-Partner:      Emotionally Abused:      Physically Abused:      Sexually Abused:        Surgical Hx:   Past Surgical History:   Procedure Laterality Date     APPENDECTOMY       COLONOSCOPY  08/13/2014    Procedure: COMBINED COLONOSCOPY, SINGLE BIOPSY/POLYPECTOMY BY BIOPSY;  Surgeon: Mike Mancuso MD;  Location: [facility]     CYSTOSCOPY N/A 03/14/2019    Procedure: Cystoscopy;  Surgeon: Mary Ash MD;  Location:  OR     ESOPHAGOSCOPY, GASTROSCOPY, DUODENOSCOPY (EGD), COMBINED  08/12/2014    Procedure: COMBINED ESOPHAGOSCOPY, GASTROSCOPY, DUODENOSCOPY (EGD), BIOPSY SINGLE OR MULTIPLE;  Surgeon: Mike Mancuso MD;  Location: [facility]     LAPAROSCOPIC HYSTERECTOMY TOTAL, BILATERAL SALPINGO-OOPHORECTOMY, NODE DISSECTION, COMBINED N/A 03/14/2019    Procedure: Laparoscopic Total Hysterectomy, Bilateral Salpingo-Oophorectomy, and Repair of Vaginal Laceration;  Surgeon: Mary Ash MD;  Location:  OR     OPEN  REDUCTION INTERNAL FIXATION ANKLE Left 2014     OTHER SURGICAL HISTORY      [place] I as teenager secondary to ulcer     TUBAL LIGATION         Allergies:    Allergies   Allergen Reactions     Asa [Aspirin]      Stomach irritation (hx of PUD)     Nsaids      Stomach irritation (Hx of PUD)       Medications:   Current Outpatient Medications   Medication     acetaminophen (TYLENOL) 325 MG tablet     furosemide (LASIX) 20 MG tablet     gabapentin (NEURONTIN) 300 MG capsule     lisinopril (ZESTRIL) 40 MG tablet     loratadine (CLARITIN) 10 MG tablet     multivitamin w/minerals (THERA-VIT-M) tablet     traZODone (DESYREL) 50 MG tablet     No current facility-administered medications for this encounter.       Labs:   Recent Labs   Lab Test 09/25/21  2312 08/05/21  1110 06/11/20  1125 02/24/19  0550 02/23/19  0654 01/08/15  1320 08/26/14  1000   ALBUMIN  --  3.5  --    < >  --    < >  --    HGB 10.4*  --    < >   < > 6.1*   < > 11.6*   INR  --   --   --   --   --   --  1.08   WBC 9.2  --   --    < > 10.1   < > 4.6   A1C  --   --   --   --  Canceled, Test credited  --   --     < > = values in this interval not displayed.     Creatinine   Date Value Ref Range Status   09/25/2021 0.92 0.52 - 1.04 mg/dL Final   06/02/2020 0.65 0.52 - 1.04 mg/dL Final     GFR Estimate   Date Value Ref Range Status   09/25/2021 67 >60 mL/min/1.73m2 Final     Comment:     As of July 11, 2021, eGFR is calculated by the CKD-EPI creatinine equation, without race adjustment. eGFR can be influenced by muscle mass, exercise, and diet. The reported eGFR is an estimation only and is only applicable if the renal function is stable.   07/15/2020 >60 >60 mL/min/1.73m2 Final   06/02/2020 >90 >60 mL/min/[1.73_m2] Final     Comment:     Non  GFR Calc  Starting 12/18/2018, serum creatinine based estimated GFR (eGFR) will be   calculated using the Chronic Kidney Disease Epidemiology Collaboration   (CKD-EPI) equation.       GFR Estimate If Black  "  Date Value Ref Range Status   07/15/2020 >60 >60 mL/min/1.73m2 Final   06/02/2020 >90 >60 mL/min/[1.73_m2] Final     Comment:      GFR Calc  Starting 12/18/2018, serum creatinine based estimated GFR (eGFR) will be   calculated using the Chronic Kidney Disease Epidemiology Collaboration   (CKD-EPI) equation.       Lab Results   Component Value Date    WBC 9.2 09/25/2021    WBC 10.9 06/02/2020     Lab Results   Component Value Date    RBC 4.03 09/25/2021    RBC 2.91 06/02/2020     Lab Results   Component Value Date    HGB 10.4 09/25/2021    HGB 8.6 06/02/2020     Lab Results   Component Value Date    HCT 34.0 09/25/2021    HCT 26.0 06/02/2020     No components found for: MCT  Lab Results   Component Value Date    MCV 84 09/25/2021    MCV 89 06/02/2020     Lab Results   Component Value Date    MCH 25.8 09/25/2021    MCH 29.6 06/02/2020     Lab Results   Component Value Date    MCHC 30.6 09/25/2021    MCHC 33.1 06/02/2020     Lab Results   Component Value Date    RDW 13.7 09/25/2021    RDW 14.8 06/02/2020     Lab Results   Component Value Date     09/25/2021    PLT 89 06/02/2020          Nutrition requirements were discussed with patient today.  Objective:  /69   Pulse 108   Temp 98.2  F (36.8  C) (Temporal)   Resp 18   Ht 1.727 m (5' 8\")   LMP 04/07/2014   BMI 48.35 kg/m    Wound (used by OP I only) 10/20/21 0955 Left (Active)   Dressing Appearance copious drainage 10/20/21 0900   Length (cm) 11 10/20/21 0900   Width (cm) 29.5 10/20/21 0900   Depth (cm) 0.1 10/20/21 0900   Wound (cm^2) 324.5 cm^2 10/20/21 0900   Wound Volume (cm^3) 32.45 cm^3 10/20/21 0900   Drainage Characteristics/Odor serous 10/20/21 0900   Drainage Amount copious 10/20/21 0900       Wound (used by OP I only) 10/20/21 0957 Right (Active)   Dressing Appearance copious drainage 10/20/21 0900   Length (cm) 8.5 10/20/21 0900   Width (cm) 10.1 10/20/21 0900   Depth (cm) 0.1 10/20/21 0900   Wound (cm^2) 85.85 cm^2 " 10/20/21 0900   Wound Volume (cm^3) 8.59 cm^3 10/20/21 0900   Drainage Characteristics/Odor serous 10/20/21 0900   Drainage Amount copious 10/20/21 0900       Wound (used by OP [facility] only) 10/20/21 0958 Left (Active)   Dressing Appearance copious drainage 10/20/21 0900   Length (cm) 8.1 10/20/21 0900   Width (cm) 15.5 10/20/21 0900   Depth (cm) 0.1 10/20/21 0900   Wound (cm^2) 125.55 cm^2 10/20/21 0900   Wound Volume (cm^3) 12.56 cm^3 10/20/21 0900   Drainage Characteristics/Odor serous 10/20/21 0900   Drainage Amount copious 10/20/21 0900        General:  Patient is alert and orientated, no acute distress.  Nonhistorian    Vascular: Palpable right and left dorsalis pedis pulse, chronic lymphedema, superficial dermal ulcerations with lymphorrhea, no cellulitis, no odor.  Heel ulcerations.        Impression: Chronic lymphedema, bilateral extremity ulcerations with lymphorrhea, history of alcoholism, nonhistorian,    Plan: Creatinine within normal limits.  No recent echocardiogram.  Recent hepatic panel of June 19, 2020 reviewed, albumin 2.6: Phosphatase minimally elevated, remainder the hepatic labs otherwise within normal limits, ultrasound of abdomen performed June 2, 2020, left kidney 10.5 cm, right kidney 9.1 cm, liver parenchyma was noted to be normal and smooth contour without focal mass, no intra-hepatic biliary ductal dilatation   will dress the wounds with dilute Betadine washes, will start up EdemaWear, ABD pads on top of the EdemaWear, roll gauze held in place, she is not a candidate for certified lymphedema therapy or lymphedema pump given her nonhistorian component and inability to adhere to daily management of lymphedema.  Elevate legs as able when sitting..  Patient will return to the clinic in 4 weeks time.     Phani Park MD on 10/20/2021 at 10:16 AM

## 2021-10-26 ENCOUNTER — TELEPHONE (OUTPATIENT)
Dept: INTERNAL MEDICINE | Facility: CLINIC | Age: 61
End: 2021-10-26

## 2021-10-26 NOTE — LETTER
Dear Felton Calderon,     Patient suffers from frequent urinary incontinence. She has open sores on the front of her lower legs by her ankles and heels. Patient needs both pull-ups and liners for protection. Without liners she will leak through the pull-ups and infect her wounds      Diagnosis: (R32) Urinary incontinence, unspecified type  Rx: Pull-ups (4/day, 120/month) and liners (4/day, 120/month).     Please contact me with any question or concerns.           Carmen Arriaza MD   [address] [address]  T: [phone], F: [phone]

## 2021-11-24 ENCOUNTER — HOSPITAL ENCOUNTER (OUTPATIENT)
Dept: WOUND CARE | Facility: CLINIC | Age: 61
Discharge: HOME | End: 2021-11-24
Attending: SURGERY | Admitting: SURGERY
Payer: COMMERCIAL

## 2021-11-24 VITALS
SYSTOLIC BLOOD PRESSURE: 117 MMHG | TEMPERATURE: 98.2 F | RESPIRATION RATE: 20 BRPM | HEART RATE: 91 BPM | DIASTOLIC BLOOD PRESSURE: 57 MMHG

## 2021-11-24 DIAGNOSIS — L97.912 ULCER OF RIGHT LOWER EXTREMITY WITH FAT LAYER EXPOSED (H): ICD-10-CM

## 2021-11-24 DIAGNOSIS — L97.922 ULCER OF LEFT LOWER EXTREMITY WITH FAT LAYER EXPOSED (H): ICD-10-CM

## 2021-11-24 PROCEDURE — 99213 OFFICE O/P EST LOW 20 MIN: CPT | Performed by: SURGERY

## 2021-11-24 PROCEDURE — 97602 WOUND(S) CARE NON-SELECTIVE: CPT

## 2021-11-24 RX ORDER — LANOLIN ALCOHOL/MO/W.PET/CERES
1000 CREAM (GRAM) TOPICAL 2 TIMES DAILY
COMMUNITY
End: 2022-04-12

## 2021-11-24 RX ORDER — FUROSEMIDE 40 MG
TABLET ORAL
COMMUNITY
Start: 2021-11-23 | End: 2022-04-12

## 2021-11-24 RX ORDER — ARGININE/GLUTAMINE/CALCIUM BMB 7G-7G-1.5G
1 POWDER IN PACKET (EA) ORAL 2 TIMES DAILY
COMMUNITY
End: 2022-07-04

## 2021-11-24 NOTE — PROGRESS NOTES
[facility] [place] Progress Note    Subject: Josiane COLEMAN Brown consultation on October 20, 2021, she does not have EdemaWear on, discussed with , reiterated dressing management from evaluation of October 20, 2021, she reconfirmed that they have the orders and will attempt to follow on a daily basis to the best of her ability.  She has significant interstitial edema of both lower extremities today which is having an impact on the ability to heal    Patient Active Problem List   Diagnosis     History of peptic ulcer disease     MDD (major depressive disorder)     RICO (generalized anxiety disorder)     Peripheral neuropathy     Benign essential hypertension     Alcoholism /alcohol abuse     Morbid obesity (H)     Poor short term memory     Impaired fasting glucose     Ulcer of right lower extremity with fat layer exposed (H)     Ulcer of left lower extremity with fat layer exposed (H)     Past Medical History:   Diagnosis Date     Alcoholism /alcohol abuse (H)      Benign essential hypertension      RICO (generalized anxiety disorder)      History of peptic ulcer disease      Impaired fasting glucose      MDD (major depressive disorder)      Morbid obesity (H)      Peripheral neuropathy      Poor short term memory      Exam:  /57 (BP Location: Right arm, Patient Position: Sitting)   Pulse 91   Temp 98.2  F (36.8  C) (Temporal)   Resp 20   LMP 04/07/2014   Wound (used by OP [facility] only) 10/20/21 0958 Left (Active)   Thickness/Stage full thickness 11/24/21 0900   Dressing Appearance moist drainage 11/24/21 0900   Base granulating 11/24/21 0900   Periwound intact 11/24/21 0900   Periwound Temperature warm 11/24/21 0900   Periwound Skin Turgor soft 11/24/21 0900   Edges open 11/24/21 0900   Length (cm) 0.6 11/24/21 0900   Width (cm) 0.5 11/24/21 0900   Depth (cm) 0.2 11/24/21 0900   Wound (cm^2) 0.3 cm^2 11/24/21 0900   Wound Volume (cm^3) 0.06 cm^3 11/24/21 0900   Wound healing %  99.76 11/24/21 0900   Drainage Characteristics/Odor yellow 11/24/21 0900   Drainage Amount large 11/24/21 0900   Care, Wound non-select wound debridement performed 11/24/21 0900       Wound (used by OP [facility] only) 11/24/21 0905 Left (Active)   Thickness/Stage full thickness 11/24/21 0900   Dressing Appearance moist drainage 11/24/21 0900   Base granulating 11/24/21 0900   Periwound intact 11/24/21 0900   Periwound Temperature warm 11/24/21 0900   Periwound Skin Turgor soft 11/24/21 0900   Edges open 11/24/21 0900   Length (cm) 0.6 11/24/21 0900   Width (cm) 0.6 11/24/21 0900   Depth (cm) 0.1 11/24/21 0900   Wound (cm^2) 0.36 cm^2 11/24/21 0900   Wound Volume (cm^3) 0.04 cm^3 11/24/21 0900   Drainage Characteristics/Odor yellow 11/24/21 0900   Drainage Amount large 11/24/21 0900   Care, Wound non-select wound debridement performed 11/24/21 0900       Wound (used by [facility] only) 11/24/21 0906 Right (Active)   Thickness/Stage full thickness 11/24/21 0900   Dressing Appearance moist drainage 11/24/21 0900   Base granulating 11/24/21 0900   Periwound intact 11/24/21 0900   Periwound Temperature warm 11/24/21 0900   Periwound Skin Turgor soft 11/24/21 0900   Edges open 11/24/21 0900   Length (cm) 5 11/24/21 0900   Width (cm) 2.6 11/24/21 0900   Depth (cm) 0.3 11/24/21 0900   Wound (cm^2) 13 cm^2 11/24/21 0900   Wound Volume (cm^3) 3.9 cm^3 11/24/21 0900   Drainage Characteristics/Odor yellow 11/24/21 0900   Drainage Amount large 11/24/21 0900   Care, Wound non-select wound debridement performed 11/24/21 0900       Wound (used by [facility] only) 11/24/21 0907 Right (Active)   Thickness/Stage full thickness 11/24/21 0900   Dressing Appearance moist drainage 11/24/21 0900   Base granulating 11/24/21 0900   Periwound intact 11/24/21 0900   Periwound Temperature warm 11/24/21 0900   Periwound Skin Turgor soft 11/24/21 0900   Edges open 11/24/21 0900   Length (cm) 0.8 11/24/21 0900   Width (cm) 2 11/24/21 0900   Depth (cm) 0  11/24/21 0900   Wound (cm^2) 1.6 cm^2 11/24/21 0900   Wound Volume (cm^3) 0 cm^3 11/24/21 0900   Drainage Characteristics/Odor yellow 11/24/21 0900   Drainage Amount large 11/24/21 0900   Care, Wound non-select wound debridement performed 11/24/21 0900       Wound (used by OP [facility] only) 11/24/21 0908 Right (Active)   Thickness/Stage full thickness 11/24/21 0900   Dressing Appearance moist drainage 11/24/21 0900   Base granulating 11/24/21 0900   Periwound intact 11/24/21 0900   Periwound Temperature warm 11/24/21 0900   Periwound Skin Turgor soft 11/24/21 0900   Edges open 11/24/21 0900   Length (cm) 2.1 11/24/21 0900   Width (cm) 0.5 11/24/21 0900   Depth (cm) 0.1 11/24/21 0900   Wound (cm^2) 1.05 cm^2 11/24/21 0900   Wound Volume (cm^3) 0.1 cm^3 11/24/21 0900   Drainage Characteristics/Odor yellow 11/24/21 0900   Drainage Amount large 11/24/21 0900   Care, Wound non-select wound debridement performed 11/24/21 0900     Significant edema right and left lower extremities, chronic ulcerations, palpable right and left dorsalis pedis pulse, no heel ulcerations, no cellulitis        Impression: Chronic lymphedema right left lower extremities with associated ulcerations    Plan: EdemaWear.  Micronutrients.  Denies purified flavonoid fraction.  Elevate legs when sitting or in bed.  We will dress the wounds with Orajel, Ioplex, can change on a daily basis or every other day basis.  Patient will return to the clinic in 6 weeks time      Further instructions from your care team       Josiane Dasilva      1960    Medications/supplements to aid in healing:  Vitamin D3 10,000 iu per day  Kinsey C 1,000 mg take twice daily  Vitamin B Complex with folic acid take 1 tablet daily  Vitamin B 12 1000 mcg daily  Vein Formula 1 tablet daily order from www.[employer] or call [phone]    Add in 1 packet of Michael Supplement into your favorite beverage TWICE a day.    Wound care recommendations to Bilateral lower legs:  Wash  lower legs daily with soap and water. Then apply yellow stripe edemawear from  toes to knees. Then apply ABD over the edemawear and secure with roll gauze. Secure  with tape. Change daily.     Compression: Apply clean Spandigrip G over the dressing in the AM. Ok to remove at night.  Remove compression dressing if toes turn blue and/or start to feel numb and is not  relieved by elevating the leg for one hour.   Walk as much as you can. When you sit raise your ankle above your hips to  promote wound healing.     HARMEET Park M.D. November 24, 2021      Call us at [phone] if you have any questions about your wounds, have redness or swelling around your wound, have a fever of 101 or greater or if you have any other problems or concerns. We answer the phone Monday through Friday 8 am to 4 pm, please leave a message as we check the voicemail frequently throughout the day.     If you had a positive experience please indicate that on your patient satisfaction survey form that [facility] will be sending you.    It was a pleasure meeting with you today.  Thank you for allowing me and my team the privilege of caring for you today.  YOU are the reason we are here, and I truly hope we provided you with the excellent service you deserve.  Please let us know if there is anything else we can do for you so that we can be sure you are leaving completely satisfied with your care experience.      If you have any billing related questions please call the [facility] Business office at [phone]. The clinic staff does not handle billing related matters.         Phani Park MD on 11/24/2021 at 12:57 PM                    Dictated using Dragon voice recognition software which may result in transcription errors

## 2021-11-29 ENCOUNTER — MEDICAL CORRESPONDENCE (OUTPATIENT)
Dept: HEALTH INFORMATION MANAGEMENT | Facility: CLINIC | Age: 61
End: 2021-11-29
Payer: COMMERCIAL

## 2021-11-29 NOTE — ADDENDUM NOTE
Encounter addended by: Rafaeal Arreola RN on: 11/29/2021 8:01 AM   Actions taken: Order list changed, Diagnosis association updated

## 2021-11-29 NOTE — ADDENDUM NOTE
Encounter addended by: Rafaela Arreola RN on: 11/29/2021 9:13 AM   Actions taken: Edited Discharge Instructions

## 2021-11-29 NOTE — DISCHARGE INSTRUCTIONS
Josiane COLEMAN Gates      1960    Medications/supplements to aid in healing:  Vitamin D3 10,000 iu per day  Kinsey C 1,000 mg take twice daily  Vitamin B Complex with folic acid take 1 tablet daily  Vitamin B 12 1000 mcg daily  Vein Formula 1 tablet daily order from www.[facility] [facility] or call [phone]    Add in 1 packet of Michael Supplement into your favorite beverage TWICE a day.    Wound care recommendations to Bilateral lower legs:  Wash lower legs daily with soap and water. Then apply yellow stripe edemawear from  toes to knees. Then apply 4x4 gauze over the edemawear and secure with roll gauze. Secure  with tape. Change daily.     Compression: Apply clean Spandigrip G over the dressing in the AM. Ok to remove at night.  Remove compression dressing if toes turn blue and/or start to feel numb and is not  relieved by elevating the leg for one hour.   Walk as much as you can. When you sit raise your ankle above your hips to  promote wound healing.     HARMEET Park M.D. November 24, 2021      Call us at [phone] if you have any questions about your wounds, have redness or swelling around your wound, have a fever of 101 or greater or if you have any other problems or concerns. We answer the phone Monday through Friday 8 am to 4 pm, please leave a message as we check the voicemail frequently throughout the day.     If you had a positive experience please indicate that on your patient satisfaction survey form that [facility] will be sending you.    It was a pleasure meeting with you today.  Thank you for allowing me and my team the privilege of caring for you today.  YOU are the reason we are here, and I truly hope we provided you with the excellent service you deserve.  Please let us know if there is anything else we can do for you so that we can be sure you are leaving completely satisfied with your care experience.      If you have any billing related questions please call the [facility]  office at [phone]. The clinic staff does not handle billing related matters.

## 2022-03-01 LAB — EJECTION FRACTION: NORMAL %

## 2022-04-06 ENCOUNTER — TELEPHONE (OUTPATIENT)
Dept: INTERNAL MEDICINE | Facility: CLINIC | Age: 62
End: 2022-04-06
Payer: COMMERCIAL

## 2022-04-06 DIAGNOSIS — R39.81 FUNCTIONAL URINARY INCONTINENCE: Primary | ICD-10-CM

## 2022-04-06 NOTE — TELEPHONE ENCOUNTER
[facility] , called stating that pt needs order for 3XL briefs.     Pharmacy pended.    Rocio CARSON RN  EP Triage

## 2022-04-06 NOTE — LETTER
April 6, 2022      Josiane Dasilva  [address]  [facility] [address]        Dear Felton,        Patient suffers from frequent urinary incontinence. She has open sores on the front of her lower legs by her ankles and heels. Patient needs both pull-ups and liners for protection. Without liners she will leak through the pull-ups and infect her wounds      Diagnosis: (R32) Urinary incontinence, unspecified type  Rx: Pull-ups- Bariatric (4/day, 120/month) and liners (4/day, 120/month).     Please contact me with any question or concerns.           Carmen Arriaza MD   [address] [address]  T: [phone], F: [phone]          Sincerely,        Carmen Arriaza MD

## 2022-04-06 NOTE — TELEPHONE ENCOUNTER
Called [facility]- requesting enough to last until visit on 4/29/22. Pended 120 to last  (4 a day). Would like script sent to [facility].     DME pended, but it does look like you've had to fax over letters for previous scripts. Pended letter with previous letter information if appropriate for provider signature.       Fax for [facility]: [phone]

## 2022-04-12 ENCOUNTER — MEDICAL CORRESPONDENCE (OUTPATIENT)
Dept: HEALTH INFORMATION MANAGEMENT | Facility: CLINIC | Age: 62
End: 2022-04-12
Payer: COMMERCIAL

## 2022-04-13 ENCOUNTER — TELEPHONE (OUTPATIENT)
Dept: INTERNAL MEDICINE | Facility: CLINIC | Age: 62
End: 2022-04-13

## 2022-04-13 ENCOUNTER — MEDICAL CORRESPONDENCE (OUTPATIENT)
Dept: HEALTH INFORMATION MANAGEMENT | Facility: CLINIC | Age: 62
End: 2022-04-13
Payer: COMMERCIAL

## 2022-04-13 NOTE — TELEPHONE ENCOUNTER
Central Prior Authorization Team   Phone: [phone]      EPA DENIED: WAITING ON DENIAL LETTER

## 2022-04-16 NOTE — TELEPHONE ENCOUNTER
Central Prior Authorization Team   Phone: [phone]      PRIOR AUTHORIZATION DENIED    Medication: omega-3 acid ethyl esters (LOVAZA) 1 g capsule - EPA DENIED          Denial Date: 4/13/2022    Denial Rational: THE PATIENT DIAGNOSIS: Peripheral polyneuropathy [G62.9]  DOES NOT MEET CRITERIA FOR APPROVAL      Appeal Information: If the Provider/Patient would like to appeal this denial, please provide a letter of medical necessity explaining why Preferred Formulary medications are not appropriate in the treatment of the patient's condition; along with any previous therapies tried/failed.    Once completed, please route back to me directly: Demarcus Hollingsworth

## 2022-04-24 DIAGNOSIS — F10.10 ALCOHOL ABUSE: ICD-10-CM

## 2022-04-25 RX ORDER — PANTOPRAZOLE SODIUM 40 MG/1
TABLET, DELAYED RELEASE ORAL
Qty: 30 TABLET | Refills: 11 | Status: SHIPPED | OUTPATIENT
Start: 2022-04-25

## 2022-04-28 PROBLEM — L97.922 ULCER OF LEFT LOWER EXTREMITY WITH FAT LAYER EXPOSED (H): Status: RESOLVED | Noted: 2021-10-20 | Resolved: 2022-04-28

## 2022-04-28 PROBLEM — L97.912 ULCER OF RIGHT LOWER EXTREMITY WITH FAT LAYER EXPOSED (H): Status: RESOLVED | Noted: 2021-10-20 | Resolved: 2022-04-28

## 2022-04-29 ENCOUNTER — OFFICE VISIT (OUTPATIENT)
Dept: INTERNAL MEDICINE | Facility: CLINIC | Age: 62
End: 2022-04-29
Payer: COMMERCIAL

## 2022-04-29 VITALS
OXYGEN SATURATION: 97 % | HEART RATE: 81 BPM | TEMPERATURE: 97.6 F | RESPIRATION RATE: 20 BRPM | SYSTOLIC BLOOD PRESSURE: 122 MMHG | DIASTOLIC BLOOD PRESSURE: 64 MMHG

## 2022-04-29 DIAGNOSIS — R60.0 BILATERAL LOWER EXTREMITY EDEMA: Primary | ICD-10-CM

## 2022-04-29 PROCEDURE — 99214 OFFICE O/P EST MOD 30 MIN: CPT | Performed by: INTERNAL MEDICINE

## 2022-04-29 ASSESSMENT — PATIENT HEALTH QUESTIONNAIRE - PHQ9: SUM OF ALL RESPONSES TO PHQ QUESTIONS 1-9: 16

## 2022-04-29 NOTE — PROGRESS NOTES
ASSESSMENT/PLAN                                                      (R60.0) Bilateral lower extremity edema  (primary encounter diagnosis)  Comment: hesitate increasing diuretic dose due to patient's functional urinary incontinence and near continuous leakage on current dosing.  Plan: agree with wound clinic evaluation and treatment; referred to lymphedema/edema clinic for further evaluation and treatment - patient will be contacted to schedule.      Carmen Arriaza MD   [address] [address]  T: [phone], F: [phone]    SUBJECTIVE                                                      Josiane Dasilva is a very pleasant 61 year old female who presents with leg swelling:    Accompanied by group home staff, who assist with history. Patient has poor short-term memory.    Patient suffers from chronic, bilateral lower extremity edema complicated by open wounds, currently with one open wound along her left lateral lower leg. She was followed by wound clinic last fall, but has not followed up with them for several months. She scheduled for a wound care clinic visit next week.    PMH significant for functional urinary incontinence and near continuous leakage. She is already on Lasix 40 mg twice a day.    OBJECTIVE                                                      /64 (BP Location: Left arm, Patient Position: Chair, Cuff Size: Adult Large)   Pulse 81   Temp 97.6  F (36.4  C) (Temporal)   Resp 20   LMP 04/07/2014   SpO2 97%   Constitutional: well-appearing    Bilateral lower extremities: moderate/moderate-severe bilateral lower extremity, nonpitting edema with overlying dry, cracked skin; superficial open ulcer left lateral leg    ---    (Note documentation was completed, in part, with [employer] voice-recognition software. Documentation was reviewed, but some grammatical, spelling, and word errors may remain.)

## 2022-04-30 DIAGNOSIS — Z78.9 TAKES DIETARY SUPPLEMENTS: Primary | ICD-10-CM

## 2022-05-02 RX ORDER — VITAMIN B COMPLEX
TABLET ORAL
Qty: 7 TABLET | Refills: 11 | Status: SHIPPED | OUTPATIENT
Start: 2022-05-02 | End: 2022-06-06

## 2022-05-02 NOTE — TELEPHONE ENCOUNTER
Vit B Complex w/Vit C        Last written prescription date: 4.12.22        Last fill quantity: 60, # refills: 0        Last office visit: 4.29.22        Future office visit: N/A   This Rx is written different from PCP's previous prescription.               Routing refill request to provider for review/approval because: Drug not on the [facility], P or [facility] refill protocol or controlled substance

## 2022-05-05 DIAGNOSIS — F33.42 RECURRENT MAJOR DEPRESSIVE DISORDER, IN FULL REMISSION (H): ICD-10-CM

## 2022-05-05 DIAGNOSIS — I10 BENIGN ESSENTIAL HYPERTENSION: ICD-10-CM

## 2022-05-05 DIAGNOSIS — G62.9 PERIPHERAL POLYNEUROPATHY: ICD-10-CM

## 2022-05-05 DIAGNOSIS — K59.00 CONSTIPATION, UNSPECIFIED CONSTIPATION TYPE: Primary | ICD-10-CM

## 2022-05-05 DIAGNOSIS — J30.2 SEASONAL ALLERGIES: ICD-10-CM

## 2022-05-06 ENCOUNTER — NURSE TRIAGE (OUTPATIENT)
Dept: INTERNAL MEDICINE | Facility: CLINIC | Age: 62
End: 2022-05-06
Payer: COMMERCIAL

## 2022-05-06 RX ORDER — LANOLIN ALCOHOL/MO/W.PET/CERES
CREAM (GRAM) TOPICAL
Qty: 60 TABLET | Refills: 11 | Status: SHIPPED | OUTPATIENT
Start: 2022-05-06

## 2022-05-06 RX ORDER — POLYETHYLENE GLYCOL 3350 17 G/17G
POWDER, FOR SOLUTION ORAL
Qty: 510 G | Refills: 11 | Status: SHIPPED | OUTPATIENT
Start: 2022-05-06

## 2022-05-06 RX ORDER — METOPROLOL SUCCINATE 25 MG/1
TABLET, EXTENDED RELEASE ORAL
Qty: 60 TABLET | Refills: 11 | Status: SHIPPED | OUTPATIENT
Start: 2022-05-06

## 2022-05-06 RX ORDER — SENNOSIDES 8.6 MG/1
TABLET, COATED ORAL
Qty: 14 TABLET | Refills: 11 | Status: SHIPPED | OUTPATIENT
Start: 2022-05-06 | End: 2022-07-04

## 2022-05-06 RX ORDER — LIDOCAINE 40 MG/G
CREAM TOPICAL
Qty: 30 G | Refills: 11 | Status: SHIPPED | OUTPATIENT
Start: 2022-05-06 | End: 2022-07-04

## 2022-05-06 RX ORDER — CHOLECALCIFEROL (VITAMIN D3) 50 MCG
TABLET ORAL
Qty: 30 TABLET | Refills: 11 | Status: SHIPPED | OUTPATIENT
Start: 2022-05-06

## 2022-05-06 RX ORDER — TRAZODONE HYDROCHLORIDE 50 MG/1
TABLET, FILM COATED ORAL
Qty: 31 TABLET | Refills: 11 | Status: SHIPPED | OUTPATIENT
Start: 2022-05-06 | End: 2022-09-06

## 2022-05-06 RX ORDER — METHOCARBAMOL 500 MG/1
TABLET, FILM COATED ORAL
Qty: 60 TABLET | Refills: 11 | Status: SHIPPED | OUTPATIENT
Start: 2022-05-06

## 2022-05-06 RX ORDER — LORATADINE 10 MG/1
TABLET ORAL
Qty: 30 TABLET | Refills: 11 | Status: ON HOLD | OUTPATIENT
Start: 2022-05-06 | End: 2022-07-20

## 2022-05-06 RX ORDER — FUROSEMIDE 40 MG
TABLET ORAL
Qty: 60 TABLET | Refills: 11 | Status: SHIPPED | OUTPATIENT
Start: 2022-05-06

## 2022-05-06 NOTE — TELEPHONE ENCOUNTER
Nurse Triage SBAR    Is this a 2nd Level Triage? YES, LICENSED PRACTITIONER REVIEW IS REQUIRED    Situation: Isai,  with [facility] calling to report pt's leg wounds. This morning wound on left outer leg calf area (size of a silver dollar and right next another small spot size of a nickel) was seen and also wound to right shin (size of a nickel). Wounds are red in color and draining clear fluid. Mild pain, no fever.     Background: Pt has chronic edema for years and on furosemide. She has had leg wounds in the past as well. Coordinator also notes she was recently in transitional care facility.     Assessment: see below    Protocol Recommended Disposition:   Go To ED/UCC Now (Or To Office With PCP Approval)    Recommendation: protocol states ED/UC or office with PCP approval. Please review/advise.      Routed to provider    Does the patient meet one of the following criteria for ADS visit consideration? 16+ years old, with an [facility] PCP     TIP  Providers, please consider if this condition is appropriate for management at one of our Acute and Diagnostic Services sites.     If patient is a good candidate, please use dotphrase <dot>triageresponse and select Refer to ADS to document.    Reason for Disposition    SEVERE swelling (e.g., swelling extends above knee, entire leg is swollen, weeping fluid)    Additional Information    Negative: Stitches and not infected    Negative: Surgical wound infection suspected (post-op)    Negative: Bright red, wide-spread, sunburn-like rash    Negative: Black (necrotic) or blisters develop in wound    Negative: Looks infected (spreading redness, red streak, pus) and fever    Negative: Patient sounds very sick or weak to the triager    Negative: Severe pain in the wound    Negative: Red streak runs from the wound    Negative: Facial wound looks infected (spreading redness)    Negative: Finger wound and entire finger swollen    Negative: Skin redness  "around the wound larger than 2 inches (5 cm)    Negative: Pus or cloudy fluid draining from wound    Negative: Taking antibiotic > 48 hours and fever persists    Negative: Taking antibiotic > 72 hours (3 days) and infected wound not improved (pain, pus, redness)    Negative: No prior tetanus shots (or is not fully vaccinated) and any wound (e.g., cut or scrape)    Negative: HIV positive or severe immunodeficiency (severely weak immune system) and DIRTY cut or scrape    Negative: Patient wants to be seen    Negative: Pimple where a stitch comes through the skin    Negative: Wound becomes more painful or swollen, 3 or more days after injury    Negative: Last tetanus shot > 5 years ago and wound from DIRTY cut or scrape    Negative: Last tetanus shot > 10 years ago    Negative: Sounds like a life-threatening emergency to the triager    Negative: Chest pain    Negative: Small area of swelling and followed an insect bite to the area    Negative: Followed a knee injury    Negative: Ankle or foot injury    Negative: Pregnant with leg swelling or edema    Negative: Difficulty breathing at rest    Negative: Entire foot is cool or blue in comparison to other side    Answer Assessment - Initial Assessment Questions  1. ONSET: \"When did the pain start?\"         Not exactly sure when this started. Pt has 2 wounds on her legs.     2. LOCATION: \"Where is the pain located?\"         Both on left calf area on the outer area of the leg. One is the size of silver dollar, second one is right next to it and skin still intact about the size of a nickel.     3. PAIN: \"How bad is the pain?\"    (Scale 1-10; or mild, moderate, severe)    -  MILD (1-3): doesn't interfere with normal activities     -  MODERATE (4-7): interferes with normal activities (e.g., work or school) or awakens from sleep, limping     -  SEVERE (8-10): excruciating pain, unable to do any normal activities, unable to walk        Mild pain.     4. WORK OR EXERCISE: \"Has " "there been any recent work or exercise that involved this part of the body?\"       *No Answer*  5. CAUSE: \"What do you think is causing the leg pain?\"      *No Answer*  6. OTHER SYMPTOMS: \"Do you have any other symptoms?\" (e.g., chest pain, back pain, breathing difficulty, swelling, rash, fever, numbness, weakness)      *No Answer*  7. PREGNANCY: \"Is there any chance you are pregnant?\" \"When was your last menstrual period?\"      *No Answer*    Answer Assessment - Initial Assessment Questions  1. LOCATION: \"Where is the wound located?\"         Located on left calf outer area of the leg, size of a silver dollar and right next to it is a smaller wound size of a nickel with skin attached. Another wound on the right leg, on shin, about size of a nickel (thinks this just appeared in the last hour or so).     2. WOUND APPEARANCE: \"What does the wound look like?\"         Open bright red, drainage is clear.     3. SIZE: If redness is present, ask: \"What is the size of the red area?\" (Inches, centimeters, or compare to size of a coin)         The actual wound itself is red, see sizes above.e     4. SPREAD: \"What's changed in the last day?\"  \"Do you see any red streaks coming from the wound?\"        In the last day these have all been found. No red streaks coming from the wound.     5. ONSET: \"When did it start to look infected?\"         These wounds were all just found this morning they appear red in color and draining fluid.     6. MECHANISM: \"How did the wound start, what was the cause?\"        Unsure how this started. She has had leg wounds in the past, she has hisotry of very bad edema.     7. PAIN: \"Is there any pain?\" If so, ask: \"How bad is the pain?\"   (Scale 1-10; or mild, moderate, severe)        Mild pain.     8. FEVER: \"Do you have a fever?\" If so, ask: \"What is your temperature, how was it measured, and when did it start?\"        No.     9. OTHER SYMPTOMS: \"Do you have any other symptoms?\" (e.g., shaking chills, " "weakness, rash elsewhere on body)        No.     10. PREGNANCY: \"Is there any chance you are pregnant?\" \"When was your last menstrual period?\"          NA    Answer Assessment - Initial Assessment Questions  1. ONSET: \"When did the swelling start?\" (e.g., minutes, hours, days)        Pt has had edema for years now, have not necessarily noticed worsening recently.     2. LOCATION: \"What part of the leg is swollen?\"  \"Are both legs swollen or just one leg?\"        From feet up to knee both legs.     3. SEVERITY: \"How bad is the swelling?\" (e.g., localized; mild, moderate, severe)   - Localized - small area of swelling localized to one leg   - MILD pedal edema - swelling limited to foot and ankle, pitting edema < 1/4 inch (6 mm) deep, rest and elevation eliminate most or all swelling   - MODERATE edema - swelling of lower leg to knee, pitting edema > 1/4 inch (6 mm) deep, rest and elevation only partially reduce swelling   - SEVERE edema - swelling extends above knee, facial or hand swelling present         Severe, wounds present and draining clear fluid.     4. REDNESS: \"Does the swelling look red or infected?\"        Does have wounds to left outer calf area (size of silver dollar) and right shin area (size of nickel). Red in color and draining clear fluid.     5. PAIN: \"Is the swelling painful to touch?\" If so, ask: \"How painful is it?\"   (Scale 1-10; mild, moderate or severe)        Mild pain    6. FEVER: \"Do you have a fever?\" If so, ask: \"What is it, how was it measured, and when did it start?\"         No    7. CAUSE: \"What do you think is causing the leg swelling?\"        Pt has had edema for years now    8. MEDICAL HISTORY: \"Do you have a history of heart failure, kidney disease, liver failure, or cancer?\"     Kidney disease       9. RECURRENT SYMPTOM: \"Have you had leg swelling before?\" If so, ask: \"When was the last time?\" \"What happened that time?\"        Yes, chronic issues has had for years. On Furosemide. " "    10. OTHER SYMPTOMS: \"Do you have any other symptoms?\" (e.g., chest pain, difficulty breathing)          NO    11. PREGNANCY: \"Is there any chance you are pregnant?\" \"When was your last menstrual period?\"          NA    Protocols used: LEG SWELLING AND EDEMA-A-OH, WOUND INFECTION-A-OH, LEG PAIN-A-OH    GO TO ED/UCC NOW (OR TO OFFICE WITH PCP APPROVAL):     NOTE TO TRIAGER:   * Use nurse judgment to select the most appropriate source of care. Consider both the urgency of the patient's symptoms AND what resources may be needed to evaluate and manage the patient.   * Then tell the caller: Leave NOW.     SOURCES OF CARE:  * TRIAGER CAUTION: In selecting the most appropriate care site, you must consider both the severity of the patient's symptoms AND what resources are available at that care site.  * ED: Patients who may need surgery, sound seriously ill or may be unstable need to be sent to an ED. Likewise, so do most patients with complex medical problems and serious symptoms.  * UCC: Some UCCs can manage patients who are stable and have less serious symptoms (e.g., minor illnesses and injuries). The triager must know the UCC capabilities before sending a patient there. If unsure, call ahead.  * OFFICE: If patient sounds stable and not seriously ill, consult PCP (or follow your office policy) to see if patient can be seen NOW in office.      CALL BACK IF:  * Swelling persists over 3 days  * Swelling becomes red or painful to the touch  * You become worse        Patient/Caregiver understands and will follow care advice? Other, see documentation     Tia G. AMMON  [facility]   "

## 2022-05-06 NOTE — TELEPHONE ENCOUNTER
Routing refill request to provider for review/approval because:  Drug not on the [facility] refill protocol   Drshazia not active on med list     Marco Antonio Graham RN  [facility] Triage Nurse

## 2022-05-06 NOTE — TELEPHONE ENCOUNTER
Group home canceled their scheduled wound care appointment for this week (was scheduled for Tuesday, May 3).  This really should not have occurred.    They should contact the wound care clinic and see if they can be seen today.  Otherwise, group home will need to bring the patient to urgent care or the ER for further evaluation.

## 2022-05-06 NOTE — TELEPHONE ENCOUNTER
Provider Recommendation Follow Up:   Reached patient/caregiver. Informed of provider's recommendations. Patient verbalized understanding and agrees with the plan.     Phone number given to contact wound clinic. Isai going to call and see if he can get an appointment for today.    Shelli Ku RN

## 2022-05-09 ENCOUNTER — HOSPITAL ENCOUNTER (OUTPATIENT)
Dept: OCCUPATIONAL THERAPY | Facility: CLINIC | Age: 62
Discharge: HOME | End: 2022-05-09
Payer: COMMERCIAL

## 2022-05-09 DIAGNOSIS — I89.0 LYMPHEDEMA: Primary | ICD-10-CM

## 2022-05-09 DIAGNOSIS — I89.0 LYMPHEDEMA OF BOTH LOWER EXTREMITIES: ICD-10-CM

## 2022-05-09 PROCEDURE — 97165 OT EVAL LOW COMPLEX 30 MIN: CPT | Mod: GO | Performed by: OCCUPATIONAL THERAPIST

## 2022-05-09 PROCEDURE — 97140 MANUAL THERAPY 1/> REGIONS: CPT | Mod: GO | Performed by: OCCUPATIONAL THERAPIST

## 2022-05-10 ENCOUNTER — TELEPHONE (OUTPATIENT)
Dept: INTERNAL MEDICINE | Facility: CLINIC | Age: 62
End: 2022-05-10
Payer: COMMERCIAL

## 2022-05-10 DIAGNOSIS — T14.8XXA OPEN WOUND: ICD-10-CM

## 2022-05-10 DIAGNOSIS — R60.0 BILATERAL LOWER EXTREMITY EDEMA: Primary | ICD-10-CM

## 2022-05-10 NOTE — TELEPHONE ENCOUNTER
Unfortunately, patient's degree of edema - complicated by non-healing wounds - really requires outpatient lymphedema therapy. Outpatient lymphedema therapy should be continued until her swelling significantly improves and her wounds heal.    Home care edema therapy is better for mild to moderate, non-complicated edema. It is not the best option for her to get better.

## 2022-05-10 NOTE — PROGRESS NOTES
05/09/22 1500   Quick Adds   Quick Adds Certification   Rehab Discipline   Discipline OT   Type of Visit   Type of visit Initial Edema Evaluation   General Information   Start of care 05/09/22   Referring physician Carmen Arriaza   Orders Evaluate and treat as indicated   Order date 04/29/22   Medical diagnosis lymphedema; wounds   Onset of illness / date of surgery 04/29/22  (chronic for years)   Edema onset 04/29/22  (chronic situation)   Affected body parts RLE;LLE   Edema etiology Infection  (inactive/sedentary; possible venous issues)   Edema etiology comments Pts BLE lymphedema seems to stem from multiple factors. overall pt does not walk or transfer much so sedentary and dep edema componant to her swelling. Pt and PCA reports history cellulitis and skin infections. No history cancer care and no substantial cardiac history found in chart. appearance BLE that possible venous componant to her swelling but would eed to see Dr for testing and diagnosis.   Pertinent history of current problem (PT: include personal factors and/or comorbidities that impact the POC; OT: include additional occupational profile info) Pts does not offfer much health history and PCA reports poor memory and historian on pts behalf. Chart indicates history alcohol abuse, falls, and memory issues reported.   Surgical / medical history reviewed Yes   Prior level of functional mobility Dep-w/c   Prior treatment Elevation;Compression garments   Community support   (PCA from group home)   Patient role / employment history Unemployed;Disabled   Psychosocial concerns   (poor memory)   Living environment Group home   Current assistive devices   (w/c)   Fall Risk Screen   Fall screen completed by OT   Have you fallen 2 or more times in the past year? No   Have you fallen and had an injury in the past year? No   Is patient a fall risk? Yes   Fall screen comments pt basically w/c dep; Max A 1-2 sit-stand. Pain RLE   Abuse Screen (yes response  referral indicated)   Feels Unsafe at Home or Work/School no   Feels Threatened by Someone no   Does Anyone Try to Keep You From Having Contact with Others or Doing Things Outside Your Home? no   Physical Signs of Abuse Present no   Patient needs abuse support services and resources No   Subjective Report   Patient report of symptoms hard to transfer; pain in legs; heavy swollen legs   Patient / Family Goals   Patient / family goals statement to reduce swelling in her legs   Pain   Patient currently in pain Yes   Pain location RLE; back   Pain comments Pt somewhat tangenital in speech and off topic throughout eval   Cognitive Status   Orientation Orientation to person, place and time   Level of consciousness Alert;Lethargic / Somnolent   Follows commands and answers questions 75% of the time   Personal safety and judgement At risk behaviors demonstrated   Memory Impaired   Edema Exam / Assessment   Skin condition Pitting;Dryness  (open skin/wound LLE-weepy drainage)   Skin condition comments 2-3+ tight taught BLE lymphedema; wound LLE weeping and open   Pitting 2+;3+   Pitting location BLE foot-knee crease   Capillary refill Symmetrical   Dorsal pedal pulse comments   (unable to palpate-no signs ischemia)   Stemmer sign Positive   Girth Measurements   Girth Measurements   (will obtain upon return for services)   Range of Motion   ROM comments RLE limited knee flex and ext-painful   Strength   Strength comments NT'd   Activities of Daily Living   Activities of Daily Living I-Max A   Bed Mobility   Bed mobility I   Transfers   Transfers Max A 1-2   Gait / Locomotion   Gait / Locomotion Max A-dep   Sensory   Sensory perception comments BLE neuropathy symptoms reported   Vascular Assessment   Vascular Assessment Comments suspicions venous issues regarding BLE lymphedema 2/2 discoloration, wounds, and pt sedentary nature   Coordination   Coordination Gross motor coordination appropriate   Muscle Tone   Muscle tone No  deficits were identified   Planned Edema Interventions   Planned edema interventions Manual lymph drainage;Gradient compression bandaging;Fit for compression garment;Exercises;Precautions to prevent infection / exacerbation;Education;Skin care / precautions;Home management program development   Planned edema intervention comments Group Home staff ed and training needed for long termpt support   Clinical Impression   Criteria for skilled therapeutic intervention met Yes   Therapy diagnosis lymphedema   Influenced by the following impairments / conditions Stage 1;Wounds / Ulcers   Assessment of Occupational Performance 1-3 Performance Deficits   Identified Performance Deficits infection risk; decreased I with transfers; decreased fit LB clothing   Clinical Decision Making (Complexity) Low complexity   Treatment Frequency 3x/week   Treatment duration 3x/wk x 4 weeks, then 0x/wk x 3 weeks, then 1x/wk x 1 week   Patient / family and/or staff in agreement with plan of care Yes   Risks and benefits of therapy have been explained Yes   Clinical impression comments Pt can benefit from skilled lymphedema services to reduce BLE lymphedema to aide wound healing and reduce risk skin infections, and promote more ease and I with transfers and mobility tasks   Goals   Edema Eval Goals 1;2;3;4;5;6   Goal 1   Goal identifier Ed   Goal description Pt and pts support staff will report understanding difference sbetween s/s lymphedema vs s/s cellulitis for safety and I with home management of BLE lymphedema needed for reduction risk skin infections and promote best fit LB clothing   Target date 08/05/22   Goal 2   Goal identifier GCB Wearing   Goal description Tolerate gradient compression bandaging/wearing compression garments 23 hrs/day to prevent re-acccumulation of extracellular fluid for max reductions in BLE lymphedema needed to reduce risk skin infections and promote more ease and I with transfers   Target date 08/05/22   Goal 3    Goal identifier GCB Donning   Goal description Demonstrate independence in applying gradient compression bandages to build I with home management of BLE lymphedema needed to reduce risk skin infections ad promote better fit LB clothing   Target date 08/05/22   Goal 4   Goal identifier HEP/MLD   Goal description Demonstrate independence in performing prescribed exercises to facilate the lymph system and muscle pumping ex  for max reductions in BLE lymphedema needed to reduce risk skin infections and promote more ease and I with transfers   Target date 08/05/22   Goal 5   Goal identifier Garments   Goal description Be independent in donning/doffing, wearing schedule, and care of compression garments  to build I with home management of BLE lymphedema needed to reduce risk skin infections ad promote better fit LB clothing   Target date 08/05/22   Goal 6   Goal identifier Reductions   Goal description Pt will display a total BLE volume reduction of 1L+ for reductions needed to reduce risk skin infections and promote improved ease and I with transfers/mobility tasks   Target date 08/05/22   Total Evaluation Time   OT Eval, Low Complexity Minutes (88012) 20   Certification   Certification date from 05/09/22   Certification date to 08/05/22   Medical Diagnosis lymphedema   Certification I certify the need for these services furnished under this plan of treatment and while under my care.  (Physician co-signature of this document indicates review and certification of the therapy plan).

## 2022-05-10 NOTE — ADDENDUM NOTE
Encounter addended by: Phani Merida on: 5/10/2022 12:14 PM   Actions taken: Clinical Note Signed, Flowsheet accepted, Document created, Document edited

## 2022-05-10 NOTE — TELEPHONE ENCOUNTER
Received a call from Taylor at the [facility] wondering if Dr. Arriaza can place a referral for home care. Patient was able to go to her appointment for lymphedema therapy but, per Taylor, it was a long process to get the patient to this appointment (pain and mobility issues). Recommendation at this appointment was for the patient to be seen 3 times a week for 4 weeks. Taylor states this will not be dueable for the patient.     Taylor states the patient needs lymphedema therapy but will not be able to go into the clinic that often.    Home care referral pended.    Call back number: [phone]  Ok to leave voicemail    Routing to PCP for review.    Shelli Ku RN

## 2022-05-11 ENCOUNTER — MEDICAL CORRESPONDENCE (OUTPATIENT)
Dept: HEALTH INFORMATION MANAGEMENT | Facility: CLINIC | Age: 62
End: 2022-05-11

## 2022-05-11 NOTE — TELEPHONE ENCOUNTER
Called and relayed message to Taylor Vazquez was quite upset stating that they can not commit to getting Josiane to the appointments 3 times a week and they want to be able to care for the patient but the therapist wants to train a group home staff on how to wrap her legs and then have that staff train the rest of the staff which they find unreasonable and a safety issues. They are wanting someone to come to the house to train them on how to to wrap her legs.     She would like Dr. Arriaza to reconsider     Marco Antonio Graham RN

## 2022-05-11 NOTE — TELEPHONE ENCOUNTER
A home care referral has been placed.    I am concerned that this will not be enough but it is better than nothing (since it sounds like they are declining outpatient services).

## 2022-05-16 ENCOUNTER — TELEPHONE (OUTPATIENT)
Dept: INTERNAL MEDICINE | Facility: CLINIC | Age: 62
End: 2022-05-16
Payer: COMMERCIAL

## 2022-05-16 DIAGNOSIS — Z76.89 REFERRAL OF PATIENT: Primary | ICD-10-CM

## 2022-05-16 NOTE — TELEPHONE ENCOUNTER
Vasile from [facility] called, stated that they were unable to take the referral due to capacity.     For any questions, Vasile may be reached at [phone].

## 2022-05-17 ENCOUNTER — PATIENT OUTREACH (OUTPATIENT)
Dept: CARE COORDINATION | Facility: CLINIC | Age: 62
End: 2022-05-17
Payer: COMMERCIAL

## 2022-05-17 ENCOUNTER — PATIENT OUTREACH (OUTPATIENT)
Dept: NURSING | Facility: CLINIC | Age: 62
End: 2022-05-17
Payer: COMMERCIAL

## 2022-05-17 NOTE — LETTER
Date:  Tuesday, May 17, 2022    To:  Home Care Agency    Attention:      Hello,    Please find the home care referral information below which includes the referral, demographics, insurance information, medications and reach out to me only if your agency is able to accept.    Thank you for your time and consideration!  Greatly appreciated!     Rocio Ryan RN, BSN, PHN  Primary Care / Care Coordinator   [facility]  E-mail [email or web address]   [phone]                                   Documentation of Face to Face and Certification for Home Health Services     I attest that I saw or will see Josiane Dasilva on this date:  4/29/2022     This encounter with the patient was in whole, or in part, for medical condition, which is the primary reason for Home Health Care:       Patient Active Problem List   Diagnosis     History of peptic ulcer disease     MDD (major depressive disorder)     RICO (generalized anxiety disorder)     Peripheral neuropathy     Benign essential hypertension     Alcoholism /alcohol abuse     Morbid obesity (H)     Poor short term memory     Impaired fasting glucose      I certify that, based on my findings, the following services are medically necessary Home Health Services: Skilled Nursing  Physical Therapy Wound assessment and care  Lymphedema (OT can eval and treat based on availability)     Additional services needed:        Further, I certify that my clinical findings support that this patient is homebound (i.e. absences from home require considerable and taxing effort and are for medical reasons or Mandaeism services or infrequently or of short duration when for other reasons) related to:Patient gets lost or wanders due to cognitive impairments     Based on the above findings, I certify that this patient is confined to the home and needs  intermittent skilled nursing care, physical therapy and/or speech therapy. The patient is under my care, and I have initiated the establishment of the plan of care. This patient will be followed by a physician who will periodically review the plan of care.        Physician/Provider to provide follow up care: Provider to follow patient: CARMEN ARRIAZA [[id number]]        Please be aware that coverage of these services is subject to the terms and limitations of your health insurance plan.  Call member services at your health plan with any benefit or coverage questions.  _______________________________________________________________________  Authorized by:  Carmen Arriaza MD       PLEASE EVALUATE AND TREAT (Evaluation timeline is 24 - 48 hrs. Please call if there is need for a variance to this timeline).     Medications:         Current Outpatient Medications   Medication Sig Dispense Refill     acetaminophen (TYLENOL) 325 MG tablet TAKE 1-2 TABLETS (325-650MG) BY MOUTH EVERY 4 HOURS AS NEEDED FOR MILD PAIN --MAXIMUM OF 4000MG ACETAMINOPHEN IN 24 HOURS-- *1 TOTAL FILL* 100 tablet 11     ALLERGY RELIEF 10 MG tablet TAKE 1 TABLET BY MOUTH ONCE DAILY *1 TOTAL FILL* 30 tablet 11     B Complex Vitamins (VITAMIN B-COMPLEX) TABS TAKE 1 TABLET BY MOUTH ONCE DAILY  *1 TOTAL FILL* **NON-COVERED MED** 7 tablet 11     Bioflavonoid Products (THEA-C PO) Take 1,000 mg by mouth 2 times daily         cholecalciferol (VITAMIN D3) 125 mcg (5000 units) capsule Take by mouth 2 times daily         cyanocobalamin (VITAMIN B-12) 1000 MCG tablet TAKE 1 TABLET BY MOUTH TWICE DAILY *1 TOTAL FILL* **NON-COVERED MED** 60 tablet 11     furosemide (LASIX) 40 MG tablet TAKE 1 TABLET BY MOUTH TWICE DAILY AT 7AM AND 2PM *1 TOTAL FILL* 60 tablet 11     gabapentin (NEURONTIN) 300 MG capsule Take 1 capsule (300 mg) by mouth At Bedtime 90 capsule 3     GAVILAX 17 GM/SCOOP powder TAKE 17G (MEASURE WITH THE MARKINGS INSIDE CAP) BY MOUTH EVERY 12 HOURS AS  NEEDED FOR CONSTIPATION *1 TOTAL FILL* 510 g 11     lidocaine (LMX4) 4 % external cream APPLY TOPICALLY TO AFFECTED AREA ONCE DAILY FOR LOWER BACK PAIN AND REMOVE PER SCHEDULE *1 TOTAL FILL* 30 g 11     lisinopril (ZESTRIL) 40 MG tablet Take 1 tablet (40 mg) by mouth every morning 30 tablet 3     methocarbamol (ROBAXIN) 500 MG tablet TAKE 1 TABLET BY MOUTH TWICE DAILY *1 TOTAL FILL* 60 tablet 11     metoprolol succinate ER (TOPROL XL) 25 MG 24 hr tablet TAKE 1 TABLET BY MOUTH TWICE DAILY *1 TOTAL FILL* 60 tablet 11     multivitamin w/minerals (THERA-VIT-M) tablet Take 1 tablet by mouth daily (Patient taking differently: Take 1 tablet by mouth daily On hold for surgery) 30 each 0     Nutritional Supplements (DERRICK) PACK Take 1 packet by mouth 2 times daily         omega-3 acid ethyl esters (LOVAZA) 1 g capsule Take 2 capsules (2 g) by mouth 2 times daily 360 capsule 3     pantoprazole (PROTONIX) 40 MG EC tablet TAKE 1 TABLET BY MOUTH EVERY MORNING (BEFORE BREAKFAST) *1 TOTAL FILL* 30 tablet 11     SENNA-TIME 8.6 MG tablet TAKE 1 TABLET BY MOUTH EVERY 12 HOURS AS NEEDED FOR CONSTIPATION *1 TOTAL FILL* 14 tablet 11     traZODone (DESYREL) 50 MG tablet TAKE 1 TABLET BY MOUTH AT BEDTIME *1 TOTAL FILL* 31 tablet 11     vitamin B complex with vitamin C (STRESS TAB) tablet TAKE 1 TABLET BY MOUTH TWICE DAILY *1 TOTAL FILL* 60 tablet 11     VITAMIN D3 50 MCG (2000 UT) tablet TAKE 1 TABLET BY MOUTH ONCE DAILY *1 TOTAL FILL* 30 tablet 11      Problems:      Patient Active Problem List   Diagnosis     History of peptic ulcer disease     MDD (major depressive disorder)     RICO (generalized anxiety disorder)     Peripheral neuropathy     Benign essential hypertension     Alcoholism /alcohol abuse     Morbid obesity (H)     Poor short term memory     Impaired fasting glucose      Diet:  None     Code Status:    Code Status: Prior     Allergies:  Asa [aspirin] and Nsaids    Order  Home Care Referral [9046.001] (Order  [id number])    Referral Details    Referred By  Referred To   Carmen Arriaza MD   [address] [facility] [address]   Phone: [phone]   Fax: [phone]    Diagnoses: Bilateral lower extremity edema   Open wound   Order: Home Care Referral    [facility]   [address] [facility] [address]   Phone: [phone]        Patient Name   Josiane Dasilva MRN   [id number] Legal Sex   Female    1960       Patient Demographics    Address   [address] [facility] [address] Phone   [phone] (Home) *Preferred*   [phone] (Mobile)     Base CPT Code (Reference Only)    Code CPT Chargeables   9046.001 9046        Existing Charges    Charge Line Charge Code Status Charge Trigger Charge Type   None              Order Information    Order Date/Time Release Date/Time Start Date/Time End Date/Time   22 06:32 PM None 2022 None       Order Details    Frequency Duration Priority Order Class   None None Routine: Next available opening  Home Care       Order Providers    Authorizing Provider Encounter Provider   Carmen Arriaza MD Lall, Cassandra R, MD     ABN Associated with this Order    There is no ABN associated with this order.                    Ordering Provider's NPI: [id number]  Carmen Arriaza        Home Care Referral [[id number]]    Electronically signed by: Carmen Arriaza MD on 22 Status: Active   Ordering user: Carmen Arriaza MD 22     Order History  Outpatient  Date/Time Action Taken User Additional Information   22 Sign Carmen Arriaza MD      Order Questions    Question Answer   Reason for Referral: Skilled Nursing   Note: Must select at least one of Skilled Nursing, Physical Therapy, and/or Speech Therapy in addition to any other services.    Physical Therapy   Note: Must select at least one of Skilled Nursing, Physical Therapy, and/or Speech Therapy in addition to any other services.   Physical  Therapy Eval and Treat for: Lymphedema (OT can eval and treat based on availability)   Skilled Nursing Eval and Treat for: Wound assessment and care   Is the patient homebound? Yes   Homebound Status (describe the functional limitations that support this patient is confined to his/her home. Medicaid recipients are not required to be homebound.): Patient gets lost or wanders due to cognitive impairments   I attest that I saw or will see the patient on this date: 4/29/2022   Provider to follow patient KAIDENCLAUDIA                           Associated Diagnoses    Bilateral lower extremity edema [R60.0]  - Primary       Open wound [T14.8XXA]         Member: ETIENNE BUENROSTRO [[id number]]     Plan: MEDICA ACCESS ANNIE NARAYAN [190* Payor: MEDICA [13]         Coverage Information    Coverage information:     Subscriber: [id number] ETIENNE BUENROSTRO     Rel to sub: 01 - Self     Member ID: [id number]     Payor: Maximo-REBECCA Ph: [phone]     Benefit plan: 1903-MEDICA ACCESS ABILITY [place] Ph: [phone]     Group number: [id number]     Member effective dates: from 09/01/18

## 2022-05-17 NOTE — PROGRESS NOTES
Clinic Care Coordination Contact  [facility]       Clinical Data: Care Coordinator Outreach  Outreach attempted x 1.  RNCC reached out to patient to apprise of RNCC waiting to hear back from the 14 home care agencies reached out to accept a referral for OT/Lymphadema home care services for the patient.  RNCC shared this could take 1-2 weeks, possibly longer, due to the pandemic and staffing issues.  RNCC also explained that patients insurance may be a challenge due to not a lot of home care agencies work with her insurance.  Patient verbalized her understanding.     Rocio Ryan RN, BSN, PHN  Primary Care / Care Coordinator   [facility]  E-mail [email or web address]   [phone]

## 2022-05-17 NOTE — PROGRESS NOTES
Clinic Care Coordination Contact  Care Team Conversations    RNCC received an in-basket message stating [facility] cannot accept referral and PCP is requesting help in finding another home care agency for RN/PT/OT/Lymphadema and bilateral extremity wound care.    RNCC faxed the referral, insurance information, demographics, and medication list to the following agencies:    [facility]  [facility], [facility]  [facility]  [facility]  [facility]  [facility]  [facility]  [facility]    RNCC will reach out to the patient to apprise and will wait for a return call from an agency that can take the referral.     Rocio Ryan RN, BSN, PHN  Primary Care / Care Coordinator   [facility]  E-mail [email or web address]   [phone]

## 2022-05-18 ENCOUNTER — HOSPITAL ENCOUNTER (OUTPATIENT)
Dept: WOUND CARE | Facility: CLINIC | Age: 62
Discharge: HOME | End: 2022-05-18
Attending: SURGERY | Admitting: SURGERY
Payer: COMMERCIAL

## 2022-05-18 ENCOUNTER — TELEPHONE (OUTPATIENT)
Dept: WOUND CARE | Facility: CLINIC | Age: 62
End: 2022-05-18

## 2022-05-18 VITALS — SYSTOLIC BLOOD PRESSURE: 132 MMHG | HEART RATE: 85 BPM | TEMPERATURE: 97.8 F | DIASTOLIC BLOOD PRESSURE: 71 MMHG

## 2022-05-18 DIAGNOSIS — L97.912 ULCER OF RIGHT LOWER EXTREMITY WITH FAT LAYER EXPOSED (H): ICD-10-CM

## 2022-05-18 DIAGNOSIS — L97.922 ULCER OF LEFT LOWER EXTREMITY WITH FAT LAYER EXPOSED (H): Primary | ICD-10-CM

## 2022-05-18 PROCEDURE — 99213 OFFICE O/P EST LOW 20 MIN: CPT | Performed by: SURGERY

## 2022-05-18 PROCEDURE — 97602 WOUND(S) CARE NON-SELECTIVE: CPT

## 2022-05-18 NOTE — TELEPHONE ENCOUNTER
Please schedule for JAN at [facility]   Patient is in 2 layer wrap: Yes   Patient is a olman transfer : No   HARMEET Park M.D.

## 2022-05-18 NOTE — DISCHARGE INSTRUCTIONS
Josiane JARED Dasilva      1960    Medications/supplements to aid in healing:  Vitamin D3 10,000 iu per day  Kinsey C 1,000 mg take daily  Vitamin B Complex with folic acid take 1 tablet daily  Vitamin B 12 1000 mcg daily  Vein Formula 500mg capsule twice daily order from www.[employer] or call [phone].     Keep leg dry with use of a cast protector (available at [facility] or [facility])    Wound care recommendations to Bilateral lower legs:  Prophase protocol  Apply acticoat 7 (not , do not pull apart) to wounds then apply spandage toes to knee  Then apply 2 layer coflex wrap  Change weekly at [facility]    Compression:   Your compression wrap is a 2 layer coflex wrap and should stay on until next week.   Please remove compression dressing if toes turn blue and/or tingle and can not be relieved by raising the leg for one hour.     Walk as much as you can. When you sit raise your ankle above your hips to  promote wound healing.    It is very important to follow your healthcare providers instructions. Studies indicate when compression therapy is applied as directed, healing may occur faster and more completely. Do Not remove CoFlex unless your healthcare provider tells you to remove it.  Helpful Tips  Your bandage may feel tight at first. This is normal. When the swelling goes down, it will not feel as tight.  Wear the stocking that comes with the system over the bandage to help you put on shoes and clothing  Wear comfortable shoes and walk regularly. Walking will improve your circulation which will improve healing.  Keep your bandage dry.   CoFlex maybe left on your leg for up to 7 days. After that your doctor or nurse will apply a new CoFlex.   Call your doctor or nurse if the bandage gets wet, slips down or if you have pain, tingling, numbness or discoloration.      HARMEET Park M.D. May 18, 2022      Call us at [phone] if you have any questions about your wounds, have redness or swelling around your  wound, have a fever of 101 or greater or if you have any other problems or concerns. We answer the phone Monday through Friday 8 am to 4 pm, please leave a message as we check the voicemail frequently throughout the day.     If you had a positive experience please indicate that on your patient satisfaction survey form that [facility] will be sending you.    It was a pleasure meeting with you today.  Thank you for allowing me and my team the privilege of caring for you today.  YOU are the reason we are here, and I truly hope we provided you with the excellent service you deserve.  Please let us know if there is anything else we can do for you so that we can be sure you are leaving completely satisfied with your care experience.      If you have any billing related questions please call the [facility] Business office at [phone]. The clinic staff does not handle billing related matters.

## 2022-05-18 NOTE — PROGRESS NOTES
[facility] [place] Progress Note    Subject: Josiane Dasilva resides at care facility, patient is unable to participate in self-cares due to cognitive impairment.  Chronic interstitial edema.    Patient Active Problem List   Diagnosis     History of peptic ulcer disease     MDD (major depressive disorder)     RICO (generalized anxiety disorder)     Peripheral neuropathy     Benign essential hypertension     Alcoholism /alcohol abuse     Morbid obesity (H)     Poor short term memory     Impaired fasting glucose     Ulcer of right lower extremity with fat layer exposed (H)     Ulcer of left lower extremity with fat layer exposed (H)     Past Medical History:   Diagnosis Date     Alcoholism /alcohol abuse      Benign essential hypertension      RICO (generalized anxiety disorder)      History of peptic ulcer disease      Impaired fasting glucose      MDD (major depressive disorder)      Morbid obesity (H)      Peripheral neuropathy      Poor short term memory      Exam:  /71 (BP Location: Right arm)   Pulse 85   Temp 97.8  F (36.6  C) (Temporal)   LMP 04/07/2014   Wound (used by OP I only) 05/18/22 0821 Right lower;anterior leg (Active)   Thickness/Stage full thickness 05/18/22 0800   Base granulating 05/18/22 0800   Periwound intact;redness;swelling 05/18/22 0800   Periwound Temperature warm 05/18/22 0800   Periwound Skin Turgor soft 05/18/22 0800   Edges open 05/18/22 0800   Length (cm) 1.5 05/18/22 0800   Width (cm) 1.5 05/18/22 0800   Depth (cm) 0.1 05/18/22 0800   Wound (cm^2) 2.25 cm^2 05/18/22 0800   Wound Volume (cm^3) 0.23 cm^3 05/18/22 0800   Drainage Characteristics/Odor serosanguineous 05/18/22 0800   Drainage Amount large 05/18/22 0800   Care, Wound non-select wound debridement performed 05/18/22 0800       Wound (used by OP I only) 05/18/22 0822 Left posterior;lower leg (Active)   Thickness/Stage full thickness 05/18/22 0800   Base granulating 05/18/22 0800   Periwound  intact;swelling 05/18/22 0800   Periwound Temperature warm 05/18/22 0800   Periwound Skin Turgor soft 05/18/22 0800   Edges open 05/18/22 0800   Length (cm) 2.5 05/18/22 0800   Width (cm) 1.8 05/18/22 0800   Depth (cm) 0.2 05/18/22 0800   Wound (cm^2) 4.5 cm^2 05/18/22 0800   Wound Volume (cm^3) 0.9 cm^3 05/18/22 0800   Drainage Characteristics/Odor serosanguineous 05/18/22 0800   Drainage Amount large 05/18/22 0800   Care, Wound non-select wound debridement performed 05/18/22 0800       Wound (used by OP WHI only) 05/18/22 0822 Left lower;lateral leg (Active)   Thickness/Stage full thickness 05/18/22 0800   Base granulating 05/18/22 0800   Periwound intact;swelling 05/18/22 0800   Periwound Temperature warm 05/18/22 0800   Periwound Skin Turgor soft 05/18/22 0800   Edges open 05/18/22 0800   Length (cm) 1.1 05/18/22 0800   Width (cm) 1.1 05/18/22 0800   Depth (cm) 0.1 05/18/22 0800   Wound (cm^2) 1.21 cm^2 05/18/22 0800   Wound Volume (cm^3) 0.12 cm^3 05/18/22 0800   Drainage Characteristics/Odor serosanguineous 05/18/22 0800   Drainage Amount large 05/18/22 0800   Care, Wound non-select wound debridement performed 05/18/22 0800     Chronic interstitial edema bilateral lower extremities, palpable pedal pulses, lower extremity ulcerations as noted.  Acticoat and 2 layer wrap compression placed bilaterally.        Impression: Uncontrolled lymphedema, interstitial edema, left lower extremity ulcerations, cognitive impairment    Plan: We will dress the wounds with Acticoat, 2 layer wrap,.  Patient will return to the clinic in 1 weeks time      Further instructions from your care team       Josiane Dasilva      1960    Medications/supplements to aid in healing:  Vitamin D3 10,000 iu per day  Kinsey C 1,000 mg take daily  Vitamin B Complex with folic acid take 1 tablet daily  Vitamin B 12 1000 mcg daily  Vein Formula 500mg capsule twice daily order from [email or web address] or call [phone].     Keep leg dry  with use of a cast protector (available at [employer] or [employer])    Wound care recommendations to Bilateral lower legs:  Prophase protocol  Apply acticoat 7 (not , do not pull apart) to wounds then apply spandage toes to knee  Then apply 2 layer coflex wrap  Change weekly at [facility]    Compression:   Your compression wrap is a 2 layer coflex wrap and should stay on until next week.   Please remove compression dressing if toes turn blue and/or tingle and can not be relieved by raising the leg for one hour.     Walk as much as you can. When you sit raise your ankle above your hips to  promote wound healing.    It is very important to follow your healthcare providers instructions. Studies indicate when compression therapy is applied as directed, healing may occur faster and more completely. Do Not remove CoFlex unless your healthcare provider tells you to remove it.  Helpful Tips  Your bandage may feel tight at first. This is normal. When the swelling goes down, it will not feel as tight.  Wear the stocking that comes with the system over the bandage to help you put on shoes and clothing  Wear comfortable shoes and walk regularly. Walking will improve your circulation which will improve healing.  Keep your bandage dry.   CoFlex maybe left on your leg for up to 7 days. After that your doctor or nurse will apply a new CoFlex.   Call your doctor or nurse if the bandage gets wet, slips down or if you have pain, tingling, numbness or discoloration.      HARMEET Park M.D. May 18, 2022      Call us at [phone] if you have any questions about your wounds, have redness or swelling around your wound, have a fever of 101 or greater or if you have any other problems or concerns. We answer the phone Monday through Friday 8 am to 4 pm, please leave a message as we check the voicemail frequently throughout the day.     If you had a positive experience please indicate that on your patient satisfaction survey form that [facility] will be sending you.    It was a pleasure meeting with you today.  Thank you for allowing me and my team the privilege of caring for you today.  YOU are the reason we are here, and I truly hope we provided you with the excellent service you deserve.  Please let us know if there is anything else we can do for you so that we can be sure you are leaving completely satisfied with your care experience.      If you have any billing related questions please call the [facility] Business office at [phone]. The clinic staff does not handle billing related matters.           Phani Park MD on 5/18/2022 at 11:10 AM          Dictated using Dragon voice recognition software which may result in transcription errors

## 2022-05-25 ENCOUNTER — HOSPITAL ENCOUNTER (OUTPATIENT)
Dept: WOUND CARE | Facility: CLINIC | Age: 62
Discharge: HOME | End: 2022-05-25
Attending: SURGERY | Admitting: SURGERY
Payer: COMMERCIAL

## 2022-05-25 VITALS — TEMPERATURE: 98.8 F | HEART RATE: 74 BPM | DIASTOLIC BLOOD PRESSURE: 65 MMHG | SYSTOLIC BLOOD PRESSURE: 114 MMHG

## 2022-05-25 DIAGNOSIS — L97.912 ULCER OF RIGHT LOWER EXTREMITY WITH FAT LAYER EXPOSED (H): Primary | ICD-10-CM

## 2022-05-25 DIAGNOSIS — L97.922 ULCER OF LEFT LOWER EXTREMITY WITH FAT LAYER EXPOSED (H): ICD-10-CM

## 2022-05-25 PROCEDURE — 29581 APPL MULTLAYER CMPRN SYS LEG: CPT

## 2022-05-25 PROCEDURE — 97602 WOUND(S) CARE NON-SELECTIVE: CPT

## 2022-05-25 NOTE — PROGRESS NOTES
[facility] Wound Healing [place] Nurse Note    Subject: Josiane Dasilva presents for nurse only visit for wound check and dressing change. Patient presents with copious serous drainage thus added Kerramax dressing to left and right legs. Macerated tissue noted on left plantar foot and periwound skin on left leg. Patient presented to visit start with 2 layer wraps bunched down on both legs, picture taken.      Exam:  /65 (BP Location: Left arm, Patient Position: Sitting)   Pulse 74   Temp 98.8  F (37.1  C) (Temporal)   LMP 04/07/2014   Wound (used by [facility] only) 05/18/22 0821 Right lower;anterior leg (Active)   Thickness/Stage full thickness 05/25/22 1441   Base granulating 05/25/22 1441   Periwound intact;swelling 05/25/22 1441   Periwound Temperature warm 05/25/22 1441   Periwound Skin Turgor soft 05/25/22 1441   Edges open 05/25/22 1441   Length (cm) 1.2 05/25/22 1441   Width (cm) 1.4 05/25/22 1441   Depth (cm) 0.2 05/25/22 1441   Wound (cm^2) 1.68 cm^2 05/25/22 1441   Wound Volume (cm^3) 0.34 cm^3 05/25/22 1441   Wound healing % 25.33 05/25/22 1441   Drainage Characteristics/Odor serous 05/25/22 1441   Drainage Amount copious 05/25/22 1441   Care, Wound non-select wound debridement performed 05/25/22 1441       Wound (used by [facility] only) 05/18/22 0822 Left posterior;lower leg (Active)   Thickness/Stage full thickness 05/25/22 1441   Base granulating 05/25/22 1441   Periwound intact;swelling 05/25/22 1441   Periwound Temperature warm 05/25/22 1441   Periwound Skin Turgor soft 05/25/22 1441   Edges open 05/25/22 1441   Length (cm) 1.1 05/25/22 1441   Width (cm) 1.1 05/25/22 1441   Depth (cm) 0.1 05/25/22 1441   Wound (cm^2) 1.21 cm^2 05/25/22 1441   Wound Volume (cm^3) 0.12 cm^3 05/25/22 1441   Wound healing % 73.11 05/25/22 1441   Drainage Characteristics/Odor serous 05/25/22 1441   Drainage Amount copious 05/25/22 1441   Care, Wound non-select wound debridement performed 05/25/22 1441       Wound  (used by [facility] only) 05/18/22 0822 Left lower;lateral leg (Active)   Thickness/Stage full thickness 05/25/22 1441   Base granulating 05/25/22 1441   Periwound intact;swelling;macerated 05/25/22 1441   Periwound Temperature warm 05/25/22 1441   Periwound Skin Turgor soft 05/25/22 1441   Edges open 05/25/22 1441   Length (cm) 2.1 05/25/22 1441   Width (cm) 2.6 05/25/22 1441   Depth (cm) 0.2 05/25/22 1441   Wound (cm^2) 5.46 cm^2 05/25/22 1441   Wound Volume (cm^3) 1.09 cm^3 05/25/22 1441   Wound healing % -351.24 05/25/22 1441   Drainage Characteristics/Odor serous 05/25/22 1441   Drainage Amount copious 05/25/22 1441   Care, Wound non-select wound debridement performed 05/25/22 1441     Procedure: Non-selective debridement was performed, no bleeding occurred. Patient tolerated procedure well.  Procedure : Wound redressed with prophase protocol, Acticoat 7 then spandage size 8 then Kerramax. Application of 2 layer coflex wrap was applied.  Plan: Patient will return to the clinic in 1 weeks time  Alannah Soares RN on 5/25/2022 at 4:05 PM                  Further instructions from your care team       Josiane Dasilva      1960    Medications/supplements to aid in healing:  Vitamin D3 10,000 iu per day  Kinsey C 1,000 mg take daily  Vitamin B Complex with folic acid take 1 tablet daily  Vitamin B 12 1000 mcg daily  Vein Formula 500mg capsule twice daily order from www.[email or web address]com or call  [phone].    Keep leg dry with use of a cast protector (available at [facility] or [facility])    Wound care recommendations to Bilateral lower legs:  Prophase protocol  Apply acticoat 7 (not , do not pull apart) to wounds then kerramax then apply spandage toes to knee  Then apply 2 layer coflex wrap  Change weekly at [facility]    Compression:  Your compression wrap is a 2 layer coflex wrap and should stay on until next week.  Please remove compression dressing if toes turn blue and/or tingle and can not be relieved by  raising the leg for one hour.    Walk as much as you can. When you sit raise your ankle above your hips to  promote wound healing.    It is very important to follow your healthcare providers instructions. Studies indicate when compression therapy is applied as directed, healing may occur faster and more completely. Do Not remove CoFlex unless your healthcare provider tells you to remove it.  Helpful Tips  Your bandage may feel tight at first. This is normal. When the swelling goes down, it will not feel as tight.  Wear the stocking that comes with the system over the bandage to help you put on shoes and clothing  Wear comfortable shoes and walk regularly. Walking will improve your circulation which will improve healing.  Keep your bandage dry.   CoFlex maybe left on your leg for up to 7 days. After that your doctor or nurse will apply a new CoFlex.   Call your doctor or nurse if the bandage gets wet, slips down or if you have pain, tingling, numbness or discoloration.     Alannah Soares RN on 5/25/2022 at 2:22 PM on behalf of HARMEET Park M.D. May 25, 2022    Call us at [phone] if you have any questions about your wounds, have redness or swelling around your wound, have a fever of 101 or greater or if you have any other problems or concerns. We answer the phone Monday through Friday 8 am to 4 pm, please leave a message as we check the voicemail frequently throughout the day.     If you had a positive experience please indicate on your patient satisfaction survey form that [facility] will be sending you.    If you have any billing related questions please call the [facility] Business office at [phone]. The clinic staff does not handle billing related matters.

## 2022-05-25 NOTE — DISCHARGE INSTRUCTIONS
Josiane COLEMAN Brown      1960    Medications/supplements to aid in healing:  Vitamin D3 10,000 iu per day  Kinsey C 1,000 mg take daily  Vitamin B Complex with folic acid take 1 tablet daily  Vitamin B 12 1000 mcg daily  Vein Formula 500mg capsule twice daily order from [email or web address] or call  [phone].    Keep leg dry with use of a cast protector (available at [facility] or [facility])    Wound care recommendations to Bilateral lower legs:  Prophase protocol  Apply acticoat 7 (not , do not pull apart) to wounds then kerramax then apply spandage toes to knee  Then apply 2 layer coflex wrap  Change weekly at [facility]    Compression:  Your compression wrap is a 2 layer coflex wrap and should stay on until next week.  Please remove compression dressing if toes turn blue and/or tingle and can not be relieved by raising the leg for one hour.    Walk as much as you can. When you sit raise your ankle above your hips to  promote wound healing.    It is very important to follow your healthcare providers instructions. Studies indicate when compression therapy is applied as directed, healing may occur faster and more completely. Do Not remove CoFlex unless your healthcare provider tells you to remove it.  Helpful Tips  Your bandage may feel tight at first. This is normal. When the swelling goes down, it will not feel as tight.  Wear the stocking that comes with the system over the bandage to help you put on shoes and clothing  Wear comfortable shoes and walk regularly. Walking will improve your circulation which will improve healing.  Keep your bandage dry.   CoFlex maybe left on your leg for up to 7 days. After that your doctor or nurse will apply a new CoFlex.   Call your doctor or nurse if the bandage gets wet, slips down or if you have pain, tingling, numbness or discoloration.     Alannah Soares RN on 5/25/2022 at 2:22 PM on behalf of HARMEET Park M.D. May 25, 2022    Call us at [phone] if you have any  questions about your wounds, have redness or swelling around your wound, have a fever of 101 or greater or if you have any other problems or concerns. We answer the phone Monday through Friday 8 am to 4 pm, please leave a message as we check the voicemail frequently throughout the day.     If you had a positive experience please indicate on your patient satisfaction survey form that  [employer] will be sending you.    If you have any billing related questions please call the [facility] Business office at [phone]. The clinic staff does not handle billing related matters.

## 2022-06-01 ENCOUNTER — HOSPITAL ENCOUNTER (OUTPATIENT)
Dept: WOUND CARE | Facility: CLINIC | Age: 62
Discharge: HOME | End: 2022-06-01
Attending: SURGERY | Admitting: SURGERY
Payer: COMMERCIAL

## 2022-06-01 VITALS — TEMPERATURE: 97.2 F | RESPIRATION RATE: 20 BRPM

## 2022-06-01 DIAGNOSIS — L97.922 ULCER OF LEFT LOWER EXTREMITY WITH FAT LAYER EXPOSED (H): ICD-10-CM

## 2022-06-01 DIAGNOSIS — L97.912 ULCER OF RIGHT LOWER EXTREMITY WITH FAT LAYER EXPOSED (H): ICD-10-CM

## 2022-06-01 PROCEDURE — 29581 APPL MULTLAYER CMPRN SYS LEG: CPT | Mod: 50

## 2022-06-01 PROCEDURE — 97602 WOUND(S) CARE NON-SELECTIVE: CPT

## 2022-06-01 NOTE — PROGRESS NOTES
[facility] Wound Healing [place] Nurse Note    Subject: Josiane Dasilva presents for nurse only visit for wound check and dressing change.      Exam:  Temp 97.2  F (36.2  C) (Temporal)   Resp 20   LMP 04/07/2014   Wound (used by OP [facility] only) 05/18/22 0821 Right lower;anterior leg (Active)   Thickness/Stage full thickness 06/01/22 1400   Base granulating 06/01/22 1400   Periwound intact;swelling 06/01/22 1400   Periwound Temperature warm 06/01/22 1400   Periwound Skin Turgor soft 06/01/22 1400   Edges open 06/01/22 1400   Length (cm) 1.2 06/01/22 1400   Width (cm) 1.3 06/01/22 1400   Depth (cm) 0.1 06/01/22 1400   Wound (cm^2) 1.56 cm^2 06/01/22 1400   Wound Volume (cm^3) 0.16 cm^3 06/01/22 1400   Wound healing % 30.67 06/01/22 1400   Drainage Characteristics/Odor serous 06/01/22 1400   Drainage Amount scant 06/01/22 1400   Care, Wound non-select wound debridement performed 06/01/22 1400       Wound (used by OP [facility] only) 05/18/22 0822 Left lower;lateral leg (Active)   Thickness/Stage full thickness 06/01/22 1400   Base granulating 06/01/22 1400   Periwound intact;swelling;macerated 06/01/22 1400   Periwound Temperature warm 06/01/22 1400   Periwound Skin Turgor soft 06/01/22 1400   Edges open 06/01/22 1400   Length (cm) 2.3 06/01/22 1400   Width (cm) 2.3 06/01/22 1400   Depth (cm) 0.1 06/01/22 1400   Wound (cm^2) 5.29 cm^2 06/01/22 1400   Wound Volume (cm^3) 0.53 cm^3 06/01/22 1400   Wound healing % -337.19 06/01/22 1400   Drainage Characteristics/Odor serous 06/01/22 1400   Drainage Amount copious 06/01/22 1400   Care, Wound non-select wound debridement performed 06/01/22 1400     Procedure: Non-selective debridement was performed, no bleeding occurred. Patient tolerated the procedure well.  Procedure: Wound redressed per instructions below followed by application of a 2 layer CoFlex wrap.  Plan: Patient will return to the hospital in 1 weeks time.   June 9, 2022            Further instructions from your care  team       Josiane Dasilva      1960    Keep leg dry with use of a cast protector (available at [employer] or [employer])    Wound care recommendations to Bilateral lower legs:  Prophase protocol  Apply acticoat 7 (not , do not pull apart) to wounds then kerramax then apply  spandage toes to knee  Then apply 2 layer coflex wrap  Change weekly at [facility]  Compression:  Your compression wrap is a 2 layer coflex wrap and should stay on until next week.  Please remove compression dressing if toes turn blue and/or tingle and can not be  relieved by raising the leg for one hour.    Walk as much as you can. When you sit raise your ankle above your hips to  promote wound healing.  It is very important to follow your healthcare providers instructions. Studies indicate  when compression therapy is applied as directed, healing may occur faster and more  completely. Do Not remove CoFlex unless your healthcare provider tells you to remove  it.  Helpful Tips  Your bandage may feel tight at first. This is normal. When the swelling goes down, it will  not feel as tight.  Instructions  Wear the stocking that comes with the system over the bandage to help you put on  shoes and clothing  Wear comfortable shoes and walk regularly. Walking will improve your circulation which  will improve healing.  Keep your bandage dry.  CoFlex maybe left on your leg for up to 7 days. After that your doctor or nurse will  apply a new CoFlex.  Call your doctor or nurse if the bandage gets wet, slips down or if you have pain,  tingling, numbness or discoloration.       HARMEET Park M.D. June 1, 2022    Call us at [phone] if you have any questions about your wounds, have redness or swelling around your wound, have a fever of 101 or greater or if you have any other problems or concerns. We answer the phone Monday through Friday 8 am to 4 pm, please leave a message as we check the voicemail frequently throughout the day.     If you had a positive  experience please indicate that on your patient satisfaction survey form that [facility] will be sending you.    It was a pleasure meeting with you today.  Thank you for allowing me and my team the privilege of caring for you today.  YOU are the reason we are here, and I truly hope we provided you with the excellent service you deserve.  Please let us know if there is anything else we can do for you so that we can be sure you are leaving completely satisfied with your care experience.      If you have any billing related questions please call the [facility] Business office at [phone]. The clinic staff does not handle billing related matters.

## 2022-06-01 NOTE — DISCHARGE INSTRUCTIONS
Josiane Dasilva      1960    Keep leg dry with use of a cast protector (available at [employer] or [employer])    Wound care recommendations to Bilateral Lower Legs:  -Prophase protocol  -Betadine to left heel and left lateral lower leg periwound  -Apply acticoat 7 (not , do not pull apart) to wounds then kerramax then apply Spandage (Size 7 or 8) toes to knee  -Then apply 2 layer coflex wrap  -Change weekly at [facility]    Compression:  Your compression wrap is a 2 layer coflex wrap and should stay on until next week.  Please remove compression dressing if toes turn blue and/or tingle and can not be relieved by raising the leg for one hour.    Walk as much as you can. When you sit raise your ankle above your hips to  promote wound healing.  It is very important to follow your healthcare providers instructions. Studies indicate when compression therapy is applied as directed, healing may occur faster and more completely. Do Not remove CoFlex unless your healthcare provider tells you to remove it.    Helpful Tips  Your bandage may feel tight at first. This is normal. When the swelling goes down, it will not feel as tight.  Wear the stocking that comes with the system over the bandage to help you put on shoes and clothing  Wear comfortable shoes and walk regularly. Walking will improve your circulation which will improve healing.  Keep your bandage dry.  CoFlex maybe left on your leg for up to 7 days. After that your doctor or nurse will apply a new CoFlex.  Call your doctor or nurse if the bandage gets wet, slips down or if you have pain, tingling, numbness or discoloration.     HARMEET Park M.D. June 1, 2022    Call us at [phone] if you have any questions about your wounds, have redness or swelling around your wound, have a fever of 101 or greater or if you have any other problems or concerns. We answer the phone Monday through Friday 8 am to 4 pm, please leave a message as we check the voicemail frequently  throughout the day.     If you had a positive experience please indicate that on your patient satisfaction survey form that [facility] will be sending you.    It was a pleasure meeting with you today.  Thank you for allowing me and my team the privilege of caring for you today.  YOU are the reason we are here, and I truly hope we provided you with the excellent service you deserve.  Please let us know if there is anything else we can do for you so that we can be sure you are leaving completely satisfied with your care experience.      If you have any billing related questions please call the [facility] Business office at [phone]. The clinic staff does not handle billing related matters.

## 2022-06-06 DIAGNOSIS — Z78.9 TAKES DIETARY SUPPLEMENTS: ICD-10-CM

## 2022-06-06 RX ORDER — VITAMIN B COMPLEX
TABLET ORAL
Qty: 90 TABLET | Refills: 3 | Status: SHIPPED | OUTPATIENT
Start: 2022-06-06

## 2022-06-06 NOTE — TELEPHONE ENCOUNTER
Routing refill request to provider for review/approval because:  Drug not on the [facility] refill protocol   Iman Zaldivar RN

## 2022-06-09 ENCOUNTER — HOSPITAL ENCOUNTER (OUTPATIENT)
Dept: WOUND CARE | Facility: CLINIC | Age: 62
Discharge: HOME | End: 2022-06-09
Attending: SURGERY | Admitting: SURGERY
Payer: COMMERCIAL

## 2022-06-09 VITALS — HEART RATE: 89 BPM | TEMPERATURE: 98 F | DIASTOLIC BLOOD PRESSURE: 83 MMHG | SYSTOLIC BLOOD PRESSURE: 156 MMHG

## 2022-06-09 DIAGNOSIS — L97.922 ULCER OF LEFT LOWER EXTREMITY WITH FAT LAYER EXPOSED (H): ICD-10-CM

## 2022-06-09 DIAGNOSIS — L97.912 ULCER OF RIGHT LOWER EXTREMITY WITH FAT LAYER EXPOSED (H): Primary | ICD-10-CM
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
## 2022-06-09 PROCEDURE — 97602 WOUND(S) CARE NON-SELECTIVE: CPT

## 2022-06-09 NOTE — DISCHARGE INSTRUCTIONS
Josiane COLEMAN Brown      1960    Apply lotion to feet and legs daily. Then apply a clean pair of compression socks.  Ok to remove the compression socks at night as long as the socks can be re-applied right away in the morning.     HARMEET Park M.D. June 9, 2022    Call us at [phone] if you have any questions about your wounds, have redness or swelling around your wound, have a fever of 101 or greater or if you have any other problems or concerns. We answer the phone Monday through Friday 8 am to 4 pm, please leave a message as we check the voicemail frequently throughout the day.     If you had a positive experience please indicate that on your patient satisfaction survey form that [facility] will be sending you.    It was a pleasure meeting with you today.  Thank you for allowing me and my team the privilege of caring for you today.  YOU are the reason we are here, and I truly hope we provided you with the excellent service you deserve.  Please let us know if there is anything else we can do for you so that we can be sure you are leaving completely satisfied with your care experience.      If you have any billing related questions please call the [facility] Business office at [phone]. The clinic staff does not handle billing related matters.

## 2022-06-09 NOTE — PROGRESS NOTES
[facility] Wound Healing [place] Nurse Note    Subject: Josiane Dasilva presents for nurse only visit for wound check. Wounds are nearly healed. Asked Dr. Park to see patient to determine a compression plan long term. Dr. Park ordered compression socks. Order for socks placed. Two layer wraps can be stopped. Patient will return to clinic in 1 month.      Exam:  BP (!) 156/83 (BP Location: Left arm)   Pulse 89   Temp 98  F (36.7  C) (Temporal)   LMP 04/07/2014   Wound (used by OP [facility] only) 05/18/22 0821 Right lower;anterior leg (Active)   Thickness/Stage full thickness 06/09/22 1541   Base scab 06/09/22 1541   Periwound intact 06/09/22 1541   Periwound Temperature warm 06/09/22 1541   Periwound Skin Turgor soft 06/09/22 1541   Length (cm) 0.5 06/09/22 1541   Width (cm) 0.5 06/09/22 1541   Depth (cm) 0 06/09/22 1541   Wound (cm^2) 0.25 cm^2 06/09/22 1541   Wound Volume (cm^3) 0 cm^3 06/09/22 1541   Wound healing % 88.89 06/09/22 1541   Drainage Characteristics/Odor serous 06/09/22 1541   Drainage Amount moderate 06/09/22 1541   Care, Wound non-select wound debridement performed 06/09/22 1541       Wound (used by OP [facility] only) 05/18/22 0822 Left lower;lateral leg (Active)   Thickness/Stage full thickness 06/09/22 1541   Base scab 06/09/22 1541   Periwound Temperature warm 06/09/22 1541   Periwound Skin Turgor soft 06/09/22 1541   Length (cm) 0.2 06/09/22 1541   Width (cm) 0.2 06/09/22 1541   Depth (cm) 0 06/09/22 1541   Wound (cm^2) 0.04 cm^2 06/09/22 1541   Wound Volume (cm^3) 0 cm^3 06/09/22 1541   Wound healing % 96.69 06/09/22 1541   Drainage Characteristics/Odor serosanguineous 06/09/22 1541   Drainage Amount moderate 06/09/22 1541   Care, Wound non-select wound debridement performed 06/09/22 1541 June 9, 2022            Further instructions from your care team       Josiane Dasilva      1960    Apply lotion to feet and legs daily. Then apply a clean pair of compression socks.  Ok to remove the  compression socks at night as long as the socks can be re-applied right away in the morning.     HARMEET Park M.D. June 9, 2022    Call us at [phone] if you have any questions about your wounds, have redness or swelling around your wound, have a fever of 101 or greater or if you have any other problems or concerns. We answer the phone Monday through Friday 8 am to 4 pm, please leave a message as we check the voicemail frequently throughout the day.     If you had a positive experience please indicate that on your patient satisfaction survey form that [facility] will be sending you.    It was a pleasure meeting with you today.  Thank you for allowing me and my team the privilege of caring for you today.  YOU are the reason we are here, and I truly hope we provided you with the excellent service you deserve.  Please let us know if there is anything else we can do for you so that we can be sure you are leaving completely satisfied with your care experience.      If you have any billing related questions please call the [facility] Business office at [phone]. The clinic staff does not handle billing related matters.

## 2022-07-04 ENCOUNTER — HOSPITAL ENCOUNTER (INPATIENT)
Facility: CLINIC | Age: 62
LOS: 10 days | Discharge: NURSING, REHAB OR LONG-TERM CARE | DRG: 464 | End: 2022-07-20
Attending: EMERGENCY MEDICINE | Admitting: HOSPITALIST
Payer: COMMERCIAL

## 2022-07-04 ENCOUNTER — APPOINTMENT (OUTPATIENT)
Dept: GENERAL RADIOLOGY | Facility: CLINIC | Age: 62
DRG: 464 | End: 2022-07-04
Attending: EMERGENCY MEDICINE
Payer: COMMERCIAL

## 2022-07-04 ENCOUNTER — MEDICAL CORRESPONDENCE (OUTPATIENT)
Dept: MEDSURG UNIT | Facility: CLINIC | Age: 62
End: 2022-07-04

## 2022-07-04 DIAGNOSIS — L97.922 ULCER OF LEFT LOWER EXTREMITY WITH FAT LAYER EXPOSED (H): ICD-10-CM

## 2022-07-04 DIAGNOSIS — S81.801A OPEN WOUND OF LOWER LIMB, RIGHT, INITIAL ENCOUNTER: ICD-10-CM

## 2022-07-04 DIAGNOSIS — M00.9 SEPTIC ARTHRITIS OF RIGHT ANKLE, DUE TO UNSPECIFIED ORGANISM (H): Primary | ICD-10-CM
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
## 2022-07-04 DIAGNOSIS — L03.115 CELLULITIS OF RIGHT LOWER EXTREMITY: ICD-10-CM

## 2022-07-04 LAB
ALBUMIN SERPL-MCNC: 3.2 G/DL (ref 3.4–5)
ALP SERPL-CCNC: 146 U/L (ref 40–150)
ALT SERPL W P-5'-P-CCNC: 35 U/L (ref 0–50)
ANION GAP SERPL CALCULATED.3IONS-SCNC: 4 MMOL/L (ref 3–14)
AST SERPL W P-5'-P-CCNC: 31 U/L (ref 0–45)
BASOPHILS # BLD AUTO: 0 10E3/UL (ref 0–0.2)
BASOPHILS NFR BLD AUTO: 0 %
BILIRUB SERPL-MCNC: 0.6 MG/DL (ref 0.2–1.3)
BUN SERPL-MCNC: 28 MG/DL (ref 7–30)
CALCIUM SERPL-MCNC: 9.3 MG/DL (ref 8.5–10.1)
CHLORIDE BLD-SCNC: 108 MMOL/L (ref 94–109)
CO2 SERPL-SCNC: 29 MMOL/L (ref 20–32)
CREAT SERPL-MCNC: 0.88 MG/DL (ref 0.52–1.04)
EOSINOPHIL # BLD AUTO: 0.3 10E3/UL (ref 0–0.7)
EOSINOPHIL NFR BLD AUTO: 3 %
ERYTHROCYTE [DISTWIDTH] IN BLOOD BY AUTOMATED COUNT: 15.5 % (ref 10–15)
ETHANOL SERPL-MCNC: <0.01 G/DL
GFR SERPL CREATININE-BSD FRML MDRD: 74 ML/MIN/1.73M2
GLUCOSE BLD-MCNC: 119 MG/DL (ref 70–99)
HCO3 BLDV-SCNC: 32 MMOL/L (ref 21–28)
HCT VFR BLD AUTO: 33.7 % (ref 35–47)
HGB BLD-MCNC: 9.9 G/DL (ref 11.7–15.7)
HOLD SPECIMEN: NORMAL
HOLD SPECIMEN: NORMAL
IMM GRANULOCYTES # BLD: 0.1 10E3/UL
IMM GRANULOCYTES NFR BLD: 1 %
INR PPP: 1.16 (ref 0.85–1.15)
LACTATE BLD-SCNC: 0.8 MMOL/L
LYMPHOCYTES # BLD AUTO: 1.6 10E3/UL (ref 0.8–5.3)
LYMPHOCYTES NFR BLD AUTO: 13 %
MCH RBC QN AUTO: 24.4 PG (ref 26.5–33)
MCHC RBC AUTO-ENTMCNC: 29.4 G/DL (ref 31.5–36.5)
MCV RBC AUTO: 83 FL (ref 78–100)
MONOCYTES # BLD AUTO: 0.8 10E3/UL (ref 0–1.3)
MONOCYTES NFR BLD AUTO: 6 %
NEUTROPHILS # BLD AUTO: 9.5 10E3/UL (ref 1.6–8.3)
NEUTROPHILS NFR BLD AUTO: 77 %
NRBC # BLD AUTO: 0 10E3/UL
NRBC BLD AUTO-RTO: 0 /100
PCO2 BLDV: 53 MM HG (ref 40–50)
PH BLDV: 7.39 [PH] (ref 7.32–7.43)
PLATELET # BLD AUTO: 232 10E3/UL (ref 150–450)
PO2 BLDV: 20 MM HG (ref 25–47)
POTASSIUM BLD-SCNC: 4.4 MMOL/L (ref 3.4–5.3)
PROCALCITONIN SERPL-MCNC: <0.05 NG/ML
PROT SERPL-MCNC: 8.8 G/DL (ref 6.8–8.8)
RBC # BLD AUTO: 4.06 10E6/UL (ref 3.8–5.2)
SAO2 % BLDV: 30 % (ref 94–100)
SARS-COV-2 RNA RESP QL NAA+PROBE: NEGATIVE
SODIUM SERPL-SCNC: 141 MMOL/L (ref 133–144)
WBC # BLD AUTO: 12.3 10E3/UL (ref 4–11)

## 2022-07-04 PROCEDURE — 82803 BLOOD GASES ANY COMBINATION: CPT

## 2022-07-04 PROCEDURE — 82077 ASSAY SPEC XCP UR&BREATH IA: CPT | Performed by: EMERGENCY MEDICINE

## 2022-07-04 PROCEDURE — 80053 COMPREHEN METABOLIC PANEL: CPT | Performed by: EMERGENCY MEDICINE

## 2022-07-04 PROCEDURE — 84145 PROCALCITONIN (PCT): CPT | Performed by: EMERGENCY MEDICINE

## 2022-07-04 PROCEDURE — 96374 THER/PROPH/DIAG INJ IV PUSH: CPT

## 2022-07-04 PROCEDURE — G0378 HOSPITAL OBSERVATION PER HR: HCPCS

## 2022-07-04 PROCEDURE — 96375 TX/PRO/DX INJ NEW DRUG ADDON: CPT

## 2022-07-04 PROCEDURE — 250N000011 HC RX IP 250 OP 636: Performed by: EMERGENCY MEDICINE

## 2022-07-04 PROCEDURE — 36415 COLL VENOUS BLD VENIPUNCTURE: CPT | Performed by: EMERGENCY MEDICINE

## 2022-07-04 PROCEDURE — 85610 PROTHROMBIN TIME: CPT | Performed by: EMERGENCY MEDICINE

## 2022-07-04 PROCEDURE — 85025 COMPLETE CBC W/AUTO DIFF WBC: CPT | Performed by: EMERGENCY MEDICINE

## 2022-07-04 PROCEDURE — 99220 PR INITIAL OBSERVATION CARE,LEVEL III: CPT | Performed by: HOSPITALIST

## 2022-07-04 PROCEDURE — U0005 INFEC AGEN DETEC AMPLI PROBE: HCPCS | Performed by: EMERGENCY MEDICINE

## 2022-07-04 PROCEDURE — 87040 BLOOD CULTURE FOR BACTERIA: CPT | Performed by: EMERGENCY MEDICINE

## 2022-07-04 PROCEDURE — 73610 X-RAY EXAM OF ANKLE: CPT | Mod: RT

## 2022-07-04 PROCEDURE — 99285 EMERGENCY DEPT VISIT HI MDM: CPT | Mod: 25

## 2022-07-04 RX ORDER — OXYCODONE HYDROCHLORIDE 5 MG/1
5 TABLET ORAL EVERY 4 HOURS PRN
Status: DISCONTINUED | OUTPATIENT
Start: 2022-07-04 | End: 2022-07-15

## 2022-07-04 RX ORDER — ONDANSETRON 4 MG/1
4 TABLET, ORALLY DISINTEGRATING ORAL EVERY 6 HOURS PRN
Status: DISCONTINUED | OUTPATIENT
Start: 2022-07-04 | End: 2022-07-13

## 2022-07-04 RX ORDER — GABAPENTIN 300 MG/1
300 CAPSULE ORAL AT BEDTIME
Status: DISCONTINUED | OUTPATIENT
Start: 2022-07-04 | End: 2022-07-20 | Stop reason: HOSPADM

## 2022-07-04 RX ORDER — TRAZODONE HYDROCHLORIDE 50 MG/1
50 TABLET, FILM COATED ORAL AT BEDTIME
Status: DISCONTINUED | OUTPATIENT
Start: 2022-07-04 | End: 2022-07-20 | Stop reason: HOSPADM

## 2022-07-04 RX ORDER — LIDOCAINE 40 MG/G
CREAM TOPICAL
Status: DISCONTINUED | OUTPATIENT
Start: 2022-07-04 | End: 2022-07-13

## 2022-07-04 RX ORDER — NALOXONE HYDROCHLORIDE 0.4 MG/ML
0.4 INJECTION, SOLUTION INTRAMUSCULAR; INTRAVENOUS; SUBCUTANEOUS
Status: DISCONTINUED | OUTPATIENT
Start: 2022-07-04 | End: 2022-07-20 | Stop reason: HOSPADM

## 2022-07-04 RX ORDER — ONDANSETRON 2 MG/ML
4 INJECTION INTRAMUSCULAR; INTRAVENOUS EVERY 6 HOURS PRN
Status: DISCONTINUED | OUTPATIENT
Start: 2022-07-04 | End: 2022-07-13

## 2022-07-04 RX ORDER — PANTOPRAZOLE SODIUM 40 MG/1
40 TABLET, DELAYED RELEASE ORAL
Status: DISCONTINUED | OUTPATIENT
Start: 2022-07-05 | End: 2022-07-20 | Stop reason: HOSPADM

## 2022-07-04 RX ORDER — HYDROMORPHONE HYDROCHLORIDE 1 MG/ML
0.5 INJECTION, SOLUTION INTRAMUSCULAR; INTRAVENOUS; SUBCUTANEOUS
Status: DISCONTINUED | OUTPATIENT
Start: 2022-07-04 | End: 2022-07-04

## 2022-07-04 RX ORDER — NALOXONE HYDROCHLORIDE 0.4 MG/ML
0.2 INJECTION, SOLUTION INTRAMUSCULAR; INTRAVENOUS; SUBCUTANEOUS
Status: DISCONTINUED | OUTPATIENT
Start: 2022-07-04 | End: 2022-07-20 | Stop reason: HOSPADM

## 2022-07-04 RX ORDER — AMOXICILLIN 250 MG
2 CAPSULE ORAL 2 TIMES DAILY PRN
Status: DISCONTINUED | OUTPATIENT
Start: 2022-07-04 | End: 2022-07-20 | Stop reason: HOSPADM

## 2022-07-04 RX ORDER — AMOXICILLIN 250 MG
1 CAPSULE ORAL 2 TIMES DAILY PRN
Status: DISCONTINUED | OUTPATIENT
Start: 2022-07-04 | End: 2022-07-20 | Stop reason: HOSPADM

## 2022-07-04 RX ORDER — METOPROLOL SUCCINATE 25 MG/1
25 TABLET, EXTENDED RELEASE ORAL 2 TIMES DAILY
Status: DISCONTINUED | OUTPATIENT
Start: 2022-07-04 | End: 2022-07-20 | Stop reason: HOSPADM

## 2022-07-04 RX ORDER — ACETAMINOPHEN 650 MG/1
650 SUPPOSITORY RECTAL EVERY 6 HOURS PRN
Status: DISCONTINUED | OUTPATIENT
Start: 2022-07-04 | End: 2022-07-20 | Stop reason: HOSPADM

## 2022-07-04 RX ORDER — FUROSEMIDE 40 MG
40 TABLET ORAL
Status: DISCONTINUED | OUTPATIENT
Start: 2022-07-05 | End: 2022-07-13

## 2022-07-04 RX ORDER — CEFAZOLIN SODIUM 2 G/100ML
2 INJECTION, SOLUTION INTRAVENOUS EVERY 8 HOURS
Status: DISCONTINUED | OUTPATIENT
Start: 2022-07-05 | End: 2022-07-15

## 2022-07-04 RX ORDER — LISINOPRIL 40 MG/1
40 TABLET ORAL EVERY MORNING
Status: DISCONTINUED | OUTPATIENT
Start: 2022-07-05 | End: 2022-07-20 | Stop reason: HOSPADM

## 2022-07-04 RX ORDER — CEFAZOLIN SODIUM 2 G/100ML
2 INJECTION, SOLUTION INTRAVENOUS ONCE
Status: COMPLETED | OUTPATIENT
Start: 2022-07-04 | End: 2022-07-04

## 2022-07-04 RX ORDER — ACETAMINOPHEN 325 MG/1
650 TABLET ORAL EVERY 6 HOURS PRN
Status: DISCONTINUED | OUTPATIENT
Start: 2022-07-04 | End: 2022-07-20 | Stop reason: HOSPADM

## 2022-07-04 RX ADMIN — CEFAZOLIN SODIUM 2 G: 2 INJECTION, SOLUTION INTRAVENOUS at 21:33

## 2022-07-04 RX ADMIN — HYDROMORPHONE HYDROCHLORIDE 0.5 MG: 1 INJECTION, SOLUTION INTRAMUSCULAR; INTRAVENOUS; SUBCUTANEOUS at 21:04

## 2022-07-05 LAB
BASOPHILS # BLD AUTO: 0 10E3/UL (ref 0–0.2)
BASOPHILS NFR BLD AUTO: 0 %
CRP SERPL-MCNC: 126 MG/L (ref 0–8)
EOSINOPHIL # BLD AUTO: 0.2 10E3/UL (ref 0–0.7)
EOSINOPHIL NFR BLD AUTO: 2 %
ERYTHROCYTE [DISTWIDTH] IN BLOOD BY AUTOMATED COUNT: 15.5 % (ref 10–15)
HCT VFR BLD AUTO: 28.1 % (ref 35–47)
HGB BLD-MCNC: 8.4 G/DL (ref 11.7–15.7)
IMM GRANULOCYTES # BLD: 0.1 10E3/UL
IMM GRANULOCYTES NFR BLD: 1 %
IRON SATN MFR SERPL: 8 % (ref 15–46)
IRON SERPL-MCNC: 17 UG/DL (ref 35–180)
LYMPHOCYTES # BLD AUTO: 1.5 10E3/UL (ref 0.8–5.3)
LYMPHOCYTES NFR BLD AUTO: 14 %
MCH RBC QN AUTO: 24.3 PG (ref 26.5–33)
MCHC RBC AUTO-ENTMCNC: 29.9 G/DL (ref 31.5–36.5)
MCV RBC AUTO: 81 FL (ref 78–100)
MONOCYTES # BLD AUTO: 0.8 10E3/UL (ref 0–1.3)
MONOCYTES NFR BLD AUTO: 7 %
NEUTROPHILS # BLD AUTO: 8.3 10E3/UL (ref 1.6–8.3)
NEUTROPHILS NFR BLD AUTO: 76 %
NRBC # BLD AUTO: 0 10E3/UL
NRBC BLD AUTO-RTO: 0 /100
PLATELET # BLD AUTO: 220 10E3/UL (ref 150–450)
RBC # BLD AUTO: 3.46 10E6/UL (ref 3.8–5.2)
TIBC SERPL-MCNC: 214 UG/DL (ref 240–430)
WBC # BLD AUTO: 10.9 10E3/UL (ref 4–11)

## 2022-07-05 PROCEDURE — G0463 HOSPITAL OUTPT CLINIC VISIT: HCPCS

## 2022-07-05 PROCEDURE — 99232 SBSQ HOSP IP/OBS MODERATE 35: CPT | Performed by: HOSPITALIST

## 2022-07-05 PROCEDURE — 96376 TX/PRO/DX INJ SAME DRUG ADON: CPT

## 2022-07-05 PROCEDURE — 250N000013 HC RX MED GY IP 250 OP 250 PS 637: Performed by: HOSPITALIST

## 2022-07-05 PROCEDURE — 86140 C-REACTIVE PROTEIN: CPT | Performed by: PHYSICIAN ASSISTANT

## 2022-07-05 PROCEDURE — 85025 COMPLETE CBC W/AUTO DIFF WBC: CPT | Performed by: HOSPITALIST

## 2022-07-05 PROCEDURE — 36415 COLL VENOUS BLD VENIPUNCTURE: CPT | Performed by: HOSPITALIST

## 2022-07-05 PROCEDURE — 99221 1ST HOSP IP/OBS SF/LOW 40: CPT | Performed by: SURGERY

## 2022-07-05 PROCEDURE — 83550 IRON BINDING TEST: CPT | Performed by: HOSPITALIST

## 2022-07-05 PROCEDURE — G0378 HOSPITAL OBSERVATION PER HR: HCPCS

## 2022-07-05 PROCEDURE — 250N000011 HC RX IP 250 OP 636: Performed by: HOSPITALIST

## 2022-07-05 RX ORDER — CYCLOBENZAPRINE HCL 5 MG
5 TABLET ORAL EVERY 8 HOURS PRN
Status: DISCONTINUED | OUTPATIENT
Start: 2022-07-05 | End: 2022-07-05

## 2022-07-05 RX ORDER — METHOCARBAMOL 500 MG/1
500 TABLET, FILM COATED ORAL 2 TIMES DAILY
Status: DISCONTINUED | OUTPATIENT
Start: 2022-07-05 | End: 2022-07-10

## 2022-07-05 RX ADMIN — METOPROLOL SUCCINATE 25 MG: 25 TABLET, EXTENDED RELEASE ORAL at 01:00

## 2022-07-05 RX ADMIN — METOPROLOL SUCCINATE 25 MG: 25 TABLET, EXTENDED RELEASE ORAL at 08:19

## 2022-07-05 RX ADMIN — TRAZODONE HYDROCHLORIDE 50 MG: 50 TABLET ORAL at 21:23

## 2022-07-05 RX ADMIN — FUROSEMIDE 40 MG: 40 TABLET ORAL at 16:36

## 2022-07-05 RX ADMIN — CEFAZOLIN 2 G: 10 INJECTION, POWDER, FOR SOLUTION INTRAVENOUS at 13:47

## 2022-07-05 RX ADMIN — FUROSEMIDE 40 MG: 40 TABLET ORAL at 08:19

## 2022-07-05 RX ADMIN — CEFAZOLIN 2 G: 10 INJECTION, POWDER, FOR SOLUTION INTRAVENOUS at 06:40

## 2022-07-05 RX ADMIN — TRAZODONE HYDROCHLORIDE 50 MG: 50 TABLET ORAL at 01:00

## 2022-07-05 RX ADMIN — GABAPENTIN 300 MG: 300 CAPSULE ORAL at 01:00

## 2022-07-05 RX ADMIN — PANTOPRAZOLE SODIUM 40 MG: 40 TABLET, DELAYED RELEASE ORAL at 06:40

## 2022-07-05 RX ADMIN — ACETAMINOPHEN 650 MG: 325 TABLET ORAL at 06:40

## 2022-07-05 RX ADMIN — OXYCODONE HYDROCHLORIDE 5 MG: 5 TABLET ORAL at 12:46

## 2022-07-05 RX ADMIN — OXYCODONE HYDROCHLORIDE 5 MG: 5 TABLET ORAL at 16:36

## 2022-07-05 RX ADMIN — METOPROLOL SUCCINATE 25 MG: 25 TABLET, EXTENDED RELEASE ORAL at 21:23

## 2022-07-05 RX ADMIN — OXYCODONE HYDROCHLORIDE 5 MG: 5 TABLET ORAL at 04:05

## 2022-07-05 RX ADMIN — GABAPENTIN 300 MG: 300 CAPSULE ORAL at 21:23

## 2022-07-05 RX ADMIN — OXYCODONE HYDROCHLORIDE 5 MG: 5 TABLET ORAL at 08:19

## 2022-07-05 RX ADMIN — CEFAZOLIN 2 G: 10 INJECTION, POWDER, FOR SOLUTION INTRAVENOUS at 21:34

## 2022-07-05 RX ADMIN — CYCLOBENZAPRINE HYDROCHLORIDE 5 MG: 5 TABLET, FILM COATED ORAL at 14:33

## 2022-07-05 RX ADMIN — LISINOPRIL 40 MG: 40 TABLET ORAL at 08:22

## 2022-07-05 RX ADMIN — ACETAMINOPHEN 650 MG: 325 TABLET ORAL at 12:46

## 2022-07-05 RX ADMIN — METHOCARBAMOL 500 MG: 500 TABLET ORAL at 21:23

## 2022-07-05 NOTE — PROGRESS NOTES
[facility]    Medicine Progress Note - Hospitalist Service    Date of Admission:  7/4/2022    Assessment & Plan          This is a 61-year-old female with medical history which includes hypertension, depression/anxiety, peripheral neuropathy, GERD, chronic anemia who lives at a group home and is wheelchair-bound fell couple of weeks back and has been intermittently complaining of right ankle pain and presented to the ER because she was having significant pain without any fever.    On presentation to the ER patient had edema in both her legs with chronic venous changes and comprehensive metabolic panel was normal, lactic acid and procalcitonin were also normal.  She had mildly elevated WBC count of 12.3 and blood cultures were done.  She was negative for COVID-19.  She underwent x-ray of the right ankle and was started on cefazolin and was admitted for concerns of infection over the hardware site    1) chronic lower extremity bilateral edema along with venous stasis changes  Ulcer over the right ankle overlying the ankle hardware from previous fracture  -On admission presented with mildly elevated white count and x-ray has been reviewed which does not show any evidence of hardware failure and her WBC count has trended down to normal and blood cultures continue to be negative  -Patient was evaluated by wound care and surgery team and surgery recommended consult from orthopedics and it was placed  -Patient was also seen by infectious disease and agree with orthopedic consult and Ancef at this point in time    2) chronic anemia  -I did review in the EMR and her hemoglobin at baseline in the past 1 year is between 8.9-10  -Hemoglobin on admission was 9.9 and repeat was 8.4 without any evidence of bleeding  -I did check her iron profile and her iron binding capacity is increased and total iron is on the lower side and we will start her on iron replacement    3) hypertension  -We will continue the  patient with home medications including metoprolol, lisinopril and furosemide    4) anxiety/depression  -We will continue the patient with the trazodone    5) peripheral neuropathy  -We will continue the patient with gabapentin    6) GERD  -We will continue patient with Protonix         Diet: Regular Diet Adult    DVT Prophylaxis: Pneumatic Compression Devices  Russell Catheter: Not present  Central Lines: None  Cardiac Monitoring: None  Code Status: Full Code      Disposition Plan      Expected Discharge Date: 07/06/2022        Discharge Comments: Awaiting recommendations from orthopedics team    The patient's care was discussed with the Bedside Nurse, Patient and Surgery Consultant.    Agatha Mata MD  Hospitalist Service  [facility]  Securely message with the Vocera Web Console (learn more here)  Text page via [employer] Paging[employer]         Clinically Significant Risk Factors Present on Admission             # Hypoalbuminemia: Albumin = 3.2 g/dL (Ref range: 3.4 - 5.0 g/dL) on admission, will monitor as appropriate   # Coagulation Defect: INR = 1.16 (Ref range: 0.85 - 1.15) and/or PTT = N/A on admission, will monitor for bleeding   # Hypertension: home medication list includes antihypertensive(s)          ______________________________________________________________________    Interval History     I saw the patient in wound care team and surgeon was present and the wound was examined.  Patient denied any chest pain, nausea, vomiting and does get spasms in her back at times.  Patient does mention that she wear stockings and was not aware of wound over the right leg and was not aware if she has hardware in place or not      Data reviewed today: I reviewed all medications, new labs and imaging results over the last 24 hours. I personally reviewed the X RAY RIGHT ANKLE image(s) showing Plate-screw fixation of proximal old healed distal fibular fracture and the distal tibiofibular syndesmosis.  No evidence of hardware failure. No widening of the syndesmosis. No acute fracture. The calcaneus appears mildly flattened but is .    Physical Exam   Vital Signs: Temp: 99.8  F (37.7  C) Temp src: Oral BP: 107/61 Pulse: 95   Resp: 20 SpO2: 96 % O2 Device: None (Room air)    Weight: 0 lbs 0 oz        General: Patient appears comfortable and in no acute distress.  HEENT: Head is atraumatic, normocephalic.  Pupils are equal, round and reactive to light.  No scleral icterus. Oral mucosa is moist   Neck: Neck is supple and No Lymphadenopathy   Respiratory: Lungs are clear to auscultation bilaterally with no wheeze or crackles   Cardiovascular: Regular rate , S1 and S2 normal with no murmer or rubs or gallops  Abdomen:   soft , non tender , non distended and bowel sound present   Skin: She does have wounds over her legs and there is an area of ulceration over the right ankle where there is hardware in place  Neurologic:  No facial droop  Musculoskeletal: Normal Range of motion over upper and lower extremities bilaterally   Psychiatric: cooperative     Data   Recent Labs   Lab 07/05/22  0634 07/04/22 2053   WBC 10.9 12.3*   HGB 8.4* 9.9*   MCV 81 83    232   INR  --  1.16*   NA  --  141   POTASSIUM  --  4.4   CHLORIDE  --  108   CO2  --  29   BUN  --  28   CR  --  0.88   ANIONGAP  --  4   JESSICA  --  9.3   GLC  --  119*   ALBUMIN  --  3.2*   PROTTOTAL  --  8.8   BILITOTAL  --  0.6   ALKPHOS  --  146   ALT  --  35   AST  --  31     Recent Results (from the past 24 hour(s))   XR Ankle Right 3 Views    Narrative    EXAM: XR ANKLE RIGHT G/E 3 VIEWS  LOCATION: [facility]  DATE/TIME: 7/4/2022 9:12 PM    INDICATION: pain after fall  COMPARISON: 09/30/2014      Impression    IMPRESSION: Plate-screw fixation of proximal old healed distal fibular fracture and the distal tibiofibular syndesmosis. No evidence of hardware failure. No widening of the syndesmosis. No acute fracture. The calcaneus appears  mildly flattened but is   similar to prior although less well profiled. There is mild degenerative arthritis in the midfoot. Diffuse soft tissue swelling.     Medications       ceFAZolin  2 g Intravenous Q8H     furosemide  40 mg Oral BID     gabapentin  300 mg Oral At Bedtime     lisinopril  40 mg Oral QAM     metoprolol succinate ER  25 mg Oral BID     pantoprazole  40 mg Oral QAM AC     sodium chloride (PF)  3 mL Intracatheter Q8H     traZODone  50 mg Oral At Bedtime

## 2022-07-05 NOTE — ED NOTES
Bed: ED06  Expected date: 7/4/22  Expected time: 7:52 PM  Means of arrival:   Comments:  Ems : 61 f

## 2022-07-05 NOTE — CONSULTS
Brief note  Patient seen, case discussed with wound ostomy nurse and hospitalist.  Patient with chronic bilateral lower extremity lymphedema.  Has a small wound on the anterior right ankle in the vicinity of previously placed orthopedic hardware.  Recommend orthopedic weigh in regarding concern for hardware infection.  No evidence for surrounding cellulitis.  I think local wound cares should be appropriate for further management unless orthopedics feels otherwise.  No further recommendations, please call with questions.  Jose Martin Simmons MD  General Surgery, Office [phone]

## 2022-07-05 NOTE — PHARMACY-ADMISSION MEDICATION HISTORY
Pharmacy Medication History  Admission medication history interview status for the 7/4/2022  admission is complete. See EPIC admission navigator for prior to admission medications     Location of Interview: Outside patient room but on unit  Medication history sources: MAR (med list from [facility])    Significant changes made to the medication list:  None    Additional medication history information:   Med list did not have last administration times.     Medication reconciliation completed by provider prior to medication history? No    Time spent in this activity: 15 min     Prior to Admission medications    Medication Sig Last Dose Taking? Auth Provider Long Term End Date   acetaminophen (TYLENOL) 325 MG tablet TAKE 1-2 TABLETS (325-650MG) BY MOUTH EVERY 4 HOURS AS NEEDED FOR MILD PAIN --MAXIMUM OF 4000MG ACETAMINOPHEN IN 24 HOURS-- *1 TOTAL FILL*  Yes Carmen Arriaza MD No    ALLERGY RELIEF 10 MG tablet TAKE 1 TABLET BY MOUTH ONCE DAILY *1 TOTAL FILL*  Yes Carmen Arriaza MD     B Complex Vitamins (VITAMIN B-COMPLEX) TABS TAKE 1 TABLET BY MOUTH ONCE DAILY. **NON-COVERED MED**  *1 TOTAL FILL*  Yes Carmen Arriaza MD     cyanocobalamin (VITAMIN B-12) 1000 MCG tablet TAKE 1 TABLET BY MOUTH TWICE DAILY *1 TOTAL FILL* **NON-COVERED MED**  Yes Carmen Arriaza MD     furosemide (LASIX) 40 MG tablet TAKE 1 TABLET BY MOUTH TWICE DAILY AT 7AM AND 2PM *1 TOTAL FILL*  Yes Carmen Arriaza MD Yes    gabapentin (NEURONTIN) 300 MG capsule Take 1 capsule (300 mg) by mouth At Bedtime  Yes Carmen Arriaza MD Yes    GAVILAX 17 GM/SCOOP powder TAKE 17G (MEASURE WITH THE MARKINGS INSIDE CAP) BY MOUTH EVERY 12 HOURS AS NEEDED FOR CONSTIPATION *1 TOTAL FILL*  Yes Carmen Arriaza MD     lisinopril (ZESTRIL) 40 MG tablet Take 1 tablet (40 mg) by mouth every morning  Yes Carmen Arriaza MD Yes    methocarbamol (ROBAXIN) 500 MG tablet TAKE 1 TABLET BY MOUTH TWICE DAILY *1 TOTAL FILL*  Yes Carmen Arriaza  MD     metoprolol succinate ER (TOPROL XL) 25 MG 24 hr tablet TAKE 1 TABLET BY MOUTH TWICE DAILY *1 TOTAL FILL*  Yes Carmen Arriaza MD Yes    omega-3 acid ethyl esters (LOVAZA) 1 g capsule Take 2 capsules (2 g) by mouth 2 times daily  Yes Carmen Arriaza MD     pantoprazole (PROTONIX) 40 MG EC tablet TAKE 1 TABLET BY MOUTH EVERY MORNING (BEFORE BREAKFAST) *1 TOTAL FILL*  Yes Carmen Arriaza MD     traZODone (DESYREL) 50 MG tablet TAKE 1 TABLET BY MOUTH AT BEDTIME *1 TOTAL FILL*  Yes Carmen Arriaza MD Yes    vitamin B complex with vitamin C (STRESS TAB) tablet TAKE 1 TABLET BY MOUTH TWICE DAILY *1 TOTAL FILL*  Yes Carmen Arriaza MD     VITAMIN D3 50 MCG (2000 UT) tablet TAKE 1 TABLET BY MOUTH ONCE DAILY *1 TOTAL FILL*  Patient taking differently: Take 1 tablet by mouth 2 times daily  Yes Carmen Arriaza MD       The information provided in this note is only as accurate as the sources available at the time of update(s)     Carmen Gan, PharmD, BCPS

## 2022-07-05 NOTE — PROGRESS NOTES
Patient admitted from ED due to increased pain to right ankle and lower back related to fall at her group home while walking. Patient is alert, oriented and forgetful. VSS on room air. Open wound and weeping noted to BLE. Moisture related rashes noted to nichole area. Boil like swollen area noted to patient right inner thigh. Patient continue to report pain to right ankle and lower back. Denied pain to left leg, left ankle and right inner thigh. Patient is on schedule Trazodone, abx, Gabapentin, PRN Tylenol and oxycodone for pain.Patient answered yes to feels like hurting orthers and stated that she feels like hurting her family because she doesn't like the way her family always try to boss her. Order placed for wound consult. Will continue to monitor.

## 2022-07-05 NOTE — PROGRESS NOTES
RECEIVING UNIT ED HANDOFF REVIEW    ED Nurse Handoff Report was reviewed by: Libby Sandoval RN on July 4, 2022 at 11:03 PM

## 2022-07-05 NOTE — ED NOTES
[facility]  ED Nurse Handoff Report    ED Chief complaint: Fall      ED Diagnosis:   Final diagnoses:   Cellulitis of right lower extremity   Open wound of lower limb, right, initial encounter       Code Status: TBD    Allergies:   Allergies   Allergen Reactions     Asa [Aspirin]      Stomach irritation (hx of PUD)     Nsaids      Stomach irritation (Hx of PUD)       Patient Story: Patient came in from group home with report of fall approx 1 week ago and continues to have right sided pain in leg/ankle.   Focused Assessment:  Patient alert, forgetful at times but cooperative. Wounds to BLE with open wound to right ankle. Edema to BLE and weeping from wounds. Pulses weak.     Treatments and/or interventions provided: Pain medication, abx, labs/cultures drawn  Patient's response to treatments and/or interventions: Improved pain    To be done/followed up on inpatient unit:  Continue abx, monitor    Does this patient have any cognitive concerns?: Forgetful    Activity level - Baseline/Home:  Unknown  Activity Level - Current:   Total Care    Patient's Preferred language: English   Needed?: No    Isolation: None  Infection: Not Applicable  Patient tested for COVID 19 prior to admission: YES  Bariatric?: No    Vital Signs:   Vitals:    07/04/22 2001   BP: (!) 167/76   Pulse: 104   Resp: 20   Temp: 98.8  F (37.1  C)   TempSrc: Oral   SpO2: 100%       Cardiac Rhythm:     Was the PSS-3 completed:   Yes  What interventions are required if any?               For the majority of the shift this patient's behavior was Green.   Behavioral interventions performed were None.    ED NURSE PHONE NUMBER: *[phone]

## 2022-07-05 NOTE — CONSULTS
[facility]  WOC Nurse Inpatient Assessment     Consulted for: Bilateral lower extremities    Brief assessment/ plan: Right anterior ankle wound over site of hardware, unclear full etiology.  Ortho to assess for deeper infection.  Wound itself otherwise does not look in acute need of debridement.  Pt also has scattered superficial weepy wounds to lower legs L>R, unclear full history but pt states are new in last few weeks.  Will dress all wounds with silver alginate for now to help manage drainage and await Ortho eval.     Patient History (according to provider note(s):      Josiane Dasilva is a 61 year old female with chronic lower extremity edema and venous stasis ulcers who is being admitted for a possible infected venous stasis ulcer on the right ankle overlying old right ankle hardware from a previous fracture.    Areas Assessed:      Areas visualized during today's visit: Lower extremities      Wound location: right anterior ankle  Last photo: 7-5-22      Wound due to: Unknown Etiology, possibly venous vs infection vs other  Wound history/plan of care: pt poor historian, does not know when or how wound developed but says it is new.  Reports usually wearing compression socks that maybe her sister bought for her.  Pt has hx hardware to ankle.  Ankle very stiff and pt cannot lift her right leg at all on her own and c/o pain when WOC touching or lifting leg.  Pt was up in chair and leg angles down heavily into the chair with heavy pressure on posterior/lateral heel area.  Pt states she has a pressure sore here already; this was not readily visible today 7/5 but it was not feasible to fully visualize all of posterior leg/heel while pt in chair.    Wound base: mix white-yellow-red moist weepy tissue with blistered epidermis at edges     Palpation of the wound bed: normal      Drainage: moderate     Description of drainage: serosanguinous     Measurements (length x width x depth, in cm): 2 x  3.5 x 0.4+cm     Tunneling: N/A     Undermining: N/A  Periwound skin: Blister(s), Dry/scaly and Edematous      Color: normal and consistent with surrounding tissue      Temperature: normal   Odor: none  Pain: moderate, radiating and tender  Pain interventions prior to dressing change: N/A  Treatment goal: Drainage control and Maintain (prevention of deterioration)  STATUS: initial assessment  Supplies ordered: gathered    Wound location: Left lateral lower leg  Last photo: 7-5-22      Wound due to: venous/lymphedema vs other  Wound history/plan of care: pt states is newer issue and she has not been to a wound clinic or dressing wound at home  Wound base: pink moist tissue     Palpation of the wound bed: normal      Drainage: moderate     Description of drainage: serosanguinous and serous weeping     Measurements (length x width x depth, in cm): approx 12 x 10 x 0.2cm, cluster measurement of patchy breakdown     Tunneling: N/A     Undermining: N/A  Periwound skin: Dry/scaly and Edematous      Color: normal and consistent with surrounding tissue      Temperature: normal   Odor: none  Pain: mild, tender  Pain interventions prior to dressing change: N/A  Treatment goal: Drainage control, Decrease moisture and Protection  STATUS: initial assessment  Supplies ordered: at bedside          Treatment Plan:     Bilateral lower leg wounds: Daily and prn:  1.  Cleanse with wound cleanser   2.  Apply Aquacel Ag to wound beds, cut to fit  3.  Cover with gauze and Kerlix, or gauze and tape if wounds are isolated  4.  Elevate legs.  Ensure heels floating completely, at all times.  May need 2 pillows under RLE.     Orders: Written    RECOMMEND PRIMARY TEAM ORDER: Lymphedema consult prn  Education provided: importance of repositioning, plan of care, wound progress, Moisture management and Off-loading pressure  Discussed plan of care with: Patient, Nurse and Physicians Dr. Simmons and Dr. Mata who were at bedside  [facility] nurse follow-up  plan: weekly and prn  Notify WOC if wound(s) deteriorate.  Nursing to notify the Provider(s) and re-consult the WOC Nurse if new skin concern.    DATA:     Current support surface: Standard  Atmos Air mattress  Containment of urine/stool: Incontinence Protocol prn  BMI: Body mass index is 48.94 kg/m .   Active diet order: Orders Placed This Encounter      Regular Diet Adult     Output: No intake/output data recorded.     Labs: Recent Labs   Lab 07/05/22  0634 07/04/22 2053   ALBUMIN  --  3.2*   HGB 8.4* 9.9*   INR  --  1.16*   WBC 10.9 12.3*     Pressure injury risk assessment:   Sensory Perception: 3-->slightly limited  Moisture: 3-->occasionally moist  Activity: 2-->chairfast  Mobility: 2-->very limited  Nutrition: 3-->adequate  Friction and Shear: 2-->potential problem  Hermes Score: 15    Danelle Sargent RN   Dept. Pager: [phone]  Dept. Office Number: [phone]

## 2022-07-05 NOTE — H&P
[facility]    History and Physical - Hospitalist Service       Date of Admission:  7/4/2022    Assessment & Plan    Josiane Dasilva is a 61 year old female with chronic lower extremity edema and venous stasis ulcers who is being admitted for a possible infected venous stasis ulcer on the right ankle overlying old right ankle hardware from a previous fracture.    Possible infected right ankle venous stasis ulcer    Admit to observation     Wound culture    Continue intravenous cefazolin     Surgery consult     Repeat WBC tomorrow    Hypertension     Continue metoprolol, furosemide and lisinopril     Depression   RICO    Continue trazodone     Peripheral neuropathy     Continue gabapentin     Gastroesophageal reflux disease     Continue proton pump inhibitor     Chronic anemia     Repeat hemoglobin tomorrow    Diet:  regular   DVT Prophylaxis: Enoxaparin (Lovenox) SQ  Russell Catheter: Not present  Central Lines: None  Cardiac Monitoring: None  Code Status:   FULL    Clinically Significant Risk Factors Present on Admission               # Coagulation Defect: INR = 1.16 (Ref range: 0.85 - 1.15) and/or PTT = N/A on admission, will monitor for bleeding   # Hypertension: home medication list includes antihypertensive(s)          Disposition Plan         The patient's care was discussed with the Patient.    Jose Wesley MD  Hospitalist Service  [facility]  Securely message with the Vocera Web Console (learn more here)  Text page via [employer] Paging/Directory         ______________________________________________________________________    Chief Complaint   Right ankle pain    History is obtained from the patient, electronic health record and emergency department physician    History of Present Illness   Josiane Dasilva is a 61 year old female who lives in a group home.  She has chronic lower extremity edema and venous stasis ulcers.  She is mostly wheelchair-bound but  apparently fell while walking about 2 weeks ago.  Since that time she is been intermittently complaining of right ankle pain.  No documented fever.  No other new complaints.  In the emergency room she was hypertensive with a blood pressure of 167/76 temperature 98.8  F heart rate 104 ox saturation 100%.  On exam she had lower extremity edema in both legs with skin thickening from chronic venous stasis.  She had several superficial wounds but over the right ankle on the anterior aspect she had an open wound approximately 4 cm in diameter which was mildly warm and erythematous and was draining.  The wound was not probed.  X-ray of the ankle showed fixation of an old proximal distal fibular fracture.  There were no acute findings.  White blood cell count was 12.3 thousand.  Procalcitonin undetectable and lactic acid 0.8.  Hemoglobin 10 g.  She was given IV cefazolin and is being admitted to the hospital.    Review of Systems    The 10 point Review of Systems is negative other than noted in the HPI or here.     Past Medical History    I have reviewed this patient's medical history and updated it with pertinent information if needed.   Past Medical History:   Diagnosis Date     Alcoholism /alcohol abuse      Benign essential hypertension      RICO (generalized anxiety disorder)      History of peptic ulcer disease      Impaired fasting glucose      MDD (major depressive disorder)      Morbid obesity (H)      Peripheral neuropathy      Poor short term memory        Past Surgical History   I have reviewed this patient's surgical history and updated it with pertinent information if needed.  Past Surgical History:   Procedure Laterality Date     APPENDECTOMY       COLONOSCOPY  08/13/2014    Procedure: COMBINED COLONOSCOPY, SINGLE BIOPSY/POLYPECTOMY BY BIOPSY;  Surgeon: Mike Mancuso MD;  Location:  GI     CYSTOSCOPY N/A 03/14/2019    Procedure: Cystoscopy;  Surgeon: Mary Ash MD;  Location: [facility]      ESOPHAGOSCOPY, GASTROSCOPY, DUODENOSCOPY (EGD), COMBINED  2014    Procedure: COMBINED ESOPHAGOSCOPY, GASTROSCOPY, DUODENOSCOPY (EGD), BIOPSY SINGLE OR MULTIPLE;  Surgeon: Mike Mancuso MD;  Location:  GI     LAPAROSCOPIC HYSTERECTOMY TOTAL, BILATERAL SALPINGO-OOPHORECTOMY, NODE DISSECTION, COMBINED N/A 2019    Procedure: Laparoscopic Total Hysterectomy, Bilateral Salpingo-Oophorectomy, and Repair of Vaginal Laceration;  Surgeon: Mary Ash MD;  Location: [facility] OR     OPEN REDUCTION INTERNAL FIXATION ANKLE Left      OTHER SURGICAL HISTORY      [place]th I as teenager secondary to ulcer     TUBAL LIGATION         Social History   I have reviewed this patient's social history and updated it with pertinent information if needed.  Social History     Tobacco Use     Smoking status: Former Smoker     Packs/day: 0.00     Years: 10.00     Pack years: 0.00     Quit date: 2005     Years since quittin.5     Smokeless tobacco: Never Used   Substance Use Topics     Alcohol use: Not Currently     Drug use: No       Family History   I have reviewed this patient's family history and updated it with pertinent information if needed.  Family History   Problem Relation Age of Onset     Diabetes Type 2  Father      Hypertension Father      Hypertension Paternal Aunt      Hypertension Paternal Uncle      Myocardial Infarction No family hx of      Cerebrovascular Disease No family hx of      Coronary Artery Disease Early Onset No family hx of      Breast Cancer No family hx of      Ovarian Cancer No family hx of      Colon Cancer No family hx of        Prior to Admission Medications   Prior to Admission Medications   Prescriptions Last Dose Informant Patient Reported? Taking?   ALLERGY RELIEF 10 MG tablet   No No   Sig: TAKE 1 TABLET BY MOUTH ONCE DAILY *1 TOTAL FILL*   B Complex Vitamins (VITAMIN B-COMPLEX) TABS   No No   Sig: TAKE 1 TABLET BY MOUTH ONCE DAILY. **NON-COVERED MED**  *1  TOTAL FILL*   Bioflavonoid Products (THEA-C PO)   Yes No   Sig: Take 1,000 mg by mouth 2 times daily   GAVILAX 17 GM/SCOOP powder   No No   Sig: TAKE 17G (MEASURE WITH THE MARKINGS INSIDE CAP) BY MOUTH EVERY 12 HOURS AS NEEDED FOR CONSTIPATION *1 TOTAL FILL*   Nutritional Supplements (DERRICK) PACK   Yes No   Sig: Take 1 packet by mouth 2 times daily   SENNA-TIME 8.6 MG tablet   No No   Sig: TAKE 1 TABLET BY MOUTH EVERY 12 HOURS AS NEEDED FOR CONSTIPATION *1 TOTAL FILL*   VITAMIN D3 50 MCG (2000 UT) tablet   No No   Sig: TAKE 1 TABLET BY MOUTH ONCE DAILY *1 TOTAL FILL*   acetaminophen (TYLENOL) 325 MG tablet   No No   Sig: TAKE 1-2 TABLETS (325-650MG) BY MOUTH EVERY 4 HOURS AS NEEDED FOR MILD PAIN --MAXIMUM OF 4000MG ACETAMINOPHEN IN 24 HOURS-- *1 TOTAL FILL*   cholecalciferol (VITAMIN D3) 125 mcg (5000 units) capsule   Yes No   Sig: Take by mouth 2 times daily   cyanocobalamin (VITAMIN B-12) 1000 MCG tablet   No No   Sig: TAKE 1 TABLET BY MOUTH TWICE DAILY *1 TOTAL FILL* **NON-COVERED MED**   furosemide (LASIX) 40 MG tablet   No No   Sig: TAKE 1 TABLET BY MOUTH TWICE DAILY AT 7AM AND 2PM *1 TOTAL FILL*   gabapentin (NEURONTIN) 300 MG capsule   No No   Sig: Take 1 capsule (300 mg) by mouth At Bedtime   lidocaine (LMX4) 4 % external cream   No No   Sig: APPLY TOPICALLY TO AFFECTED AREA ONCE DAILY FOR LOWER BACK PAIN AND REMOVE PER SCHEDULE *1 TOTAL FILL*   lisinopril (ZESTRIL) 40 MG tablet   No No   Sig: Take 1 tablet (40 mg) by mouth every morning   methocarbamol (ROBAXIN) 500 MG tablet   No No   Sig: TAKE 1 TABLET BY MOUTH TWICE DAILY *1 TOTAL FILL*   metoprolol succinate ER (TOPROL XL) 25 MG 24 hr tablet   No No   Sig: TAKE 1 TABLET BY MOUTH TWICE DAILY *1 TOTAL FILL*   multivitamin w/minerals (THERA-VIT-M) tablet   No No   Sig: Take 1 tablet by mouth daily   Patient taking differently: Take 1 tablet by mouth daily On hold for surgery   omega-3 acid ethyl esters (LOVAZA) 1 g capsule   No No   Sig: Take 2 capsules  (2 g) by mouth 2 times daily   pantoprazole (PROTONIX) 40 MG EC tablet   No No   Sig: TAKE 1 TABLET BY MOUTH EVERY MORNING (BEFORE BREAKFAST) *1 TOTAL FILL*   traZODone (DESYREL) 50 MG tablet   No No   Sig: TAKE 1 TABLET BY MOUTH AT BEDTIME *1 TOTAL FILL*   vitamin B complex with vitamin C (STRESS TAB) tablet   No No   Sig: TAKE 1 TABLET BY MOUTH TWICE DAILY *1 TOTAL FILL*      Facility-Administered Medications: None     Allergies   Allergies   Allergen Reactions     Asa [Aspirin]      Stomach irritation (hx of PUD)     Nsaids      Stomach irritation (Hx of PUD)       Physical Exam   Vital Signs: Temp: 98.8  F (37.1  C) Temp src: Oral BP: (!) 167/76 Pulse: 104   Resp: 20 SpO2: 100 % O2 Device: None (Room air)    Weight: 0 lbs 0 oz    Constitutional: awake, alert, cooperative, no apparent distress  Eyes: Lids and lashes normal, pupils equal, round, sclera clear, conjunctiva normal  ENT: Normocephalic, without obvious abnormality, atraumatic, oral pharynx with moist mucous membranes, tonsils without erythema or exudates  Respiratory: No increased work of breathing, good air exchange, clear to auscultation bilaterally, no crackles or wheezing  Cardiovascular: regular rate and rhythm, normal S1 and S2, no S3 or S4, and no murmur noted  GI: normal bowel sounds, soft, non-distended, non-tender, no masses palpated  Skin:  There are several venous stasis ulcers on the legs bilaterally with a 4 cm deeper wound on the anterior right ankle which is draining bloody fluid.  Musculoskeletal:  There is 2+ edema to the legs up over the knees bilaterally the ankle joints bilaterally were not tender or painful with movement  Neurologic: Awake, alert, oriented to name, place and time.  Cranial nerves II-XII are grossly intact.  Motor is 5 out of 5 bilaterally.  Neuropsychiatric: General: normal, calm and normal eye contact    Data   Data reviewed today: I reviewed all medications, new labs and imaging results over the last 24 hours. I  personally reviewed no images or EKG's today.    Recent Labs   Lab 07/04/22 2053   WBC 12.3*   HGB 9.9*   MCV 83      INR 1.16*      POTASSIUM 4.4   CHLORIDE 108     12.3 (H)    \    9.9 (L)    /    232   N 77    L N/A    141    108    N/A /   ------------------------------------ N/A   ALT N/A   AST N/A   AP N/A   ALB N/A   Ca N/A  4.4    N/A    N/A \    % RETIC N/A    LDH N/A  Troponin N/A    BNP N/A    CK N/A  INR 1.16 (H)   PTT N/A    D-dimer N/A    Fibrinogen N/A    Antithrombin N/A  Ferritin N/A  CRP N/A    IL-6 N/A  Recent Results (from the past 24 hour(s))   XR Ankle Right 3 Views    Narrative    EXAM: XR ANKLE RIGHT G/E 3 VIEWS  LOCATION: [facility]  DATE/TIME: 7/4/2022 9:12 PM    INDICATION: pain after fall  COMPARISON: 09/30/2014      Impression    IMPRESSION: Plate-screw fixation of proximal old healed distal fibular fracture and the distal tibiofibular syndesmosis. No evidence of hardware failure. No widening of the syndesmosis. No acute fracture. The calcaneus appears mildly flattened but is   similar to prior although less well profiled. There is mild degenerative arthritis in the midfoot. Diffuse soft tissue swelling.

## 2022-07-05 NOTE — CONSULTS
"[facility]    Orthopedic Consultation    Josiane Dasilva MRN# [id number]   Age: 61 year old YOB: 1960     Date of Admission: 7/4/2022    Reason for consult: Right anterior ankle ulcer        Requesting provider: Agatha Mata       Level of consult: Consult, follow and place orders           Assessment and Plan:   Assessment:   Right anterior ankle ulcer  History ORIF right fibula      Plan:   Continue IV ABx  Elevate right Lower extremity  CRP ordered   If not improving, then would proceed with right ankle MRI.  Currently not appearing to be a septic joint.   Wound care per [facility].    WBAT bilateral LEs           Chief Complaint:   Right ankle pain          History of Present Illness:   Josiane Dasilva is a 61 year old female who lives in a group home.  She has chronic lower extremity edema and venous stasis ulcers.  She is mostly wheelchair-bound but had a reported fall while walking about 2 weeks ago. She states she occasionally uses a walker.  Since that time she is been intermittently complaining of right ankle pain but also reports \"my whole body hurts.\"    X-ray of the ankle showed fixation of an old proximal distal fibular fracture.  She states she had her ankle surgery \"a long time ago.\"          Past Medical History:     Past Medical History:   Diagnosis Date     Alcoholism /alcohol abuse      Benign essential hypertension      RICO (generalized anxiety disorder)      History of peptic ulcer disease      Impaired fasting glucose      MDD (major depressive disorder)      Morbid obesity (H)      Peripheral neuropathy      Poor short term memory              Past Surgical History:     Past Surgical History:   Procedure Laterality Date     APPENDECTOMY       COLONOSCOPY  08/13/2014    Procedure: COMBINED COLONOSCOPY, SINGLE BIOPSY/POLYPECTOMY BY BIOPSY;  Surgeon: Mike Mancuso MD;  Location:  GI     CYSTOSCOPY N/A 03/14/2019    Procedure: Cystoscopy;  Surgeon: " Mary Ash MD;  Location: [facility] OR     ESOPHAGOSCOPY, GASTROSCOPY, DUODENOSCOPY (EGD), COMBINED  2014    Procedure: COMBINED ESOPHAGOSCOPY, GASTROSCOPY, DUODENOSCOPY (EGD), BIOPSY SINGLE OR MULTIPLE;  Surgeon: Mike Mancuso MD;  Location:  GI     LAPAROSCOPIC HYSTERECTOMY TOTAL, BILATERAL SALPINGO-OOPHORECTOMY, NODE DISSECTION, COMBINED N/A 2019    Procedure: Laparoscopic Total Hysterectomy, Bilateral Salpingo-Oophorectomy, and Repair of Vaginal Laceration;  Surgeon: Mary Ash MD;  Location: [facility] OR     OPEN REDUCTION INTERNAL FIXATION ANKLE Left      OTHER SURGICAL HISTORY      [place] I as teenager secondary to ulcer     TUBAL LIGATION               Social History:     Social History     Tobacco Use     Smoking status: Former Smoker     Packs/day: 0.00     Years: 10.00     Pack years: 0.00     Quit date: 2005     Years since quittin.5     Smokeless tobacco: Never Used   Substance Use Topics     Alcohol use: Not Currently             Family History:     Family History   Problem Relation Age of Onset     Diabetes Type 2  Father      Hypertension Father      Hypertension Paternal Aunt      Hypertension Paternal Uncle      Myocardial Infarction No family hx of      Cerebrovascular Disease No family hx of      Coronary Artery Disease Early Onset No family hx of      Breast Cancer No family hx of      Ovarian Cancer No family hx of      Colon Cancer No family hx of              Immunizations:     VACCINE/DOSE   Diptheria   DPT   DTAP   HBIG   Hepatitis A   Hepatitis B   HIB   Influenza   Measles   Meningococcal   MMR   Mumps   Pneumococcal   Polio   Rubella   Small Pox   TDAP   Varicella   Zoster             Allergies:     Allergies   Allergen Reactions     Asa [Aspirin]      Stomach irritation (hx of PUD)     Nsaids      Stomach irritation (Hx of PUD)             Medications:     Current Facility-Administered Medications   Medication     acetaminophen  "(TYLENOL) tablet 650 mg    Or     acetaminophen (TYLENOL) Suppository 650 mg     ceFAZolin (ANCEF) intermittent infusion 2 g in 100 mL dextrose PRE-MIX     furosemide (LASIX) tablet 40 mg     gabapentin (NEURONTIN) capsule 300 mg     lidocaine (LMX4) cream     lidocaine 1 % 0.1-1 mL     lisinopril (ZESTRIL) tablet 40 mg     melatonin tablet 1 mg     methocarbamol (ROBAXIN) tablet 500 mg     metoprolol succinate ER (TOPROL XL) 24 hr tablet 25 mg     naloxone (NARCAN) injection 0.2 mg    Or     naloxone (NARCAN) injection 0.4 mg    Or     naloxone (NARCAN) injection 0.2 mg    Or     naloxone (NARCAN) injection 0.4 mg     ondansetron (ZOFRAN ODT) ODT tab 4 mg    Or     ondansetron (ZOFRAN) injection 4 mg     oxyCODONE (ROXICODONE) tablet 5 mg     pantoprazole (PROTONIX) EC tablet 40 mg     senna-docusate (SENOKOT-S/PERICOLACE) 8.6-50 MG per tablet 1 tablet    Or     senna-docusate (SENOKOT-S/PERICOLACE) 8.6-50 MG per tablet 2 tablet     sodium chloride (PF) 0.9% PF flush 3 mL     sodium chloride (PF) 0.9% PF flush 3 mL     traZODone (DESYREL) tablet 50 mg             Review of Systems:   ROS:  10 point ROS neg other than the symptoms noted above in the HPI.            Physical Exam:   All vitals have been reviewed  Patient Vitals for the past 24 hrs:   BP Temp Temp src Pulse Resp SpO2 Height Weight   07/05/22 1138 -- -- -- -- -- -- 1.727 m (5' 8\") 146 kg (321 lb 14 oz)   07/05/22 0823 139/63 99.1  F (37.3  C) Oral 91 16 100 % -- --   07/05/22 0650 107/61 -- -- 95 -- 96 % -- --   07/05/22 0400 112/53 -- -- 83 -- 97 % -- --   07/05/22 0007 (!) 154/59 99.8  F (37.7  C) Oral 93 20 99 % -- --   07/04/22 2001 (!) 167/76 98.8  F (37.1  C) Oral 104 20 100 % -- --       Intake/Output Summary (Last 24 hours) at 7/5/2022 1529  Last data filed at 7/5/2022 1500  Gross per 24 hour   Intake --   Output 725 ml   Net -725 ml         Physical Exam   Temp: 99.1  F (37.3  C) Temp src: Oral BP: 139/63 Pulse: 91   Resp: 16 SpO2: 100 % O2 " Device: None (Room air)    Vital Signs with Ranges  Temp:  [98.8  F (37.1  C)-99.8  F (37.7  C)] 99.1  F (37.3  C)  Pulse:  [] 91  Resp:  [16-20] 16  BP: (107-167)/(53-76) 139/63  SpO2:  [96 %-100 %] 100 %  321 lbs 13.95 oz    Constitutional: Alert, following commands.  HEENT: Head atraumatic normocephalic.  Respiratory: Unlabored breathing no audible wheeze  Cardiovascular: Regular rate and rhythm per pulses  GI: Abdomen non-distended.  Lymph/Hematologic: No lymphadenopathy in areas examined  Genitourinary:  No jj  Skin: Multiple wounds along low legs.  Edema bilateral lower extremities  Musculoskeletal: Right ankle: anterior ankle wound present with mild drainage on dressings.  Pain bilateral ankles with attempted ROM, right no more painful than the left with passive motion.  She has very limited bilateral AROM of either ankle.  Swelling present in right foot and ankle.  No effusion appreciated but difficult to assess given body habitus.  Reports sensation to light touch on right vs left LE.              Data:   All laboratory data reviewed  Results for orders placed or performed during the hospital encounter of 07/04/22   XR Ankle Right 3 Views     Status: None    Narrative    EXAM: XR ANKLE RIGHT G/E 3 VIEWS  LOCATION: [facility]  DATE/TIME: 7/4/2022 9:12 PM    INDICATION: pain after fall  COMPARISON: 09/30/2014      Impression    IMPRESSION: Plate-screw fixation of proximal old healed distal fibular fracture and the distal tibiofibular syndesmosis. No evidence of hardware failure. No widening of the syndesmosis. No acute fracture. The calcaneus appears mildly flattened but is   similar to prior although less well profiled. There is mild degenerative arthritis in the midfoot. Diffuse soft tissue swelling.   INR     Status: Abnormal   Result Value Ref Range    INR 1.16 (H) 0.85 - 1.15   Comprehensive metabolic panel     Status: Abnormal   Result Value Ref Range    Sodium 141 133  - 144 mmol/L    Potassium 4.4 3.4 - 5.3 mmol/L    Chloride 108 94 - 109 mmol/L    Carbon Dioxide (CO2) 29 20 - 32 mmol/L    Anion Gap 4 3 - 14 mmol/L    Urea Nitrogen 28 7 - 30 mg/dL    Creatinine 0.88 0.52 - 1.04 mg/dL    Calcium 9.3 8.5 - 10.1 mg/dL    Glucose 119 (H) 70 - 99 mg/dL    Alkaline Phosphatase 146 40 - 150 U/L    AST 31 0 - 45 U/L    ALT 35 0 - 50 U/L    Protein Total 8.8 6.8 - 8.8 g/dL    Albumin 3.2 (L) 3.4 - 5.0 g/dL    Bilirubin Total 0.6 0.2 - 1.3 mg/dL    GFR Estimate 74 >60 mL/min/1.73m2   Procalcitonin     Status: Normal   Result Value Ref Range    Procalcitonin <0.05 <0.05 ng/mL   Ethyl Alcohol Level     Status: Normal   Result Value Ref Range    Alcohol ethyl <0.01 <=0.01 g/dL   CBC with platelets and differential     Status: Abnormal   Result Value Ref Range    WBC Count 12.3 (H) 4.0 - 11.0 10e3/uL    RBC Count 4.06 3.80 - 5.20 10e6/uL    Hemoglobin 9.9 (L) 11.7 - 15.7 g/dL    Hematocrit 33.7 (L) 35.0 - 47.0 %    MCV 83 78 - 100 fL    MCH 24.4 (L) 26.5 - 33.0 pg    MCHC 29.4 (L) 31.5 - 36.5 g/dL    RDW 15.5 (H) 10.0 - 15.0 %    Platelet Count 232 150 - 450 10e3/uL    % Neutrophils 77 %    % Lymphocytes 13 %    % Monocytes 6 %    % Eosinophils 3 %    % Basophils 0 %    % Immature Granulocytes 1 %    NRBCs per 100 WBC 0 <1 /100    Absolute Neutrophils 9.5 (H) 1.6 - 8.3 10e3/uL    Absolute Lymphocytes 1.6 0.8 - 5.3 10e3/uL    Absolute Monocytes 0.8 0.0 - 1.3 10e3/uL    Absolute Eosinophils 0.3 0.0 - 0.7 10e3/uL    Absolute Basophils 0.0 0.0 - 0.2 10e3/uL    Absolute Immature Granulocytes 0.1 <=0.4 10e3/uL    Absolute NRBCs 0.0 10e3/uL   iStat Gases (lactate) venous, POCT     Status: Abnormal   Result Value Ref Range    Lactic Acid POCT 0.8 <=2.0 mmol/L    Bicarbonate Venous POCT 32 (H) 21 - 28 mmol/L    O2 Sat, Venous POCT 30 (L) 94 - 100 %    pCO2V Venous POCT 53 (H) 40 - 50 mm Hg    pH Venous POCT 7.39 7.32 - 7.43    pO2 Venous POCT 20 (L) 25 - 47 mm Hg   Asymptomatic COVID-19 Virus  (Coronavirus) by PCR Nasopharyngeal     Status: Normal    Specimen: Nasopharyngeal; Swab   Result Value Ref Range    SARS CoV2 PCR Negative Negative    Narrative    Testing was performed using the Xpert Xpress SARS-CoV-2 Assay on the   Cepheid Gene-Xpert Instrument Systems. Additional information about   this Emergency Use Authorization (EUA) assay can be found via the Lab   Guide. This test should be ordered for the detection of SARS-CoV-2 in   individuals who meet SARS-CoV-2 clinical and/or epidemiological   criteria. Test performance is unknown in asymptomatic patients. This   test is for in vitro diagnostic use under the FDA EUA for   laboratories certified under CLIA to perform high complexity testing.   This test has not been FDA cleared or approved. A negative result   does not rule out the presence of PCR inhibitors in the specimen or   target RNA in concentration below the limit of detection for the   assay. The possibility of a false negative should be considered if   the patient's recent exposure or clinical presentation suggests   COVID-19. This test was validated by the [facility]. This laboratory is certified under the Clinical Laboratory Improvement Amendments of 1988 (CLIA-88) as qualified to perform high complexity laboratory testing.     [place] Draw     Status: None    Narrative    The following orders were created for panel order [place] Draw.  Procedure                               Abnormality         Status                     ---------                               -----------         ------                     Extra Blood Culture Bottle[[id number]]                       Final result                 Please view results for these tests on the individual orders.   Extra Blood Culture Bottle     Status: None   Result Value Ref Range    Hold Specimen JIC    [place] Draw     Status: None    Narrative    The following orders were created for panel order [place]  Draw.  Procedure                               Abnormality         Status                     ---------                               -----------         ------                     Extra Blood Culture Bottle[[id number]]                       Final result                 Please view results for these tests on the individual orders.   Extra Blood Culture Bottle     Status: None   Result Value Ref Range    Hold Specimen JI    CBC with platelets and differential     Status: Abnormal   Result Value Ref Range    WBC Count 10.9 4.0 - 11.0 10e3/uL    RBC Count 3.46 (L) 3.80 - 5.20 10e6/uL    Hemoglobin 8.4 (L) 11.7 - 15.7 g/dL    Hematocrit 28.1 (L) 35.0 - 47.0 %    MCV 81 78 - 100 fL    MCH 24.3 (L) 26.5 - 33.0 pg    MCHC 29.9 (L) 31.5 - 36.5 g/dL    RDW 15.5 (H) 10.0 - 15.0 %    Platelet Count 220 150 - 450 10e3/uL    % Neutrophils 76 %    % Lymphocytes 14 %    % Monocytes 7 %    % Eosinophils 2 %    % Basophils 0 %    % Immature Granulocytes 1 %    NRBCs per 100 WBC 0 <1 /100    Absolute Neutrophils 8.3 1.6 - 8.3 10e3/uL    Absolute Lymphocytes 1.5 0.8 - 5.3 10e3/uL    Absolute Monocytes 0.8 0.0 - 1.3 10e3/uL    Absolute Eosinophils 0.2 0.0 - 0.7 10e3/uL    Absolute Basophils 0.0 0.0 - 0.2 10e3/uL    Absolute Immature Granulocytes 0.1 <=0.4 10e3/uL    Absolute NRBCs 0.0 10e3/uL   Iron and iron binding capacity     Status: Abnormal   Result Value Ref Range    Iron 17 (L) 35 - 180 ug/dL    Iron Binding Capacity 214 (L) 240 - 430 ug/dL    Iron Sat Index 8 (L) 15 - 46 %   Blood Culture Peripheral Blood     Status: Normal (Preliminary result)    Specimen: Peripheral Blood   Result Value Ref Range    Culture No growth after 12 hours    Blood Culture Peripheral Blood     Status: Normal (Preliminary result)    Specimen: Peripheral Blood   Result Value Ref Range    Culture No growth after 12 hours    CBC with platelets differential     Status: Abnormal    Narrative    The following orders were created for panel order CBC with  platelets differential.  Procedure                               Abnormality         Status                     ---------                               -----------         ------                     CBC with platelets and d...[[id number]]  Abnormal            Final result                 Please view results for these tests on the individual orders.   CBC with platelets differential     Status: Abnormal    Narrative    The following orders were created for panel order CBC with platelets differential.  Procedure                               Abnormality         Status                     ---------                               -----------         ------                     CBC with platelets and d...[[id number]]  Abnormal            Final result                 Please view results for these tests on the individual orders.          Attestation:  I have reviewed today's vital signs, notes, medications, labs and imaging with Dr. Laurent.  Amount of time performed on this consult: 40 minutes.    Ayah Chisholm PA-C

## 2022-07-05 NOTE — PLAN OF CARE
Goal Outcome Evaluation:  -tolerating oral antibiotics or has plans for home infusion setup: NOT MET  -infection is improving: NOT MET  -safe disposition plan has been identified: NOT MET    Patient vital signs are at baseline: No,  Reason:  tachy at times  Patient able to ambulate as they were prior to admission or with assist devices provided by therapies during their stay:  No, up with [place] to chair  Patient MUST void prior to discharge:  Yes  Patient able to tolerate oral intake:  Yes  Pain has adequate pain control using Oral analgesics:  No,  Reason:  c/o back pain and muscle pain      Nurse to notify provider when observation goals have been met and patient is ready for discharge

## 2022-07-05 NOTE — CONSULTS
[facility]    Infectious Disease Consultation     Date of Admission:  7/4/2022  Date of Consult (When I saw the patient): 07/05/22    Assessment & Plan   Josiane Dasilva is a 61 year old female who was admitted on 7/4/2022.     Impression:  1. 61 y.o female with multiple co morbidities   2. Admitted with BLE ulcers.   3. Chronic lower extremity edema and venous stasis ulcers   4. Ulcer on the right ankle overlying old right ankle hardware from a previous fracture.  5. HTN   6. Depression   7. RICO  8. Peripheral neuropathy   9. Alcohol abuse history    10. Was started on ancef.     Recommendations:   Consult orthopedics   For now ancef          Silvia Freeman MD    Reason for Consult   Reason for consult: I was asked to evaluate this patient for LE wounds.    Primary Care Physician   Carmen Arriaza    Chief Complaint   fall    History is obtained from the patient and medical records    History of Present Illness   Josiane Dasilva is a 61 year old female who presents with Josiane Dasilva is a 61 year old female with chronic lower extremity edema and venous stasis ulcers who is being admitted for a possible infected venous stasis ulcer on the right ankle overlying old right ankle hardware from a previous fracture.     Past Medical History   I have reviewed this patient's medical history and updated it with pertinent information if needed.   Past Medical History:   Diagnosis Date     Alcoholism /alcohol abuse      Benign essential hypertension      RICO (generalized anxiety disorder)      History of peptic ulcer disease      Impaired fasting glucose      MDD (major depressive disorder)      Morbid obesity (H)      Peripheral neuropathy      Poor short term memory        Past Surgical History   I have reviewed this patient's surgical history and updated it with pertinent information if needed.  Past Surgical History:   Procedure Laterality Date     APPENDECTOMY       COLONOSCOPY  08/13/2014     Procedure: COMBINED COLONOSCOPY, SINGLE BIOPSY/POLYPECTOMY BY BIOPSY;  Surgeon: Mike Mancuso MD;  Location:  GI     CYSTOSCOPY N/A 03/14/2019    Procedure: Cystoscopy;  Surgeon: Mary Ash MD;  Location:  OR     ESOPHAGOSCOPY, GASTROSCOPY, DUODENOSCOPY (EGD), COMBINED  08/12/2014    Procedure: COMBINED ESOPHAGOSCOPY, GASTROSCOPY, DUODENOSCOPY (EGD), BIOPSY SINGLE OR MULTIPLE;  Surgeon: Mike Mancuso MD;  Location: [facility]     LAPAROSCOPIC HYSTERECTOMY TOTAL, BILATERAL SALPINGO-OOPHORECTOMY, NODE DISSECTION, COMBINED N/A 03/14/2019    Procedure: Laparoscopic Total Hysterectomy, Bilateral Salpingo-Oophorectomy, and Repair of Vaginal Laceration;  Surgeon: Mary Ash MD;  Location:  OR     OPEN REDUCTION INTERNAL FIXATION ANKLE Left 2014     OTHER SURGICAL HISTORY      Billroth I as teenager secondary to ulcer     TUBAL LIGATION         Prior to Admission Medications   Prior to Admission Medications   Prescriptions Last Dose Informant Patient Reported? Taking?   ALLERGY RELIEF 10 MG tablet  Nursing Home No Yes   Sig: TAKE 1 TABLET BY MOUTH ONCE DAILY *1 TOTAL FILL*   B Complex Vitamins (VITAMIN B-COMPLEX) TABS  Nursing Home No Yes   Sig: TAKE 1 TABLET BY MOUTH ONCE DAILY. **NON-COVERED MED**  *1 TOTAL FILL*   GAVILAX 17 GM/SCOOP powder  Nursing Home No Yes   Sig: TAKE 17G (MEASURE WITH THE MARKINGS INSIDE CAP) BY MOUTH EVERY 12 HOURS AS NEEDED FOR CONSTIPATION *1 TOTAL FILL*   VITAMIN D3 50 MCG (2000 UT) tablet  Nursing Home No Yes   Sig: TAKE 1 TABLET BY MOUTH ONCE DAILY *1 TOTAL FILL*   Patient taking differently: Take 1 tablet by mouth 2 times daily   acetaminophen (TYLENOL) 325 MG tablet  Nursing Home No Yes   Sig: TAKE 1-2 TABLETS (325-650MG) BY MOUTH EVERY 4 HOURS AS NEEDED FOR MILD PAIN --MAXIMUM OF 4000MG ACETAMINOPHEN IN 24 HOURS-- *1 TOTAL FILL*   cyanocobalamin (VITAMIN B-12) 1000 MCG tablet  Nursing Home No Yes   Sig: TAKE 1 TABLET BY MOUTH TWICE DAILY  *1 TOTAL FILL* **NON-COVERED MED**   furosemide (LASIX) 40 MG tablet  Nursing Home No Yes   Sig: TAKE 1 TABLET BY MOUTH TWICE DAILY AT 7AM AND 2PM *1 TOTAL FILL*   gabapentin (NEURONTIN) 300 MG capsule  Nursing Home No Yes   Sig: Take 1 capsule (300 mg) by mouth At Bedtime   lisinopril (ZESTRIL) 40 MG tablet  Nursing Home No Yes   Sig: Take 1 tablet (40 mg) by mouth every morning   methocarbamol (ROBAXIN) 500 MG tablet  Nursing Home No Yes   Sig: TAKE 1 TABLET BY MOUTH TWICE DAILY *1 TOTAL FILL*   metoprolol succinate ER (TOPROL XL) 25 MG 24 hr tablet  Nursing Home No Yes   Sig: TAKE 1 TABLET BY MOUTH TWICE DAILY *1 TOTAL FILL*   omega-3 acid ethyl esters (LOVAZA) 1 g capsule  Nursing Home No Yes   Sig: Take 2 capsules (2 g) by mouth 2 times daily   pantoprazole (PROTONIX) 40 MG EC tablet  Nursing Home No Yes   Sig: TAKE 1 TABLET BY MOUTH EVERY MORNING (BEFORE BREAKFAST) *1 TOTAL FILL*   traZODone (DESYREL) 50 MG tablet  Nursing Home No Yes   Sig: TAKE 1 TABLET BY MOUTH AT BEDTIME *1 TOTAL FILL*   vitamin B complex with vitamin C (STRESS TAB) tablet  Nursing Home No Yes   Sig: TAKE 1 TABLET BY MOUTH TWICE DAILY *1 TOTAL FILL*      Facility-Administered Medications: None     Allergies   Allergies   Allergen Reactions     Asa [Aspirin]      Stomach irritation (hx of PUD)     Nsaids      Stomach irritation (Hx of PUD)       Immunization History   Immunization History   Administered Date(s) Administered     COVID-19,PF,Moderna 12/31/2020, 01/28/2021     HepB-Adult 09/30/1998     Influenza (IIV3) PF 02/07/2013     Influenza Not Indicated - By Hx 11/04/2014     Influenza Vaccine IM Ages 6-35 Months 4 Valent (PF) 10/22/2020     TD (ADULT, 7+) 07/22/2021     Tdap (Adacel,Boostrix) 06/03/2011     Zoster vaccine recombinant adjuvanted (SHINGRIX) 07/22/2021       Social History   I have reviewed this patient's social history and updated it with pertinent information if needed. Josiane Dasilva  reports that she quit smoking  about 17 years ago. She smoked 0.00 packs per day for 10.00 years. She has never used smokeless tobacco. She reports previous alcohol use. She reports that she does not use drugs.    Family History   I have reviewed this patient's family history and updated it with pertinent information if needed.   Family History   Problem Relation Age of Onset     Diabetes Type 2  Father      Hypertension Father      Hypertension Paternal Aunt      Hypertension Paternal Uncle      Myocardial Infarction No family hx of      Cerebrovascular Disease No family hx of      Coronary Artery Disease Early Onset No family hx of      Breast Cancer No family hx of      Ovarian Cancer No family hx of      Colon Cancer No family hx of        Review of Systems   The 10 point Review of Systems is negative other than noted in the HPI or here.     Physical Exam   Temp: 99.1  F (37.3  C) Temp src: Oral BP: 139/63 Pulse: 91   Resp: 16 SpO2: 100 % O2 Device: None (Room air)    Vital Signs with Ranges  Temp:  [98.8  F (37.1  C)-99.8  F (37.7  C)] 99.1  F (37.3  C)  Pulse:  [] 91  Resp:  [16-20] 16  BP: (107-167)/(53-76) 139/63  SpO2:  [96 %-100 %] 100 %  0 lbs 0 oz  There is no height or weight on file to calculate BMI.    GENERAL APPEARANCE:  awake  EYES: Eyes grossly normal to inspection  NECK: no adenopathy  RESP: lungs clear   CV: regular rates and rhythm  LYMPHATICS: normal ant/post cervical and supraclavicular nodes  ABDOMEN: soft, nontender  MS: extremities normal  SKIN: BLE ulcers pic int he chart         Data   Lab Results   Component Value Date    WBC 10.9 07/05/2022    HGB 8.4 (L) 07/05/2022    HCT 28.1 (L) 07/05/2022     07/05/2022     07/04/2022    POTASSIUM 4.4 07/04/2022    CHLORIDE 108 07/04/2022    CO2 29 07/04/2022    BUN 28 07/04/2022    CR 0.88 07/04/2022     (H) 07/04/2022    NTBNP 148 (H) 03/13/2019    TROPI <0.015 05/29/2020    AST 31 07/04/2022    ALT 35 07/04/2022    ALKPHOS 146 07/04/2022    BILITOTAL  0.6 07/04/2022    INR 1.16 (H) 07/04/2022     No results for input(s): CULT in the last 168 hours.  Recent Labs   Lab Test 02/25/19  0800 02/25/19  0754 02/23/19  0653 02/22/19  2045 02/22/19  1841 02/22/19  1836 08/12/14  0841 08/11/14  1900   CULT No growth No growth No growth 10,000 to 50,000 colonies/mL  mixed urogenital laly  Susceptibility testing not routinely done   No growth Cultured on the 2nd day of incubation:  Parabacteroides merdae  CORRECTED ON 03/04 AT 1113: PREVIOUSLY REPORTED AS Cultured on the 2nd day of incubation:   Parabacteroides merdae  *  Critical Value/Significant Value, preliminary result only, called to and read back by   SARAI GEORGE RN @1790 2/24/19. CT    (Note)  NEGATIVE for the following organisms and resistance markers:  Acinetobacter sp., Citrobacter sp., Enterobacter sp., Proteus sp., E.  coli, K. pneumoniae/oxytoca, P. aeruginosa, CTX-M, KPC, NDM, VIM, IMP  and OXA by [employer] multiplex nucleic acid test. Final identification  and antimicrobial susceptibility testing will be verified by standard  methods.    Critical Value/Significant Value called to and read back by Yennifer George RN  2.25.19 FRANTZ.     10,000 to 50,000 colonies/mL mixed urogenital laly 50,000 to 100,000 colonies/mL mixed urogenital laly

## 2022-07-05 NOTE — ED TRIAGE NOTES
Patient BIBA from group [facility] in [place]. Patient had fall approx 1 week ago, since has been taking tylenol for pain but not really managing pain, unable to move around or move LLE from pain.      Triage Assessment     Row Name 07/04/22 2002       Triage Assessment (Adult)    Airway WDL WDL       Respiratory WDL    Respiratory WDL WDL       Skin Circulation/Temperature WDL    Skin Circulation/Temperature WDL X  Wounds to BLE        Cardiac WDL    Cardiac WDL X  tachycardiac       Peripheral/Neurovascular WDL    Peripheral Neurovascular WDL X  edema to BLE, weak flexion, weak pulses       Cognitive/Neuro/Behavioral WDL    Cognitive/Neuro/Behavioral WDL WDL

## 2022-07-05 NOTE — ED PROVIDER NOTES
History     Chief Complaint:  Fall       HPI   Josiane Dasilva is a 61 year old female who presents via EMS from her group home due to right ankle pain after apparently falling.  Of note, the patient is an extremely poor historian.  She claims that she fell while walking about 2 weeks ago.  However most of the time she is wheelchair-bound.  She is unable to tell me exactly why she fell, when she fell, or when she started having pain.  Initially, she was apparently complaining of left leg pain, however she is complaining of right ankle pain to me.  She denies any other injuries or fevers.  She states that she has been getting Tylenol for the pain, which is not helping.  She also thinks she is getting her gabapentin as well.    ROS:  Review of Systems   All other systems reviewed and are negative.        Allergies:  Asa [Aspirin]  Nsaids     Medications:    acetaminophen (TYLENOL) 325 MG tablet  ALLERGY RELIEF 10 MG tablet  B Complex Vitamins (VITAMIN B-COMPLEX) TABS  Bioflavonoid Products (THEA-C PO)  cholecalciferol (VITAMIN D3) 125 mcg (5000 units) capsule  cyanocobalamin (VITAMIN B-12) 1000 MCG tablet  furosemide (LASIX) 40 MG tablet  gabapentin (NEURONTIN) 300 MG capsule  GAVILAX 17 GM/SCOOP powder  lidocaine (LMX4) 4 % external cream  lisinopril (ZESTRIL) 40 MG tablet  methocarbamol (ROBAXIN) 500 MG tablet  metoprolol succinate ER (TOPROL XL) 25 MG 24 hr tablet  multivitamin w/minerals (THERA-VIT-M) tablet  Nutritional Supplements (DERRICK) PACK  omega-3 acid ethyl esters (LOVAZA) 1 g capsule  pantoprazole (PROTONIX) 40 MG EC tablet  SENNA-TIME 8.6 MG tablet  traZODone (DESYREL) 50 MG tablet  vitamin B complex with vitamin C (STRESS TAB) tablet  VITAMIN D3 50 MCG (2000 UT) tablet        Past Medical History:    Past Medical History:   Diagnosis Date     Alcoholism /alcohol abuse      Benign essential hypertension      RICO (generalized anxiety disorder)      History of peptic ulcer disease      Impaired fasting  glucose      MDD (major depressive disorder)      Morbid obesity (H)      Peripheral neuropathy      Poor short term memory        Past Surgical History:    Past Surgical History:   Procedure Laterality Date     APPENDECTOMY       COLONOSCOPY  08/13/2014    Procedure: COMBINED COLONOSCOPY, SINGLE BIOPSY/POLYPECTOMY BY BIOPSY;  Surgeon: Mike Mancuso MD;  Location:  GI     CYSTOSCOPY N/A 03/14/2019    Procedure: Cystoscopy;  Surgeon: Mary Ash MD;  Location: [facility] OR     ESOPHAGOSCOPY, GASTROSCOPY, DUODENOSCOPY (EGD), COMBINED  08/12/2014    Procedure: COMBINED ESOPHAGOSCOPY, GASTROSCOPY, DUODENOSCOPY (EGD), BIOPSY SINGLE OR MULTIPLE;  Surgeon: Mike Mancuso MD;  Location: [facility]     LAPAROSCOPIC HYSTERECTOMY TOTAL, BILATERAL SALPINGO-OOPHORECTOMY, NODE DISSECTION, COMBINED N/A 03/14/2019    Procedure: Laparoscopic Total Hysterectomy, Bilateral Salpingo-Oophorectomy, and Repair of Vaginal Laceration;  Surgeon: Mary Ash MD;  Location: [facility] OR     OPEN REDUCTION INTERNAL FIXATION ANKLE Left 2014     OTHER SURGICAL HISTORY      Billroth I as teenager secondary to ulcer     TUBAL LIGATION          Family History:    family history includes Diabetes Type 2  in her father; Hypertension in her father, paternal aunt, and paternal uncle.    Social History:   reports that she quit smoking about 17 years ago. She smoked 0.00 packs per day for 10.00 years. She has never used smokeless tobacco. She reports previous alcohol use. She reports that she does not use drugs.  PCP: Carmen Arriaza     Physical Exam     Patient Vitals for the past 24 hrs:   BP Temp Temp src Pulse Resp SpO2   07/04/22 2001 (!) 167/76 98.8  F (37.1  C) Oral 104 20 100 %        Physical Exam  Nursing note and vitals reviewed.  Constitutional:  Awake and alert. Cooperative.  Appears uncomfortable.  HENT:   Nose:    Nose normal.   Mouth/Throat:   Facemask in place.   Eyes:    Conjunctivae normal and EOM are  normal.      Pupils are equal, round, and reactive to light.   Neck:    Trachea normal.   Cardiovascular:  Normal rate, regular rhythm, normal heart sounds and normal pulses. No murmur heard.  Pulmonary/Chest:  Effort normal and breath sounds normal.   Abdominal:   Soft. Normal appearance and bowel sounds are normal.      There is no tenderness.      There is no rebound and no CVA tenderness.   Musculoskeletal:  Extremities atraumatic x 4.   Lymphadenopathy:  No cervical adenopathy.   Neurological:   Awake and alert. Normal strength.      No cranial nerve deficit or sensory deficit. GCS eye subscore is 4. GCS verbal subscore is 5. GCS motor subscore is 6.   Skin:    Chronic venous stasis changes and edema to both lower extremities with some open wounds present. Open wound to the anterior aspect of the right ankle which is tender to palpation.  Some mild warmth and erythema to the right lower leg as well.  Psychiatric:   Anxious and tearful but otherwise normal mood and affect.      Emergency Department Course   Imaging:  XR Ankle Right 3 Views   Final Result   IMPRESSION: Plate-screw fixation of proximal old healed distal fibular fracture and the distal tibiofibular syndesmosis. No evidence of hardware failure. No widening of the syndesmosis. No acute fracture. The calcaneus appears mildly flattened but is    similar to prior although less well profiled. There is mild degenerative arthritis in the midfoot. Diffuse soft tissue swelling.         Report per radiology    Laboratory:  Labs Ordered and Resulted from Time of ED Arrival to Time of ED Departure   INR - Abnormal       Result Value    INR 1.16 (*)    COMPREHENSIVE METABOLIC PANEL - Abnormal    Sodium 141      Potassium 4.4      Chloride 108      Carbon Dioxide (CO2) 29      Anion Gap 4      Urea Nitrogen 28      Creatinine 0.88      Calcium 9.3      Glucose 119 (*)     Alkaline Phosphatase 146      AST 31      ALT 35      Protein Total 8.8      Albumin 3.2 (*)      Bilirubin Total 0.6      GFR Estimate 74     CBC WITH PLATELETS AND DIFFERENTIAL - Abnormal    WBC Count 12.3 (*)     RBC Count 4.06      Hemoglobin 9.9 (*)     Hematocrit 33.7 (*)     MCV 83      MCH 24.4 (*)     MCHC 29.4 (*)     RDW 15.5 (*)     Platelet Count 232      % Neutrophils 77      % Lymphocytes 13      % Monocytes 6      % Eosinophils 3      % Basophils 0      % Immature Granulocytes 1      NRBCs per 100 WBC 0      Absolute Neutrophils 9.5 (*)     Absolute Lymphocytes 1.6      Absolute Monocytes 0.8      Absolute Eosinophils 0.3      Absolute Basophils 0.0      Absolute Immature Granulocytes 0.1      Absolute NRBCs 0.0     ISTAT GASES LACTATE VENOUS POCT - Abnormal    Lactic Acid POCT 0.8      Bicarbonate Venous POCT 32 (*)     O2 Sat, Venous POCT 30 (*)     pCO2V Venous POCT 53 (*)     pH Venous POCT 7.39      pO2 Venous POCT 20 (*)    ETHYL ALCOHOL LEVEL - Normal    Alcohol ethyl <0.01     PROCALCITONIN   COVID-19 VIRUS (CORONAVIRUS) BY PCR   BLOOD CULTURE   BLOOD CULTURE        Emergency Department Course:      Reviewed:  I reviewed nursing notes, vitals, past medical history, Care Everywhere and [facility]    Interventions:  Medications   HYDROmorphone (PF) (DILAUDID) injection 0.5 mg (0.5 mg Intravenous Given 7/4/22 2104)   ceFAZolin (ANCEF) intermittent infusion 2 g in 100 mL dextrose PRE-MIX (2 g Intravenous New Bag 7/4/22 2133)        Disposition:  The patient was admitted to the hospital under the care of Dr. Wesley.     Impression & Plan    Medical Decision Making:  This is a 61-year-old female who came in for further evaluation of right ankle pain.  Unfortunately, the patient was an extremely poor historian.  I felt it was reasonable to check the above blood work and x-rays of the right ankle.  She does have an open wound, and therefore I was also concerned about the possibility of cellulitis.  Her x-rays do not show anything acute, but she does have hardware in the ankle.  She was provided IV  Ancef here.  I am concerned that she could develop infected hardware, and therefore I think it is best that she be brought into the hospital for further evaluation and management.  I subsequently spoke with Dr. Wesley, who will be taking care of her.        Diagnosis:    ICD-10-CM    1. Cellulitis of right lower extremity  L03.115    2. Open wound of lower limb, right, initial encounter  S81.801A           7/4/2022   Reza Lal MD Lashkowitz, Seth H, MD  07/04/22 2153

## 2022-07-06 LAB
ANION GAP SERPL CALCULATED.3IONS-SCNC: 3 MMOL/L (ref 3–14)
BUN SERPL-MCNC: 23 MG/DL (ref 7–30)
CALCIUM SERPL-MCNC: 9 MG/DL (ref 8.5–10.1)
CHLORIDE BLD-SCNC: 107 MMOL/L (ref 94–109)
CO2 SERPL-SCNC: 29 MMOL/L (ref 20–32)
CREAT SERPL-MCNC: 0.93 MG/DL (ref 0.52–1.04)
CRP SERPL-MCNC: 120 MG/L (ref 0–8)
ERYTHROCYTE [DISTWIDTH] IN BLOOD BY AUTOMATED COUNT: 15.3 % (ref 10–15)
GFR SERPL CREATININE-BSD FRML MDRD: 69 ML/MIN/1.73M2
GLUCOSE BLD-MCNC: 131 MG/DL (ref 70–99)
HCT VFR BLD AUTO: 30.7 % (ref 35–47)
HGB BLD-MCNC: 9.3 G/DL (ref 11.7–15.7)
MCH RBC QN AUTO: 25.1 PG (ref 26.5–33)
MCHC RBC AUTO-ENTMCNC: 30.3 G/DL (ref 31.5–36.5)
MCV RBC AUTO: 83 FL (ref 78–100)
PLATELET # BLD AUTO: 244 10E3/UL (ref 150–450)
POTASSIUM BLD-SCNC: 4.4 MMOL/L (ref 3.4–5.3)
RBC # BLD AUTO: 3.7 10E6/UL (ref 3.8–5.2)
SODIUM SERPL-SCNC: 139 MMOL/L (ref 133–144)
WBC # BLD AUTO: 10.7 10E3/UL (ref 4–11)

## 2022-07-06 PROCEDURE — 36415 COLL VENOUS BLD VENIPUNCTURE: CPT | Performed by: HOSPITALIST

## 2022-07-06 PROCEDURE — G0378 HOSPITAL OBSERVATION PER HR: HCPCS

## 2022-07-06 PROCEDURE — 250N000013 HC RX MED GY IP 250 OP 250 PS 637: Performed by: HOSPITALIST

## 2022-07-06 PROCEDURE — 85027 COMPLETE CBC AUTOMATED: CPT | Performed by: HOSPITALIST

## 2022-07-06 PROCEDURE — 250N000011 HC RX IP 250 OP 636: Performed by: HOSPITALIST

## 2022-07-06 PROCEDURE — 99232 SBSQ HOSP IP/OBS MODERATE 35: CPT | Performed by: HOSPITALIST

## 2022-07-06 PROCEDURE — 96376 TX/PRO/DX INJ SAME DRUG ADON: CPT

## 2022-07-06 PROCEDURE — 80048 BASIC METABOLIC PNL TOTAL CA: CPT | Performed by: HOSPITALIST

## 2022-07-06 PROCEDURE — 86140 C-REACTIVE PROTEIN: CPT | Performed by: HOSPITALIST

## 2022-07-06 RX ORDER — FERROUS SULFATE 325(65) MG
325 TABLET ORAL DAILY
Status: DISCONTINUED | OUTPATIENT
Start: 2022-07-06 | End: 2022-07-20 | Stop reason: HOSPADM

## 2022-07-06 RX ADMIN — CEFAZOLIN 2 G: 10 INJECTION, POWDER, FOR SOLUTION INTRAVENOUS at 05:53

## 2022-07-06 RX ADMIN — CEFAZOLIN 2 G: 10 INJECTION, POWDER, FOR SOLUTION INTRAVENOUS at 15:45

## 2022-07-06 RX ADMIN — FERROUS SULFATE TAB 325 MG (65 MG ELEMENTAL FE) 325 MG: 325 (65 FE) TAB at 09:13

## 2022-07-06 RX ADMIN — METOPROLOL SUCCINATE 25 MG: 25 TABLET, EXTENDED RELEASE ORAL at 09:13

## 2022-07-06 RX ADMIN — OXYCODONE HYDROCHLORIDE 5 MG: 5 TABLET ORAL at 09:41

## 2022-07-06 RX ADMIN — LISINOPRIL 40 MG: 40 TABLET ORAL at 09:13

## 2022-07-06 RX ADMIN — GABAPENTIN 300 MG: 300 CAPSULE ORAL at 21:30

## 2022-07-06 RX ADMIN — METHOCARBAMOL 500 MG: 500 TABLET ORAL at 21:34

## 2022-07-06 RX ADMIN — METOPROLOL SUCCINATE 25 MG: 25 TABLET, EXTENDED RELEASE ORAL at 21:34

## 2022-07-06 RX ADMIN — FUROSEMIDE 40 MG: 40 TABLET ORAL at 09:13

## 2022-07-06 RX ADMIN — PANTOPRAZOLE SODIUM 40 MG: 40 TABLET, DELAYED RELEASE ORAL at 06:59

## 2022-07-06 RX ADMIN — ACETAMINOPHEN 650 MG: 325 TABLET ORAL at 02:16

## 2022-07-06 RX ADMIN — CEFAZOLIN 2 G: 10 INJECTION, POWDER, FOR SOLUTION INTRAVENOUS at 23:51

## 2022-07-06 RX ADMIN — FUROSEMIDE 40 MG: 40 TABLET ORAL at 15:45

## 2022-07-06 RX ADMIN — TRAZODONE HYDROCHLORIDE 50 MG: 50 TABLET ORAL at 21:34

## 2022-07-06 RX ADMIN — METHOCARBAMOL 500 MG: 500 TABLET ORAL at 09:13

## 2022-07-06 NOTE — PROGRESS NOTES
Reason for admission: Cellulitis of right lower extremity  Mental Status: x4  Activity: AX2 CL  Diet: regular  Pain: controlled w/ oral analgesics  Urination: incontinent. Uses purewick  Tele/Restraints/Iso: NA  LDA: PIV SL  Expected D/C Date: Pending clinical progress  Other Info: WBC count has trended down to normal. Blood cultures continue to be negative. CRP still elevated.On IV Abx (Ancef) per ID. Pt NPO post midnight for right ankle aspiration per Ortho. Refused wound dressing change today. Current wound dressing CDI.

## 2022-07-06 NOTE — PROGRESS NOTES
Orthopedic Surgery  Josiane COLEMAN Branch  07/06/2022     Admit Date:  7/4/2022    Right ankle pain with anterior ulcer.  Remote history of ORIF right fibula     Patient resting in chair    Continues to complain of multiple pains including her shoulders, back and ankle   Tolerating oral intake.    Denies nausea or vomiting    Temp:  [97.5  F (36.4  C)-99.5  F (37.5  C)] 98.8  F (37.1  C)  Pulse:  [83-97] 83  Resp:  [15-20] 20  BP: (100-143)/(54-79) 114/61  SpO2:  [96 %-100 %] 100 %    Right ankle: anterior ankle wound present with mild drainage on dressings.  Pain bilateral ankles with attempted ROM, right today is more painful than the left with passive motion.  She has very limited bilateral AROM of either ankle but increased pain on the right.  Swelling present in right low leg, foot and ankle.  Mild effusion appreciated but difficult to assess given body habitus.  Reports sensation to light touch on right vs left LE.      Labs:  Recent Labs   Lab Test 07/06/22  1039 07/05/22  0634 07/04/22 2053   WBC 10.7 10.9 12.3*   HGB 9.3* 8.4* 9.9*    220 232     Recent Labs   Lab Test 07/04/22 2053 08/26/14  1000   INR 1.16* 1.08     Recent Labs   Lab Test 07/06/22  1039 07/05/22  1149   .0* 126.0*         1. PLAN:   Given her continued right ankle pain and swelling, right ankle aspiration ordered   Elevate right LE   Continue Abx per ID    NPO midnight while awaiting lab results from aspiration   Wound care per [facility]   Trend CRP x 3 days (ordered)    Ayah Chisholm PA-C

## 2022-07-06 NOTE — PROGRESS NOTES
[facility]    Medicine Progress Note - Hospitalist Service    Date of Admission:  7/4/2022    Assessment & Plan          This is a 61-year-old female with medical history which includes hypertension, depression/anxiety, peripheral neuropathy, GERD, chronic anemia who lives at a group home and is wheelchair-bound fell couple of weeks back and has been intermittently complaining of right ankle pain and presented to the ER because she was having significant pain without any fever.    On presentation to the ER patient had edema in both her legs with chronic venous changes and comprehensive metabolic panel was normal, lactic acid and procalcitonin were also normal.  She had mildly elevated WBC count of 12.3 and blood cultures were done.  She was negative for COVID-19.  She underwent x-ray of the right ankle and was started on cefazolin and was admitted for concerns of infection over the hardware site    1) chronic lower extremity bilateral edema along with venous stasis changes  Ulcer over the right ankle overlying the ankle hardware from previous fracture  -On admission presented with mildly elevated white count and x-ray has been reviewed which does not show any evidence of hardware failure and her WBC count has trended down to normal and blood cultures continue to be negative  -Patient was evaluated by wound care and surgery team and surgery recommended consult from orthopedics and it was placed  -Evaluated by orthopedic teams and antibiotics were continued and her CRP continues to be elevated and she continues to have right ankle pain and patient was seen by orthopedics and they have ordered right ankle aspiration and continue with antibiotics and n.p.o. after midnight and to trend her CRP for 3 days  -Appreciate input from the ID and orthopedics team and we will continue the patient with IV antibiotics    2) chronic anemia  -I did review in the EMR and her hemoglobin at baseline in the past  "1 year is between 8.9-10  -I did check her iron profile and her iron binding capacity is increased and total iron is on the lower side and we will start her on iron replacement  --Hemoglobin on admission was 9.9-8.4-9.3    3) hypertension  -We will continue the patient with home medications including metoprolol, lisinopril and furosemide    4) anxiety/depression  -We will continue the patient with the trazodone    5) peripheral neuropathy  -We will continue the patient with gabapentin    6) GERD  -We will continue patient with Protonix    5) # Severe Obesity: Estimated body mass index is 48.94 kg/m  as calculated from the following:    Height as of this encounter: 1.727 m (5' 8\").    Weight as of this encounter: 146 kg (321 lb 14 oz).         Diet: Regular Diet Adult  NPO per Anesthesia Guidelines for Procedure/Surgery Except for: Meds, Ice Chips    DVT Prophylaxis: Pneumatic Compression Devices  Russell Catheter: Not present  Central Lines: None  Cardiac Monitoring: None  Code Status: Full Code      Disposition Plan      Expected Discharge Date: 07/07/2022        Discharge Comments: Awaiting plan    The patient's care was discussed with the Bedside Nurse, Patient and Patient's Family.  I also called patient's Sister Jyotsna who is her primary contact and she was updated and Jyotsna was not aware that patient is in the hospital    Agatha Mata MD  Hospitalist Service  [facility]  Securely message with the Vocera Web Console (learn more here)  Text page via [employer] Paging/Directory         Clinically Significant Risk Factors Present on Admission                 # Hypertension: home medication list includes antihypertensive(s)    # Severe Obesity: Estimated body mass index is 48.94 kg/m  as calculated from the following:    Height as of this encounter: 1.727 m (5' 8\").    Weight as of this encounter: 146 kg (321 lb 14 oz).    "     ______________________________________________________________________    Interval History     I saw the patient today and she was sitting in the chair and mentioned to me that she does not have any chest pain no nausea vomiting and she is eating and drinking.  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  She continues to have pain over the right leg.  She did give me permission to speak with her Sister Jyotsna      Data reviewed today: I reviewed all medications, new labs and imaging results over the last 24 hours. I personally reviewed no images or EKG's today.    Physical Exam   Vital Signs: Temp: 98.8  F (37.1  C) Temp src: Oral BP: 114/61 Pulse: 83   Resp: 20 SpO2: 100 % O2 Device: None (Room air)    Weight: 321 lbs 13.95 oz        General: Patient appears comfortable and in no acute distress.  HEENT: Head is atraumatic, normocephalic.     Neck: Neck is supple  Respiratory: Lungs are clear to auscultation bilaterally with no wheeze or crackles   Cardiovascular: Regular rate , S1 and S2 normal with no murmer or rubs or gallops  Abdomen:   soft , non tender , non distended and bowel sound present   Skin: She does have wounds over her legs and there is an area of ulceration over the right ankle where there is hardware in place  Neurologic:  No facial droop  Musculoskeletal: Normal Range of motion over upper and lower extremities bilaterally   Psychiatric: cooperative     Data   Recent Labs   Lab 07/06/22  1039 07/05/22  0634 07/04/22  2053   WBC 10.7 10.9 12.3*   HGB 9.3* 8.4* 9.9*   MCV 83 81 83    220 232   INR  --   --  1.16*     --  141   POTASSIUM 4.4  --  4.4   CHLORIDE 107  --  108   CO2 29  --  29   BUN 23  --  28   CR 0.93  --  0.88   ANIONGAP 3  --  4   JESSICA 9.0  --  9.3   *  --  119*   ALBUMIN  --   --  3.2*   PROTTOTAL  --   --  8.8   BILITOTAL  --   --  0.6   ALKPHOS  --   --  146   ALT  --   --  35   AST  --   --  31     No results found for this  or any previous visit (from the past 24 hour(s)).  Medications       ceFAZolin  2 g Intravenous Q8H     ferrous sulfate  325 mg Oral Daily     furosemide  40 mg Oral BID     gabapentin  300 mg Oral At Bedtime     lisinopril  40 mg Oral QAM     methocarbamol  500 mg Oral BID     metoprolol succinate ER  25 mg Oral BID     pantoprazole  40 mg Oral QAM AC     sodium chloride (PF)  3 mL Intracatheter Q8H     traZODone  50 mg Oral At Bedtime

## 2022-07-06 NOTE — PROGRESS NOTES
tolerating oral antibiotics or has plans for home infusion setup : not met  -infection is improving : not met  -safe disposition plan has been identified : not met  Nurse to notify provider when observation goals have been met and patient is ready for discharge.      Pt alert to self - disoriented overnight, vss on room air - ex slightly hypertensive, incontinent - purewick in place with adequate output, ivsl - with intermittent ivabx, a2 stedy, wounds to BLE covered, regular diet, slept intermittently overnight

## 2022-07-06 NOTE — UTILIZATION REVIEW
"  Admission Status; Secondary Review Determination         Under the authority of the Utilization Management Committee, the utilization review process indicated a secondary review on the above patient.  The review outcome is based on review of the medical records, discussions with staff, and applying clinical experience noted on the date of the review.       (x) Observation Status Appropriate - This patient does not meet hospital inpatient criteria and is placed in observation status. If this patient's primary payer is Medicare and was admitted as an inpatient, Condition Code 44 should be used and patient status changed to \"observation\".     RATIONALE FOR DETERMINATION   The patient is a 61-year-old female admitted on 7/4/2022.  The patient was brought to the ED by EMS from her group home due to right ankle pain.  She apparently fell 2 weeks prior and complains about ongoing pain and is currently wheelchair-bound.  She was noted to have edema of both ankles.  There is an ulcer overlying a plate screw fixation of a proximal old healed distal fibular fracture.  X-rays do not show any hardware failure.  Patient presented and has been afebrile initial white count was 12,300 which is now normalized in the 10 range.  Patient was started on Ancef IV antibiotics.  Infectious disease was consulted and confirmed that there is a ulcer present with stasis findings and edema.  The ulcers are bilateral lower extremity.  Orthopedics evaluated today and recommended continue IV antibiotic and elevation of the lower extremity.  CRP is elevated at 120.  Recommendation is to proceed with MRI scan if not improving.  Currently the joint was not suspected to be involved in the infection.  Based on current criteria necessary for inpatient status for cellulitis, recommendation is to maintain observation status at this time.  If follow-up imaging does show involvement in fixation hardware of her right lower ankle, recommendation at that time " would be to advance to inpatient status as a more prolonged hospitalization would be required.      The severity of illness, intensity of service provided, expected LOS and risk for adverse outcome make the care complex, high risk and appropriate for hospital admission.        The information on this document is developed by the utilization review team in order for the business office to ensure compliance.  This only denotes the appropriateness of proper admission status and does not reflect the quality of care rendered.         The definitions of Inpatient Status and Observation Status used in making the determination above are those provided in the CMS Coverage Manual, Chapter 1 and Chapter 6, section 70.4.      Sincerely,     Gerardo Joe MD  Physician Advisor  Utilization Review/ Case Management  [facility].

## 2022-07-06 NOTE — PLAN OF CARE
Mental Status: A&O with forgetfulness  Activity/dangle Up with assist of 2 with celsa leblanc.  Diet: Reg  Pain:Back pain and ankle painManaged with prn oxycodone and schedule trazodone.  Russell/Voiding:Voiding per pure wick  Tele/Restraints/Iso: na  02/LDA: On RA/SL  D/C Date:Pending  Other Info: Assist of 2-3 with turn/repo. Incontinent x1. Dressings on RLE CDI. Drainage noted on LLE.

## 2022-07-06 NOTE — PROGRESS NOTES
[facility]    Infectious Disease Progress Note    Date of Service (when I saw the patient): 07/06/2022     Assessment & Plan   Josiane Dasilva is a 61 year old female who was admitted on 7/4/2022.       Impression:  1. 61 y.o female with multiple co morbidities   2. Admitted with BLE ulcers.   3. Chronic lower extremity edema and venous stasis ulcers   4. Ulcer on the right ankle overlying old right ankle hardware from a previous fracture.  5. HTN   6. Depression   7. RICO  8. Peripheral neuropathy   9. Alcohol abuse history    10. Was started on ancef.      Recommendations:   Plan for aspiration   Will follow   Continue on ancef for now         Silvia Freeman MD    Interval History   Tolerating antibiotics ok   No new rashes or issues with antibiotics   Labs reviewed   Pain in the ankle    Physical Exam   Temp: 99.1  F (37.3  C) Temp src: Oral BP: 119/54 Pulse: 97   Resp: 20 SpO2: 97 % O2 Device: None (Room air)    Vitals:    07/05/22 1138   Weight: 146 kg (321 lb 14 oz)     Vital Signs with Ranges  Temp:  [97.5  F (36.4  C)-99.5  F (37.5  C)] 99.1  F (37.3  C)  Pulse:  [86-97] 97  Resp:  [15-20] 20  BP: (100-143)/(54-79) 119/54  SpO2:  [96 %-98 %] 97 %    Constitutional: Awake, alert, cooperative, no apparent distress  Lungs: Clear to auscultation bilaterally, no crackles or wheezing  Cardiovascular: Regular rate and rhythm, normal S1 and S2, and no murmur noted  Abdomen: Normal bowel sounds, soft, non-distended, non-tender  Skin: No rashes, no cyanosis, no edema  Other:    Medications       ceFAZolin  2 g Intravenous Q8H     ferrous sulfate  325 mg Oral Daily     furosemide  40 mg Oral BID     gabapentin  300 mg Oral At Bedtime     lisinopril  40 mg Oral QAM     methocarbamol  500 mg Oral BID     metoprolol succinate ER  25 mg Oral BID     pantoprazole  40 mg Oral QAM AC     sodium chloride (PF)  3 mL Intracatheter Q8H     traZODone  50 mg Oral At Bedtime       Data   All microbiology  laboratory data reviewed.  Recent Labs   Lab Test 07/05/22  0634 07/04/22 2053 09/25/21  2312   WBC 10.9 12.3* 9.2   HGB 8.4* 9.9* 10.4*   HCT 28.1* 33.7* 34.0*   MCV 81 83 84    232 194     Recent Labs   Lab Test 07/04/22 2053 09/25/21  2312 08/05/21  1110   CR 0.88 0.92 0.80     No lab results found.  Recent Labs   Lab Test 02/25/19  0800 02/25/19  0754 02/23/19  0653 02/22/19  2045 02/22/19  1841 02/22/19  1836 08/12/14  0841 08/11/14  1900   CULT No growth No growth No growth 10,000 to 50,000 colonies/mL  mixed urogenital laly  Susceptibility testing not routinely done   No growth Cultured on the 2nd day of incubation:  Parabacteroides merdae  CORRECTED ON 03/04 AT 1113: PREVIOUSLY REPORTED AS Cultured on the 2nd day of incubation:   Parabacteroides merdae  *  Critical Value/Significant Value, preliminary result only, called to and read back by   SARAI GEORGE RN @9624 2/24/19. CT    (Note)  NEGATIVE for the following organisms and resistance markers:  Acinetobacter sp., Citrobacter sp., Enterobacter sp., Proteus sp., E.  coli, K. pneumoniae/oxytoca, P. aeruginosa, CTX-M, KPC, NDM, VIM, IMP  and OXA by [employer] multiplex nucleic acid test. Final identification  and antimicrobial susceptibility testing will be verified by standard  methods.    Critical Value/Significant Value called to and read back by Yennifer George RN  2.25.19 FRANTZ.     10,000 to 50,000 colonies/mL mixed urogenital laly 50,000 to 100,000 colonies/mL mixed urogenital laly

## 2022-07-07 ENCOUNTER — APPOINTMENT (OUTPATIENT)
Dept: GENERAL RADIOLOGY | Facility: CLINIC | Age: 62
DRG: 464 | End: 2022-07-07
Attending: PHYSICIAN ASSISTANT
Payer: COMMERCIAL

## 2022-07-07 ENCOUNTER — APPOINTMENT (OUTPATIENT)
Dept: ULTRASOUND IMAGING | Facility: CLINIC | Age: 62
DRG: 464 | End: 2022-07-07
Attending: ORTHOPAEDIC SURGERY
Payer: COMMERCIAL

## 2022-07-07 LAB
ANION GAP SERPL CALCULATED.3IONS-SCNC: 6 MMOL/L (ref 3–14)
APPEARANCE FLD: ABNORMAL
BUN SERPL-MCNC: 21 MG/DL (ref 7–30)
CALCIUM SERPL-MCNC: 9.3 MG/DL (ref 8.5–10.1)
CELL COUNT BODY FLUID SOURCE: ABNORMAL
CHLORIDE BLD-SCNC: 106 MMOL/L (ref 94–109)
CO2 SERPL-SCNC: 27 MMOL/L (ref 20–32)
COLOR FLD: ABNORMAL
CREAT SERPL-MCNC: 0.84 MG/DL (ref 0.52–1.04)
CRP SERPL-MCNC: 101 MG/L (ref 0–8)
CRYSTALS SNV MICRO: NORMAL
ERYTHROCYTE [DISTWIDTH] IN BLOOD BY AUTOMATED COUNT: 15.2 % (ref 10–15)
GFR SERPL CREATININE-BSD FRML MDRD: 78 ML/MIN/1.73M2
GLUCOSE BLD-MCNC: 98 MG/DL (ref 70–99)
HCT VFR BLD AUTO: 29 % (ref 35–47)
HGB BLD-MCNC: 8.7 G/DL (ref 11.7–15.7)
LYMPHOCYTES NFR FLD MANUAL: NORMAL %
MCH RBC QN AUTO: 24.2 PG (ref 26.5–33)
MCHC RBC AUTO-ENTMCNC: 30 G/DL (ref 31.5–36.5)
MCV RBC AUTO: 81 FL (ref 78–100)
MONOS+MACROS NFR FLD MANUAL: NORMAL %
NEUTS BAND NFR FLD MANUAL: NORMAL %
PLATELET # BLD AUTO: 241 10E3/UL (ref 150–450)
POTASSIUM BLD-SCNC: 4.2 MMOL/L (ref 3.4–5.3)
RBC # BLD AUTO: 3.6 10E6/UL (ref 3.8–5.2)
SODIUM SERPL-SCNC: 139 MMOL/L (ref 133–144)
WBC # BLD AUTO: 10.6 10E3/UL (ref 4–11)

## 2022-07-07 PROCEDURE — 250N000013 HC RX MED GY IP 250 OP 250 PS 637: Performed by: HOSPITALIST

## 2022-07-07 PROCEDURE — 87070 CULTURE OTHR SPECIMN AEROBIC: CPT | Performed by: PHYSICIAN ASSISTANT

## 2022-07-07 PROCEDURE — 250N000011 HC RX IP 250 OP 636: Performed by: HOSPITALIST

## 2022-07-07 PROCEDURE — 87075 CULTR BACTERIA EXCEPT BLOOD: CPT | Performed by: PHYSICIAN ASSISTANT

## 2022-07-07 PROCEDURE — 89051 BODY FLUID CELL COUNT: CPT | Performed by: PHYSICIAN ASSISTANT

## 2022-07-07 PROCEDURE — 89060 EXAM SYNOVIAL FLUID CRYSTALS: CPT | Performed by: PHYSICIAN ASSISTANT

## 2022-07-07 PROCEDURE — 96376 TX/PRO/DX INJ SAME DRUG ADON: CPT

## 2022-07-07 PROCEDURE — 93922 UPR/L XTREMITY ART 2 LEVELS: CPT

## 2022-07-07 PROCEDURE — 80048 BASIC METABOLIC PNL TOTAL CA: CPT | Performed by: HOSPITALIST

## 2022-07-07 PROCEDURE — 250N000009 HC RX 250: Performed by: PHYSICIAN ASSISTANT

## 2022-07-07 PROCEDURE — 36415 COLL VENOUS BLD VENIPUNCTURE: CPT | Performed by: HOSPITALIST

## 2022-07-07 PROCEDURE — 0S9F3ZX DRAINAGE OF RIGHT ANKLE JOINT, PERCUTANEOUS APPROACH, DIAGNOSTIC: ICD-10-PCS | Performed by: PHYSICIAN ASSISTANT

## 2022-07-07 PROCEDURE — 20605 DRAIN/INJ JOINT/BURSA W/O US: CPT | Mod: RT

## 2022-07-07 PROCEDURE — G0378 HOSPITAL OBSERVATION PER HR: HCPCS

## 2022-07-07 PROCEDURE — 86140 C-REACTIVE PROTEIN: CPT | Performed by: PHYSICIAN ASSISTANT

## 2022-07-07 PROCEDURE — 85027 COMPLETE CBC AUTOMATED: CPT | Performed by: HOSPITALIST

## 2022-07-07 PROCEDURE — 99232 SBSQ HOSP IP/OBS MODERATE 35: CPT | Performed by: HOSPITALIST

## 2022-07-07 RX ADMIN — FERROUS SULFATE TAB 325 MG (65 MG ELEMENTAL FE) 325 MG: 325 (65 FE) TAB at 09:06

## 2022-07-07 RX ADMIN — TRAZODONE HYDROCHLORIDE 50 MG: 50 TABLET ORAL at 21:33

## 2022-07-07 RX ADMIN — METOPROLOL SUCCINATE 25 MG: 25 TABLET, EXTENDED RELEASE ORAL at 09:06

## 2022-07-07 RX ADMIN — METHOCARBAMOL 500 MG: 500 TABLET ORAL at 21:33

## 2022-07-07 RX ADMIN — OXYCODONE HYDROCHLORIDE 5 MG: 5 TABLET ORAL at 03:31

## 2022-07-07 RX ADMIN — GABAPENTIN 300 MG: 300 CAPSULE ORAL at 21:33

## 2022-07-07 RX ADMIN — FUROSEMIDE 40 MG: 40 TABLET ORAL at 18:49

## 2022-07-07 RX ADMIN — METOPROLOL SUCCINATE 25 MG: 25 TABLET, EXTENDED RELEASE ORAL at 21:33

## 2022-07-07 RX ADMIN — PANTOPRAZOLE SODIUM 40 MG: 40 TABLET, DELAYED RELEASE ORAL at 09:05

## 2022-07-07 RX ADMIN — CEFAZOLIN 2 G: 10 INJECTION, POWDER, FOR SOLUTION INTRAVENOUS at 18:34

## 2022-07-07 RX ADMIN — LIDOCAINE HYDROCHLORIDE 3 ML: 10 INJECTION, SOLUTION EPIDURAL; INFILTRATION; INTRACAUDAL; PERINEURAL at 12:45

## 2022-07-07 RX ADMIN — LISINOPRIL 40 MG: 40 TABLET ORAL at 09:06

## 2022-07-07 RX ADMIN — FUROSEMIDE 40 MG: 40 TABLET ORAL at 09:05

## 2022-07-07 RX ADMIN — METHOCARBAMOL 500 MG: 500 TABLET ORAL at 09:04

## 2022-07-07 RX ADMIN — OXYCODONE HYDROCHLORIDE 5 MG: 5 TABLET ORAL at 14:30

## 2022-07-07 RX ADMIN — CEFAZOLIN 2 G: 10 INJECTION, POWDER, FOR SOLUTION INTRAVENOUS at 09:04

## 2022-07-07 RX ADMIN — ACETAMINOPHEN 650 MG: 325 TABLET ORAL at 14:30

## 2022-07-07 NOTE — PROGRESS NOTES
[facility]    Medicine Progress Note - Hospitalist Service    Date of Admission:  7/4/2022    Assessment & Plan          This is a 61-year-old female with medical history which includes hypertension, depression/anxiety, peripheral neuropathy, GERD, chronic anemia who lives at a group home and is wheelchair-bound fell couple of weeks back and has been intermittently complaining of right ankle pain and presented to the ER because she was having significant pain without any fever.    On presentation to the ER patient had edema in both her legs with chronic venous changes and comprehensive metabolic panel was normal, lactic acid and procalcitonin were also normal.  She had mildly elevated WBC count of 12.3 and blood cultures were done.  She was negative for COVID-19.  She underwent x-ray of the right ankle and was started on cefazolin and was admitted for concerns of infection over the hardware site    1) chronic lower extremity bilateral edema along with venous stasis changes  Ulcer over the right ankle overlying the ankle hardware from previous fracture  -On admission presented with mildly elevated white count and x-ray has been reviewed which does not show any evidence of hardware failure and her WBC count has trended down to normal and blood cultures continue to be negative  -Been evaluated by the orthopedic team, surgery team, infectious disease and she is on IV antibiotics and orthopedics have recommended joint aspiration which will be done today and the CRP is trending down  -We will follow on the results of the joint aspiration and further treatment plan will be made    2) chronic anemia  -I did review in the EMR and her hemoglobin at baseline in the past 1 year is between 8.9-10  -I did check her iron profile and her iron binding capacity is increased and total iron is on the lower side and we will start her on iron replacement  --Hemoglobin on admission was 9.9-8.4-9.3-8.7    3)  "hypertension  -We will continue the patient with home medications including metoprolol, lisinopril and furosemide    4) anxiety/depression  -We will continue the patient with the trazodone    5) peripheral neuropathy  -We will continue the patient with gabapentin    6) GERD  -We will continue patient with Protonix    5) # Severe Obesity: Estimated body mass index is 48.94 kg/m  as calculated from the following:    Height as of this encounter: 1.727 m (5' 8\").    Weight as of this encounter: 146 kg (321 lb 14 oz).         Diet: Regular Diet Adult    DVT Prophylaxis: Pneumatic Compression Devices  Russell Catheter: Not present  Central Lines: None  Cardiac Monitoring: None  Code Status: Full Code      Disposition Plan      Expected Discharge Date: 07/09/2022        Discharge Comments: Awaiting plan    The patient's care was discussed with the Bedside Nurse and Patient.    Agatha Mata MD  Hospitalist Service  [facility]  Securely message with the Vocera Web Console (learn more here)  Text page via [employer] Paging/Directory         Clinically Significant Risk Factors Present on Admission                 # Hypertension: home medication list includes antihypertensive(s)    # Severe Obesity: Estimated body mass index is 48.94 kg/m  as calculated from the following:    Height as of this encounter: 1.727 m (5' 8\").    Weight as of this encounter: 146 kg (321 lb 14 oz).        ______________________________________________________________________    Interval History     I saw the patient later in the day and as earlier when I went to see her she was not in her room and was in IR getting procedure done and denies any shortness of breath or chest pain or nausea or vomiting but she did mention that she has pain over both legs.  She is eating and drinking well.  I did update the patient that I spoke with her sister yesterday    I also spoke with patient's nurse on plan of care    Data reviewed today: I " reviewed all medications, new labs and imaging results over the last 24 hours. I personally reviewed no images or EKG's today.    Physical Exam   Vital Signs: Temp: 98.3  F (36.8  C) Temp src: Oral BP: (!) 162/62 Pulse: 93   Resp: 20 SpO2: 96 % O2 Device: None (Room air)    Weight: 321 lbs 13.95 oz        General: Patient appears comfortable and in no acute distress.  HEENT: Head is atraumatic,   Neck: Neck is supple  Respiratory: CTA LA with no wheeze  Cardiovascular: Regular rate , S1 and S2 normal with no murmer or rubs or gallops  Abdomen:   soft , non tender , non distended and bowel sound present   Skin: She does have wounds over her legs and there is an area of ulceration over the right ankle where there is hardware in place  Neurologic:  No facial droop  Musculoskeletal: Moves upper and lower extremities  Psychiatric: cooperative     Data   Recent Labs   Lab 07/07/22  0750 07/06/22  1039 07/05/22  0634 07/04/22  2053   WBC 10.6 10.7 10.9 12.3*   HGB 8.7* 9.3* 8.4* 9.9*   MCV 81 83 81 83    244 220 232   INR  --   --   --  1.16*    139  --  141   POTASSIUM 4.2 4.4  --  4.4   CHLORIDE 106 107  --  108   CO2 27 29  --  29   BUN 21 23  --  28   CR 0.84 0.93  --  0.88   ANIONGAP 6 3  --  4   JESSICA 9.3 9.0  --  9.3   GLC 98 131*  --  119*   ALBUMIN  --   --   --  3.2*   PROTTOTAL  --   --   --  8.8   BILITOTAL  --   --   --  0.6   ALKPHOS  --   --   --  146   ALT  --   --   --  35   AST  --   --   --  31     No results found for this or any previous visit (from the past 24 hour(s)).  Medications       ceFAZolin  2 g Intravenous Q8H     ferrous sulfate  325 mg Oral Daily     furosemide  40 mg Oral BID     gabapentin  300 mg Oral At Bedtime     lisinopril  40 mg Oral QAM     methocarbamol  500 mg Oral BID     metoprolol succinate ER  25 mg Oral BID     pantoprazole  40 mg Oral QAM AC     sodium chloride (PF)  10 mL INTRA-ARTICULAR Once     sodium chloride (PF)  3 mL Intracatheter Q8H     traZODone  50 mg  Oral At Bedtime

## 2022-07-07 NOTE — PROGRESS NOTES
RADIOLOGY PROCEDURE NOTE  Patient name: Josiane Dasilva  MRN: [id number]  : 1960    Pre-procedure diagnosis: Right ankle pain and ulceration  Post-procedure diagnosis: Same    Procedure Date/Time: 2022  1:03 PM  Procedure: Right ankle joint aspiration.  Difficult as the patient could not move her ankle much with out severe pain.  Used a mini blaise until it had an error.  Took plain films documenting need tip in anterior recess of ankle joint.  Obtained 0.5 to 1 ml of bloody viscous fluid from the anterior joint space.  Estimated blood loss: None  Specimen(s) collected with description: 0.5 ml of bloody viscous fluid.  The patient tolerated the procedure well with no immediate complications.  Significant findings:none    See imaging dictation for procedural details.    Provider name: Fernando Gaytan PA-C  Assistant(s):None

## 2022-07-07 NOTE — PROGRESS NOTES
"Seen and examined.  Right ankle pain continues.  Ankle aspirate with clotted sample for cell count.  Complains of chronic ankle pain, but worse lately.  Can't really say how long her ankle has been bothering her worse than usual.    Blood pressure (!) 162/62, pulse 93, temperature 98.3  F (36.8  C), temperature source Oral, resp. rate 20, height 1.727 m (5' 8\"), weight 146 kg (321 lb 14 oz), last menstrual period 04/07/2014, SpO2 96 %, not currently breastfeeding.      Exam:  Anterior R ankle wound, superficial in appearance, chronic.  Limited ankle ROM, painful.  Cannot palpable distal pulses.    #1 Right ankle pain  #2 Multiple bilateral lower extremity ulcers    Plan:    - Follow culture from aspirate  - MRI R ankle with IV contrast would be helpful  - Non-invasive vascular US/JAN BLE if not recent studies  "

## 2022-07-07 NOTE — PLAN OF CARE
Goal Outcome Evaluation:      Reason for admission: Cellulitis of right lower extremity  Mental Status:A&Ox3/ forgetful  Activity: AX2 lift   Diet: NPO since MN  Pain: Oxycodone & scheduled   Urination: incontinent. Uses purewick  Tele/Restraints/Iso: NA  LDA: PIV SL on IV ABx  Expected D/C Date: Pending clinical progress  Other: Plan Right  Ankle Aspiration today . Wounds BLE covered with dressing .

## 2022-07-07 NOTE — PLAN OF CARE
Goal Outcome Evaluation:    Plan of Care Reviewed With: patient   Pt A&Ox4; VSS; C/o lower back and RLE pain. Oxycodone and tylenol provided. R ankle aspiration done today; result pending/ please call Dr. Laurent (Ortho) if results are positive. BLEs wounds/ pt declined wound care/dressing change this shift. Incontinent of urine/ purewick in place. Turned/repositioned. BLE U/S ABIs done; R ankle MRI pending to be done; will continue to monitor.

## 2022-07-07 NOTE — PROGRESS NOTES
Orthopedic Surgery  Josiane COLEMAN Branch  07/07/2022     Admit Date:  7/4/2022    Right ankle pain with anterior ulcer.  Remote history of ORIF right fibula     Patient resting in bed  Continues to complain of multiple pains including both her lower legs  Tolerating oral intake.    Denies nausea or vomiting  Awaiting ankle aspiration    Temp:  [98.3  F (36.8  C)-99.3  F (37.4  C)] 99.1  F (37.3  C)  Pulse:  [76-99] 99  Resp:  [18-20] 18  BP: (108-149)/(46-77) 136/77  SpO2:  [95 %-100 %] 95 %    Right ankle: anterior ankle wound present with mild drainage on dressings.  Pain bilateral ankles with attempted ROM, right is more painful than the left with passive motion.  She has very limited bilateral AROM of either ankle but increased pain on the right.  Swelling present in right low leg, foot and ankle.  Mild effusion appreciated but difficult to assess given body habitus.  No calf tenderness bilaterally    Labs:  Recent Labs   Lab Test 07/07/22  0750 07/06/22  1039 07/05/22  0634   WBC 10.6 10.7 10.9   HGB 8.7* 9.3* 8.4*    244 220     Recent Labs   Lab Test 07/04/22  2053 08/26/14  1000   INR 1.16* 1.08     Recent Labs   Lab Test 07/07/22  0750 07/06/22  1039 07/05/22  1149   .0* 120.0* 126.0*         1. PLAN:   Right ankle aspiration ordered.  To be done today at 11:30 per report   Elevate right LE   Continue Abx per ID    Has been NPO since midnight while awaiting lab results from aspiration   Wound care per WOC   Trend CRP - trending down    Phyllis Hernandez PA-C

## 2022-07-08 ENCOUNTER — APPOINTMENT (OUTPATIENT)
Dept: MRI IMAGING | Facility: CLINIC | Age: 62
DRG: 464 | End: 2022-07-08
Attending: ORTHOPAEDIC SURGERY
Payer: COMMERCIAL

## 2022-07-08 LAB
ANION GAP SERPL CALCULATED.3IONS-SCNC: 7 MMOL/L (ref 3–14)
BUN SERPL-MCNC: 16 MG/DL (ref 7–30)
CALCIUM SERPL-MCNC: 9.3 MG/DL (ref 8.5–10.1)
CHLORIDE BLD-SCNC: 103 MMOL/L (ref 94–109)
CO2 SERPL-SCNC: 27 MMOL/L (ref 20–32)
CREAT SERPL-MCNC: 0.84 MG/DL (ref 0.52–1.04)
CRP SERPL-MCNC: 95.8 MG/L (ref 0–8)
ERYTHROCYTE [DISTWIDTH] IN BLOOD BY AUTOMATED COUNT: 14.9 % (ref 10–15)
GFR SERPL CREATININE-BSD FRML MDRD: 78 ML/MIN/1.73M2
GLUCOSE BLD-MCNC: 112 MG/DL (ref 70–99)
HCT VFR BLD AUTO: 28.9 % (ref 35–47)
HGB BLD-MCNC: 8.8 G/DL (ref 11.7–15.7)
MCH RBC QN AUTO: 24.2 PG (ref 26.5–33)
MCHC RBC AUTO-ENTMCNC: 30.4 G/DL (ref 31.5–36.5)
MCV RBC AUTO: 80 FL (ref 78–100)
PLATELET # BLD AUTO: 249 10E3/UL (ref 150–450)
POTASSIUM BLD-SCNC: 4.2 MMOL/L (ref 3.4–5.3)
RBC # BLD AUTO: 3.63 10E6/UL (ref 3.8–5.2)
SODIUM SERPL-SCNC: 137 MMOL/L (ref 133–144)
WBC # BLD AUTO: 10.4 10E3/UL (ref 4–11)

## 2022-07-08 PROCEDURE — 86140 C-REACTIVE PROTEIN: CPT | Performed by: PHYSICIAN ASSISTANT

## 2022-07-08 PROCEDURE — 250N000013 HC RX MED GY IP 250 OP 250 PS 637: Performed by: HOSPITALIST

## 2022-07-08 PROCEDURE — 73721 MRI JNT OF LWR EXTRE W/O DYE: CPT | Mod: RT

## 2022-07-08 PROCEDURE — G0378 HOSPITAL OBSERVATION PER HR: HCPCS

## 2022-07-08 PROCEDURE — 250N000011 HC RX IP 250 OP 636: Performed by: HOSPITALIST

## 2022-07-08 PROCEDURE — 96376 TX/PRO/DX INJ SAME DRUG ADON: CPT

## 2022-07-08 PROCEDURE — 73721 MRI JNT OF LWR EXTRE W/O DYE: CPT | Mod: 26 | Performed by: RADIOLOGY

## 2022-07-08 PROCEDURE — 99232 SBSQ HOSP IP/OBS MODERATE 35: CPT | Performed by: HOSPITALIST

## 2022-07-08 PROCEDURE — 85027 COMPLETE CBC AUTOMATED: CPT | Performed by: HOSPITALIST

## 2022-07-08 PROCEDURE — 36415 COLL VENOUS BLD VENIPUNCTURE: CPT | Performed by: HOSPITALIST

## 2022-07-08 PROCEDURE — 80048 BASIC METABOLIC PNL TOTAL CA: CPT | Performed by: HOSPITALIST

## 2022-07-08 RX ADMIN — CEFAZOLIN 2 G: 10 INJECTION, POWDER, FOR SOLUTION INTRAVENOUS at 17:05

## 2022-07-08 RX ADMIN — METHOCARBAMOL 500 MG: 500 TABLET ORAL at 10:31

## 2022-07-08 RX ADMIN — GABAPENTIN 300 MG: 300 CAPSULE ORAL at 21:56

## 2022-07-08 RX ADMIN — METHOCARBAMOL 500 MG: 500 TABLET ORAL at 20:40

## 2022-07-08 RX ADMIN — FUROSEMIDE 40 MG: 40 TABLET ORAL at 10:30

## 2022-07-08 RX ADMIN — METOPROLOL SUCCINATE 25 MG: 25 TABLET, EXTENDED RELEASE ORAL at 20:40

## 2022-07-08 RX ADMIN — FUROSEMIDE 40 MG: 40 TABLET ORAL at 17:04

## 2022-07-08 RX ADMIN — FERROUS SULFATE TAB 325 MG (65 MG ELEMENTAL FE) 325 MG: 325 (65 FE) TAB at 10:31

## 2022-07-08 RX ADMIN — TRAZODONE HYDROCHLORIDE 50 MG: 50 TABLET ORAL at 21:56

## 2022-07-08 RX ADMIN — PANTOPRAZOLE SODIUM 40 MG: 40 TABLET, DELAYED RELEASE ORAL at 06:47

## 2022-07-08 RX ADMIN — METOPROLOL SUCCINATE 25 MG: 25 TABLET, EXTENDED RELEASE ORAL at 10:31

## 2022-07-08 RX ADMIN — CEFAZOLIN 2 G: 10 INJECTION, POWDER, FOR SOLUTION INTRAVENOUS at 02:46

## 2022-07-08 RX ADMIN — LISINOPRIL 40 MG: 40 TABLET ORAL at 10:31

## 2022-07-08 NOTE — PROGRESS NOTES
Observation Goals:  -tolerating oral antibiotics or has plans for home infusion setup: taking IV ABX  -infection is improving: JONATHAN wounds/ patient refuses wound care  -safe disposition plan has been identified: unknown-pt from group home, social work involved????

## 2022-07-08 NOTE — PROGRESS NOTES
"Notified Person Name: HARMEET Mata MD     Notification Date/Time: 7/8 1718     Purpose of Notification or Copy of Page: Wound care/ patient status     Comments: \" FYI: patient completed MRI, allowed writer to give IV ABX and also change some of her dressings on her legs-some of the dressings have not been changed since 7/5 and the silver that was ordered for her wounds is stuck to her wounds, impossible to remove without removing the new skin underneath-writer attempted multiple ways to remove old silver alginate. Not sure if you would like to have another WOC consult to re-evaluate wound care?? \"    "

## 2022-07-08 NOTE — PROGRESS NOTES
[facility]    Infectious Disease Progress Note    Date of Service (when I saw the patient): 07/08/2022     Assessment & Plan   Josiane Dasilva is a 61 year old female who was admitted on 7/4/2022.       Impression:  1. 61 y.o female with multiple co morbidities   2. Admitted with BLE ulcers.   3. Chronic lower extremity edema and venous stasis ulcers   4. Ulcer on the right ankle overlying old right ankle hardware from a previous fracture.  5. HTN   6. Depression   7. RICO  8. Peripheral neuropathy   9. Alcohol abuse history    10. Was started on ancef.      Recommendations:   s/p aspiration   Will follow up on the pending cultures   Continue on ancef for now         Silvia Freeman MD    Interval History   Tolerating antibiotics ok   No new rashes or issues with antibiotics   Labs reviewed   Pain in the ankle    Physical Exam   Temp: (!) 96.6  F (35.9  C) Temp src: Oral BP: 123/50 Pulse: 89   Resp: 20 SpO2: 96 % O2 Device: None (Room air)    Vitals:    07/05/22 1138   Weight: 146 kg (321 lb 14 oz)     Vital Signs with Ranges  Temp:  [96.6  F (35.9  C)-98.6  F (37  C)] 96.6  F (35.9  C)  Pulse:  [83-96] 89  Resp:  [18-20] 20  BP: (100-162)/(50-81) 123/50  SpO2:  [94 %-100 %] 96 %    Constitutional: Awake, alert, cooperative, no apparent distress  Lungs: Clear to auscultation bilaterally, no crackles or wheezing  Cardiovascular: Regular rate and rhythm, normal S1 and S2, and no murmur noted  Abdomen: Normal bowel sounds, soft, non-distended, non-tender  Skin: No rashes, no cyanosis, no edema  Other:    Medications       ceFAZolin  2 g Intravenous Q8H     ferrous sulfate  325 mg Oral Daily     furosemide  40 mg Oral BID     gabapentin  300 mg Oral At Bedtime     lisinopril  40 mg Oral QAM     methocarbamol  500 mg Oral BID     metoprolol succinate ER  25 mg Oral BID     pantoprazole  40 mg Oral QAM AC     sodium chloride (PF)  10 mL INTRA-ARTICULAR Once     sodium chloride (PF)  3 mL  Intracatheter Q8H     traZODone  50 mg Oral At Bedtime       Data   All microbiology laboratory data reviewed.  Recent Labs   Lab Test 07/08/22  0824 07/07/22  0750 07/06/22  1039   WBC 10.4 10.6 10.7   HGB 8.8* 8.7* 9.3*   HCT 28.9* 29.0* 30.7*   MCV 80 81 83    241 244     Recent Labs   Lab Test 07/08/22  0824 07/07/22  0750 07/06/22  1039   CR 0.84 0.84 0.93     No lab results found.  Recent Labs   Lab Test 02/25/19  0800 02/25/19  0754 02/23/19  0653 02/22/19  2045 02/22/19  1841 02/22/19  1836 08/12/14  0841 08/11/14  1900   CULT No growth No growth No growth 10,000 to 50,000 colonies/mL  mixed urogenital laly  Susceptibility testing not routinely done   No growth Cultured on the 2nd day of incubation:  Parabacteroides merdae  CORRECTED ON 03/04 AT 1113: PREVIOUSLY REPORTED AS Cultured on the 2nd day of incubation:   Parabacteroides merdae  *  Critical Value/Significant Value, preliminary result only, called to and read back by   SARAI GEORGE RN @6016 2/24/19. CT    (Note)  NEGATIVE for the following organisms and resistance markers:  Acinetobacter sp., Citrobacter sp., Enterobacter sp., Proteus sp., E.  coli, K. pneumoniae/oxytoca, P. aeruginosa, CTX-M, KPC, NDM, VIM, IMP  and OXA by [id number] multiplex nucleic acid test. Final identification  and antimicrobial susceptibility testing will be verified by standard  methods.    Critical Value/Significant Value called to and read back by Yennifer George RN  2.25.19 FRANTZ.     10,000 to 50,000 colonies/mL mixed urogenital laly 50,000 to 100,000 colonies/mL mixed urogenital laly

## 2022-07-08 NOTE — PROGRESS NOTES
Orthopedic Surgery  Josiane COLEMAN Branch  07/08/2022     Admit Date:  7/4/2022    Right ankle pain with anterior ulcer.  Remote history of ORIF right fibula     Patient resting in bed, asking to get up  Continues to complain of multiple pains including both her lower legs  Tolerating oral intake.    Denies nausea or vomiting  Awaiting ankle aspiration    Temp:  [96.6  F (35.9  C)-98.6  F (37  C)] 96.6  F (35.9  C)  Pulse:  [83-96] 89  Resp:  [18-20] 20  BP: (100-162)/(50-81) 123/50  SpO2:  [94 %-100 %] 96 %    Right ankle: anterior ankle wound present with mild drainage on dressings.  Pain bilateral ankles with attempted ROM, right is more painful than the left with passive motion.  She has very limited bilateral AROM of either ankle but increased pain on the right.  Swelling present in right low leg, foot and ankle.  Mild effusion appreciated but difficult to assess given body habitus.  No calf tenderness bilaterally    Labs:  Recent Labs   Lab Test 07/08/22  0824 07/07/22  0750 07/06/22  1039   WBC 10.4 10.6 10.7   HGB 8.8* 8.7* 9.3*    241 244     Recent Labs   Lab Test 07/04/22 2053 08/26/14  1000   INR 1.16* 1.08     Recent Labs   Lab Test 07/08/22  0824 07/07/22  0750 07/06/22  1039   CRP 95.8* 101.0* 120.0*     Ankle aspiration: NGTD, cell count clotted       1. PLAN:   Right ankle aspiration done, NGTD   Elevate right LE   Continue Abx per ID    refused MRI yesterday, but today agrees to get it done   Wound care per WOC   Trend CRP - trending down    Kjerstin L Foss, PA-C

## 2022-07-08 NOTE — PROGRESS NOTES
[facility]    Medicine Progress Note - Hospitalist Service    Date of Admission:  7/4/2022    Assessment & Plan          This is a 61-year-old female with medical history which includes hypertension, depression/anxiety, peripheral neuropathy, GERD, chronic anemia who lives at a group home and is wheelchair-bound fell couple of weeks back and has been intermittently complaining of right ankle pain and presented to the ER because she was having significant pain without any fever.    On presentation to the ER patient had edema in both her legs with chronic venous changes and comprehensive metabolic panel was normal, lactic acid and procalcitonin were also normal.  She had mildly elevated WBC count of 12.3 and blood cultures were done.  She was negative for COVID-19.  She underwent x-ray of the right ankle and was started on cefazolin and was admitted for concerns of infection over the hardware site    1) chronic lower extremity bilateral edema along with venous stasis changes  Ulcer over the right ankle overlying the ankle hardware from previous fracture  -On admission presented with mildly elevated white count and x-ray has been reviewed which does not show any evidence of hardware failure and her WBC count has trended down to normal and blood cultures continue to be negative  -Been evaluated by the orthopedic team, surgery team, infectious disease and she is on IV antibiotics and orthopedics   -Status joint aspiration and the cultures have been negative so far but WBC count is normal, she is afebrile and CRP is trending down  -MRI was recommended by orthopedics team yesterday but she declined but agrees with it today  -We will continue the patient with IV antibiotics and appreciate input from orthopedics and ID team    2) chronic anemia  -I did review in the EMR and her hemoglobin at baseline in the past 1 year is between 8.9-10  -I did check her iron profile and her iron binding capacity is  "increased and total iron is on the lower side and we will start her on iron replacement  --Hemoglobin on admission was 9.9-8.4-9.3-8.8    3) hypertension  -We will continue the patient with home medications including metoprolol, lisinopril and furosemide    4) anxiety/depression  -We will continue the patient with the trazodone    5) peripheral neuropathy  -We will continue the patient with gabapentin    6) GERD  -We will continue patient with Protonix    5) # Severe Obesity: Estimated body mass index is 48.94 kg/m  as calculated from the following:    Height as of this encounter: 1.727 m (5' 8\").    Weight as of this encounter: 146 kg (321 lb 14 oz).         Diet: Regular Diet Adult    DVT Prophylaxis: Pneumatic Compression Devices  Russell Catheter: Not present  Central Lines: None  Cardiac Monitoring: None  Code Status: Full Code      Disposition Plan     Expected Discharge Date: 07/09/2022        Discharge Comments: Awaiting plan    The patient's care was discussed with the Bedside Nurse and Patient.    Agatha Mata MD  Hospitalist Service  [employer] [facility]  Securely message with the Vocera Web Console (learn more here)  Text page via [employer] Paging/Directory         Clinically Significant Risk Factors Present on Admission                 # Hypertension: home medication list includes antihypertensive(s)    # Severe Obesity: Estimated body mass index is 48.94 kg/m  as calculated from the following:    Height as of this encounter: 1.727 m (5' 8\").    Weight as of this encounter: 146 kg (321 lb 14 oz).        ______________________________________________________________________    Interval History     I did see the patient today and the nurses told me that patient declined IV antibiotic this morning and I talked with the patient but she agrees with IV antibiotics and MRI.  She did mention that she wants to walk in the hallway with the help of her wheelchair and I told the patient that once we get " work-up done then we can help her with it and she agrees    Patient denies any fever, chills, nausea, vomiting and continues to have pain over both lower extremities and she was also educated on importance of wound care and understands        Data reviewed today: I reviewed all medications, new labs and imaging results over the last 24 hours. I personally reviewed no images or EKG's today.    Physical Exam   Vital Signs: Temp: (!) 96.6  F (35.9  C) Temp src: Oral BP: 123/50 Pulse: 89   Resp: 20 SpO2: 96 % O2 Device: None (Room air)    Weight: 321 lbs 13.95 oz        General: Patient appears comfortable and in no acute distress.  HEENT: Head is atraumatic,   Neck: Neck is supple  Respiratory: CTA LA with no wheeze  Cardiovascular: Regular rate , S1 and S2 normal with no murmer or rubs or gallops  Abdomen:   soft , non tender , non distended and bowel sound present   Skin: She does have wounds over her legs and there is an area of ulceration over the right ankle where there is hardware in place  Neurologic:  No facial droop  Musculoskeletal: Moves upper and lower extremities  Psychiatric: cooperative     Data   Recent Labs   Lab 07/08/22  0824 07/07/22  0750 07/06/22  1039 07/05/22  0634 07/04/22  2053   WBC 10.4 10.6 10.7   < > 12.3*   HGB 8.8* 8.7* 9.3*   < > 9.9*   MCV 80 81 83   < > 83    241 244   < > 232   INR  --   --   --   --  1.16*    139 139  --  141   POTASSIUM 4.2 4.2 4.4  --  4.4   CHLORIDE 103 106 107  --  108   CO2 27 27 29  --  29   BUN 16 21 23  --  28   CR 0.84 0.84 0.93  --  0.88   ANIONGAP 7 6 3  --  4   JESSICA 9.3 9.3 9.0  --  9.3   * 98 131*  --  119*   ALBUMIN  --   --   --   --  3.2*   PROTTOTAL  --   --   --   --  8.8   BILITOTAL  --   --   --   --  0.6   ALKPHOS  --   --   --   --  146   ALT  --   --   --   --  35   AST  --   --   --   --  31    < > = values in this interval not displayed.     Recent Results (from the past 24 hour(s))   US JAN Doppler No Exercise     Narrative    EXAM: RESTING ANKLE-BRACHIAL INDICES (ABIs)  LOCATION: [facility]  DATE/TIME: 7/7/2022 5:00 PM    INDICATION: bilateral lower extremity ulcers  COMPARISON: None.    JAN FINDINGS:  RIGHT  Brachial: 141  Ankle (PT): 177 Index: 1.13  Ankle (DP): 190 Index: 1.22  Digit: 128     LEFT  Brachial: 156  Ankle (PT): 187 Index: 1.20  Ankle (DP): 188 Index: 1.21  Digit: 150     The right JAN at rest is 1.22. The left JAN at rest is 1.21.      WAVEFORMS: The dorsalis pedis and posterior tibial arteries are triphasic bilaterally.      Impression    IMPRESSION:  1.  RIGHT LOWER EXTREMITY: JAN at rest is normal.  2.  LEFT LOWER EXTREMITY: JAN at rest is normal.     Medications       ceFAZolin  2 g Intravenous Q8H     ferrous sulfate  325 mg Oral Daily     furosemide  40 mg Oral BID     gabapentin  300 mg Oral At Bedtime     lisinopril  40 mg Oral QAM     methocarbamol  500 mg Oral BID     metoprolol succinate ER  25 mg Oral BID     pantoprazole  40 mg Oral QAM AC     sodium chloride (PF)  10 mL INTRA-ARTICULAR Once     sodium chloride (PF)  3 mL Intracatheter Q8H     traZODone  50 mg Oral At Bedtime

## 2022-07-08 NOTE — PROGRESS NOTES
Observation Goals:  -tolerating oral antibiotics or has plans for home infusion setup: taking IV ABX  -infection is improving: Wound care completed, last complete 7/5- MD notified, WOC to be resonculted/notified  -safe disposition plan has been identified: group home at discharge??

## 2022-07-08 NOTE — PROGRESS NOTES
Observation Goals:  -tolerating oral antibiotics or has plans for home infusion setup: taking IV ABX-refused 1000 AM  ANX  -infection is improving: JONATHAN wounds/ patient refuses wound care  -safe disposition plan has been identified: unknown-pt from group home, social work involved????

## 2022-07-08 NOTE — PROGRESS NOTES
"Per MRI tech, pt. Arrived to MRI and refused imaging stating \"I'm in too much pain;\" stated she would be willing to complete tomorrow.    On return to unit, pt. Denies pain on assessment.  "

## 2022-07-08 NOTE — PLAN OF CARE
Goal Outcome Evaluation:        Pt. Alert & oriented x 4; forgetful at times. Labile behavior. On room air. Lift assist. Incontinent at times, purewick in place.  BLE dressings loose, pt. Refuses dressing change.  BLE moderate edema, 1-2+pedal pulses. Tolerating PO intake.   Agreed to transfer to MRI and on arrival to MRI refused imaging; MRI was NOT completed this shift. Denied need for PRN pain medication.

## 2022-07-09 LAB
ANION GAP SERPL CALCULATED.3IONS-SCNC: 4 MMOL/L (ref 3–14)
BUN SERPL-MCNC: 18 MG/DL (ref 7–30)
CALCIUM SERPL-MCNC: 8.9 MG/DL (ref 8.5–10.1)
CHLORIDE BLD-SCNC: 104 MMOL/L (ref 94–109)
CO2 SERPL-SCNC: 30 MMOL/L (ref 20–32)
CREAT SERPL-MCNC: 0.85 MG/DL (ref 0.52–1.04)
CRP SERPL-MCNC: 86.1 MG/L (ref 0–8)
ERYTHROCYTE [DISTWIDTH] IN BLOOD BY AUTOMATED COUNT: 14.7 % (ref 10–15)
GFR SERPL CREATININE-BSD FRML MDRD: 77 ML/MIN/1.73M2
GLUCOSE BLD-MCNC: 109 MG/DL (ref 70–99)
HCT VFR BLD AUTO: 29.8 % (ref 35–47)
HGB BLD-MCNC: 8.9 G/DL (ref 11.7–15.7)
MCH RBC QN AUTO: 24.7 PG (ref 26.5–33)
MCHC RBC AUTO-ENTMCNC: 29.9 G/DL (ref 31.5–36.5)
MCV RBC AUTO: 83 FL (ref 78–100)
PLATELET # BLD AUTO: 259 10E3/UL (ref 150–450)
POTASSIUM BLD-SCNC: 3.8 MMOL/L (ref 3.4–5.3)
RBC # BLD AUTO: 3.6 10E6/UL (ref 3.8–5.2)
SODIUM SERPL-SCNC: 138 MMOL/L (ref 133–144)
WBC # BLD AUTO: 9.5 10E3/UL (ref 4–11)

## 2022-07-09 PROCEDURE — 36415 COLL VENOUS BLD VENIPUNCTURE: CPT | Performed by: HOSPITALIST

## 2022-07-09 PROCEDURE — 999N000128 HC STATISTIC PERIPHERAL IV START W/O US GUIDANCE

## 2022-07-09 PROCEDURE — 250N000013 HC RX MED GY IP 250 OP 250 PS 637: Performed by: HOSPITALIST

## 2022-07-09 PROCEDURE — 80048 BASIC METABOLIC PNL TOTAL CA: CPT | Performed by: HOSPITALIST

## 2022-07-09 PROCEDURE — G0378 HOSPITAL OBSERVATION PER HR: HCPCS

## 2022-07-09 PROCEDURE — 96376 TX/PRO/DX INJ SAME DRUG ADON: CPT

## 2022-07-09 PROCEDURE — 250N000011 HC RX IP 250 OP 636: Performed by: HOSPITALIST

## 2022-07-09 PROCEDURE — 86140 C-REACTIVE PROTEIN: CPT | Performed by: PHYSICIAN ASSISTANT

## 2022-07-09 PROCEDURE — 99232 SBSQ HOSP IP/OBS MODERATE 35: CPT | Performed by: HOSPITALIST

## 2022-07-09 PROCEDURE — 85027 COMPLETE CBC AUTOMATED: CPT | Performed by: HOSPITALIST

## 2022-07-09 RX ADMIN — PANTOPRAZOLE SODIUM 40 MG: 40 TABLET, DELAYED RELEASE ORAL at 07:04

## 2022-07-09 RX ADMIN — FUROSEMIDE 40 MG: 40 TABLET ORAL at 17:52

## 2022-07-09 RX ADMIN — METOPROLOL SUCCINATE 25 MG: 25 TABLET, EXTENDED RELEASE ORAL at 09:24

## 2022-07-09 RX ADMIN — METHOCARBAMOL 500 MG: 500 TABLET ORAL at 21:38

## 2022-07-09 RX ADMIN — GABAPENTIN 300 MG: 300 CAPSULE ORAL at 21:39

## 2022-07-09 RX ADMIN — TRAZODONE HYDROCHLORIDE 50 MG: 50 TABLET ORAL at 21:38

## 2022-07-09 RX ADMIN — LISINOPRIL 40 MG: 40 TABLET ORAL at 09:24

## 2022-07-09 RX ADMIN — METOPROLOL SUCCINATE 25 MG: 25 TABLET, EXTENDED RELEASE ORAL at 21:38

## 2022-07-09 RX ADMIN — FUROSEMIDE 40 MG: 40 TABLET ORAL at 09:24

## 2022-07-09 RX ADMIN — ACETAMINOPHEN 650 MG: 325 TABLET ORAL at 17:52

## 2022-07-09 RX ADMIN — CEFAZOLIN 2 G: 10 INJECTION, POWDER, FOR SOLUTION INTRAVENOUS at 09:24

## 2022-07-09 RX ADMIN — FERROUS SULFATE TAB 325 MG (65 MG ELEMENTAL FE) 325 MG: 325 (65 FE) TAB at 09:24

## 2022-07-09 RX ADMIN — OXYCODONE HYDROCHLORIDE 5 MG: 5 TABLET ORAL at 21:39

## 2022-07-09 RX ADMIN — CEFAZOLIN 2 G: 10 INJECTION, POWDER, FOR SOLUTION INTRAVENOUS at 17:52

## 2022-07-09 RX ADMIN — METHOCARBAMOL 500 MG: 500 TABLET ORAL at 09:24

## 2022-07-09 RX ADMIN — CEFAZOLIN 2 G: 10 INJECTION, POWDER, FOR SOLUTION INTRAVENOUS at 02:09

## 2022-07-09 RX ADMIN — OXYCODONE HYDROCHLORIDE 5 MG: 5 TABLET ORAL at 17:52

## 2022-07-09 NOTE — PROGRESS NOTES
[facility]    Medicine Progress Note - Hospitalist Service    Date of Admission:  7/4/2022    Assessment & Plan          This is a 61-year-old female with medical history which includes hypertension, depression/anxiety, peripheral neuropathy, GERD, chronic anemia who lives at a group home and is wheelchair-bound fell couple of weeks back and has been intermittently complaining of right ankle pain and presented to the ER because she was having significant pain without any fever.    On presentation to the ER patient had edema in both her legs with chronic venous changes and comprehensive metabolic panel was normal, lactic acid and procalcitonin were also normal.  She had mildly elevated WBC count of 12.3 and blood cultures were done.  She was negative for COVID-19.  She underwent x-ray of the right ankle and was started on cefazolin and was admitted for concerns of infection over the hardware site    1) chronic lower extremity bilateral edema along with venous stasis changes  Ulcer over the right ankle overlying the ankle hardware from previous fracture  -On admission presented with mildly elevated white count and x-ray has been reviewed which does not show any evidence of hardware failure and her WBC count has trended down to normal and blood cultures continue to be negative  -Been evaluated by the orthopedic team, surgery team, infectious disease and she is on IV antibiotics and orthopedics   -Status joint aspiration and the cultures have been negative so far but WBC count is normal, she is afebrile and CRP is trending down and is 86.1 today  -MRI was done which shows Diffuse circumferential subcutaneous edema and swelling of the visualized leg extending to the ankle and dorsal foot.  -We will continue the patient with IV antibiotics and appreciate input from orthopedics and ID team and final recommendation from ID and Ortho    2) chronic anemia  -I did review in the EMR and her hemoglobin  "at baseline in the past 1 year is between 8.9-10  -I did check her iron profile and her iron binding capacity is increased and total iron is on the lower side and we will start her on iron replacement  --Hemoglobin on admission was 9.9-8.4-9.3-8.9    3) hypertension  -We will continue the patient with home medications including metoprolol, lisinopril and furosemide    4) anxiety/depression  -We will continue the patient with the trazodone    5) peripheral neuropathy  -We will continue the patient with gabapentin    6) GERD  -We will continue patient with Protonix    5) # Severe Obesity: Estimated body mass index is 48.94 kg/m  as calculated from the following:    Height as of this encounter: 1.727 m (5' 8\").    Weight as of this encounter: 146 kg (321 lb 14 oz).         Diet: Regular Diet Adult    DVT Prophylaxis: Pneumatic Compression Devices  Russell Catheter: Not present  Central Lines: None  Cardiac Monitoring: None  Code Status: Full Code      Disposition Plan      Expected Discharge Date: 07/10/2022        Discharge Comments: Awaiting plan    The patient's care was discussed with the Bedside Nurse and Patient.    Agatha Mata MD  Hospitalist Service  [facility]  Securely message with the Vocera Web Console (learn more here)  Text page via [employer] Paging/Directory         Clinically Significant Risk Factors Present on Admission                 # Hypertension: home medication list includes antihypertensive(s)    # Severe Obesity: Estimated body mass index is 48.94 kg/m  as calculated from the following:    Height as of this encounter: 1.727 m (5' 8\").    Weight as of this encounter: 146 kg (321 lb 14 oz).        ______________________________________________________________________    Interval History     I saw the patient today and that she was sitting in the bed and continues to have pain here and there over her legs.  I did update the patient on results of her labs and the MRI.  I also " [Provider aware of all medications taken (including OTC)] : Patient stated provider is aware of all medications ~he/she~ is taking including OTC  discussed the plan of care with patient's nurse.  Patient denies any fever or chills, nausea or vomiting but does mention of pain over the legs.  I did tell the patient that we are waiting for final recommendation from ID and Ortho        Data reviewed today: I reviewed all medications, new labs and imaging results over the last 24 hours. I personally reviewed no images or EKG's today.    Physical Exam   Vital Signs: Temp: 99.1  F (37.3  C) Temp src: Oral BP: 130/63 Pulse: 78   Resp: 18 SpO2: 97 % O2 Device: None (Room air)    Weight: 321 lbs 13.95 oz        General: Patient appears comfortable and in no acute distress.  HEENT: Head is atraumatic,   Neck: Neck is supple  Respiratory: CTA LA with no wheeze  Cardiovascular: S1-S2 normal   abdomen:   soft , non tender , non distended and bowel sound present   Skin: She does have wounds over her legs and there is an area of ulceration over the right ankle and dressing is in place today  Neurologic:  No facial droop  Musculoskeletal: Moves upper and lower extremities  Psychiatric: cooperative     Data   Recent Labs   Lab 07/09/22  0913 07/08/22  0824 07/07/22  0750 07/05/22  0634 07/04/22  2053   WBC 9.5 10.4 10.6   < > 12.3*   HGB 8.9* 8.8* 8.7*   < > 9.9*   MCV 83 80 81   < > 83    249 241   < > 232   INR  --   --   --   --  1.16*    137 139   < > 141   POTASSIUM 3.8 4.2 4.2   < > 4.4   CHLORIDE 104 103 106   < > 108   CO2 30 27 27   < > 29   BUN 18 16 21   < > 28   CR 0.85 0.84 0.84   < > 0.88   ANIONGAP 4 7 6   < > 4   JESSICA 8.9 9.3 9.3   < > 9.3   * 112* 98   < > 119*   ALBUMIN  --   --   --   --  3.2*   PROTTOTAL  --   --   --   --  8.8   BILITOTAL  --   --   --   --  0.6   ALKPHOS  --   --   --   --  146   ALT  --   --   --   --  35   AST  --   --   --   --  31    < > = values in this interval not displayed.     No results found for this or any previous visit (from the past 24 hour(s)).  Medications       ceFAZolin  2 g Intravenous Q8H      ferrous sulfate  325 mg Oral Daily     furosemide  40 mg Oral BID     gabapentin  300 mg Oral At Bedtime     lisinopril  40 mg Oral QAM     methocarbamol  500 mg Oral BID     metoprolol succinate ER  25 mg Oral BID     pantoprazole  40 mg Oral QAM AC     sodium chloride (PF)  10 mL INTRA-ARTICULAR Once     sodium chloride (PF)  3 mL Intracatheter Q8H     traZODone  50 mg Oral At Bedtime

## 2022-07-09 NOTE — PLAN OF CARE
Goal Outcome Evaluation:  Plan of Care Reviewed With: patient   Overall Patient Progress: improving  PT A&Ox3; disoriented to situation; forgetful. VSS on RA. Dressings to LEs intact(were changed by previous shift nurse). Incontinent of bladder; pure wick in place. Total care; up with lift; assist of 2-3 with cares.

## 2022-07-09 NOTE — PROGRESS NOTES
Observation goals  PRIOR TO DISCHARGE        Comments: -tolerating oral antibiotics or has plans for home infusion setup: Not met; on IV antibiotics  -infection is improving: Not met  -safe disposition plan has been identified: Not Met;  Back to group home?

## 2022-07-09 NOTE — PROGRESS NOTES
PRIOR TO DISCHARGE       Comments: -tolerating oral antibiotics or has plans for home infusion setup :No met  -infection is improving :Not met  -safe disposition plan has been identified   Nurse to notify provider when observation goals have been met and patient is ready for discharge.Not met

## 2022-07-09 NOTE — PLAN OF CARE
"Shift Note [id number]:   Patient admitted to unit for cellulitis. Patient is alert and oriented x3-disoriented to situation. Assist 2-3 with lift. VSS. Will not verbalize a pain level, pain scale FLACC used \"5/10\" offered meds-refused, repositioned. Aggression Stop Light green-yellow. Patient not willing to cooperate with staff with transfers/ wants to be pulled/lifted from staff versus using lift. Education occurred for safe transfers-pt told staff to get out of her room.    Patient refused wound care in the am , allowed wound care changes in the evening. MD notified, message left for WOC for re-evaluation of wound care changes.    Major Shift Events: MRI completed  " 20-Feb-2019 22:30

## 2022-07-10 LAB
ANION GAP SERPL CALCULATED.3IONS-SCNC: 8 MMOL/L (ref 3–14)
BACTERIA BLD CULT: NO GROWTH
BACTERIA BLD CULT: NO GROWTH
BUN SERPL-MCNC: 17 MG/DL (ref 7–30)
CALCIUM SERPL-MCNC: 8.8 MG/DL (ref 8.5–10.1)
CHLORIDE BLD-SCNC: 105 MMOL/L (ref 94–109)
CO2 SERPL-SCNC: 29 MMOL/L (ref 20–32)
CREAT SERPL-MCNC: 0.92 MG/DL (ref 0.52–1.04)
ERYTHROCYTE [DISTWIDTH] IN BLOOD BY AUTOMATED COUNT: 14.9 % (ref 10–15)
GFR SERPL CREATININE-BSD FRML MDRD: 70 ML/MIN/1.73M2
GLUCOSE BLD-MCNC: 134 MG/DL (ref 70–99)
HCT VFR BLD AUTO: 30.3 % (ref 35–47)
HGB BLD-MCNC: 8.9 G/DL (ref 11.7–15.7)
MCH RBC QN AUTO: 24.6 PG (ref 26.5–33)
MCHC RBC AUTO-ENTMCNC: 29.4 G/DL (ref 31.5–36.5)
MCV RBC AUTO: 84 FL (ref 78–100)
PLATELET # BLD AUTO: 262 10E3/UL (ref 150–450)
POTASSIUM BLD-SCNC: 3.8 MMOL/L (ref 3.4–5.3)
RBC # BLD AUTO: 3.62 10E6/UL (ref 3.8–5.2)
SODIUM SERPL-SCNC: 142 MMOL/L (ref 133–144)
WBC # BLD AUTO: 8.9 10E3/UL (ref 4–11)

## 2022-07-10 PROCEDURE — 36415 COLL VENOUS BLD VENIPUNCTURE: CPT | Performed by: HOSPITALIST

## 2022-07-10 PROCEDURE — 250N000013 HC RX MED GY IP 250 OP 250 PS 637: Performed by: HOSPITALIST

## 2022-07-10 PROCEDURE — 96376 TX/PRO/DX INJ SAME DRUG ADON: CPT

## 2022-07-10 PROCEDURE — G0378 HOSPITAL OBSERVATION PER HR: HCPCS

## 2022-07-10 PROCEDURE — 120N000001 HC R&B MED SURG/OB

## 2022-07-10 PROCEDURE — 80048 BASIC METABOLIC PNL TOTAL CA: CPT | Performed by: HOSPITALIST

## 2022-07-10 PROCEDURE — 85027 COMPLETE CBC AUTOMATED: CPT | Performed by: HOSPITALIST

## 2022-07-10 PROCEDURE — 250N000011 HC RX IP 250 OP 636: Performed by: HOSPITALIST

## 2022-07-10 PROCEDURE — 99232 SBSQ HOSP IP/OBS MODERATE 35: CPT | Performed by: HOSPITALIST

## 2022-07-10 RX ORDER — METHOCARBAMOL 500 MG/1
500 TABLET, FILM COATED ORAL 2 TIMES DAILY PRN
Status: DISCONTINUED | OUTPATIENT
Start: 2022-07-10 | End: 2022-07-20 | Stop reason: HOSPADM

## 2022-07-10 RX ADMIN — GABAPENTIN 300 MG: 300 CAPSULE ORAL at 21:04

## 2022-07-10 RX ADMIN — OXYCODONE HYDROCHLORIDE 5 MG: 5 TABLET ORAL at 06:42

## 2022-07-10 RX ADMIN — CEFAZOLIN 2 G: 10 INJECTION, POWDER, FOR SOLUTION INTRAVENOUS at 11:31

## 2022-07-10 RX ADMIN — ACETAMINOPHEN 650 MG: 325 TABLET ORAL at 21:03

## 2022-07-10 RX ADMIN — FERROUS SULFATE TAB 325 MG (65 MG ELEMENTAL FE) 325 MG: 325 (65 FE) TAB at 11:30

## 2022-07-10 RX ADMIN — ACETAMINOPHEN 650 MG: 325 TABLET ORAL at 12:56

## 2022-07-10 RX ADMIN — PANTOPRAZOLE SODIUM 40 MG: 40 TABLET, DELAYED RELEASE ORAL at 06:43

## 2022-07-10 RX ADMIN — OXYCODONE HYDROCHLORIDE 5 MG: 5 TABLET ORAL at 12:54

## 2022-07-10 RX ADMIN — CEFAZOLIN 2 G: 10 INJECTION, POWDER, FOR SOLUTION INTRAVENOUS at 01:27

## 2022-07-10 RX ADMIN — TRAZODONE HYDROCHLORIDE 50 MG: 50 TABLET ORAL at 21:03

## 2022-07-10 RX ADMIN — METOPROLOL SUCCINATE 25 MG: 25 TABLET, EXTENDED RELEASE ORAL at 11:30

## 2022-07-10 RX ADMIN — OXYCODONE HYDROCHLORIDE 5 MG: 5 TABLET ORAL at 18:31

## 2022-07-10 RX ADMIN — CEFAZOLIN 2 G: 10 INJECTION, POWDER, FOR SOLUTION INTRAVENOUS at 18:25

## 2022-07-10 RX ADMIN — OXYCODONE HYDROCHLORIDE 5 MG: 5 TABLET ORAL at 02:00

## 2022-07-10 RX ADMIN — LISINOPRIL 40 MG: 40 TABLET ORAL at 11:29

## 2022-07-10 RX ADMIN — METOPROLOL SUCCINATE 25 MG: 25 TABLET, EXTENDED RELEASE ORAL at 21:03

## 2022-07-10 ASSESSMENT — ACTIVITIES OF DAILY LIVING (ADL)
ADLS_ACUITY_SCORE: 46
ADLS_ACUITY_SCORE: 46
ADLS_ACUITY_SCORE: 42
ADLS_ACUITY_SCORE: 46

## 2022-07-10 NOTE — PROGRESS NOTES
Ortho Brief -     R ankle draining anteriorly, foul smelling.  Plan OR Monday for I&D.  NPO p KATARINA Oscar MD

## 2022-07-10 NOTE — PLAN OF CARE
[id number]    Pt A&Ox2-3, disoriented to time & situation. Ax2/lift, T&R q2h.  Unable to use BSC, utilized bedpan. AUOP via purewick. Pain managed with PRN oxy x1 & tylenol x1. Tolerating reg diet. Wound care done to BLE. Noted abrasion to RUE. Discharge pending.

## 2022-07-10 NOTE — UTILIZATION REVIEW
Admission Status; Secondary Review Determination       Under the authority of the Utilization Management Committee, the utilization review process indicated a secondary review on the above patient. The review outcome is based on review of the medical records, discussions with staff, and applying clinical experience noted on the date of the review.     (x) Inpatient Status Appropriate - This patient's medical care is consistent with medical management for inpatient care and reasonable inpatient medical practice.     RATIONALE FOR DETERMINATION     The patient is a 61-year-old female admitted on 7/4/2022.  She has history of alcoholism, hypertension, anxiety disorder, peptic ulcer disease, depression, morbid obesity, peripheral neuropathy,  an open reduction and internal fixation of right ankle.  She was brought to the ED by EMS from her group home due to right ankle pain.  She had fallen 2 weeks prior and had been wheelchair bound with continued pain. ED evaluation showed heart rate in the 90s to 100s.  She was noted to have edema of both ankles.  There was an ulcer overlying a plate screw fixation of a proximal old healed distal fibular fracture.  Labs showed white blood cell count of 12.3. X-rays did not show any hardware failure. She was started on IV antibiotic (Ancef).  Infectious disease and orthopedic surgery were consulted and continued IV Ancef was recommended.  In spite of IV antibiotics patient did not improve.  Ankle was aspirated and fluid was bloody but culture has not grown any bacteria (had been on antibiotic prior to ankle aspiration).  MRI of right ankle was obtained but was limited because extremity was unable to be straightened due to pain.  It showed diffuse edema.  Patient has now started to have draining of foul-smelling fluid from the ankle.  She has been reevaluated by orthopedic surgery and is to have incision and drainage on 7/11/2022.  Inpatient admission is appropriate for this presumed  right ankle skin and possibly joint infection with history of ankle open reduction and internal fixation that has failed to improve with IV antibiotics and is now requiring surgical incision and drainage.    At the time of admission with the information available to the attending physician more than 2 nights Hospital complex care was anticipated, based on patient risk of adverse outcome if treated as outpatient and complex care required. Inpatient admission is appropriate based on the Medicare guidelines.     This document was produced using voice recognition software       The information on this document is developed by the utilization review team in order for the business office to ensure compliance. This only denotes the appropriateness of proper admission status and does not reflect the quality of care rendered.   The definitions of Inpatient Status and Observation Status used in making the determination above are those provided in the CMS Coverage Manual, Chapter 1 and Chapter 6, section 70.4.   Sincerely,     Delvis Rosales MD    Utilization Review  Physician Advisor  [facility].

## 2022-07-10 NOTE — PROGRESS NOTES
[facility]    Medicine Progress Note - Hospitalist Service    Date of Admission:  7/4/2022    Assessment & Plan          This is a 61-year-old female with medical history which includes hypertension, depression/anxiety, peripheral neuropathy, GERD, chronic anemia who lives at a group home and is wheelchair-bound fell couple of weeks back and has been intermittently complaining of right ankle pain and presented to the ER because she was having significant pain without any fever.    On presentation to the ER patient had edema in both her legs with chronic venous changes and comprehensive metabolic panel was normal, lactic acid and procalcitonin were also normal.  She had mildly elevated WBC count of 12.3 and blood cultures were done.  She was negative for COVID-19.  She underwent x-ray of the right ankle and was started on cefazolin and was admitted for concerns of infection over the hardware site    1) chronic lower extremity bilateral edema along with venous stasis changes  Ulcer over the right ankle overlying the ankle hardware from previous fracture  -On admission presented with mildly elevated white count and x-ray has been reviewed which does not show any evidence of hardware failure and her WBC count has trended down to normal and blood cultures continue to be negative  -Been evaluated by the orthopedic team, surgery team, infectious disease and she is on IV antibiotics and orthopedics   -Status joint aspiration and the cultures have been negative so far but WBC count is normal, she is afebrile and CRP is trending down and is 86.1  -MRI was done which shows Diffuse circumferential subcutaneous edema and swelling of the visualized leg extending to the ankle and dorsal foot.  -Have foul-smelling drainage from the ankle joint and was evaluated by orthopedics and plan is drainage on Monday and she is made n.p.o. after midnight and we will continue the patient with IV cefazolin and ID is on  "board also    2) chronic anemia  -I did review in the EMR and her hemoglobin at baseline in the past 1 year is between 8.9-10  -I did check her iron profile and her iron binding capacity is increased and total iron is on the lower side and we will start her on iron replacement  --Hemoglobin on admission was 9.9-8.4-9.3-8.9    3) hypertension  -I was told by the nurse that her pressure was on the lower side and it was systolic 100 and I checked at 1152 myself and it was 115/59 and patient was asymptomatic.  -I did put a hold to her Lasix and other oral medications but we will continue the patient with metoprolol and lisinopril with holding parameters and Lasix can be restarted after operative intervention tomorrow    4) anxiety/depression  -We will continue the patient with the trazodone    5) peripheral neuropathy  -We will continue the patient with gabapentin    6) GERD  -We will continue patient with Protonix    5) # Severe Obesity: Estimated body mass index is 48.94 kg/m  as calculated from the following:    Height as of this encounter: 1.727 m (5' 8\").    Weight as of this encounter: 146 kg (321 lb 14 oz).         Diet: Regular Diet Adult  NPO for Medical/Clinical Reasons Except for: Meds    DVT Prophylaxis: Pneumatic Compression Devices  Russell Catheter: Not present  Central Lines: None  Cardiac Monitoring: None  Code Status: Full Code      Disposition Plan      Expected Discharge Date: 07/11/2022    Discharge Delays: IV Medication - consider oral or Home Infusion  Placement - TCU    Discharge Comments: Awaiting plan    The patient's care was discussed with the Bedside Nurse and Patient.  Patient wanted me to call her Sister Ann Marie but the number she gave to me does not work and I called the Sister Jyotsna to get her number and she mentioned to me that Ann Marie lives in [place] and may not be able to answer her phone as she is at a game.  I have updated the Sister Jyotsna today and Jyotsna did express her " "concern that patient has big nail and does not want it to be cut and scratches herself a lot and that may have caused infection.    Agatha Mata MD  Hospitalist Service  [facility]  Securely message with the Vocera Web Console (learn more here)  Text page via [employer] Paging/Directory         Clinically Significant Risk Factors Present on Admission                 # Hypertension: home medication list includes antihypertensive(s)    # Severe Obesity: Estimated body mass index is 48.94 kg/m  as calculated from the following:    Height as of this encounter: 1.727 m (5' 8\").    Weight as of this encounter: 146 kg (321 lb 14 oz).      I am off service from tomorrow morning and her care will be taken over by hospital medicine team  ______________________________________________________________________    Interval History     I saw the patient today and she was sitting in the chair and denied any lightheadedness, dizziness and I again mentioned to her that we are treating her with antibiotics.  She wanted me to call her Sister Cassia.  Patient denied any shortness of breath or chest pain or nausea or vomiting.  She is eating and drinking.    The nurse did tell me that her blood pressure was around systolic 100 and oral meds this morning was held but repeat 1 was stable and patient was asymptomatic        Data reviewed today: I reviewed all medications, new labs and imaging results over the last 24 hours. I personally reviewed no images or EKG's today.    Physical Exam   Vital Signs: Temp: 98.2  F (36.8  C) Temp src: Oral BP: 116/58 Pulse: 75   Resp: 16 SpO2: 96 % O2 Device: None (Room air)    Weight: 321 lbs 13.95 oz        General: Patient appears comfortable and in no acute distress.  HEENT: Head is atraumatic,   Neck: Neck is supple  Respiratory: CTA LA with no wheeze  Cardiovascular: S1-S2 normal   abdomen:   soft , non tender , non distended and bowel sound present   Skin: She does have wounds " over her legs and there is an area of ulceration over the right ankle and dressing is in place today  Neurologic:  No facial droop  Musculoskeletal: Moves upper and lower extremities  Psychiatric: cooperative     Data   Recent Labs   Lab 07/10/22  1148 07/09/22  0913 07/08/22  0824 07/05/22  0634 07/04/22  2053   WBC 8.9 9.5 10.4   < > 12.3*   HGB 8.9* 8.9* 8.8*   < > 9.9*   MCV 84 83 80   < > 83    259 249   < > 232   INR  --   --   --   --  1.16*    138 137   < > 141   POTASSIUM 3.8 3.8 4.2   < > 4.4   CHLORIDE 105 104 103   < > 108   CO2 29 30 27   < > 29   BUN 17 18 16   < > 28   CR 0.92 0.85 0.84   < > 0.88   ANIONGAP 8 4 7   < > 4   JESSICA 8.8 8.9 9.3   < > 9.3   * 109* 112*   < > 119*   ALBUMIN  --   --   --   --  3.2*   PROTTOTAL  --   --   --   --  8.8   BILITOTAL  --   --   --   --  0.6   ALKPHOS  --   --   --   --  146   ALT  --   --   --   --  35   AST  --   --   --   --  31    < > = values in this interval not displayed.     No results found for this or any previous visit (from the past 24 hour(s)).  Medications       ceFAZolin  2 g Intravenous Q8H     ferrous sulfate  325 mg Oral Daily     [Held by provider] furosemide  40 mg Oral BID     gabapentin  300 mg Oral At Bedtime     lisinopril  40 mg Oral QAM     metoprolol succinate ER  25 mg Oral BID     pantoprazole  40 mg Oral QAM AC     sodium chloride (PF)  10 mL INTRA-ARTICULAR Once     sodium chloride (PF)  3 mL Intracatheter Q8H     traZODone  50 mg Oral At Bedtime

## 2022-07-10 NOTE — PLAN OF CARE
Goal Outcome Evaluation:    [id number]. Stable. Alert Oriented, forgetful Utilized SaraSteady after pt declined using lift to the bathroom. Pt had one hard stool. Declined laxative. Encouraged more fluid intake. Up in chair this shift. Dressing CDI. PRN pain medication given at hs. Purewick in place.  Plan pending ID and Ortho consults.

## 2022-07-10 NOTE — PROGRESS NOTES
ORTHOPEDICS  Chart Check    62 yo with bilateral LE wounds and painful ankles.  TMax 99.1.  Normal white count. Downtrending CRP (126 >> 86).  Arthrocentesis clotted so no cell count with diff however negative for crystals and cultures NGTD.  Did not tolerate MRI however the images obtained show mainly diffuse subcutaneous swelling; no abscess or gas.    Recommend continuing to monitor closely.  Will ask Dr Oscar to review chart.     SJohnson PAC  [facility]

## 2022-07-10 NOTE — PROGRESS NOTES
A/O with forgetfulness. VSS on RA. Dressing BLE intact. Up with 2 celsa steady. Voiding with purewick. Pain managed with Oxycodone. PIV sl. On IV abx. Will continue to monitor.

## 2022-07-10 NOTE — PLAN OF CARE
Goal Outcome Evaluation:    Plan of Care Reviewed With: patient     Infected venous ulcer/status post arthrocentesis POD 3/status post fall. CMS - disoriented to time/situation/forgetful; calm/cooperative; letting her needs be known; left pinky finger discomfort/contracted since falling; bilateral leg edema/tenderness & sensitive with movement; back discomfort. Tolerating regular diet/meds whole with water. Up with 2-3 assist/gait belt & celsa steady to recliner and to bathroom. Voiding fine/urine yellow, no odor. Bowel smear today/passing flatus. VSS, RA. Dressings on bilateral shins/loose/needs reinforcing/drainage noted. C/o of back/leg discomfort - oxycodone/tylenol provided. Plan for I & D tomorrow on right lower. NPO after midnight.

## 2022-07-10 NOTE — PROVIDER NOTIFICATION
Paged Hospitalist re: low bp. Asypomtomatic, patient bp rechecked and charted. Will await advisement on bp med parameters.    MD paged right back, adjusted meds and held some.

## 2022-07-10 NOTE — PROGRESS NOTES
INFECTIOUS DISEASES CHART CHECK PROGRESS NOTE    Assessment/Recommendations:  Patient with PMH venous stasis, right ankle hardware infection in place, peripheral neuropathy admitted 7/4 with infected right venous stasis ulcer; underwent arthrocentesis 7/7 as part of deep infection evaluation, cultures ngtd. Continue cefazolin.     ID will continue to follow this patient.   Marcia Talley MD  [phone]    ------------------------------------------------------------------------------------------------------------------------------------------------------------------  Subjective/Interval Events:  -Afeb  -Cultures ngtd  Chart checked    Laboratory Data:  Creatinine   Date Value Ref Range Status   07/09/2022 0.85 0.52 - 1.04 mg/dL Final   07/08/2022 0.84 0.52 - 1.04 mg/dL Final   07/07/2022 0.84 0.52 - 1.04 mg/dL Final   07/06/2022 0.93 0.52 - 1.04 mg/dL Final   07/04/2022 0.88 0.52 - 1.04 mg/dL Final   06/02/2020 0.65 0.52 - 1.04 mg/dL Final   06/01/2020 0.74 0.52 - 1.04 mg/dL Final   05/31/2020 0.61 0.52 - 1.04 mg/dL Final   05/30/2020 0.73 0.52 - 1.04 mg/dL Final     Comment:     Drawn above stopped IV   05/29/2020 0.84 0.52 - 1.04 mg/dL Final     WBC   Date Value Ref Range Status   06/02/2020 10.9 4.0 - 11.0 10e9/L Final   05/31/2020 8.9 4.0 - 11.0 10e9/L Final   05/30/2020 10.5 4.0 - 11.0 10e9/L Final     Comment:     Drawn above stopped IV   05/29/2020 7.1 4.0 - 11.0 10e9/L Final   05/25/2020 7.6 4.0 - 11.0 10e9/L Final     WBC Count   Date Value Ref Range Status   07/09/2022 9.5 4.0 - 11.0 10e3/uL Final   07/08/2022 10.4 4.0 - 11.0 10e3/uL Final   07/07/2022 10.6 4.0 - 11.0 10e3/uL Final   07/06/2022 10.7 4.0 - 11.0 10e3/uL Final   07/05/2022 10.9 4.0 - 11.0 10e3/uL Final     Hemoglobin   Date Value Ref Range Status   07/09/2022 8.9 (L) 11.7 - 15.7 g/dL Final   06/02/2020 8.6 (L) 11.7 - 15.7 g/dL Final     Platelet Count   Date Value Ref Range Status   07/09/2022 259 150 - 450 10e3/uL Final   06/02/2020 89  (L) 150 - 450 10e9/L Final     Lab Results   Component Value Date     07/09/2022    BUN 18 07/09/2022    CO2 30 07/09/2022     CRP Inflammation   Date Value Ref Range Status   07/09/2022 86.1 (H) 0.0 - 8.0 mg/L Final   07/08/2022 95.8 (H) 0.0 - 8.0 mg/L Final   07/07/2022 101.0 (H) 0.0 - 8.0 mg/L Final   07/06/2022 120.0 (H) 0.0 - 8.0 mg/L Final   07/05/2022 126.0 (H) 0.0 - 8.0 mg/L Final        Micro:  No results for input(s): CULT, SDES in the last 168 hours.    Imaging:  Recent Results (from the past 48 hour(s))   MR Ankle Right w/o Contrast    Narrative    MR right ankle without  contrast 7/8/2022 3:10 PM    History: Ankle pain.    Techniques: Multiplanar multisequence imaging of the right ankle was  attempted. Patient unable to continue the exam with inability to  straighten legs and pains in multiple body parts.    Comparison: 7/4/2022    Findings:    Metallic susceptibility artifact secondary to lateral fibular plate  and screw fixation compromising assessment. Low signal to noise  especially distal to the hindfoot due to positioning.    Diffuse circumferential subcutaneous edema and swelling of the  visualized leg extending to the ankle and dorsal foot.    Plantar calcaneal enthesophyte.    Grossly no evidence of edema like marrow signal intensity in the  visualized bones. Small T1 hypointensity area medial aspect of talus  at medial clear space area, maybe from remote trauma.    Physiologic amount of ankle and posterior subtalar joint fluid.    Achilles tendon is intact. Otherwise tendons and ligaments are not  well assessed.      Impression    IMPRESSION:   Severely limited study as patient was not able to straighten legs and  pains in multiple body parts.   1. Diffuse circumferential subcutaneous edema and swelling of the  visualized leg extending to the ankle and dorsal foot.    MoglueAHASHI         SYSTEM ID:  [id number]

## 2022-07-10 NOTE — PROVIDER NOTIFICATION
Paged Hospitalist re: angel, update.  Orthopedic planning procedure tomorrow, NPO after midnight. Will await advisement from medical.    Universal Safety Interventions

## 2022-07-11 LAB
ANION GAP SERPL CALCULATED.3IONS-SCNC: 4 MMOL/L (ref 3–14)
BUN SERPL-MCNC: 18 MG/DL (ref 7–30)
CALCIUM SERPL-MCNC: 8.8 MG/DL (ref 8.5–10.1)
CHLORIDE BLD-SCNC: 105 MMOL/L (ref 94–109)
CO2 SERPL-SCNC: 30 MMOL/L (ref 20–32)
CREAT SERPL-MCNC: 0.84 MG/DL (ref 0.52–1.04)
ERYTHROCYTE [DISTWIDTH] IN BLOOD BY AUTOMATED COUNT: 14.8 % (ref 10–15)
GFR SERPL CREATININE-BSD FRML MDRD: 78 ML/MIN/1.73M2
GLUCOSE BLD-MCNC: 93 MG/DL (ref 70–99)
HCT VFR BLD AUTO: 33.5 % (ref 35–47)
HGB BLD-MCNC: 9.9 G/DL (ref 11.7–15.7)
MCH RBC QN AUTO: 24.9 PG (ref 26.5–33)
MCHC RBC AUTO-ENTMCNC: 29.6 G/DL (ref 31.5–36.5)
MCV RBC AUTO: 84 FL (ref 78–100)
PLATELET # BLD AUTO: 257 10E3/UL (ref 150–450)
POTASSIUM BLD-SCNC: 4.4 MMOL/L (ref 3.4–5.3)
RBC # BLD AUTO: 3.98 10E6/UL (ref 3.8–5.2)
SODIUM SERPL-SCNC: 139 MMOL/L (ref 133–144)
WBC # BLD AUTO: 10.1 10E3/UL (ref 4–11)

## 2022-07-11 PROCEDURE — 99232 SBSQ HOSP IP/OBS MODERATE 35: CPT | Performed by: HOSPITALIST

## 2022-07-11 PROCEDURE — 250N000013 HC RX MED GY IP 250 OP 250 PS 637: Performed by: HOSPITALIST

## 2022-07-11 PROCEDURE — 250N000011 HC RX IP 250 OP 636: Performed by: HOSPITALIST

## 2022-07-11 PROCEDURE — 80048 BASIC METABOLIC PNL TOTAL CA: CPT | Performed by: HOSPITALIST

## 2022-07-11 PROCEDURE — 99232 SBSQ HOSP IP/OBS MODERATE 35: CPT | Performed by: SPECIALIST

## 2022-07-11 PROCEDURE — 85027 COMPLETE CBC AUTOMATED: CPT | Performed by: HOSPITALIST

## 2022-07-11 PROCEDURE — 36415 COLL VENOUS BLD VENIPUNCTURE: CPT | Performed by: HOSPITALIST

## 2022-07-11 PROCEDURE — 120N000001 HC R&B MED SURG/OB

## 2022-07-11 RX ADMIN — CEFAZOLIN 2 G: 10 INJECTION, POWDER, FOR SOLUTION INTRAVENOUS at 09:05

## 2022-07-11 RX ADMIN — SENNOSIDES AND DOCUSATE SODIUM 2 TABLET: 50; 8.6 TABLET ORAL at 10:21

## 2022-07-11 RX ADMIN — GABAPENTIN 300 MG: 300 CAPSULE ORAL at 21:52

## 2022-07-11 RX ADMIN — METOPROLOL SUCCINATE 25 MG: 25 TABLET, EXTENDED RELEASE ORAL at 20:23

## 2022-07-11 RX ADMIN — FERROUS SULFATE TAB 325 MG (65 MG ELEMENTAL FE) 325 MG: 325 (65 FE) TAB at 09:05

## 2022-07-11 RX ADMIN — PANTOPRAZOLE SODIUM 40 MG: 40 TABLET, DELAYED RELEASE ORAL at 06:56

## 2022-07-11 RX ADMIN — ACETAMINOPHEN 650 MG: 325 TABLET ORAL at 20:23

## 2022-07-11 RX ADMIN — CEFAZOLIN 2 G: 10 INJECTION, POWDER, FOR SOLUTION INTRAVENOUS at 02:34

## 2022-07-11 RX ADMIN — TRAZODONE HYDROCHLORIDE 50 MG: 50 TABLET ORAL at 21:52

## 2022-07-11 RX ADMIN — LISINOPRIL 40 MG: 40 TABLET ORAL at 09:05

## 2022-07-11 RX ADMIN — METOPROLOL SUCCINATE 25 MG: 25 TABLET, EXTENDED RELEASE ORAL at 09:04

## 2022-07-11 RX ADMIN — CEFAZOLIN 2 G: 10 INJECTION, POWDER, FOR SOLUTION INTRAVENOUS at 18:26

## 2022-07-11 ASSESSMENT — ACTIVITIES OF DAILY LIVING (ADL)
ADLS_ACUITY_SCORE: 46
ADLS_ACUITY_SCORE: 43
ADLS_ACUITY_SCORE: 39
ADLS_ACUITY_SCORE: 39
ADLS_ACUITY_SCORE: 43
ADLS_ACUITY_SCORE: 46
ADLS_ACUITY_SCORE: 43
ADLS_ACUITY_SCORE: 39

## 2022-07-11 NOTE — PROGRESS NOTES
Orthopedics    Right ankle wound    Right anterior ankle wound  Pain with passive motion bilateral ankles, right greater than left    Cultures right ankle aspiration remain negative    Plan:  Will place NPO midnight for probable I&D of ankle tomorrow  Surgeon: Dr Oscar

## 2022-07-11 NOTE — PROGRESS NOTES
[facility]    Infectious Disease Progress Note    Date of Service : 07/11/2022     Assessment:  Patient with PMH venous stasis, right ankle hardware infection, peripheral neuropathy admitted 7/4 with infected right venous stasis ulcer; underwent arthrocentesis 7/7 as part of deep infection evaluation, cultures ngtd. Maintained on cefazolin but planned for OR debridement due to purulent drainage from joint    Recommendations  1. OR debridement planned  2. Further antibiotic plan based on operative findings and cx data    Sudha Parra MD    Interval History   Patient was seen and examined, irritable today. Remained afebrile and is tolerating antibiotics without side effects    Physical Exam   Temp: 97.5  F (36.4  C) Temp src: Oral BP: 113/60 Pulse: 88   Resp: 18 SpO2: 97 % O2 Device: None (Room air)    Vitals:    07/05/22 1138   Weight: 146 kg (321 lb 14 oz)     Vital Signs with Ranges  Temp:  [97.5  F (36.4  C)-98.6  F (37  C)] 97.5  F (36.4  C)  Pulse:  [82-88] 88  Resp:  [16-18] 18  BP: (113-130)/(52-63) 113/60  SpO2:  [96 %-98 %] 97 %    Constitutional: Awake, alert, irritable  Lungs: Clear to auscultation bilaterally, no crackles or wheezing  Cardiovascular: Regular rate and rhythm, normal S1 and S2, and no murmur noted  Abdomen: soft non tender  Skin: stasis changes and ulcerations  MS : r ankle ulceration    Other:    Medications       ceFAZolin  2 g Intravenous Q8H     ferrous sulfate  325 mg Oral Daily     [Held by provider] furosemide  40 mg Oral BID     gabapentin  300 mg Oral At Bedtime     lisinopril  40 mg Oral QAM     metoprolol succinate ER  25 mg Oral BID     pantoprazole  40 mg Oral QAM AC     sodium chloride (PF)  10 mL INTRA-ARTICULAR Once     sodium chloride (PF)  3 mL Intracatheter Q8H     traZODone  50 mg Oral At Bedtime       Data   All microbiology laboratory data reviewed.  Recent Labs   Lab Test 07/10/22  1148 07/09/22  0913 07/08/22  0824   WBC 8.9 9.5 10.4   HGB  8.9* 8.9* 8.8*   HCT 30.3* 29.8* 28.9*   MCV 84 83 80    259 249     Recent Labs   Lab Test 07/10/22  1148 07/09/22  0913 07/08/22  0824   CR 0.92 0.85 0.84

## 2022-07-11 NOTE — PROGRESS NOTES
[facility]  Hospitalist Progress Note   07/11/2022          Assessment and Plan:       Josiane Dasilva is a 61 year old female with hypertension, depression/anxiety, peripheral neuropathy, GERD, chronic anemia who lives at a group home admitted on 7/4/2022.  Is wheelchair-bound fell couple of weeks back and has been intermittently complaining of right ankle pain and presented with pain.     Right ankle hardware infection.    chronic lower extremity bilateral edema along with venous stasis changes  Peripheral neuropathy.  Ulcer over the right ankle overlying the ankle hardware from previous fracture. On presentation edema in both her legs with chronic venous changes.  CMP, lactic acid, procalcitonin within normal limits.  WBC count elevated.  Blood cultures no growth to date.  COVID 19 PCR negative.  xray of the right ankle  on admission.  On IV cefazolin.   -Status joint aspiration and the cultures have been negative. CRP is trending down.  -MRI Diffuse circumferential subcutaneous edema and swelling of the visualized leg extending to the ankle and dorsal foot.  -Followed by ID, general surgery, orthopedic team.  ID recommending or debridement.  Continue IV cefazolin.  Orthopedic team following, plan for debridement today in OR.  Appreciate multidisciplinary team input.  Trend WBC count, fever curve.  Follow CRP in AM.  Continue PTA gabapentin.  As needed Tylenol, oxycodone for pain management.    Chronic anemia  Hemoglobin at baseline in the past 1 year is between 8.9-10  iron binding capacity is increased and total iron is on the lower sidE.   Oral iron replacement.  Hemoglobin stable around 9.  Monitor periodically.       Hypertension  Continue PTA metoprolol, lisinopril with hold parameters.  PTA list Lasix on hold, likely restart in a.m. after procedure    Anxiety/depression  Continue the patient with the trazodone      GERD  Continue patient with Protonix    BCT with a BMI of 48.94.  Consider  lifestyle modification with diet and exercise as able to.    Physical deconditioning.  PT, OT evaluation prior to discharge.     Orders Placed This Encounter      Regular Diet Adult      DVT Prophylaxis: SCDs, ambulate.  Holding pharmacological prophylaxis for procedure  Code Status: Full Code  Disposition: Expected discharge greater than 2 days    Discussed with patient, Floor RN  More than 70% of time spent in direct patient care, care coordination, patient counseling, and formalizing plan of care.     Andrew Rogers MD        Interval History:      Patient lying in bed.  Afebrile in the last 24 hours.  Blood pressures improving.  N.p.o. from midnight for incision and drainage today.  Patient drowsy but arousable.  Complaining of lower extremity pain managed with oxycodone.           Physical Exam:        Physical Exam   Temp:  [97.5  F (36.4  C)-98.6  F (37  C)] 97.5  F (36.4  C)  Pulse:  [82-88] 88  Resp:  [16-18] 18  BP: (113-130)/(52-63) 113/60  SpO2:  [96 %-98 %] 97 %    Intake/Output Summary (Last 24 hours) at 7/11/2022 1553  Last data filed at 7/11/2022 0842  Gross per 24 hour   Intake 240 ml   Output 1150 ml   Net -910 ml       Admission Weight: 146 kg (321 lb 14 oz)  Current Weight: 146 kg (321 lb 14 oz)    PHYSICAL EXAM  GENERAL: Patient is in no distress.  Drowsy but arousable  HEART: Regular rate and rhythm. S1S2. No murmurs  LUNGS  Respirations unlabored  ABDOMEN: Soft.  NEURO: moving all extremities.  EXTREMITIES: 2+ pedal edema, dressing over wounds intact.  SKIN: Warm, dry.  PSYCHIATRY Cooperative       Medications:          ceFAZolin  2 g Intravenous Q8H     ferrous sulfate  325 mg Oral Daily     [Held by provider] furosemide  40 mg Oral BID     gabapentin  300 mg Oral At Bedtime     lisinopril  40 mg Oral QAM     metoprolol succinate ER  25 mg Oral BID     pantoprazole  40 mg Oral QAM AC     sodium chloride (PF)  10 mL INTRA-ARTICULAR Once     sodium chloride (PF)  3 mL Intracatheter Q8H      traZODone  50 mg Oral At Bedtime     acetaminophen **OR** acetaminophen, lidocaine 4%, lidocaine (buffered or not buffered), melatonin, methocarbamol, naloxone **OR** naloxone **OR** naloxone **OR** naloxone, ondansetron **OR** ondansetron, oxyCODONE, senna-docusate **OR** senna-docusate, sodium chloride (PF)         Data:      All new lab and imaging data was reviewed.

## 2022-07-11 NOTE — PROGRESS NOTES
Date/Time:07/11 ,[id number]    Trauma/Ortho/Medical (Choose one) :Ortho    Diagnosis:Cellulitis of right lower extremity   POD#:I&D 07/11  Mental Status:A&O x 2,very forgetful  Activity/dangle:A2-3 Sera steady  Diet:NPO as from midnight for possible I&D today.  Pain:Managed with oxycodone  Russell/Voiding:Purewick with adequate amount  Tele/Restraints/Iso:None  02/LDA:KATERINE  D/C Date:Pending I&D 07/11    Other Info:CMS intact,IV SL

## 2022-07-11 NOTE — PLAN OF CARE
Goal Outcome Evaluation:  Neuro- A&OX3, and forgetful, intermittently confused   Most Recent Vitals- Temp: 98.6  F (37  C) Temp src: Oral BP: 115/60 Pulse: 82   Resp: 16 SpO2: 96 % O2 Device: None (Room air)   Tele/Cardiac- WDL  Resp- RA  Activity- UP with 2-3 assist & celsa steady    Pain- Right leg and generalized aches, prn Tylenol and oxycodone given with good effect   Drips- ABX  Drains/Tubes- P-IV SL  Skin- Right ankle wound   GI/- pure wick or commode   Aggression Color- Green  COVID status- Negative  Plan- I&D tomorrow, keep NPO after midnight   [facility]-     Liliana Beverly RN

## 2022-07-11 NOTE — PLAN OF CARE
Goal Outcome Evaluation:    Plan of Care Reviewed With: patient     Overall Patient Progress: no change    Outcome Evaluation: Pt was awaiting I&D procedure today. Procedure will not take place today.  Awaiting re-scheduling.    Patient vital signs are at baseline: Yes  Patient able to ambulate as they were prior to admission or with assist devices provided by therapies during their stay:  No,  Reason:  Unable to ambulate.   SeraStedy for transfers.  Patient MUST void prior to discharge:  Yes  Patient able to tolerate oral intake:  Yes  Pain has adequate pain control using Oral analgesics:  Yes  Does patient have an identified :  No,  Reason:  Awaiting I&D procedure.  Has goal D/C date and time been discussed with patient:  No,  Reason:  Awaiting I&D procedure    A&O x1-2 to Person and intermittently to Place.   CMS Intact.   VSS on RA.   Up with Ax2 w/ SeraStedy lift.   Voiding in BR.   Pain controlled.  No pain meds this shift  Continue to monitor.

## 2022-07-12 ENCOUNTER — ANESTHESIA EVENT (OUTPATIENT)
Dept: SURGERY | Facility: CLINIC | Age: 62
DRG: 464 | End: 2022-07-12
Payer: COMMERCIAL

## 2022-07-12 ENCOUNTER — ANESTHESIA (OUTPATIENT)
Dept: SURGERY | Facility: CLINIC | Age: 62
DRG: 464 | End: 2022-07-12
Payer: COMMERCIAL

## 2022-07-12 LAB
ALBUMIN SERPL-MCNC: 2.8 G/DL (ref 3.4–5)
ALP SERPL-CCNC: 126 U/L (ref 40–150)
ALT SERPL W P-5'-P-CCNC: 12 U/L (ref 0–50)
ANION GAP SERPL CALCULATED.3IONS-SCNC: 4 MMOL/L (ref 3–14)
AST SERPL W P-5'-P-CCNC: 16 U/L (ref 0–45)
BACTERIA SNV CULT: NO GROWTH
BILIRUB SERPL-MCNC: 0.5 MG/DL (ref 0.2–1.3)
BUN SERPL-MCNC: 15 MG/DL (ref 7–30)
CALCIUM SERPL-MCNC: 8.8 MG/DL (ref 8.5–10.1)
CHLORIDE BLD-SCNC: 105 MMOL/L (ref 94–109)
CK SERPL-CCNC: 134 U/L (ref 30–225)
CO2 SERPL-SCNC: 27 MMOL/L (ref 20–32)
CREAT SERPL-MCNC: 0.8 MG/DL (ref 0.52–1.04)
CRP SERPL-MCNC: 62.1 MG/L (ref 0–8)
GFR SERPL CREATININE-BSD FRML MDRD: 83 ML/MIN/1.73M2
GLUCOSE BLD-MCNC: 123 MG/DL (ref 70–99)
GLUCOSE BLDC GLUCOMTR-MCNC: 100 MG/DL (ref 70–99)
GRAM STAIN RESULT: NORMAL
HGB BLD-MCNC: 9.2 G/DL (ref 11.7–15.7)
POTASSIUM BLD-SCNC: 4.2 MMOL/L (ref 3.4–5.3)
PROT SERPL-MCNC: 8.3 G/DL (ref 6.8–8.8)
SARS-COV-2 RNA RESP QL NAA+PROBE: NEGATIVE
SODIUM SERPL-SCNC: 136 MMOL/L (ref 133–144)

## 2022-07-12 PROCEDURE — 258N000003 HC RX IP 258 OP 636: Performed by: ANESTHESIOLOGY

## 2022-07-12 PROCEDURE — 370N000017 HC ANESTHESIA TECHNICAL FEE, PER MIN: Performed by: ORTHOPAEDIC SURGERY

## 2022-07-12 PROCEDURE — 250N000013 HC RX MED GY IP 250 OP 250 PS 637: Performed by: HOSPITALIST

## 2022-07-12 PROCEDURE — 710N000009 HC RECOVERY PHASE 1, LEVEL 1, PER MIN: Performed by: ORTHOPAEDIC SURGERY

## 2022-07-12 PROCEDURE — 250N000009 HC RX 250: Performed by: NURSE ANESTHETIST, CERTIFIED REGISTERED

## 2022-07-12 PROCEDURE — G0463 HOSPITAL OUTPT CLINIC VISIT: HCPCS

## 2022-07-12 PROCEDURE — 250N000025 HC SEVOFLURANE, PER MIN: Performed by: ORTHOPAEDIC SURGERY

## 2022-07-12 PROCEDURE — 80053 COMPREHEN METABOLIC PANEL: CPT | Performed by: HOSPITALIST

## 2022-07-12 PROCEDURE — 250N000011 HC RX IP 250 OP 636: Performed by: HOSPITALIST

## 2022-07-12 PROCEDURE — 36415 COLL VENOUS BLD VENIPUNCTURE: CPT | Performed by: HOSPITALIST

## 2022-07-12 PROCEDURE — 85018 HEMOGLOBIN: CPT | Performed by: HOSPITALIST

## 2022-07-12 PROCEDURE — 250N000009 HC RX 250: Performed by: ORTHOPAEDIC SURGERY

## 2022-07-12 PROCEDURE — 87176 TISSUE HOMOGENIZATION CULTR: CPT | Performed by: ORTHOPAEDIC SURGERY

## 2022-07-12 PROCEDURE — 86140 C-REACTIVE PROTEIN: CPT | Performed by: HOSPITALIST

## 2022-07-12 PROCEDURE — U0003 INFECTIOUS AGENT DETECTION BY NUCLEIC ACID (DNA OR RNA); SEVERE ACUTE RESPIRATORY SYNDROME CORONAVIRUS 2 (SARS-COV-2) (CORONAVIRUS DISEASE [COVID-19]), AMPLIFIED PROBE TECHNIQUE, MAKING USE OF HIGH THROUGHPUT TECHNOLOGIES AS DESCRIBED BY CMS-2020-01-R: HCPCS | Performed by: ORTHOPAEDIC SURGERY

## 2022-07-12 PROCEDURE — 272N000001 HC OR GENERAL SUPPLY STERILE: Performed by: ORTHOPAEDIC SURGERY

## 2022-07-12 PROCEDURE — 250N000013 HC RX MED GY IP 250 OP 250 PS 637: Performed by: PHYSICIAN ASSISTANT

## 2022-07-12 PROCEDURE — 0JBQ0ZZ EXCISION OF RIGHT FOOT SUBCUTANEOUS TISSUE AND FASCIA, OPEN APPROACH: ICD-10-PCS | Performed by: ORTHOPAEDIC SURGERY

## 2022-07-12 PROCEDURE — 250N000011 HC RX IP 250 OP 636: Performed by: NURSE ANESTHETIST, CERTIFIED REGISTERED

## 2022-07-12 PROCEDURE — 0SJF0ZZ INSPECTION OF RIGHT ANKLE JOINT, OPEN APPROACH: ICD-10-PCS | Performed by: ORTHOPAEDIC SURGERY

## 2022-07-12 PROCEDURE — 360N000075 HC SURGERY LEVEL 2, PER MIN: Performed by: ORTHOPAEDIC SURGERY

## 2022-07-12 PROCEDURE — 120N000001 HC R&B MED SURG/OB

## 2022-07-12 PROCEDURE — 99232 SBSQ HOSP IP/OBS MODERATE 35: CPT | Performed by: HOSPITALIST

## 2022-07-12 PROCEDURE — 999N000141 HC STATISTIC PRE-PROCEDURE NURSING ASSESSMENT: Performed by: ORTHOPAEDIC SURGERY

## 2022-07-12 PROCEDURE — 87205 SMEAR GRAM STAIN: CPT | Performed by: ORTHOPAEDIC SURGERY

## 2022-07-12 PROCEDURE — 258N000003 HC RX IP 258 OP 636: Performed by: HOSPITALIST

## 2022-07-12 PROCEDURE — 82550 ASSAY OF CK (CPK): CPT | Performed by: HOSPITALIST

## 2022-07-12 PROCEDURE — 87070 CULTURE OTHR SPECIMN AEROBIC: CPT | Performed by: ORTHOPAEDIC SURGERY

## 2022-07-12 RX ORDER — ONDANSETRON 2 MG/ML
INJECTION INTRAMUSCULAR; INTRAVENOUS PRN
Status: DISCONTINUED | OUTPATIENT
Start: 2022-07-12 | End: 2022-07-12

## 2022-07-12 RX ORDER — ONDANSETRON 2 MG/ML
4 INJECTION INTRAMUSCULAR; INTRAVENOUS EVERY 30 MIN PRN
Status: DISCONTINUED | OUTPATIENT
Start: 2022-07-12 | End: 2022-07-12 | Stop reason: HOSPADM

## 2022-07-12 RX ORDER — MAGNESIUM HYDROXIDE 1200 MG/15ML
LIQUID ORAL PRN
Status: DISCONTINUED | OUTPATIENT
Start: 2022-07-12 | End: 2022-07-12 | Stop reason: HOSPADM

## 2022-07-12 RX ORDER — FENTANYL CITRATE 50 UG/ML
INJECTION, SOLUTION INTRAMUSCULAR; INTRAVENOUS PRN
Status: DISCONTINUED | OUTPATIENT
Start: 2022-07-12 | End: 2022-07-12

## 2022-07-12 RX ORDER — HYDRALAZINE HYDROCHLORIDE 20 MG/ML
2.5-5 INJECTION INTRAMUSCULAR; INTRAVENOUS EVERY 10 MIN PRN
Status: DISCONTINUED | OUTPATIENT
Start: 2022-07-12 | End: 2022-07-12 | Stop reason: HOSPADM

## 2022-07-12 RX ORDER — PROPOFOL 10 MG/ML
INJECTION, EMULSION INTRAVENOUS PRN
Status: DISCONTINUED | OUTPATIENT
Start: 2022-07-12 | End: 2022-07-12

## 2022-07-12 RX ORDER — SODIUM CHLORIDE, SODIUM LACTATE, POTASSIUM CHLORIDE, CALCIUM CHLORIDE 600; 310; 30; 20 MG/100ML; MG/100ML; MG/100ML; MG/100ML
INJECTION, SOLUTION INTRAVENOUS CONTINUOUS
Status: DISCONTINUED | OUTPATIENT
Start: 2022-07-12 | End: 2022-07-12 | Stop reason: HOSPADM

## 2022-07-12 RX ORDER — MEPERIDINE HYDROCHLORIDE 25 MG/ML
12.5 INJECTION INTRAMUSCULAR; INTRAVENOUS; SUBCUTANEOUS EVERY 5 MIN PRN
Status: DISCONTINUED | OUTPATIENT
Start: 2022-07-12 | End: 2022-07-12 | Stop reason: HOSPADM

## 2022-07-12 RX ORDER — HYDROMORPHONE HCL IN WATER/PF 6 MG/30 ML
0.4 PATIENT CONTROLLED ANALGESIA SYRINGE INTRAVENOUS EVERY 5 MIN PRN
Status: DISCONTINUED | OUTPATIENT
Start: 2022-07-12 | End: 2022-07-12 | Stop reason: HOSPADM

## 2022-07-12 RX ORDER — FENTANYL CITRATE-0.9 % NACL/PF 10 MCG/ML
PLASTIC BAG, INJECTION (ML) INTRAVENOUS PRN
Status: DISCONTINUED | OUTPATIENT
Start: 2022-07-12 | End: 2022-07-12

## 2022-07-12 RX ORDER — FENTANYL CITRATE 0.05 MG/ML
50 INJECTION, SOLUTION INTRAMUSCULAR; INTRAVENOUS EVERY 5 MIN PRN
Status: DISCONTINUED | OUTPATIENT
Start: 2022-07-12 | End: 2022-07-12 | Stop reason: HOSPADM

## 2022-07-12 RX ORDER — OXYCODONE HYDROCHLORIDE 5 MG/1
5 TABLET ORAL EVERY 4 HOURS PRN
Status: DISCONTINUED | OUTPATIENT
Start: 2022-07-12 | End: 2022-07-12 | Stop reason: HOSPADM

## 2022-07-12 RX ORDER — HYDROMORPHONE HYDROCHLORIDE 1 MG/ML
INJECTION, SOLUTION INTRAMUSCULAR; INTRAVENOUS; SUBCUTANEOUS PRN
Status: DISCONTINUED | OUTPATIENT
Start: 2022-07-12 | End: 2022-07-12

## 2022-07-12 RX ORDER — LABETALOL HYDROCHLORIDE 5 MG/ML
10 INJECTION, SOLUTION INTRAVENOUS
Status: DISCONTINUED | OUTPATIENT
Start: 2022-07-12 | End: 2022-07-12 | Stop reason: HOSPADM

## 2022-07-12 RX ORDER — ONDANSETRON 4 MG/1
4 TABLET, ORALLY DISINTEGRATING ORAL EVERY 30 MIN PRN
Status: DISCONTINUED | OUTPATIENT
Start: 2022-07-12 | End: 2022-07-12 | Stop reason: HOSPADM

## 2022-07-12 RX ORDER — LIDOCAINE HYDROCHLORIDE 20 MG/ML
INJECTION, SOLUTION INFILTRATION; PERINEURAL PRN
Status: DISCONTINUED | OUTPATIENT
Start: 2022-07-12 | End: 2022-07-12

## 2022-07-12 RX ORDER — LIDOCAINE 40 MG/G
CREAM TOPICAL
Status: DISCONTINUED | OUTPATIENT
Start: 2022-07-12 | End: 2022-07-20 | Stop reason: HOSPADM

## 2022-07-12 RX ORDER — PROPOFOL 10 MG/ML
INJECTION, EMULSION INTRAVENOUS CONTINUOUS PRN
Status: DISCONTINUED | OUTPATIENT
Start: 2022-07-12 | End: 2022-07-12

## 2022-07-12 RX ORDER — ALBUTEROL SULFATE 0.83 MG/ML
2.5 SOLUTION RESPIRATORY (INHALATION) EVERY 4 HOURS PRN
Status: DISCONTINUED | OUTPATIENT
Start: 2022-07-12 | End: 2022-07-12 | Stop reason: HOSPADM

## 2022-07-12 RX ADMIN — FENTANYL CITRATE 50 MCG: 50 INJECTION, SOLUTION INTRAMUSCULAR; INTRAVENOUS at 15:43

## 2022-07-12 RX ADMIN — METOPROLOL SUCCINATE 25 MG: 25 TABLET, EXTENDED RELEASE ORAL at 21:49

## 2022-07-12 RX ADMIN — PROPOFOL 180 MG: 10 INJECTION, EMULSION INTRAVENOUS at 15:43

## 2022-07-12 RX ADMIN — FENTANYL CITRATE 50 MCG: 50 INJECTION, SOLUTION INTRAMUSCULAR; INTRAVENOUS at 16:10

## 2022-07-12 RX ADMIN — Medication 100 MCG: at 16:01

## 2022-07-12 RX ADMIN — SODIUM CHLORIDE, POTASSIUM CHLORIDE, SODIUM LACTATE AND CALCIUM CHLORIDE: 600; 310; 30; 20 INJECTION, SOLUTION INTRAVENOUS at 15:12

## 2022-07-12 RX ADMIN — ACETAMINOPHEN 650 MG: 325 TABLET ORAL at 18:58

## 2022-07-12 RX ADMIN — METHOCARBAMOL 500 MG: 500 TABLET ORAL at 18:58

## 2022-07-12 RX ADMIN — PROPOFOL 20 MCG/KG/MIN: 10 INJECTION, EMULSION INTRAVENOUS at 15:45

## 2022-07-12 RX ADMIN — HYDROMORPHONE HYDROCHLORIDE 0.5 MG: 1 INJECTION, SOLUTION INTRAMUSCULAR; INTRAVENOUS; SUBCUTANEOUS at 16:14

## 2022-07-12 RX ADMIN — CEFAZOLIN 2 G: 10 INJECTION, POWDER, FOR SOLUTION INTRAVENOUS at 11:14

## 2022-07-12 RX ADMIN — CEFAZOLIN 2 G: 10 INJECTION, POWDER, FOR SOLUTION INTRAVENOUS at 02:45

## 2022-07-12 RX ADMIN — ONDANSETRON 4 MG: 2 INJECTION INTRAMUSCULAR; INTRAVENOUS at 16:54

## 2022-07-12 RX ADMIN — OXYCODONE HYDROCHLORIDE 5 MG: 5 TABLET ORAL at 19:55

## 2022-07-12 RX ADMIN — METOPROLOL SUCCINATE 25 MG: 25 TABLET, EXTENDED RELEASE ORAL at 09:53

## 2022-07-12 RX ADMIN — PROPOFOL 170 MCG/KG/MIN: 10 INJECTION, EMULSION INTRAVENOUS at 15:43

## 2022-07-12 RX ADMIN — LISINOPRIL 40 MG: 40 TABLET ORAL at 09:53

## 2022-07-12 RX ADMIN — TRAZODONE HYDROCHLORIDE 50 MG: 50 TABLET ORAL at 21:49

## 2022-07-12 RX ADMIN — FERROUS SULFATE TAB 325 MG (65 MG ELEMENTAL FE) 325 MG: 325 (65 FE) TAB at 09:53

## 2022-07-12 RX ADMIN — LIDOCAINE HYDROCHLORIDE 100 MG: 20 INJECTION, SOLUTION INFILTRATION; PERINEURAL at 15:43

## 2022-07-12 RX ADMIN — GABAPENTIN 300 MG: 300 CAPSULE ORAL at 21:49

## 2022-07-12 ASSESSMENT — ACTIVITIES OF DAILY LIVING (ADL)
ADLS_ACUITY_SCORE: 43
ADLS_ACUITY_SCORE: 43
ADLS_ACUITY_SCORE: 41
ADLS_ACUITY_SCORE: 43
ADLS_ACUITY_SCORE: 41
ADLS_ACUITY_SCORE: 39
ADLS_ACUITY_SCORE: 41
ADLS_ACUITY_SCORE: 43
ADLS_ACUITY_SCORE: 43

## 2022-07-12 NOTE — ANESTHESIA PREPROCEDURE EVALUATION
Anesthesia Pre-Procedure Evaluation    Patient: Josiane Dasilva   MRN: [id number] : 1960        Procedure : Procedure(s):  irrigation and debridement right ankle          Past Medical History:   Diagnosis Date     Alcoholism /alcohol abuse      Benign essential hypertension      RICO (generalized anxiety disorder)      History of peptic ulcer disease      Impaired fasting glucose      MDD (major depressive disorder)      Morbid obesity (H)      Peripheral neuropathy      Poor short term memory       Past Surgical History:   Procedure Laterality Date     APPENDECTOMY       COLONOSCOPY  2014    Procedure: COMBINED COLONOSCOPY, SINGLE BIOPSY/POLYPECTOMY BY BIOPSY;  Surgeon: Mike Mancuso MD;  Location:  GI     CYSTOSCOPY N/A 2019    Procedure: Cystoscopy;  Surgeon: Mary Ash MD;  Location: [facility] OR     ESOPHAGOSCOPY, GASTROSCOPY, DUODENOSCOPY (EGD), COMBINED  2014    Procedure: COMBINED ESOPHAGOSCOPY, GASTROSCOPY, DUODENOSCOPY (EGD), BIOPSY SINGLE OR MULTIPLE;  Surgeon: Mike Mancuso MD;  Location: [facility]     LAPAROSCOPIC HYSTERECTOMY TOTAL, BILATERAL SALPINGO-OOPHORECTOMY, NODE DISSECTION, COMBINED N/A 2019    Procedure: Laparoscopic Total Hysterectomy, Bilateral Salpingo-Oophorectomy, and Repair of Vaginal Laceration;  Surgeon: Mary Ash MD;  Location: [facility] OR     OPEN REDUCTION INTERNAL FIXATION ANKLE Left      OTHER SURGICAL HISTORY      [place] I as teenager secondary to ulcer     TUBAL LIGATION        Allergies   Allergen Reactions     Asa [Aspirin]      Stomach irritation (hx of PUD)     Nsaids      Stomach irritation (Hx of PUD)      Social History     Tobacco Use     Smoking status: Former Smoker     Packs/day: 0.00     Years: 10.00     Pack years: 0.00     Quit date: 2005     Years since quittin.5     Smokeless tobacco: Never Used   Substance Use Topics     Alcohol use: Not Currently      Wt Readings from Last 1  Encounters:   07/05/22 146 kg (321 lb 14 oz)        Anesthesia Evaluation   Pt has had prior anesthetic.     No history of anesthetic complications       ROS/MED HX  ENT/Pulmonary:    (-) sleep apnea   Neurologic:     (+) peripheral neuropathy,  (-) no CVA   Cardiovascular:     (+) hypertension----- (-) CAD   METS/Exercise Tolerance:     Hematologic:       Musculoskeletal:       GI/Hepatic:    (-) GERD   Renal/Genitourinary:       Endo:     (+) Obesity,  (-) Type II DM   Psychiatric/Substance Use:     (+) psychiatric history anxiety     Infectious Disease:       Malignancy:       Other:            Physical Exam    Airway        Mallampati: III   TM distance: > 3 FB   Neck ROM: full   Mouth opening: > 3 cm    Respiratory Devices and Support         Dental  no notable dental history         Cardiovascular   cardiovascular exam normal          Pulmonary   pulmonary exam normal                OUTSIDE LABS:  CBC:   Lab Results   Component Value Date    WBC 10.1 07/11/2022    WBC 8.9 07/10/2022    HGB 9.2 (L) 07/12/2022    HGB 9.9 (L) 07/11/2022    HCT 33.5 (L) 07/11/2022    HCT 30.3 (L) 07/10/2022     07/11/2022     07/10/2022     BMP:   Lab Results   Component Value Date     07/12/2022     07/11/2022    POTASSIUM 4.2 07/12/2022    POTASSIUM 4.4 07/11/2022    CHLORIDE 105 07/12/2022    CHLORIDE 105 07/11/2022    CO2 27 07/12/2022    CO2 30 07/11/2022    BUN 15 07/12/2022    BUN 18 07/11/2022    CR 0.80 07/12/2022    CR 0.84 07/11/2022     (H) 07/12/2022    GLC 93 07/11/2022     COAGS:   Lab Results   Component Value Date    INR 1.16 (H) 07/04/2022     POC:   Lab Results   Component Value Date    BGM 93 03/14/2019     HEPATIC:   Lab Results   Component Value Date    ALBUMIN 2.8 (L) 07/12/2022    PROTTOTAL 8.3 07/12/2022    ALT 12 07/12/2022    AST 16 07/12/2022    ALKPHOS 126 07/12/2022    BILITOTAL 0.5 07/12/2022     OTHER:   Lab Results   Component Value Date    PH 7.39 07/04/2022     LACT 0.8 07/04/2022    A1C Canceled, Test credited 02/23/2019    JESSICA 8.8 07/12/2022    PHOS 2.6 06/02/2020    MAG 2.6 (H) 05/29/2020    LIPASE 1,119 (H) 06/01/2020    TSH 5.48 (H) 06/27/2018    T4 0.93 06/27/2018    CRP 62.1 (H) 07/12/2022       Anesthesia Plan    ASA Status:  3   NPO Status:  NPO Appropriate    Anesthesia Type: General.     - Airway: LMA   Induction: Propofol, Intravenous.   Maintenance: Balanced.        Consents            Postoperative Care    Pain management: Multi-modal analgesia.   PONV prophylaxis: Ondansetron (or other 5HT-3), Dexamethasone or Solumedrol     Comments:                Emily Hadley MD, MD

## 2022-07-12 NOTE — PLAN OF CARE
Goal Outcome Evaluation:        Shift [id number]    A/O x2. Alert to self and place. Forgetful. A2 sera steady. Incontinent of bowel and bladders @ time. Reg diet- NPO @ midnight for I&D of ankle tomorrow. Pain with movement. DIC TBD

## 2022-07-12 NOTE — PROGRESS NOTES
[facility]  Hospitalist Progress Note   07/12/2022          Assessment and Plan:       Josiane Dasilva is a 61 year old female with hypertension, depression/anxiety, peripheral neuropathy, GERD, chronic anemia, group home resident admitted on 7/4/2022 presented with right ankle pain.     Right ankle hardware infection.    Chronic lower extremity bilateral edema along with venous stasis changes  Peripheral neuropathy.  Group home resident, wheelchair-bound.  Report of fall couple of weeks prior to admission.  Presented with right ankle pain.  Ulcer over the right ankle overlying the ankle hardware from previous fracture, edema in both her legs with chronic venous changes.  WBC 12.3. Lactic acid, procalcitonin within normal limits.  Blood cultures no growth to date.  Xray of the right ankle - fixation of an old proximal distal fibular fracture.  MRI Diffuse circumferential subcutaneous edema and swelling of the visualized leg extending to the ankle and dorsal foot.  Status joint aspiration on 7/7, cultures negative.  CRP trending down.    -- General surgery reviewed with wound ostomy nurse.  Recommend orthopedic evaluation, local wound care.  Ortho following- plan for debridement today in OR.  Infectious disease team following.  Continue IV cefazolin.  Appreciate multidisciplinary team input.  Continue PTA gabapentin.  As needed Tylenol, oxycodone for pain management.    Chronic anemia  Baseline hemoglobin between 9-10.  Iron saturation index 8.  Oral iron replacement ordered.  Hemoglobin at around previous baseline, monitor periodically.    Hypertension  Continue PTA metoprolol, lisinopril with hold parameters.  PTA list Lasix on hold, likely restart in a.m. after procedure    Anxiety/depression  Continue PTA trazodone     GERD  Continue patient with Protonix    History of chronic neuropathy.  Continue PTA gabapentin.    Concern for cognitive impairment.  Patient alert and oriented, quite  forgetful.  On chart review -confusion during previous admissions [6/2020] then neuropsychiatric testing was recommended.    Physical deconditioning with chronic debility at baseline.  History of alcohol abuse, falls previously.  Now resident of [facility].  Fall few weeks prior to admission at [facility].  Recommend OT for cognitive assessment prior to discharge.  PT evaluation prior to discharge    Obesity with a BMI greater than 30.  Consider lifestyle modification with diet and exercise as able to.     Orders Placed This Encounter      NPO per Anesthesia Guidelines for Procedure/Surgery Except for: Meds, Ice Chips      DVT Prophylaxis: SCDs, ambulate.  Holding pharmacological prophylaxis for procedure  Code Status: Full Code  Disposition: Expected discharge greater than 2 days    Discussed with patient, floor RN  More than 70% of time spent in direct patient care, care coordination, patient counseling, and formalizing plan of care.     Andrew Rogers MD        Interval History:      Patient lying in bed.  Afebrile in the last 24 hours.  Blood pressures improving.  N.p.o. from midnight for incision and drainage today.  Complaining of lower extremity pain managed with oxycodone.    ORIENTED but forgetful.         Physical Exam:        Physical Exam   Temp:  [97.9  F (36.6  C)-98.8  F (37.1  C)] 98.7  F (37.1  C)  Pulse:  [83-89] 89  Resp:  [16-17] 16  BP: (109-158)/(46-87) 150/72  SpO2:  [94 %-100 %] 96 %    Intake/Output Summary (Last 24 hours) at 7/11/2022 1553  Last data filed at 7/11/2022 0842  Gross per 24 hour   Intake 240 ml   Output 1150 ml   Net -910 ml       Admission Weight: 146 kg (321 lb 14 oz)  Current Weight: 146 kg (321 lb 14 oz)    PHYSICAL EXAM  GENERAL: Patient is in no distress, awake and can answer simple questions  HEART: Regular rate and rhythm. S1S2. No murmurs  LUNGS bilateral decreased breath sounds.  Respirations unlabored  ABDOMEN: Soft.  NEURO: moving all extremities.  EXTREMITIES:  2+ pedal edema, dressing over wounds intact.  Chronic stasis changes present  SKIN: Warm, dry.  PSYCHIATRY Cooperative       Medications:          ceFAZolin  2 g Intravenous Q8H     ferrous sulfate  325 mg Oral Daily     [Held by provider] furosemide  40 mg Oral BID     gabapentin  300 mg Oral At Bedtime     lisinopril  40 mg Oral QAM     metoprolol succinate ER  25 mg Oral BID     pantoprazole  40 mg Oral QAM AC     sodium chloride (PF)  10 mL INTRA-ARTICULAR Once     sodium chloride (PF)  3 mL Intracatheter Q8H     traZODone  50 mg Oral At Bedtime     acetaminophen **OR** acetaminophen, lidocaine 4%, lidocaine (buffered or not buffered), melatonin, methocarbamol, naloxone **OR** naloxone **OR** naloxone **OR** naloxone, ondansetron **OR** ondansetron, oxyCODONE, senna-docusate **OR** senna-docusate, sodium chloride (PF)         Data:      All new lab and imaging data was reviewed.

## 2022-07-12 NOTE — ANESTHESIA CARE TRANSFER NOTE
Patient: Josiane Dasilva    Procedure: Procedure(s):  irrigation and debridement right ankle       Diagnosis: Open wound of lower limb, right, initial encounter [S81.801A]  Diagnosis Additional Information: No value filed.    Anesthesia Type:   General     Note:    Oropharynx: oropharynx clear of all foreign objects and spontaneously breathing  Level of Consciousness: awake  Oxygen Supplementation: face mask  Level of Supplemental Oxygen (L/min / FiO2): 6  Independent Airway: airway patency satisfactory and stable  Dentition: dentition unchanged  Vital Signs Stable: post-procedure vital signs reviewed and stable  Report to RN Given: handoff report given  Patient transferred to: PACU  Comments: At end of procedure, spontaneous respirations, adequate tidal volumes, followed commands to voice, LMA removed atraumatically, oropharynx suctioned, airway patent after LMA removal. Oxygen via facemask at 6 liters per minute to PACU. Oxygen tubing connected to wall O2 in PACU, SpO2, NiBP, and EKG monitors and alarms on and functioning, Ladi Hugger warmer connected to patient gown, report on patient's clinical status given to PACU RN, RN questions answered.  Handoff Report: Identifed the Patient, Identified the Reponsible Provider, Reviewed the pertinent medical history, Discussed the surgical course, Reviewed Intra-OP anesthesia mangement and issues during anesthesia, Set expectations for post-procedure period and Allowed opportunity for questions and acknowledgement of understanding      Vitals:  Vitals Value Taken Time   /68 07/12/22 1718   Temp     Pulse 83 07/12/22 1722   Resp 23 07/12/22 1722   SpO2 100 % 07/12/22 1722   Vitals shown include unvalidated device data.    Electronically Signed By: Cha Arellano  July 12, 2022  5:23 PM

## 2022-07-12 NOTE — PLAN OF CARE
Goal Outcome Evaluation:        7/11/22 9109-5504  Pt A/O x2, alert to self and place. VSS on RA x HTN. PRN tylenol x1 for leg pain. Cont B/B, up to BR w/ A2 GB + serasteady. Tolerating regular diet, NPO at 0000 for I&D on 7/12. Discharge pending.

## 2022-07-12 NOTE — PLAN OF CARE
Goal Outcome Evaluation:    Plan of Care Reviewed With: patient     Overall Patient Progress: no change    Outcome Evaluation: Transferred to Surgery today 07/12/2022 @14:45 for I&D Right LE    A&O x1 to Person only w/ forgetfulness.   CMS Intact.   VSS on RA.   Up with Ax2-3 w/ SeraStedy.   Voiding via PureWick, in BR w/ incontinence.   Pain controlled.   Continue to monitor.    Currently in surgery for I&D of RLE.

## 2022-07-12 NOTE — CONSULTS
Care Management Initial Consult    General Information  Assessment completed with: Patient, Family, Caregiver, Other, Svetlana at [facility] sister Jyotsna  Type of CM/SW Visit: CM Role Introduction  Resides at [facility] Address in Epic correct as listed  Supervisor Susan Phone   Office: [phone] Ext.   [phone]        Fax:[phone]  Paperwork regarding Guardianship submitted to [employer] 7/12/22 appointment is Jyotsna  Primary Care Provider verified and updated as needed: yes AUSTIN Arriaza   Readmission within the last 30 days:     Reason for Consult: discharge planning  Advance Care Planning:            Communication Assessment  Patient's communication style: spoken language (English or Bilingual)    Hearing Difficulty or Deaf: no   Wear Glasses or Blind: yes (Reading glasses)    Cognitive  Cognitive/Neuro/Behavioral: .WDL except  Level of Consciousness: alert, confused  Arousal Level: opens eyes spontaneously  Orientation: disoriented to, place, time, situation  Mood/Behavior: cooperative  Best Language: 0 - No aphasia  Speech: clear, spontaneous    Living Environment:   People in home: other (see comments), facility resident  group home  Current living Arrangements: group home      Able to return to prior arrangements: yes       Family/Social Support:  Care provided by: self, other (see comments) ([facility];s as group home)  Provides care for: no one, unable/limited ability to care for self  Marital Status: Single  Sibling(s)          Description of Support System: Supportive, Involved    Support Assessment: Adequate family and caregiver support, Adequate social supports    Current Resources:   Patient receiving home care services: No (attempts made by PCP in 5/22 unsucessful due to capacity/payor)     Community Resources: County Programs, County Worker, Financial/Insurance, Transportation Services  Equipment currently used at home: wheelchair, manual  Supplies currently used at home: Incontinence Supplies, Diabetic  "Supplies, Nutritional Supplements, Enteral Nutrition & Supplies, Wound Care Supplies    Employment/Financial:  Employment Status: disabled        Financial Concerns: No concerns identified   Referral to Financial Worker: No       Lifestyle & Psychosocial Needs:  Social Determinants of Health     Tobacco Use: Medium Risk     Smoking Tobacco Use: Former Smoker     Smokeless Tobacco Use: Never Used   Alcohol Use: Not on file   Financial Resource Strain: Not on file   Food Insecurity: Not on file   Transportation Needs: Not on file   Physical Activity: Not on file   Stress: Not on file   Social Connections: Not on file   Intimate Partner Violence: Not on file   Depression: At risk     PHQ-2 Score: 6   Housing Stability: Not on file       Functional Status:  Prior to admission patient needed assistance: at baseline patient is able to Pivot Transfer and uses a wheelchair.  Patient receives assistance with meals, meds, toileting, compression socks.             Mental Health Status:   History of depression    Chemical Dependency Status:  History of ETOH                 Values/Beliefs:  Spiritual, Cultural Beliefs, Moravian Practices, Values that affect care:                 Additional Information:  Writer met with the patient at the bedside.  She stated \"call my sister Jyotsna.\"  Writer called and talked to Jyotsna she stated she is the \"Guardian\" for her sister and that [facility] has the paperwork for validation.  Per Jyotsna the patient has resided at this [facility] since 6/2021.  Patient is confused at baseline and gets assistance with all cares.  Jyotsna states that her sisters nails have caused issues with open wounds and that she is under the impression this could be the cause for the current ankle wound.  Jyotsna is aware that Josiane is down for I&D and that we will continue to work on discharge planning and identifying needs.  Writer called and talked to the  Supervisor Glo, she acknowledged " Guardianship PW and this was sent to honoring choices for validation.  Glo stated that the patient will need to be at her baseline for moving which is pivot transfer also any new medications will need to be submitted to [facility].  Per Glo they provide transportation to patients PCP/Vivian appointments.   They will need to have the patient set up for an [facility] ride (w/chair or stretcher to be determined) for discharge back to Group Home.    Per Glo they are unable to take the patient back on the weekend, we would need to coordinate a discharge with Glo when known.  Writer will continue to follow for further discharge planning needs as identified.      Misty De Santiago RN, BSN, ACM   Care Transitions Specialist  [facility]  Care Transitions Specialist  [id number] [id number]  [email or web address]  Office: [phone] Fax: [phone]  [facility]

## 2022-07-12 NOTE — ANESTHESIA PROCEDURE NOTES
Airway       Patient location during procedure: OR  Staff -        Anesthesiologist:  Emily Hadley MD       CRNA: Kvng Zaragoza APRN CRNA       Other Anesthesia Staff: Elver Waterman       Performed By: SRNA  Consent for Airway        Urgency: elective  Indications and Patient Condition       Indications for airway management: jenny-procedural       Induction type:intravenous       Mask difficulty assessment: 1 - vent by mask    Final Airway Details       Final airway type: supraglottic airway    Supraglottic Airway Details        Type: LMA       Brand: Ambu AuraGain       LMA size: 5    Post intubation assessment        Placement verified by: capnometry, equal breath sounds and chest rise        Number of attempts at approach: 1       Secured with: plastic tape       Ease of procedure: easy       Dentition: Intact and Unchanged

## 2022-07-12 NOTE — CONSULTS
[facility]  WOC Nurse Inpatient Assessment     Consulted for: Bilateral lower extremities    Brief assessment/ plan: Right anterior ankle wound over site of hardware, unclear full etiology-Not visualized 7/12 as pt heading to OR for I&D with ortho. WOC to follow up post off to assess needs.     Left LE: pt has scattered superficial weepy wounds that are slightly dryer today, significant peeling dry tissue.     Patient History (according to provider note(s):      Josiane Dasilva is a 61 year old female with chronic lower extremity edema and venous stasis ulcers who is being admitted for a possible infected venous stasis ulcer on the right ankle overlying old right ankle hardware from a previous fracture.    Areas Assessed:      Areas visualized during today's visit: Lower extremities      Wound location: right anterior ankle (Not assessed 7/12)  Last photo: 7-5-22      Wound due to: Unknown Etiology, possibly venous vs infection vs other  Wound history/plan of care: pt poor historian, does not know when or how wound developed but says it is new.  Reports usually wearing compression socks that maybe her sister bought for her.  Pt has hx hardware to ankle.  Ankle very stiff and pt cannot lift her right leg at all on her own and c/o pain when WOC touching or lifting leg.  Pt was up in chair and leg angles down heavily into the chair with heavy pressure on posterior/lateral heel area.  Pt states she has a pressure sore here already; this was not readily visible today 7/5 but it was not feasible to fully visualize all of posterior leg/heel while pt in chair.    Wound base: mix white-yellow-red moist weepy tissue with blistered epidermis at edges     Palpation of the wound bed: normal      Drainage: moderate     Description of drainage: serosanguinous     Measurements (length x width x depth, in cm): 2 x 3.5 x 0.4+cm     Tunneling: N/A     Undermining: N/A  Periwound skin: Blister(s), Dry/scaly  and Edematous      Color: normal and consistent with surrounding tissue      Temperature: normal   Odor: none  Pain: moderate, radiating and tender  Pain interventions prior to dressing change: N/A  Treatment goal: Drainage control and Maintain (prevention of deterioration)  STATUS: initial assessment  Supplies ordered: gathered    Wound location: Left lateral lower leg  Last photo: 7-5-22          Wound due to: venous/lymphedema vs other  Wound history/plan of care: pt states is newer issue and she has not been to a wound clinic or dressing wound at home, ballooning effect from where socks are digging in, sock left off 7/12  Wound base: pink moist dermis, superficial with moderate amt of healing. 5 open areas largest 2cm x 1cm      Palpation of the wound bed: normal      Drainage: scant     Description of drainage:   serous     Measurements (length x width x depth, in cm): approx 12 x 10 x 0.2cm, 5 open areas      Tunneling: N/A     Undermining: N/A  Periwound skin: Dry/scaly and Edematous      Color: normal and consistent with surrounding tissue      Temperature: normal   Odor: none  Pain: mild, tender  Pain interventions prior to dressing change: N/A  Treatment goal: Increase moisture  and Protection  STATUS: healing  Supplies ordered: at bedside     Wound location: Left Heel    Last photo: 7/12/22  Not currently a wound, significant dry peeling scale and pt reports pain. Tissue is slightly boggy  Wound history/plan of care: offload pressure and provide moisture to dry scaly tissue  Wound base: 100 % blanchable  erythema, intact epidermis     Palpation of the wound bed: boggy -slight     Drainage: none     Description of drainage: none     Measurements (length x width x depth, in cm): 1.5  x 2.5  x  0 cm      Tunneling: N/A     Undermining: N/A  Periwound skin: Intact and Dry/scaly      Color: pale      Temperature: normal   Odor: none  Pain: mild, shooting  Pain interventions prior to dressing change:  N/A  Treatment goal: Protection  STATUS: initial assessment  Supplies ordered: supplies stored on unit      Treatment Plan:     Ortho managing RLE    LLE: Every other day and PRN  1. Cleanse with microklenz and blot dry  2. Apply oil emulsion guaze (#[id number])  3. Cover absorbent dressing and secure with kerlix  4. Time/Date/Initial dressing change       L Heel: Daily  1. Cleanse with saline and blot dry  2. Apply sween 24 from base of toes to below knee.       Orders: Written    RECOMMEND PRIMARY TEAM ORDER: Lymphedema consult prn  Education provided: importance of repositioning, plan of care, wound progress, Moisture management and Off-loading pressure  Discussed plan of care with: Patient and Nurses  WOC nurse follow-up plan: weekly and prn  Notify WOC if wound(s) deteriorate.  Nursing to notify the Provider(s) and re-consult the WOC Nurse if new skin concern.    DATA:     Current support surface: Standard  Atmos Air mattress  Containment of urine/stool: Incontinence Protocol prn  BMI: Body mass index is 48.94 kg/m .   Active diet order: Orders Placed This Encounter      NPO per Anesthesia Guidelines for Procedure/Surgery Except for: Meds, Ice Chips     Output: I/O last 3 completed shifts:  In: 1140 [P.O.:1140]  Out: 800 [Urine:800]     Labs:   Recent Labs   Lab 07/12/22  1019 07/11/22  1515   ALBUMIN 2.8*  --    HGB 9.2* 9.9*   WBC  --  10.1   CRP 62.1*  --      Pressure injury risk assessment:   Sensory Perception: 3-->slightly limited  Moisture: 2-->very moist  Activity: 2-->chairfast  Mobility: 2-->very limited  Nutrition: 3-->adequate  Friction and Shear: 2-->potential problem  Hermes Score: 14    Kyrie Falcon RN   Dept. Pager: [phone]  Dept. Office Number: [phone]

## 2022-07-12 NOTE — PLAN OF CARE
Goal Outcome Evaluation:        Pt. Alert & oriented x3; disoriented to situation.  On room air.  Up with sarasteady/lift assist.  Incontinent of urine; Purewick in place at night--see I & O.  IV saline locked between antibiotics.  Bilateral lower extremities edematous, draining/peeling.  Denies pain. Pt. Made NPO at midnight pending procedure.

## 2022-07-13 ENCOUNTER — APPOINTMENT (OUTPATIENT)
Dept: PHYSICAL THERAPY | Facility: CLINIC | Age: 62
DRG: 464 | End: 2022-07-13
Attending: PHYSICIAN ASSISTANT
Payer: COMMERCIAL

## 2022-07-13 LAB
GLUCOSE BLD-MCNC: 100 MG/DL (ref 70–99)
HGB BLD-MCNC: 8.6 G/DL (ref 11.7–15.7)

## 2022-07-13 PROCEDURE — 258N000003 HC RX IP 258 OP 636: Performed by: PHYSICIAN ASSISTANT

## 2022-07-13 PROCEDURE — 250N000013 HC RX MED GY IP 250 OP 250 PS 637: Performed by: PHYSICIAN ASSISTANT

## 2022-07-13 PROCEDURE — 36569 INSJ PICC 5 YR+ W/O IMAGING: CPT

## 2022-07-13 PROCEDURE — 97161 PT EVAL LOW COMPLEX 20 MIN: CPT | Mod: GP

## 2022-07-13 PROCEDURE — 99232 SBSQ HOSP IP/OBS MODERATE 35: CPT | Performed by: HOSPITALIST

## 2022-07-13 PROCEDURE — 272N000450 HC KIT 4FR POWER PICC SINGLE LUMEN

## 2022-07-13 PROCEDURE — 82947 ASSAY GLUCOSE BLOOD QUANT: CPT | Performed by: HOSPITALIST

## 2022-07-13 PROCEDURE — 85018 HEMOGLOBIN: CPT | Performed by: PHYSICIAN ASSISTANT

## 2022-07-13 PROCEDURE — 250N000011 HC RX IP 250 OP 636: Performed by: PHYSICIAN ASSISTANT

## 2022-07-13 PROCEDURE — 250N000013 HC RX MED GY IP 250 OP 250 PS 637: Performed by: HOSPITALIST

## 2022-07-13 PROCEDURE — 120N000001 HC R&B MED SURG/OB

## 2022-07-13 PROCEDURE — L4360 PNEUMAT WALKING BOOT PRE CST: HCPCS

## 2022-07-13 PROCEDURE — 36415 COLL VENOUS BLD VENIPUNCTURE: CPT | Performed by: HOSPITALIST

## 2022-07-13 PROCEDURE — 99232 SBSQ HOSP IP/OBS MODERATE 35: CPT | Performed by: SPECIALIST

## 2022-07-13 PROCEDURE — 250N000009 HC RX 250: Performed by: SPECIALIST

## 2022-07-13 RX ORDER — FUROSEMIDE 20 MG
20 TABLET ORAL
Status: DISCONTINUED | OUTPATIENT
Start: 2022-07-13 | End: 2022-07-14

## 2022-07-13 RX ORDER — CEFAZOLIN SODIUM 2 G/100ML
2 INJECTION, SOLUTION INTRAVENOUS EVERY 8 HOURS
DISCHARGE
Start: 2022-07-13 | End: 2022-07-15

## 2022-07-13 RX ADMIN — TRAZODONE HYDROCHLORIDE 50 MG: 50 TABLET ORAL at 20:39

## 2022-07-13 RX ADMIN — CEFAZOLIN 2 G: 10 INJECTION, POWDER, FOR SOLUTION INTRAVENOUS at 18:09

## 2022-07-13 RX ADMIN — PANTOPRAZOLE SODIUM 40 MG: 40 TABLET, DELAYED RELEASE ORAL at 07:01

## 2022-07-13 RX ADMIN — ACETAMINOPHEN 650 MG: 325 TABLET ORAL at 20:35

## 2022-07-13 RX ADMIN — METHOCARBAMOL 500 MG: 500 TABLET ORAL at 13:29

## 2022-07-13 RX ADMIN — METOPROLOL SUCCINATE 25 MG: 25 TABLET, EXTENDED RELEASE ORAL at 09:05

## 2022-07-13 RX ADMIN — CEFAZOLIN 2 G: 10 INJECTION, POWDER, FOR SOLUTION INTRAVENOUS at 03:39

## 2022-07-13 RX ADMIN — GABAPENTIN 300 MG: 300 CAPSULE ORAL at 20:36

## 2022-07-13 RX ADMIN — FERROUS SULFATE TAB 325 MG (65 MG ELEMENTAL FE) 325 MG: 325 (65 FE) TAB at 09:05

## 2022-07-13 RX ADMIN — LISINOPRIL 40 MG: 40 TABLET ORAL at 09:06

## 2022-07-13 RX ADMIN — ACETAMINOPHEN 650 MG: 325 TABLET ORAL at 09:05

## 2022-07-13 RX ADMIN — METOPROLOL SUCCINATE 25 MG: 25 TABLET, EXTENDED RELEASE ORAL at 20:35

## 2022-07-13 RX ADMIN — FUROSEMIDE 20 MG: 20 TABLET ORAL at 16:04

## 2022-07-13 RX ADMIN — CEFAZOLIN 2 G: 10 INJECTION, POWDER, FOR SOLUTION INTRAVENOUS at 09:06

## 2022-07-13 RX ADMIN — LIDOCAINE HYDROCHLORIDE 1 ML: 10 INJECTION, SOLUTION EPIDURAL; INFILTRATION; INTRACAUDAL; PERINEURAL at 13:26

## 2022-07-13 ASSESSMENT — ACTIVITIES OF DAILY LIVING (ADL)
ADLS_ACUITY_SCORE: 44
ADLS_ACUITY_SCORE: 39
ADLS_ACUITY_SCORE: 44
ADLS_ACUITY_SCORE: 40
ADLS_ACUITY_SCORE: 39
ADLS_ACUITY_SCORE: 44
ADLS_ACUITY_SCORE: 39
ADLS_ACUITY_SCORE: 43
ADLS_ACUITY_SCORE: 40
ADLS_ACUITY_SCORE: 43
ADLS_ACUITY_SCORE: 40
ADLS_ACUITY_SCORE: 39

## 2022-07-13 NOTE — PROGRESS NOTES
S.  Patient was seen today at 2314-01  for an evaluation for a cam walker for their right foot/ankle as ordered by Ayah Chisholm PA-C  O/G. help stabilize foot and ankle.  A.  Patient was fit with a  Medium tall pneumatic Cam Walker for the right leg.  Cam walker was assembled by removing the soft liner from the foot plate and uprights, the patients foot and leg was positioned into the soft liner with the heel firmly sealed.  The liner was closed tightly and secured with the Velcro closure. The heel and foot were positioned in the rocker bottom foot plate and the uprights were contoured to fall at mid-line of the lower leg and secured to the Velcro.  The foot plate Velcro straps were secured, proximal straps first, then the distal strap.  The lower leg straps were attached starting distal and working proximal. The ankle joints were set at 90 degrees of dorsi/plantar flexion. Donning and doffing of the Cam Walker was explained.  P.   Patient was advised to contact this office with any problems or questions.  Gerardo MEDINA

## 2022-07-13 NOTE — PROGRESS NOTES
[id number]    Josiane is doing well. POD #0 I&D of RLE wound. A&Ox1 to self, remains pleasantly confused. VSS on RA. Pain managed with repositioning, elevation and PRN oxycodone x1. RLE wound vac in place, -125 mmHg, minimal output; dressing with ACE wrap remains C/D/I. CMS intact to BLE. Inc of urine, PureWick placed. Discharge pending. Will continue plan of care.

## 2022-07-13 NOTE — PROGRESS NOTES
07/13/22 1100   Quick Adds   Type of Visit Initial PT Evaluation   Living Environment   People in Home facility resident   Current Living Arrangements group home   Transportation Anticipated family or friend will provide   Living Environment Comments [facility], requires assist with all cares as stated in care coordinator notes. Pt was unable to provide accurate subjective history.   Self-Care   Usual Activity Tolerance fair   Current Activity Tolerance poor   Fall history within last six months yes   Number of times patient has fallen within last six months 1  (unclear due to pt poor historian and confusion)   Activity/Exercise/Self-Care Comment Pt requires assistance with all cares per care coordinator note.   General Information   Onset of Illness/Injury or Date of Surgery 07/12/22   Referring Physician Carmen Arriaza MD   Patient/Family Therapy Goals Statement (PT) none stated   Pertinent History of Current Problem (include personal factors and/or comorbidities that impact the POC) 62 yo female presents to PT POD#1 for R joint arthrotomy I&D. Pt PMH includes B LE edema with venous stasis ulcers, HTN, anxiety, depression, peripheral neuropathy and impaired cognition.   Existing Precautions/Restrictions fall;weight bearing   Weight-Bearing Status - RLE weight-bearing as tolerated  (WBAT for pivot transfers/bed to chair in CAM boot.)   Cognition   Affect/Mental Status (Cognition) confused   Orientation Status (Cognition) oriented to;person   Follows Commands (Cognition) delayed response/completion   Cognitive Status Comments Plesantly confused   Pain Assessment   Patient Currently in Pain   (Pt declines pain in RLE at rest, demonstrates high sensitivity to pain, grimacing and yelling out in pain with any active LE movement.)   Integumentary/Edema   Integumentary/Edema Comments Observed B LE swelling, RLE ace wrap and wound VAC in place   Posture    Posture Comments Able to tolerate upright sitting  posture in chair   Range of Motion (ROM)   ROM Comment LE ROM not formally assessed, generalized decreased ankle, hip and knee ROM. Unable to assess R ankle ROM d/t wound VAC and ACE wrap.   Strength (Manual Muscle Testing)   Strength Comments Demonstrates 2+/5 strength of BLEs, significatly limited due to pain   Bed Mobility   Comment, (Bed Mobility) Not assessed pt sitting up in chair   Transfers   Comment, (Transfers) Not assessed pt refused   Gait/Stairs (Locomotion)   Comment, (Gait/Stairs) Not assessed; pt wheel chair bound at baseline per notes   Sensory Examination   Sensory Perception Comments Not assessed pt has peripheral neuropathy at baseline   Clinical Impression   Criteria for Skilled Therapeutic Intervention Yes, treatment indicated   PT Diagnosis (PT) Difficulty with functional mobility   Influenced by the following impairments pain, generalized weakness, decreased ROM, decreased activity tolerance   Functional limitations due to impairments difficulty with functional mobility, transfers   Clinical Presentation (PT Evaluation Complexity) Stable/Uncomplicated   Clinical Presentation Rationale PMH and current status of post op procedure   Clinical Decision Making (Complexity) low complexity   Planned Therapy Interventions (PT) bed mobility training;patient/family education;strengthening;transfer training   Risk & Benefits of therapy have been explained patient   PT Discharge Planning   PT Discharge Recommendation (DC Rec) Transitional Care Facility   PT Rationale for DC Rec Pt below baseline with transfers, unable to fully assess functional mobility due to pain and refusal to participate. Requires stand pivot transfer with assist of 1 to return to group home. If grouphome able to provide assist with use of mechanical lift, pt able to return to group home with home PT.   PT Brief overview of current status Unable to assess functional mobility due to pt refusal as limited by pain.   Plan of Care Review    Plan of Care Reviewed With patient   Total Evaluation Time   Total Evaluation Time (Minutes) 30   Physical Therapy Goals   PT Frequency 3x/week   PT Predicted Duration/Target Date for Goal Attainment 07/16/22   PT Goals Bed Mobility;Transfers   PT: Bed Mobility Supervision/stand-by assist   PT: Transfers Minimal assist;Bed to/from chair

## 2022-07-13 NOTE — PLAN OF CARE
Goal Outcome Evaluation:    Plan of Care Reviewed With: patient     Overall Patient Progress: improving    Patient vital signs are at baseline: Yes  Patient able to ambulate as they were prior to admission or with assist devices provided by therapies during their stay:  No,  Reason:  baseline pivots only  Patient MUST void prior to discharge:  Yes  Patient able to tolerate oral intake:  Yes  Pain has adequate pain control using Oral analgesics:  Yes  Does patient have an identified :  No,  Reason:  Pending  Has goal D/C date and time been discussed with patient:  No,  Reason:      Dressing CDI. Wound Vac patent and intact. Voiding adequate amount in external cath. Up with 2-3 celsa steady/lift. PICC placed today.

## 2022-07-13 NOTE — PROGRESS NOTES
Patient came back from PACU at 6.30 pm. Confused,  gave Tylenol and Robaxin for pain, VSS on RA, has the tendency to desaturate at rest, continue to monitor.

## 2022-07-13 NOTE — PROGRESS NOTES
[facility]    Infectious Disease Progress Note    Date of Service : 07/13/2022       Assessment:  Patient with PMH venous stasis, right ankle hardware infection, peripheral neuropathy admitted 7/4 with infected right venous stasis ulcer; underwent arthrocentesis 7/7 as part of deep infection evaluation, cultures ngtd. S/p OR debridement , wound extending to the ankle joint, cxs negative thus far (has remained on antibiotics so cxs may remain suppressed)     Recommendations  1. Continue Cefazolin  2. Will need long term IV antibiotics for septic joint given extension of wound into the joint space for 4-6 weeks from surgery  3. Plan PICC   4. Follow cx data  OPIV antibiotic orders placed in Epic, further antibiotic modification based on cx data    Schedule FUP with dr. Freeman in 2 weeks    Sudha Parra MD    Interval History   Operative findings noted, wound extended to the ankle joint. Underwent I&D, cxs no growth so far (on antibiotics). Complains of pain, wound vac in place    Physical Exam   Temp: 98.6  F (37  C) Temp src: Oral BP: (!) 142/70 Pulse: 83   Resp: 20 SpO2: 94 % O2 Device: None (Room air) Oxygen Delivery: 1 LPM  Vitals:    07/05/22 1138   Weight: 146 kg (321 lb 14 oz)     Vital Signs with Ranges  Temp:  [97.9  F (36.6  C)-98.8  F (37.1  C)] 98.6  F (37  C)  Pulse:  [74-83] 83  Resp:  [14-24] 20  BP: (124-160)/(58-77) 142/70  SpO2:  [94 %-100 %] 94 %    Constitutional: Awake, alert  Lungs: Clear to auscultation bilaterally, no crackles or wheezing  Cardiovascular: Regular rate and rhythm, normal S1 and S2, and no murmur noted  Abdomen: soft non tender  Skin: stasis changes and ulcerations  MS : r ankle in dressing , wound vac in place    Other:    Medications       ceFAZolin  2 g Intravenous Q8H     ferrous sulfate  325 mg Oral Daily     [Held by provider] furosemide  40 mg Oral BID     gabapentin  300 mg Oral At Bedtime     lisinopril  40 mg Oral QAM     metoprolol succinate ER   25 mg Oral BID     pantoprazole  40 mg Oral QAM AC     sodium chloride (PF)  10 mL INTRA-ARTICULAR Once     sodium chloride (PF)  3 mL Intracatheter Q8H     sodium chloride (PF)  3 mL Intracatheter Q8H     traZODone  50 mg Oral At Bedtime       Data   All microbiology laboratory data reviewed.  Recent Labs   Lab Test 07/13/22  0815 07/12/22  1019 07/11/22  1515 07/10/22  1148 07/09/22  0913   WBC  --   --  10.1 8.9 9.5   HGB 8.6* 9.2* 9.9* 8.9* 8.9*   HCT  --   --  33.5* 30.3* 29.8*   MCV  --   --  84 84 83   PLT  --   --  257 262 259     Recent Labs   Lab Test 07/12/22  1019 07/11/22  1515 07/10/22  1148   CR 0.80 0.84 0.92

## 2022-07-13 NOTE — PROCEDURES
[facility]    Single Lumen PICC Placement    Date/Time: 7/13/2022 2:11 PM  Performed by: Eric Ritchie RN  Authorized by: Sudha Parra MD   Indications: vascular access      UNIVERSAL PROTOCOL   Site Marked: Yes  Prior Images Obtained and Reviewed:  Yes  Required items: Required blood products, implants, devices and special equipment available    Patient identity confirmed:  Verbally with patient, arm band and hospital-assigned identification number  NA - No sedation, light sedation, or local anesthesia  Confirmation Checklist:  Patient's identity using two indicators, relevant allergies, procedure was appropriate and matched the consent or emergent situation and correct equipment/implants were available  Time out: Immediately prior to the procedure a time out was called    Universal Protocol: the Joint Commission Universal Protocol was followed    Preparation: Patient was prepped and draped in usual sterile fashion       ANESTHESIA    Anesthesia: Local infiltration  Local Anesthetic:  Lidocaine 1% without epinephrine  Anesthetic Total (mL):  2      SEDATION    Patient Sedated: No        Skin prep agent: skin prep agent completely dried prior to procedure  Sterile barriers: maximum sterile barriers were used: cap, mask, sterile gown, sterile gloves, and large sterile sheet  Hand hygiene: hand hygiene performed prior to central venous catheter insertion  Type of line used: Power PICC  Catheter type: single lumen  Lumen type: valved  Catheter size: 4 Fr  Brand: Bard  Lot number: [id number]  Placement method: venipuncture, MST and ultrasound  Number of attempts: 1  Difficulty threading catheter: no  Successful placement: yes  Orientation: right    Location: basilic vein  Arm circumference: adults 10 cm  Extremity circumference: 45  Visible catheter length: 6  Internal length: 46 cm  Total catheter length: 52  Dressing and securement: chlorhexidine patch applied, subcutaneous anchor  securement system, transparent securement dressing, securement device and sterile dressing applied  Post procedure assessment: blood return through all ports and placement verified by 3CG technology  PROCEDURE   Patient Tolerance:  Patient tolerated the procedure well with no immediate complicationsDescribe Procedure:  Nursing note  Pt a/ox 2 disoriented to time and situation. The writer explained to pt guardian Jyotsna Dasilva who is also pt sister the risks/benefits of the procedure and addressed her concerns. Pt guardian consented for pt. Found 2.5 mm right basilic vein and was successful on one attempt with good blood return.

## 2022-07-13 NOTE — PROGRESS NOTES
Date/Time:07/13,,[id number]    Trauma/Ortho/Medical (Choose one) :Ortho    Diagnosis:Cellulitis of right lower extremity   POD#:I&D 07/12  Mental Status:A&O x 3,very forgetful  Activity/dangle:A2-3 Sera steady  Diet:Reg  Pain:Managed with PRN oxycodone,Robaxin,Tylenol  Russell/Voiding:Purewick with adequate amount  Tele/Restraints/Iso:None  02/LDA:RA  D/C Date:Pending     Other Info:CMS intact,IV SL   RLE wound vac in place, -125 mmHg, minimal drainage.

## 2022-07-13 NOTE — PROGRESS NOTES
Orthopedic Surgery  Josiane Dasilva  07/13/2022     Admit Date:  7/4/2022  POD: 1 Day Post-Op   Procedure(s):  irrigation and debridement right ankle    Alert, is confused.    Patient resting in bed.    Pain controlled.  Tolerating oral intake.    Denies nausea or vomiting  Denies chest pain or shortness of breath    Temp:  [97.9  F (36.6  C)-98.8  F (37.1  C)] 98.6  F (37  C)  Pulse:  [74-83] 83  Resp:  [14-24] 20  BP: (124-160)/(58-77) 142/70  SpO2:  [94 %-100 %] 94 %    Dressing is clean, dry, and intact. Wound vac functioning on right ankle   Minimal erythema of the surrounding skin.   Bilateral calves are woody to palpation, non-tender.  Right lower extremity is NVI.  Bilateral peripheral neuropathy.    Patient has limited dorsi and plantar flexion bilaterally.    +Dp pulse    Labs:  Recent Labs   Lab Test 07/13/22  0815 07/12/22  1019 07/11/22  1515 07/10/22  1148 07/09/22  0913   WBC  --   --  10.1 8.9 9.5   HGB 8.6* 9.2* 9.9* 8.9* 8.9*   PLT  --   --  257 262 259     Recent Labs   Lab Test 07/04/22  2053 08/26/14  1000   INR 1.16* 1.08     Recent Labs   Lab Test 07/12/22  1019 07/09/22  0913 07/08/22  0824   CRP 62.1* 86.1* 95.8*         1. PLAN:    Mobilize with PT/OT    WBAT for pivot transfers/bed to chair in CAM boot.   Ok to have CAM boot off in bed.     Abx per ID     Continue current pain regiment.   Dressings: Wound Vac changes Monday and Friday   Follow-up: 2 weeks with Dr Oscar     2. Disposition   Anticipate d/c to group home vs TCU when medically cleared and progressing in PT.    Ayah Chisholm PA-C

## 2022-07-13 NOTE — OP NOTE
Procedure Date: 07/12/2022    PREOPERATIVE DIAGNOSIS:  Right septic ankle.    POSTOPERATIVE DIAGNOSIS:  Right septic ankle.    PROCEDURES PERFORMED:  1.  Right ankle joint arthrotomy, irrigation and excisional debridement.  2.  Wound VAC application less than 50 cm2.    SURGEON:  Titus Oscar MD    ASSISTANT:  Eliezer Birch PA-C, whose assistance was critical for positioning the patient, retraction during exposure, closure, and wound VAC application.      ANESTHETIC:  General.    ESTIMATED BLOOD LOSS:  Approximately 50 mL.    FINDINGS:  Chronic-appearing 2 x 3 cm ulcer over the dorsal aspect of the ankle joint, essentially that tracked down to the joint.    INDICATIONS FOR PROCEDURE:  A 61-year-old female who lives in a group home with the above-named injury.  Foul-smelling right leg with chronic-appearing ulcer appearing to track down to the ankle joint, draining purulence.  Risks, benefits, alternatives explained to her and her sister.  They would like to proceed with the above.    DESCRIPTION OF PROCEDURE:  The patient was identified in preoperative holding area per hospital policy, correct operative site marked, to the OR, on OR table.  Anesthetic induced.  Betadine prep and drape, timeout performed, all in the room agreed.    A 2 x 3 cm ulcer over the dorsal aspect of the ankle.  I extended the ulcer longitudinally to allow access to the ankle, sharply excising with scalpel skin and subcutaneous tissue and fat (2x3cm), incising the ankle retinaculum, retracting the tendons and neurovascular structures.  Dissected down onto the ankle joint synovium.  There was bleeding in the synovium.  Tourniquet used and hemostasis was obtained with bipolar cautery.  The wound tracked down to the ankle joint.  There was no gross purulence in the ankle.  Copiously irrigated the ankle joint with 3 liters of normal saline.  I closed the skin with 2-0 nylon.  We used a wound VAC for the chronic 2 x 3 cm ulceration over the  dorsum of the ankle.    POSTOPERATIVE PLAN:    1.  Limit weightbearing.  2.  Cam boot.  3.  Elevate.  4.  Wound VAC, Monday, Friday changes.    Titus Oscar MD        D: 2022   T: 2022   MT: [id number]    Name:     ETIENNE BUENROSTRO  MRN:      [id number]        Account:        [id number]   :      1960           Procedure Date: 2022     Document: [id number]

## 2022-07-13 NOTE — PROGRESS NOTES
[facility]  Hospitalist Progress Note   07/13/2022          Assessment and Plan:       Josiane Dasilva is a 61 year old female with hypertension, depression/anxiety, peripheral neuropathy, GERD, chronic anemia, group home resident admitted on 7/4/2022 presented with right ankle pain.     Right ankle hardware infection Status post incision and debridement, wound VAC placed 7/12  Chronic lower extremity bilateral edema along with venous stasis changes  Peripheral neuropathy.  Group home resident, wheelchair-bound.  Report of fall couple of weeks prior to admission.  Presented with right ankle pain.  Ulcer over the right ankle overlying the ankle hardware from previous fracture, edema in both her legs with chronic venous changes.  WBC 12.3. Lactic acid, procalcitonin within normal limits.  Blood cultures no growth to date.  Xray of the right ankle - fixation of an old proximal distal fibular fracture.  MRI Diffuse circumferential subcutaneous edema and swelling of the visualized leg extending to the ankle and dorsal foot.  Status joint aspiration on 7/7, cultures negative.  CRP trending down.   Status post incision and debridement, wound VAC placed 7/12.     -- General surgery -orthopedic evaluation, local wound care.  Ortho following, recommend mobilize with PT/OT.  Weightbearing for pivot transfers/bed to chair in cam boot.  Wound VAC changes Monday and Friday.  Follow-up in 2 weeks with Dr.Dr Oscar   Infectious disease team following, IV antibiotics for 4 to 6 weeks from surgery.  Follow-up with Dr. Freeman in 2 weeks.  Continue IV cefazolin.  Appreciate multidisciplinary team input.  Continue PTA gabapentin.  As needed Tylenol, oxycodone for pain management.    Chronic anemia  Baseline hemoglobin between 9-10.  Iron saturation index 8.  Oral iron replacement ordered.  Hemoglobin at around previous baseline, monitor periodically.    Hypertension  Continue PTA metoprolol, lisinopril with hold  parameters.  PTA Lasix 40 mg twice daily.  Today restarted 20 mg twice daily.    Anxiety/depression  Continue PTA trazodone     GERD  Continue patient with Protonix    History of chronic neuropathy.  Continue PTA gabapentin.    Concern for cognitive impairment.  Patient alert and oriented, quite forgetful.  On chart review -confusion during previous admissions [6/2020] then neuropsychiatric testing was recommended.    Physical deconditioning with chronic debility at baseline.  History of alcohol abuse, falls previously.  Now resident of shelter.  Fall few weeks prior to admission at shelter.  Recommend OT for cognitive assessment at TCU.  PT evaluation   Care management team assistance with transition    Obesity with a BMI greater than 30.  Consider lifestyle modification with diet and exercise as able to.     Orders Placed This Encounter      Advance Diet as Tolerated: Regular Diet Adult      DVT Prophylaxis: SCDs, ambulate.   Code Status: Full Code  Disposition: Expected discharge pending safe discharge plan in place.    Discussed with patient, floor RN  More than 70% of time spent in direct patient care, care coordination, patient counseling, and formalizing plan of care.     Andrew Rogers MD        Interval History:      Patient lying in bed.  Afebrile in the last 24 hours.  Blood pressures improvED  s/p incision and drainage yesterday  Complaining of lower extremity pain managed with oxycodone.    Awake, able to answer simple questions.  Quite forgetful.         Physical Exam:        Physical Exam   Temp:  [97.9  F (36.6  C)-98.8  F (37.1  C)] 98.6  F (37  C)  Pulse:  [74-83] 83  Resp:  [14-24] 20  BP: (124-160)/(58-77) 142/70  SpO2:  [94 %-100 %] 94 %    Intake/Output Summary (Last 24 hours) at 7/11/2022 1553  Last data filed at 7/11/2022 0842  Gross per 24 hour   Intake 240 ml   Output 1150 ml   Net -910 ml       Admission Weight: 146 kg (321 lb 14 oz)  Current Weight: 146 kg (321 lb 14  oz)    PHYSICAL EXAM  GENERAL: Patient is in no distress, awake and can answer simple questions  HEART: Regular rate and rhythm. S1S2. No murmurs  LUNGS bilateral decreased breath sounds. Respirations unlabored  NEURO: moving all extremities.  EXTREMITIES: 2+ pedal edema, dressing over wounds intact. Wound vac+ Chronic stasis changes present  SKIN: Warm, dry.  PSYCHIATRY Cooperative       Medications:          ceFAZolin  2 g Intravenous Q8H     ferrous sulfate  325 mg Oral Daily     [Held by provider] furosemide  40 mg Oral BID     gabapentin  300 mg Oral At Bedtime     lisinopril  40 mg Oral QAM     metoprolol succinate ER  25 mg Oral BID     pantoprazole  40 mg Oral QAM AC     sodium chloride (PF)  10 mL INTRA-ARTICULAR Once     sodium chloride (PF)  3 mL Intracatheter Q8H     sodium chloride (PF)  3 mL Intracatheter Q8H     traZODone  50 mg Oral At Bedtime     acetaminophen **OR** acetaminophen, lidocaine 4%, lidocaine 4%, lidocaine (buffered or not buffered), lidocaine (buffered or not buffered), melatonin, methocarbamol, naloxone **OR** naloxone **OR** naloxone **OR** naloxone, oxyCODONE, senna-docusate **OR** senna-docusate, sodium chloride (PF), sodium chloride (PF)         Data:      All new lab and imaging data was reviewed.

## 2022-07-13 NOTE — BRIEF OP NOTE
[facility]    Brief Operative Note    Pre-operative diagnosis: Open wound of lower limb, right, initial encounter [P20.280C]  Post-operative diagnosis Same as pre-operative diagnosis    Procedure: Procedure(s):  irrigation and debridement right ankle  Surgeon: Surgeon(s) and Role:     * Titus Oscar MD - Primary     * Eliezer Birch PA-C - Assisting  Anesthesia: Choice   Estimated Blood Loss: Less than 100 ml    Drains: None  Specimens:   ID Type Source Tests Collected by Time Destination   A : Right Ankle Wound Tissue Ankle, Right ANAEROBIC BACTERIAL CULTURE ROUTINE, GRAM STAIN, AEROBIC BACTERIAL CULTURE ROUTINE Titus Oscar MD 7/12/2022  4:12 PM    B : Right ankle wound Wound Ankle, Right GRAM STAIN, AEROBIC BACTERIAL CULTURE ROUTINE Titus Oscar MD 7/12/2022  4:16 PM    C : Right ankle wound Wound Ankle, Right ANAEROBIC BACTERIAL CULTURE ROUTINE Titus Oscar MD 7/12/2022  4:17 PM      Findings:   Atypical bleeding inherent to the operation that was recognized and controlled.  Complications: None.  Implants: * No implants in log *      Right ankle ulcer that tracks deep to ankle joint  IV abx   Infectious Disease  Elevate  Wound vac, wound nurse consult  Limit WB  Titus Oscar MD

## 2022-07-13 NOTE — ANESTHESIA POSTPROCEDURE EVALUATION
Patient: Josiane Dasilva    Procedure: Procedure(s):  irrigation and debridement right ankle       Anesthesia Type:  General    Note:     Postop Pain Control: Uneventful            Sign Out: Well controlled pain   PONV: No   Neuro/Psych: Uneventful            Sign Out: Acceptable/Baseline neuro status   Airway/Respiratory: Uneventful            Sign Out: Acceptable/Baseline resp. status   CV/Hemodynamics: Uneventful            Sign Out: Acceptable CV status; No obvious hypovolemia; No obvious fluid overload   Other NRE: NONE   DID A NON-ROUTINE EVENT OCCUR? No           Last vitals:  Vitals Value Taken Time   /62 07/12/22 1800   Temp 36.6  C (97.9  F) 07/12/22 1718   Pulse 77 07/12/22 1809   Resp 30 07/12/22 1809   SpO2 99 % 07/12/22 1810   Vitals shown include unvalidated device data.    Electronically Signed By: Dirk Bradley MD  July 12, 2022  7:27 PM

## 2022-07-14 ENCOUNTER — APPOINTMENT (OUTPATIENT)
Dept: ULTRASOUND IMAGING | Facility: CLINIC | Age: 62
DRG: 464 | End: 2022-07-14
Attending: SURGERY
Payer: COMMERCIAL

## 2022-07-14 LAB
ALBUMIN SERPL-MCNC: 2.6 G/DL (ref 3.4–5)
ALP SERPL-CCNC: 115 U/L (ref 40–150)
ALT SERPL W P-5'-P-CCNC: 10 U/L (ref 0–50)
ANION GAP SERPL CALCULATED.3IONS-SCNC: 2 MMOL/L (ref 3–14)
AST SERPL W P-5'-P-CCNC: 17 U/L (ref 0–45)
BACTERIA WND CULT: ABNORMAL
BILIRUB SERPL-MCNC: 0.3 MG/DL (ref 0.2–1.3)
BUN SERPL-MCNC: 11 MG/DL (ref 7–30)
CALCIUM SERPL-MCNC: 8.5 MG/DL (ref 8.5–10.1)
CHLORIDE BLD-SCNC: 107 MMOL/L (ref 94–109)
CO2 SERPL-SCNC: 29 MMOL/L (ref 20–32)
CREAT SERPL-MCNC: 0.77 MG/DL (ref 0.52–1.04)
ERYTHROCYTE [DISTWIDTH] IN BLOOD BY AUTOMATED COUNT: 14.8 % (ref 10–15)
GFR SERPL CREATININE-BSD FRML MDRD: 87 ML/MIN/1.73M2
GLUCOSE BLD-MCNC: 102 MG/DL (ref 70–99)
HCT VFR BLD AUTO: 29 % (ref 35–47)
HGB BLD-MCNC: 8.4 G/DL (ref 11.7–15.7)
MCH RBC QN AUTO: 24.4 PG (ref 26.5–33)
MCHC RBC AUTO-ENTMCNC: 29 G/DL (ref 31.5–36.5)
MCV RBC AUTO: 84 FL (ref 78–100)
PLATELET # BLD AUTO: 236 10E3/UL (ref 150–450)
POTASSIUM BLD-SCNC: 4.1 MMOL/L (ref 3.4–5.3)
PROT SERPL-MCNC: 7.3 G/DL (ref 6.8–8.8)
RBC # BLD AUTO: 3.44 10E6/UL (ref 3.8–5.2)
SODIUM SERPL-SCNC: 138 MMOL/L (ref 133–144)
WBC # BLD AUTO: 7.7 10E3/UL (ref 4–11)

## 2022-07-14 PROCEDURE — 250N000013 HC RX MED GY IP 250 OP 250 PS 637: Performed by: PHYSICIAN ASSISTANT

## 2022-07-14 PROCEDURE — 258N000003 HC RX IP 258 OP 636: Performed by: HOSPITALIST

## 2022-07-14 PROCEDURE — 120N000001 HC R&B MED SURG/OB

## 2022-07-14 PROCEDURE — 250N000013 HC RX MED GY IP 250 OP 250 PS 637: Performed by: SURGERY

## 2022-07-14 PROCEDURE — 258N000003 HC RX IP 258 OP 636: Performed by: PHYSICIAN ASSISTANT

## 2022-07-14 PROCEDURE — 80053 COMPREHEN METABOLIC PANEL: CPT | Performed by: HOSPITALIST

## 2022-07-14 PROCEDURE — 99231 SBSQ HOSP IP/OBS SF/LOW 25: CPT | Performed by: SURGERY

## 2022-07-14 PROCEDURE — 250N000011 HC RX IP 250 OP 636: Performed by: HOSPITALIST

## 2022-07-14 PROCEDURE — 99233 SBSQ HOSP IP/OBS HIGH 50: CPT | Performed by: HOSPITALIST

## 2022-07-14 PROCEDURE — 999N000040 HC STATISTIC CONSULT NO CHARGE VASC ACCESS

## 2022-07-14 PROCEDURE — 82040 ASSAY OF SERUM ALBUMIN: CPT | Performed by: HOSPITALIST

## 2022-07-14 PROCEDURE — 250N000011 HC RX IP 250 OP 636: Performed by: PHYSICIAN ASSISTANT

## 2022-07-14 PROCEDURE — 999N000190 HC STATISTIC VAT ROUNDS

## 2022-07-14 PROCEDURE — 85014 HEMATOCRIT: CPT | Performed by: HOSPITALIST

## 2022-07-14 PROCEDURE — 93926 LOWER EXTREMITY STUDY: CPT | Mod: RT

## 2022-07-14 PROCEDURE — 250N000013 HC RX MED GY IP 250 OP 250 PS 637: Performed by: HOSPITALIST

## 2022-07-14 RX ORDER — FUROSEMIDE 40 MG
40 TABLET ORAL
Status: DISCONTINUED | OUTPATIENT
Start: 2022-07-14 | End: 2022-07-20 | Stop reason: HOSPADM

## 2022-07-14 RX ORDER — ARGININE/GLUTAMINE/CALCIUM BMB 7G-7G-1.5G
1 POWDER IN PACKET (EA) ORAL 2 TIMES DAILY
Status: DISCONTINUED | OUTPATIENT
Start: 2022-07-14 | End: 2022-07-20 | Stop reason: HOSPADM

## 2022-07-14 RX ADMIN — Medication 1 CAPSULE: at 21:22

## 2022-07-14 RX ADMIN — FERROUS SULFATE TAB 325 MG (65 MG ELEMENTAL FE) 325 MG: 325 (65 FE) TAB at 09:29

## 2022-07-14 RX ADMIN — TRAZODONE HYDROCHLORIDE 50 MG: 50 TABLET ORAL at 21:22

## 2022-07-14 RX ADMIN — IRON SUCROSE 300 MG: 20 INJECTION, SOLUTION INTRAVENOUS at 18:34

## 2022-07-14 RX ADMIN — ACETAMINOPHEN 650 MG: 325 TABLET ORAL at 06:45

## 2022-07-14 RX ADMIN — CEFAZOLIN 2 G: 10 INJECTION, POWDER, FOR SOLUTION INTRAVENOUS at 17:20

## 2022-07-14 RX ADMIN — METOPROLOL SUCCINATE 25 MG: 25 TABLET, EXTENDED RELEASE ORAL at 21:22

## 2022-07-14 RX ADMIN — METOPROLOL SUCCINATE 25 MG: 25 TABLET, EXTENDED RELEASE ORAL at 09:30

## 2022-07-14 RX ADMIN — CEFAZOLIN 2 G: 10 INJECTION, POWDER, FOR SOLUTION INTRAVENOUS at 09:30

## 2022-07-14 RX ADMIN — GABAPENTIN 300 MG: 300 CAPSULE ORAL at 21:22

## 2022-07-14 RX ADMIN — LISINOPRIL 40 MG: 40 TABLET ORAL at 09:29

## 2022-07-14 RX ADMIN — FUROSEMIDE 40 MG: 40 TABLET ORAL at 17:19

## 2022-07-14 RX ADMIN — PANTOPRAZOLE SODIUM 40 MG: 40 TABLET, DELAYED RELEASE ORAL at 06:24

## 2022-07-14 RX ADMIN — CEFAZOLIN 2 G: 10 INJECTION, POWDER, FOR SOLUTION INTRAVENOUS at 01:33

## 2022-07-14 RX ADMIN — METHOCARBAMOL 500 MG: 500 TABLET ORAL at 06:45

## 2022-07-14 RX ADMIN — FUROSEMIDE 20 MG: 20 TABLET ORAL at 09:29

## 2022-07-14 ASSESSMENT — ACTIVITIES OF DAILY LIVING (ADL)
ADLS_ACUITY_SCORE: 42
ADLS_ACUITY_SCORE: 44
ADLS_ACUITY_SCORE: 44
ADLS_ACUITY_SCORE: 42
ADLS_ACUITY_SCORE: 42
ADLS_ACUITY_SCORE: 44
ADLS_ACUITY_SCORE: 42
ADLS_ACUITY_SCORE: 44
ADLS_ACUITY_SCORE: 44
ADLS_ACUITY_SCORE: 42
ADLS_ACUITY_SCORE: 44
ADLS_ACUITY_SCORE: 44

## 2022-07-14 NOTE — CONSULTS
Care Management Follow Up    Length of Stay (days): 4    Expected Discharge Date: 07/15/2022     Concerns to be Addressed: discharge planning     Patient plan of care discussed at interdisciplinary rounds: Yes    Anticipated Discharge Disposition: Group Home vs TCU due to extended IV antx PICC and Wound Vack     Anticipated Discharge Services: Transportation Services [facility] due to confusion  Anticipated Discharge DME: None    Patient/family educated on Medicare website which has current facility and service quality ratings: yes  Education Provided on the Discharge Plan:  yes  Patient/Family in Agreement with the Plan: yes    Referrals Placed by CM/SW: Internal Clinic Care Coordination, External Care Coordination, Specialty Providers, Transportation, Community Residential Settings (Group Homes)  Private pay costs discussed: Not applicable    Additional Information:  Writer called  Supervisor Glo by phone today [phone] ext [phone] writer went over the current needs for the patient including limited mobility (per  patient would need to be at baseline of A1 and pivot), PICC line with IV antx (Q8H Cefazolin ~26 days) and wound vac with every Monday/Friday Dressing changes.  Per Glo they are unable to provide this level of care for the patient, patient has had to go to [facility] after [facility] hospitalization in 12/21.  Writer called and talked to Guardicorin Goyal by phone [phone] and explained the medical update.  Writer discussed the following with Jyotsna  1.  unable to take Josiane back and recommendation is for TCU due to PT recommendations and Skilled need for IV antx and Wound Vac dressing changes.  Per Jyotsna she would like a referral to go to [facility] will submit via [employer] and update the SW.  2. Writer discussed possible need for a level two screening and may be required for transition to TCU, if this is needed  to discuss further but Jyotsna aware that this can possibly hold  up TCU discharge.  3. Writer discussed acuity issues and possibility that we may have a difficult time with placement for Josiane at TCU.  Will ask SW to continue to provide update(s) and send out additional referrals as needed.    Jyotsna and SHELBI aware that our discharge planning team will continue to follow for further updates.        Misty De Santiago RN, BSN, AC   Care Transitions Specialist  [facility]  Care Transitions Specialist  [id number] [id number]  [email or web address], [email or web address]  Office: [phone] Fax: [phone]  [facility]

## 2022-07-14 NOTE — PROGRESS NOTES
A/O x2, confused.  VSS on RA. Baseline wheelchair bound. Voiding with pure wick. PICC and PIV sl. Wound vac to R ankle intact. Slept most of the shift. Will continue to monitor.   Patient a&ox4,vss throughout shift. Patient up and ambulatory, showered by herself and performed all adl's. Prn stool softener, anti itch meds, pain meds and lotion all given this shift. I.V rotated r/t infiltration following vanc administration.

## 2022-07-14 NOTE — PROGRESS NOTES
Consultation requested by , right ankle wound, negative pressure wound therapy, chronic lymphedema.  Underwent surgical management of a septic joint, right ankle, July 12.  Patient resides in [facility], in town visiting her sister.  Ongoing tobacco utilization.  Nondiabetic.  Ankle-brachial indices reviewed, waveforms biphasic, cannot palpate pedal pulses on physical examination, arterial duplex ultrasound for correlation ordered given risk of arterial disease.  Malnourished with low albumin.  Ace wrap from base of toe to ankle over negative pressure wound therapy, photographs of wound reviewed, exposed tendon and soft tissues.  Advised EdemaWear from base of toe to knee, overlap with red stripe to high thigh, wear 24 hours/day, elevate ankle above hip when in bed or in chair given chronic lymphedema.  Added additional adjunct of bio nutrients.  Consider VAC instill with hypochlorous acid Vashe utilization at next VAC change.  Vivian  [phone]

## 2022-07-14 NOTE — PLAN OF CARE
Goal Outcome Evaluation:    Plan of Care Reviewed With: patient     Overall Patient Progress: improving     A&O X 3, forgetful. VSS, RA. CMS intact. Dressing on RLE CDI, wound vac patent and intact. Pain managed with tylenol and robaxin. Up with 2 and lift.  Voiding in purewick, sat on commode twice no BM. PICC line SL. US on LE done, pending result.

## 2022-07-14 NOTE — PROGRESS NOTES
Orthopedic Surgery  Josiane Dasilva  07/14/2022     Admit Date:  7/4/2022  POD: 2 Days Post-Op   Procedure(s):  irrigation and debridement right ankle    Alert, confused.    Patient resting in chair.    Pain controlled.  Tolerating oral intake.    Denies nausea or vomiting  Denies chest pain or shortness of breath    Temp:  [98.3  F (36.8  C)-98.8  F (37.1  C)] 98.3  F (36.8  C)  Pulse:  [81-84] 84  Resp:  [16-18] 16  BP: (127-145)/(62-72) 145/64  SpO2:  [94 %-99 %] 94 %    Dressing is clean, dry, and intact. Wound vac functioning on right ankle   Minimal erythema of the surrounding skin.   Bilateral calves are woody to palpation, non-tender.  Right lower extremity is NVI.  Bilateral peripheral neuropathy.    Patient has limited dorsi and plantar flexion bilaterally.    +Dp pulse    Labs:  Recent Labs   Lab Test 07/14/22  0614 07/13/22  0815 07/12/22  1019 07/11/22  1515 07/10/22  1148   WBC 7.7  --   --  10.1 8.9   HGB 8.4* 8.6* 9.2* 9.9* 8.9*     --   --  257 262     Recent Labs   Lab Test 07/04/22  2053 08/26/14  1000   INR 1.16* 1.08     Recent Labs   Lab Test 07/12/22  1019 07/09/22  0913 07/08/22  0824   CRP 62.1* 86.1* 95.8*         1. PLAN:    Mobilize with PT/OT    WBAT for pivot transfers/bed to chair in CAM boot.   Ok to have CAM boot off in bed.     Abx per ID     Continue current pain regiment.   Dressings: Wound Vac changes Monday and Friday   Dr Park planning to see patient later today.      2. Disposition   Anticipate d/c to group home vs TCU when medically cleared and progressing in PT.    Ayah Chisholm PA-C

## 2022-07-14 NOTE — PLAN OF CARE
Goal Outcome Evaluation:     Shift 7/13 [id number]    Trauma/Ortho/Medical (Choose one) trauma    Diagnosis:R ankle I&D with wound vac  POD#:1  Mental Status:1-2  Activity/dangle: Turn and repo q.2hrs, pt baseline WC, pt came from group home  Diet:reg  Pain: n/a  Russell/Voiding:Pure wick  Tele/Restraints/Iso: n/a  02/LDA:PICC, anbx  D/C Date: unknown  Other Info: wound care done

## 2022-07-14 NOTE — PROGRESS NOTES
[facility]  Hospitalist Progress Note   07/14/2022          Assessment and Plan:       Josiane Dasilva is a 61 year old female with hypertension, depression/anxiety, peripheral neuropathy, GERD, chronic anemia, group home resident admitted on 7/4/2022 presented with right ankle pain.     Right ankle hardware infection Status post incision and debridement, wound VAC placed 7/12  Chronic lower extremity bilateral edema along with venous stasis changes  Peripheral neuropathy.  Group home resident, wheelchair-bound.  Report of fall couple of weeks prior to admission.  Presented with right ankle pain.  Ulcer over the right ankle overlying the ankle hardware from previous fracture, edema in both her legs with chronic venous changes.  WBC 12.3. Lactic acid, procalcitonin within normal limits.  Blood cultures no growth to date.  Xray of the right ankle - fixation of an old proximal distal fibular fracture.  MRI Diffuse circumferential subcutaneous edema and swelling of the visualized leg extending to the ankle and dorsal foot.  Status joint aspiration on 7/7, cultures negative.  CRP trending down.   Status post incision and debridement, wound VAC placed 7/12.     -- General surgery -orthopedic evaluation, local wound care.  Ortho following, recommend mobilize with PT/OT.  Weightbearing for pivot transfers/bed to chair in cam boot.  Wound VAC changes Monday and Friday.  Follow-up in 2 weeks with Dr.Dr Oscar   Infectious disease team following, IV antibiotics for 4 to 6 weeks from surgery.  Follow-up with Dr. Freeman in 2 weeks.  Wound care team, Dr. Park consulted today.  Continue IV cefazolin.  Appreciate multidisciplinary team input.  Continue PTA gabapentin.  As needed Tylenol, oxycodone, Robaxin for pain management.    Acute on chronic anemia likely dilutional, surgical blood loss.  Chronic anemia  Baseline hemoglobin between 9-10.  Postop hemoglobin at around 8.6, dropped to 8.4 today.  Iron  saturation index 8.  IV Venofer ordered for 1 doses.  Oral iron supplements.  Monitor hemoglobin periodically.    Hypertension  Continue PTA metoprolol, lisinopril with hold parameters.  PTA Lasix 40 mg twice daily.  Increase back to 40 mg oral twice daily today.    Monitor blood pressures    Anxiety/depression  Continue PTA trazodone     GERD  Continue patient with Protonix    History of chronic neuropathy.  Continue PTA gabapentin.    History of cognitive impairment.  Patient alert and oriented, quite forgetful.  On chart review -confusion during previous admissions [6/2020] then neuropsychiatric testing was recommended.  Discussed with patient's sister 7/14, at baseline.    Hypoalbuminemia likely from dilutional, acute illness.    Serum albumin 2.6.  Increased dose of Lasix to home dose today.  Dietary supplements with Ensure.  Monitor periodically.    Physical deconditioning with chronic debility at baseline.  History of alcohol abuse, falls previously.  Now resident of [facility].  Fall few weeks prior to admission at [facility].  Recommend OT for cognitive assessment at TCU.  PT evaluation   Care management team assistance with transition    Obesity with a BMI greater than 30.  Consider lifestyle modification with diet and exercise as able to.     Orders Placed This Encounter      Advance Diet as Tolerated: Regular Diet Adult      DVT Prophylaxis: SCDs, ambulate.   Code Status: Full Code  Disposition: Expected discharge pending safe discharge plan in place.    Discussed with patient, floor RN.  Updated patient's Sister, legal guardian Jyotsna, 7/14.  Total time greater than 35 minutes. more than 70% of time spent in direct patient care, care coordination, patient counseling, and formalizing plan of care.     Andrew Rogers MD        Interval History:        Patient lying in bed.  Afebrile in the last 24 hours.    Complaining of lower extremity pain managed with tylenolol, robaxin.   Awake, able to answer  simple questions.  Quite forgetful.   Up with 2 and lift.         Physical Exam:        Physical Exam   Temp:  [98.3  F (36.8  C)-98.8  F (37.1  C)] 98.5  F (36.9  C)  Pulse:  [81-84] 84  Resp:  [18] 18  BP: (127-144)/(62-72) 127/63  SpO2:  [96 %-99 %] 96 %    Intake/Output Summary (Last 24 hours) at 7/11/2022 1553  Last data filed at 7/11/2022 0842  Gross per 24 hour   Intake 240 ml   Output 1150 ml   Net -910 ml       Admission Weight: 146 kg (321 lb 14 oz)  Current Weight: 146 kg (321 lb 14 oz)    PHYSICAL EXAM  GENERAL: Patient is in no distress.  Awake and can answer simple questions  HEART: Regular rate and rhythm. S1S2. No murmurs  LUNGS bilateral decreased breath sounds. Respirations unlabored  NEURO: moving all extremities.  EXTREMITIES: 2+ pedal edema, dressing over wounds intact.   RLE Wound vac+ Chronic stasis changes present  SKIN: Warm, dry.  PSYCHIATRY Cooperative       Medications:          ceFAZolin  2 g Intravenous Q8H     ferrous sulfate  325 mg Oral Daily     furosemide  20 mg Oral BID     gabapentin  300 mg Oral At Bedtime     lisinopril  40 mg Oral QAM     metoprolol succinate ER  25 mg Oral BID     pantoprazole  40 mg Oral QAM AC     sodium chloride (PF)  10-40 mL Intracatheter Q7 Days     sodium chloride (PF)  3 mL Intracatheter Q8H     traZODone  50 mg Oral At Bedtime     acetaminophen **OR** acetaminophen, lidocaine 4%, lidocaine (buffered or not buffered), melatonin, methocarbamol, naloxone **OR** naloxone **OR** naloxone **OR** naloxone, oxyCODONE, senna-docusate **OR** senna-docusate, sodium chloride (PF), sodium chloride (PF), sodium chloride (PF), sodium chloride (PF), sodium chloride (PF)         Data:      All new lab and imaging data was reviewed.

## 2022-07-15 LAB — HGB BLD-MCNC: 8.7 G/DL (ref 11.7–15.7)

## 2022-07-15 PROCEDURE — 250N000013 HC RX MED GY IP 250 OP 250 PS 637: Performed by: PHYSICIAN ASSISTANT

## 2022-07-15 PROCEDURE — 250N000011 HC RX IP 250 OP 636: Performed by: HOSPITALIST

## 2022-07-15 PROCEDURE — 250N000011 HC RX IP 250 OP 636: Performed by: PHYSICIAN ASSISTANT

## 2022-07-15 PROCEDURE — 99232 SBSQ HOSP IP/OBS MODERATE 35: CPT | Performed by: SPECIALIST

## 2022-07-15 PROCEDURE — 999N000190 HC STATISTIC VAT ROUNDS

## 2022-07-15 PROCEDURE — 97605 NEG PRS WND THER DME<=50SQCM: CPT

## 2022-07-15 PROCEDURE — 120N000001 HC R&B MED SURG/OB

## 2022-07-15 PROCEDURE — 99232 SBSQ HOSP IP/OBS MODERATE 35: CPT | Performed by: HOSPITALIST

## 2022-07-15 PROCEDURE — 258N000003 HC RX IP 258 OP 636: Performed by: HOSPITALIST

## 2022-07-15 PROCEDURE — 258N000003 HC RX IP 258 OP 636: Performed by: PHYSICIAN ASSISTANT

## 2022-07-15 PROCEDURE — 250N000013 HC RX MED GY IP 250 OP 250 PS 637: Performed by: SURGERY

## 2022-07-15 PROCEDURE — 250N000013 HC RX MED GY IP 250 OP 250 PS 637: Performed by: HOSPITALIST

## 2022-07-15 PROCEDURE — G0463 HOSPITAL OUTPT CLINIC VISIT: HCPCS | Mod: 25

## 2022-07-15 PROCEDURE — 85018 HEMOGLOBIN: CPT | Performed by: HOSPITALIST

## 2022-07-15 PROCEDURE — 250N000011 HC RX IP 250 OP 636: Performed by: SPECIALIST

## 2022-07-15 RX ORDER — ACETAMINOPHEN 325 MG/1
650 TABLET ORAL EVERY 6 HOURS PRN
Qty: 40 TABLET | Refills: 0 | Status: SHIPPED | OUTPATIENT
Start: 2022-07-15

## 2022-07-15 RX ORDER — ERTAPENEM 1 G/1
1 INJECTION, POWDER, LYOPHILIZED, FOR SOLUTION INTRAMUSCULAR; INTRAVENOUS EVERY 24 HOURS
DISCHARGE
Start: 2022-07-15 | End: 2022-08-25

## 2022-07-15 RX ORDER — ERTAPENEM 1 G/1
1 INJECTION, POWDER, LYOPHILIZED, FOR SOLUTION INTRAMUSCULAR; INTRAVENOUS EVERY 24 HOURS
Status: DISCONTINUED | OUTPATIENT
Start: 2022-07-15 | End: 2022-07-20 | Stop reason: HOSPADM

## 2022-07-15 RX ORDER — OXYCODONE HYDROCHLORIDE 5 MG/1
2.5-5 TABLET ORAL EVERY 4 HOURS PRN
Qty: 20 TABLET | Refills: 0 | Status: SHIPPED | OUTPATIENT
Start: 2022-07-15

## 2022-07-15 RX ADMIN — PANTOPRAZOLE SODIUM 40 MG: 40 TABLET, DELAYED RELEASE ORAL at 06:17

## 2022-07-15 RX ADMIN — METHOCARBAMOL 500 MG: 500 TABLET ORAL at 16:27

## 2022-07-15 RX ADMIN — ERTAPENEM SODIUM 1 G: 1 INJECTION, POWDER, LYOPHILIZED, FOR SOLUTION INTRAMUSCULAR; INTRAVENOUS at 10:39

## 2022-07-15 RX ADMIN — VANCOMYCIN HYDROCHLORIDE 2500 MG: 5 INJECTION, POWDER, LYOPHILIZED, FOR SOLUTION INTRAVENOUS at 13:50

## 2022-07-15 RX ADMIN — FUROSEMIDE 40 MG: 40 TABLET ORAL at 08:39

## 2022-07-15 RX ADMIN — METOPROLOL SUCCINATE 25 MG: 25 TABLET, EXTENDED RELEASE ORAL at 20:56

## 2022-07-15 RX ADMIN — METHOCARBAMOL 500 MG: 500 TABLET ORAL at 10:36

## 2022-07-15 RX ADMIN — METOPROLOL SUCCINATE 25 MG: 25 TABLET, EXTENDED RELEASE ORAL at 08:40

## 2022-07-15 RX ADMIN — TRAZODONE HYDROCHLORIDE 50 MG: 50 TABLET ORAL at 20:55

## 2022-07-15 RX ADMIN — Medication 1 CAPSULE: at 21:01

## 2022-07-15 RX ADMIN — FERROUS SULFATE TAB 325 MG (65 MG ELEMENTAL FE) 325 MG: 325 (65 FE) TAB at 08:39

## 2022-07-15 RX ADMIN — FUROSEMIDE 40 MG: 40 TABLET ORAL at 16:27

## 2022-07-15 RX ADMIN — GABAPENTIN 300 MG: 300 CAPSULE ORAL at 20:56

## 2022-07-15 RX ADMIN — LISINOPRIL 40 MG: 40 TABLET ORAL at 08:40

## 2022-07-15 RX ADMIN — CEFAZOLIN 2 G: 10 INJECTION, POWDER, FOR SOLUTION INTRAVENOUS at 01:38

## 2022-07-15 ASSESSMENT — ACTIVITIES OF DAILY LIVING (ADL)
ADLS_ACUITY_SCORE: 34
ADLS_ACUITY_SCORE: 42
ADLS_ACUITY_SCORE: 42
ADLS_ACUITY_SCORE: 34
ADLS_ACUITY_SCORE: 34
ADLS_ACUITY_SCORE: 38
ADLS_ACUITY_SCORE: 34
ADLS_ACUITY_SCORE: 34
ADLS_ACUITY_SCORE: 42

## 2022-07-15 NOTE — CONSULTS
[facility]  WOC Nurse Inpatient Assessment     Consulted for: Vac dressing changes to RLE; follow-up on bilateral lower extremities    Brief assessment/ plan: Right anterior ankle wound over site of hardware, unclear etiology, now s/p I&D 7-12-22 Dr. Oscar with vac placement.  Dr. Park has also been consulted and recommended VeraFlo and EdemaWear.  7/15 WOC did first vac change and applied VeraFlo dressing with Vashe instillation.  Wound is small and clean but large tendon exposed.  Pt refusing most of the EdemaWear but allowed a Small stocking on RLE.      BLE: prior scattered superficial weepy wounds are mostly resurfaced and dry now, will update POC to just moisturizer and EdemaWear as tolerated.      Patient History (according to provider note(s):      Josiane Dasilva is a 61 year old female with chronic lower extremity edema and venous stasis ulcers who is being admitted for a possible infected venous stasis ulcer on the right ankle overlying old right ankle hardware from a previous fracture.    Areas Assessed:      Areas visualized during today's visit: Lower extremities      Negative pressure wound therapy applied to: right anterior ankle   Last photo: 7-15-22      7-15-22 RLE        7-5-22 right ankle      Wound due to: Unknown Etiology, possibly venous vs infection vs other  Wound history/plan of care: pt poor historian, does not know when or how wound developed but says it is new in the couple of weeks PTA.  Reports usually wearing compression socks that maybe her sister bought for her.  Pt has hx hardware to ankle.  Ankle and knee very stiff and pt cannot really lift her right leg at all on her own and c/o pain when WOC moving or lifting leg.  Wound deteriorated since admission and is now s/p I&D 7-12-22 Dr. Oscar with vac placement.  Per report, wound was down to joint.  Dr. Park has also been consulted and recommended VeraFlo and EdemaWear.  Pt refusing EdemaWear  to thighs and to LLE but allowed a small EdemaWear to RLE 7/15.       Surgical date: 7-12-22     Date Negative Pressure Wound Therapy initiated: 7-12-22     Interventions in place: repositioning and compression    Is patient s nutritional status compromised? no   a. If yes, what interventions are in place? N/A    Reason for initiating vac therapy? Presence of co-morbidities, High risk of infections and Need for accelerated granulation tissue and exposed tendon    Which?of?the?following?co-morbidities?apply? Obesity and Smoking  a. If diabetic is patient on a diabetic management program? N/A     Is osteomyelitis present in wound? no  a.  If yes what treatments are in place? N/A    Wound base: mix white-yellow-red moist weepy tissue with blistered epidermis at edges     Palpation of the wound bed: normal      Drainage: moderate     Description of drainage: serosanguinous     Measurements (length x width x depth, in cm): 2.5 x 3.5 x 1+cm     Tunneling: none obvious     Undermining: scant, around tendon  Periwound skin: Blister(s), Dry/scaly and Edematous      Color: normal and consistent with surrounding tissue      Temperature: normal   Odor: none  Pain: moderate, sharp and tender  Pain interventions prior to dressing change: oral meds  Treatment goal: Infection control/prevention and Increase granulation  STATUS: clean and stable, newly post-op  Supplies ordered: gathered     Number of foam pieces removed from a wound (excluding foam for bridge) : 2 GranuFoam Black   Verified this matched the number of foam pieces applied last dressing change: Yes   Number of foam pieces packed into wound (excluding foam for bridge) : 1 CleanseChoice Contact layer     Wound location: Left lateral lower leg  Last photo: 7-5-22 (no new photo 7/15)        Wound due to: venous/lymphedema vs other  Wound history/plan of care: pt states is newer issue and she has not been to a wound clinic or dressing wound at home, ballooning effect from  where socks are digging in, sock left off 7/12  Wound base: previous pink moist dermis now lightly resurfaced with flaking serous scabbing and peeling scales     Palpation of the wound bed: normal      Drainage: none  Periwound skin: Dry/scaly and Edematous      Color: normal and consistent with surrounding tissue      Temperature: normal   Odor: none  Pain: mild, tender  Pain interventions prior to dressing change: N/A  Treatment goal: Increase moisture  and Protection  STATUS: healed  Supplies ordered: at bedside     Wound location: Left Heel  Last photo: 7/12/22      Not currently a wound, significant dry peeling scale and pt reports pain. Tissue remains slightly boggy 7/15  Wound history/plan of care: offload pressure and provide moisture to dry scaly tissue  Wound base: 100 % blanchable  erythema, intact epidermis     Palpation of the wound bed: boggy       Drainage: none     Description of drainage: none     Measurements (length x width x depth, in cm): 1.5  x 2.5  x  0 cm      Tunneling: N/A     Undermining: N/A  Periwound skin: Intact and Dry/scaly      Color: pale      Temperature: normal   Odor: none  Pain: mild, and moderate to severe tenderness with palpation  Pain interventions prior to dressing change: N/A  Treatment goal: Protection  STATUS: unchanged  Supplies ordered: supplies stored on unit      Treatment Plan:     Right ankle: VeraFlo vac therapy with dressing changes 2x week and prn.  Next change Mon or Tues next week.    Current settings: 10cc Vashe instillation every 2 hrs for 10 min dwell time, and NPWT set to -125mmHg continuous.   Current dressing: Cleanse Choice contact layer (thin grey foam) to wound bed, Adapt ring to periwound, thin grey foam bridge with suction pad over wound and instill pad to medial proximal ankle  Compression: EdemaWear size small (navy stripe) applied to RLE from toes to knee, small hole cut to accomodate vac tubing.  Large EdemaWear (red stripe) to be applied to  thigh if pt allows.   Elevation: elevate BLE as much as possible; float heels at all times    If vac dressing fails and cannot be repaired in approx 2 hrs, notify Provider, remove vac dressing and start BID Vashe-moist gauze dressings until vac can be reapplied or any new orders from Surgery.     Nursing to replace Vashe bottle (#[id number]) and cannister (#[id number]) prn.    Left lower leg: Daily:   1.  Cleanse with mild skin cleanser  2.  Apply Sween 24 cream   3.  EdemaWear (small or medium) from toes to knee or thigh, as tolerated    Orders: Reviewed, Written and Updated    RECOMMEND PRIMARY TEAM ORDER: Lymphedema consult prn  Education provided: importance of repositioning, plan of care, wound progress, Moisture management and Off-loading pressure  Discussed plan of care with: Patient and Nurses  WOC nurse follow-up plan: twice weekly and prn - next dressing change Mon or Tues next week  Notify WOC if wound(s) deteriorate.  Nursing to notify the Provider(s) and re-consult the WOC Nurse if new skin concern.    DATA:     Current support surface: Standard  Atmos Air mattress  Containment of urine/stool: Incontinence Protocol prn  BMI: Body mass index is 48.94 kg/m .   Active diet order: Orders Placed This Encounter      Advance Diet as Tolerated: Regular Diet Adult     Output: I/O last 3 completed shifts:  In: 240 [P.O.:240]  Out: 800 [Urine:800]     Labs:   Recent Labs   Lab 07/15/22  0616 07/14/22  0614 07/13/22  0815 07/12/22  1019   ALBUMIN  --  2.6*  --  2.8*   HGB 8.7* 8.4*   < > 9.2*   WBC  --  7.7  --   --    CRP  --   --   --  62.1*    < > = values in this interval not displayed.     Pressure injury risk assessment:   Sensory Perception: 3-->slightly limited  Moisture: 3-->occasionally moist  Activity: 2-->chairfast  Mobility: 2-->very limited  Nutrition: 3-->adequate  Friction and Shear: 2-->potential problem  Hermes Score: 15    Danelle Sargent RN   Dept. Pager: [phone]  Dept. Office Number:  [phone]

## 2022-07-15 NOTE — PROGRESS NOTES
Pt alerted to self, forgetful. VSS except BP is slightly high. Denied pain. Dressing to RLE intact with ace wrap. Wound vac patent. Purewick in place with adequate output. Samira steady x3 to BR. BM x1. PICC intact. Pt c/o PICC site irritated.ordered IV team to change dressing. Will continue to monitor.

## 2022-07-15 NOTE — PROGRESS NOTES
[facility]  Hospitalist Progress Note        Magno Arcos MD   07/15/2022        Interval History:        - remains afebrile, no leukocytosis; on IV Ancef  - wound culture 7/12 growing Serratia and Corynebacterium, ID d/florencio Cafazolin and started Ertapenem and Vancomycin ; PICC placed 7/13  - Ortho following , to mobilize with PT/OT, WBAT for pivot transfers/bed to chair in CAM boot; needs wound vac changes Monday and Friday  - evaluated by Dr Park 7/14, US LE arterial obtained which was noted with no significant stenosis; wound cares per WOC/surgery  - SW following for disposition to TCU         Assessment and Plan:        Josiane Dasilva is a 61 year old female with hypertension, depression/anxiety, peripheral neuropathy, GERD, chronic anemia, group home resident admitted on 7/4/2022 presented with right ankle pain following a fall couple of weeks prior to admission .     She is a Group home resident, wheelchair-bound at baseline.     Right ankle hardware infection Status post incision and debridement, wound VAC placed 7/12  Chronic lower extremity bilateral edema along with venous stasis changes  Peripheral neuropathy.  - Xray of the right ankle - fixation of an old proximal distal fibular fracture.  - MRI Diffuse circumferential subcutaneous edema and swelling of the visualized leg extending to the ankle and dorsal foot.  -  evaluated and followed by ortho, Status joint aspiration on 7/7; s/p I & D and wound vac placement 7/12  - remains afebrile, leukocytosis and CRP trended down; B Cx rom 7/4 with no growth; was on IV Ancef (7/4 to 7/15)  - wound culture 7/12 growing Serratia and Corynebacterium, ID d/florencio Cefazolin (7/15) and started Ertapenem and Vancomycin (7/15) ; PICC placed 7/13; ID plans for IV abx for 4 to 6 weeks from surgery; FUP with Dr Freeman 2-3 weeks following discharge.   - Ortho following , to mobilize with PT/OT, WBAT for pivot transfers/bed to chair in CAM boot; needs  wound vac changes Monday and Friday  - evaluated by Dr Park 7/14,  LE arterial obtained which was noted with no significant stenosis; wound cares per WOC/surgery  - SW following for disposition to TCU  - to follow-up in 2 weeks with Dr.Dr Oscar  - Continue PTA gabapentin; as needed Tylenol, oxycodone, Robaxin for pain management.     Acute on chronic anemia likely dilutional, surgical blood loss.  Chronic anemia  Baseline hemoglobin between 9-10.  Postop hemoglobin at around 8.6  - stable around 8-9; Iron saturation index 8; got one dose IV Venofer  - Oral iron supplements.  - Monitor hemoglobin periodically.     Hypertension  - Continue PTA metoprolol, lisinopril with hold parameters.  - continue PTA Lasix 40 mg twice daily     Anxiety/depression  - Continue PTA trazodone     GERD  - Continue patient with Protonix     History of chronic neuropathy.  - Continue PTA gabapentin.     History of cognitive impairment.  - Patient alert and oriented but forgetful.  On chart review -confusion during previous admissions [6/2020] then neuropsychiatric testing was recommended.     Hypoalbuminemia likely from dilutional, acute illness.    Serum albumin 2.6  Dietary supplements with Ensure.       Physical deconditioning with chronic debility at baseline.  History of alcohol abuse, falls previously.  Now resident of halfway.  Fall few weeks prior to admission at halfway.  Recommend OT for cognitive assessment at TCU.  PT evaluation   Care management team assistance with transition     Obesity with a BMI greater than 30.  Consider lifestyle modification with diet and exercise as able to    Diet: Advance Diet as Tolerated: Regular Diet Adult  Snacks/Supplements Pediatric: Boost glucose control; Between Meals        DVT Prophylaxis: SCDs, ambulate.   Code Status: Full Code  Disposition: Expected discharge pending safe discharge plan in place; - SW following for disposition to TCU    Clinically Significant Risk Factors  "Present on Admission                       Page Me (7 am to 6 pm)    Care plan discussed with patient and nursing              Physical Exam:      Blood pressure 132/69, pulse 74, temperature 99  F (37.2  C), temperature source Oral, resp. rate 18, height 1.727 m (5' 8\"), weight 146 kg (321 lb 14 oz), last menstrual period 04/07/2014, SpO2 96 %, not currently breastfeeding.  Vitals:    07/05/22 1138   Weight: 146 kg (321 lb 14 oz)     Vital Signs with Ranges  Temp:  [98  F (36.7  C)-99  F (37.2  C)] 99  F (37.2  C)  Pulse:  [74-89] 74  Resp:  [16-18] 18  BP: (132-145)/(62-69) 132/69  SpO2:  [94 %-98 %] 96 %  I/O's Last 24 hours  I/O last 3 completed shifts:  In: 240 [P.O.:240]  Out: 800 [Urine:800]    Constitutional: Alert, awake and oriented , forgetful; resting comfortably in no apparent distress   HEENT: Pupils equal and reactive to light and accomodation, neck supple    Oral cavity: Moist mucosa   Cardiovascular: Normal s1 s2, regular rate and rhythm, no murmur   Lungs: B/l clear to auscultation, no wheezes or crepitations   Abdomen: Soft, nt, nd, no guarding, rigidity or rebound; BS +   LE : B/l LE edema; RLE in bandage with wound vac in place; chronic venous stasis changes   Musculoskeletal: Power 5/5 in all extremities   Neuro: No focal neurological deficits noted   Psychiatry: normal mood and affect                Medications:          ceFAZolin  2 g Intravenous Q8H     diosmin-hesperidin 450-50  1 capsule Oral BID     ferrous sulfate  325 mg Oral Daily     furosemide  40 mg Oral BID     gabapentin  300 mg Oral At Bedtime     Michael  1 packet Oral BID     lisinopril  40 mg Oral QAM     metoprolol succinate ER  25 mg Oral BID     pantoprazole  40 mg Oral QAM AC     sodium chloride (PF)  10-40 mL Intracatheter Q7 Days     sodium chloride (PF)  3 mL Intracatheter Q8H     traZODone  50 mg Oral At Bedtime     PRN Meds: acetaminophen **OR** acetaminophen, lidocaine 4%, lidocaine (buffered or not buffered), " melatonin, methocarbamol, naloxone **OR** naloxone **OR** naloxone **OR** naloxone, oxyCODONE, senna-docusate **OR** senna-docusate, sodium chloride (PF), sodium chloride (PF), sodium chloride (PF), sodium chloride (PF), sodium chloride (PF)         Data:      All new lab and imaging data was reviewed.   Recent Labs   Lab Test 07/15/22  0616 07/14/22  0614 07/13/22  0815 07/12/22  1019 07/11/22  1515 07/10/22  1148 07/05/22  0634 07/04/22  2053 01/08/15  1320 08/26/14  1000   WBC  --  7.7  --   --  10.1 8.9   < > 12.3*   < > 4.6   HGB 8.7* 8.4* 8.6*   < > 9.9* 8.9*   < > 9.9*   < > 11.6*   MCV  --  84  --   --  84 84   < > 83   < > 101*   PLT  --  236  --   --  257 262   < > 232   < > 179   INR  --   --   --   --   --   --   --  1.16*  --  1.08    < > = values in this interval not displayed.      Recent Labs   Lab Test 07/14/22  0614 07/13/22  0815 07/12/22  1740 07/12/22  1019 07/11/22  1515     --   --  136 139   POTASSIUM 4.1  --   --  4.2 4.4   CHLORIDE 107  --   --  105 105   CO2 29  --   --  27 30   BUN 11  --   --  15 18   CR 0.77  --   --  0.80 0.84   ANIONGAP 2*  --   --  4 4   JESSICA 8.5  --   --  8.8 8.8   * 100* 100* 123* 93     Recent Labs   Lab Test 05/29/20  2233 02/24/19  1142 02/24/19  0550   TROPI <0.015 <0.015 <0.015

## 2022-07-15 NOTE — PROGRESS NOTES
A/O x2 with confusion. VSS on RA. BLE wound. Dressing CDI. Wound vac on R ankle intact.  PICC in place. Voiding with pure wick. Up with 2 lift. Will continue to monitor.

## 2022-07-15 NOTE — PHARMACY-VANCOMYCIN DOSING SERVICE
Pharmacy Vancomycin Initial Note  Date of Service July 15, 2022  Patient's  1960  61 year old, female    Indication: Bone and Joint Infection    Current estimated CrCl = Estimated Creatinine Clearance: 115.6 mL/min (based on SCr of 0.77 mg/dL).    Creatinine for last 3 days  2022:  6:14 AM Creatinine 0.77 mg/dL    Recent Vancomycin Level(s) for last 3 days  No results found for requested labs within last 72 hours.      Vancomycin IV Administrations (past 72 hours)      No vancomycin orders with administrations in past 72 hours.                Nephrotoxins and other renal medications (From now, onward)    Start     Dose/Rate Route Frequency Ordered Stop    07/15/22 2230  vancomycin 1250 mg in 0.9% NaCl 250 mL intermittent infusion 1,250 mg         1,250 mg  over 90 Minutes Intravenous EVERY 12 HOURS 07/15/22 1031      07/15/22 1100  vancomycin 2500 mg in 0.9% NaCl 500 ml intermittent infusion 2,500 mg         2,500 mg  over 120 Minutes Intravenous ONCE 07/15/22 1031      22 1600  furosemide (LASIX) tablet 40 mg         40 mg Oral 2 TIMES DAILY (Diuretics and Nitrates) 22 1559      22 0900  lisinopril (ZESTRIL) tablet 40 mg         40 mg Oral EVERY MORNING 22 2313            Contrast Orders - past 72 hours (72h ago, onward)    None          Insight[employer] Prediction of Planned Initial Vancomycin Regimen  Loading dose: 2500 mg at 11:00 07/15/2022.  Regimen: 1250 mg IV every 12 hours.  Start time: 10:29 on 07/15/2022  Exposure target: AUC24 (range)400-600 mg/L.hr   AUC24,ss: 580 mg/L.hr  Probability of AUC24 > 400: 76 %  Ctrough,ss: 16.5 mg/L  Probability of Ctrough,ss > 20: 40 %  Probability of nephrotoxicity (Lodise JOSE ): 12 %          Plan:  1. Start vancomycin  1250 mg IV q12h.   2. Vancomycin monitoring method: AUC  3. Vancomycin therapeutic monitoring goal: 400-600 mg*h/L  4. Pharmacy will check vancomycin levels as appropriate in 1-3 Days.    5. Serum creatinine levels will  be ordered daily for the first week of therapy and at least twice weekly for subsequent weeks.      Елена Clinton RPH

## 2022-07-15 NOTE — PROGRESS NOTES
[facility]    Infectious Disease Progress Note    Date of Service : 07/15/2022     Assessment:  Patient with PMH venous stasis, right ankle hardware infection, peripheral neuropathy admitted 7/4 with infected right venous stasis ulcer; underwent arthrocentesis 7/7 as part of deep infection evaluation, cultures ngtd. S/p OR debridement , wound extending to the ankle joint, cxs are growing Serratia marcescens from multiple cxs and corynebacterium striatum from a single cx     Recommendations  1. Discontinue cefazolin  2, Treat with Ertapenem for serratia , adding Vancomycin for corynebacterium. Spoke with micro lab to run sensitivities. Unclear whether this is a true pathogen however .   3. Will need long term IV antibiotics for septic joint given extension of wound into the joint space for 4-6 weeks from surgery. No hardware in joint space but in close proximity    OPIV antibiotic orders placed in Epic  Vancomycin orders to be placed once dose has been determined by Pharmacy    FUP with Dr Freeman 2-3 weeks following discharge    Sudha Parra MD    Interval History   Resting, has no new complaints, has remained afebrile, new cx data available with growth of serratia and corynebacterium from operative cxs. Seen with the wound team    Physical Exam   Temp: 97.2  F (36.2  C) Temp src: Oral BP: (!) 147/71 Pulse: 81   Resp: 20 SpO2: 97 % O2 Device: None (Room air)    Vitals:    07/05/22 1138   Weight: 146 kg (321 lb 14 oz)     Vital Signs with Ranges  Temp:  [97.2  F (36.2  C)-99  F (37.2  C)] 97.2  F (36.2  C)  Pulse:  [74-89] 81  Resp:  [16-20] 20  BP: (132-147)/(62-71) 147/71  SpO2:  [96 %-98 %] 97 %    Constitutional: Awake, alert, cooperative, no apparent distress  Lungs: Clear to auscultation bilaterally, no crackles or wheezing  Cardiovascular: Regular rate and rhythm, normal S1 and S2, and no murmur noted  Abdomen: Normal bowel sounds, soft, non-distended, non-tender  MS : r ankle wound with  exposed tendons     Other:    Medications       ceFAZolin  2 g Intravenous Q8H     diosmin-hesperidin 450-50  1 capsule Oral BID     ferrous sulfate  325 mg Oral Daily     furosemide  40 mg Oral BID     gabapentin  300 mg Oral At Bedtime     Michael  1 packet Oral BID     lisinopril  40 mg Oral QAM     metoprolol succinate ER  25 mg Oral BID     pantoprazole  40 mg Oral QAM AC     sodium chloride (PF)  10-40 mL Intracatheter Q7 Days     sodium chloride (PF)  3 mL Intracatheter Q8H     traZODone  50 mg Oral At Bedtime       Data   All microbiology laboratory data reviewed.  Recent Labs   Lab Test 07/15/22  0616 07/14/22  0614 07/13/22  0815 07/12/22  1019 07/11/22  1515 07/10/22  1148   WBC  --  7.7  --   --  10.1 8.9   HGB 8.7* 8.4* 8.6*   < > 9.9* 8.9*   HCT  --  29.0*  --   --  33.5* 30.3*   MCV  --  84  --   --  84 84   PLT  --  236  --   --  257 262    < > = values in this interval not displayed.     Recent Labs   Lab Test 07/14/22  0614 07/12/22  1019 07/11/22  1515   CR 0.77 0.80 0.84     Microbiology  7/12 R ankle tissue  Ankle, Right; Tissue          0 Result Notes    Culture 1+ Serratia marcescens Abnormal        1+ Corynebacterium striatum Abnormal     Identification obtained by MALDI-TOF mass spectrometry research use only database. Test characteristics determined and verified by the [facility].   Susceptibilities not routinely done        Resulting Agency: [facility]       Susceptibility     Serratia marcescens     TIFFANIE     Ampicillin  Resistant 1     Ampicillin/ Sulbactam  Resistant 1     Cefepime <=1 ug/mL Susceptible     Ceftazidime <=1 ug/mL Susceptible     Ceftriaxone <=1 ug/mL Susceptible     Ciprofloxacin <=0.25 ug/mL Susceptible     Gentamicin <=1 ug/mL Susceptible     Levofloxacin <=0.12 ug/mL Susceptible     Meropenem <=0.25 ug/mL Susceptible     Piperacillin/Tazobactam 3 ug/mL Susceptible     Tobramycin <=1 ug/mL Susceptible     Trimethoprim/Sulfamethoxazole <=1/19 ug/mL  Susceptible

## 2022-07-15 NOTE — PROGRESS NOTES
Orthopedic Surgery  Josiane Dasilva  07/15/2022     Admit Date:  7/4/2022  POD: 3 Days Post-Op   Procedure(s):  irrigation and debridement right ankle    Alert, having wound vac change, irritated.    Patient in bed    Pain controlled.  Tolerating oral intake.        Temp:  [97.2  F (36.2  C)-99  F (37.2  C)] 97.2  F (36.2  C)  Pulse:  [74-89] 81  Resp:  [16-20] 20  BP: (132-147)/(62-71) 147/71  SpO2:  [96 %-98 %] 97 %    Half dollar sized wound down to clean tendon sheath.  Red friable edges visualized.     Minimal erythema of the surrounding skin.   Bilateral calves are woody to palpation, non-tender.  Right lower extremity is NVI.  Bilateral peripheral neuropathy.    Patient has limited dorsi and plantar flexion bilaterally.    +Dp pulse    Labs:  Recent Labs   Lab Test 07/15/22  0616 07/14/22  0614 07/13/22  0815 07/12/22  1019 07/11/22  1515 07/10/22  1148   WBC  --  7.7  --   --  10.1 8.9   HGB 8.7* 8.4* 8.6*   < > 9.9* 8.9*   PLT  --  236  --   --  257 262    < > = values in this interval not displayed.     Recent Labs   Lab Test 07/04/22 2053 08/26/14  1000   INR 1.16* 1.08     Recent Labs   Lab Test 07/12/22  1019 07/09/22  0913 07/08/22  0824   CRP 62.1* 86.1* 95.8*         1. PLAN:    Mobilize with PT/OT    WBAT for pivot transfers/bed to chair in CAM boot if able to wear comfortably over wound vac and lymphedema wrap   Ok to have CAM boot off in bed.     Abx per ID     Continue current pain regiment.   Dressings: Wound Vac changes Monday and Friday        2. Disposition   Anticipate d/c to group home vs TCU when medically cleared and progressing in PT.    Kjerstin L Foss, PA-C

## 2022-07-16 LAB
BACTERIA TISS BX CULT: ABNORMAL
BACTERIA TISS BX CULT: ABNORMAL
CREAT SERPL-MCNC: 0.79 MG/DL (ref 0.52–1.04)
GFR SERPL CREATININE-BSD FRML MDRD: 84 ML/MIN/1.73M2
VANCOMYCIN SERPL-MCNC: 22 MG/L

## 2022-07-16 PROCEDURE — 250N000011 HC RX IP 250 OP 636: Performed by: SPECIALIST

## 2022-07-16 PROCEDURE — 80202 ASSAY OF VANCOMYCIN: CPT | Performed by: HOSPITALIST

## 2022-07-16 PROCEDURE — 120N000001 HC R&B MED SURG/OB

## 2022-07-16 PROCEDURE — 82565 ASSAY OF CREATININE: CPT | Performed by: HOSPITALIST

## 2022-07-16 PROCEDURE — 250N000013 HC RX MED GY IP 250 OP 250 PS 637: Performed by: PHYSICIAN ASSISTANT

## 2022-07-16 PROCEDURE — 250N000011 HC RX IP 250 OP 636: Performed by: HOSPITALIST

## 2022-07-16 PROCEDURE — 250N000013 HC RX MED GY IP 250 OP 250 PS 637: Performed by: HOSPITALIST

## 2022-07-16 PROCEDURE — 999N000190 HC STATISTIC VAT ROUNDS

## 2022-07-16 PROCEDURE — 258N000003 HC RX IP 258 OP 636: Performed by: HOSPITALIST

## 2022-07-16 PROCEDURE — 250N000013 HC RX MED GY IP 250 OP 250 PS 637: Performed by: SURGERY

## 2022-07-16 PROCEDURE — 99232 SBSQ HOSP IP/OBS MODERATE 35: CPT | Performed by: HOSPITALIST

## 2022-07-16 RX ADMIN — LISINOPRIL 40 MG: 40 TABLET ORAL at 10:20

## 2022-07-16 RX ADMIN — FUROSEMIDE 40 MG: 40 TABLET ORAL at 07:38

## 2022-07-16 RX ADMIN — PANTOPRAZOLE SODIUM 40 MG: 40 TABLET, DELAYED RELEASE ORAL at 07:38

## 2022-07-16 RX ADMIN — VANCOMYCIN HYDROCHLORIDE 1250 MG: 5 INJECTION, POWDER, LYOPHILIZED, FOR SOLUTION INTRAVENOUS at 01:11

## 2022-07-16 RX ADMIN — METOPROLOL SUCCINATE 25 MG: 25 TABLET, EXTENDED RELEASE ORAL at 10:20

## 2022-07-16 RX ADMIN — FUROSEMIDE 40 MG: 40 TABLET ORAL at 16:37

## 2022-07-16 RX ADMIN — TRAZODONE HYDROCHLORIDE 50 MG: 50 TABLET ORAL at 21:10

## 2022-07-16 RX ADMIN — Medication 1 CAPSULE: at 22:36

## 2022-07-16 RX ADMIN — METOPROLOL SUCCINATE 25 MG: 25 TABLET, EXTENDED RELEASE ORAL at 21:10

## 2022-07-16 RX ADMIN — Medication 1 CAPSULE: at 10:21

## 2022-07-16 RX ADMIN — ERTAPENEM SODIUM 1 G: 1 INJECTION, POWDER, LYOPHILIZED, FOR SOLUTION INTRAMUSCULAR; INTRAVENOUS at 10:21

## 2022-07-16 RX ADMIN — FERROUS SULFATE TAB 325 MG (65 MG ELEMENTAL FE) 325 MG: 325 (65 FE) TAB at 10:20

## 2022-07-16 RX ADMIN — VANCOMYCIN HYDROCHLORIDE 1250 MG: 5 INJECTION, POWDER, LYOPHILIZED, FOR SOLUTION INTRAVENOUS at 14:27

## 2022-07-16 RX ADMIN — METHOCARBAMOL 500 MG: 500 TABLET ORAL at 17:58

## 2022-07-16 RX ADMIN — GABAPENTIN 300 MG: 300 CAPSULE ORAL at 21:10

## 2022-07-16 ASSESSMENT — ACTIVITIES OF DAILY LIVING (ADL)
ADLS_ACUITY_SCORE: 42
ADLS_ACUITY_SCORE: 34
ADLS_ACUITY_SCORE: 42
ADLS_ACUITY_SCORE: 42

## 2022-07-16 NOTE — PLAN OF CARE
[id number]    Pt bed completely saturated at start of shift, bedding changed. Stable vital signs and no complaints of pain. Surgical site and wound vac WDL.    Mack Jamison RN on 7/16/2022 at 6:15 AM

## 2022-07-16 NOTE — PLAN OF CARE
Goal Outcome Evaluation:      Shift 7/15 [id number]    Trauma/Ortho/Medical (Choose one) trauma    Diagnosis:R ankle I&D with wound vac  POD#:3  Mental Status:1-2  Activity/dangle: Turn and repo q.2hrs, pt baseline WC, pt came from group home, Pt does pivot into chair  Diet:reg  Pain: n/a  Russell/Voiding:Pure wick  Tele/Restraints/Iso: n/a  02/LDA:PICC, anbx  D/C Date: unknown  Other Info: wound care done

## 2022-07-16 NOTE — PROGRESS NOTES
Orthopedic Surgery  Josiane Dasilva  DATE: 07/16/2022    Admit Date:  7/4/2022    A/P:  Joisane Dasilva is a 61 year old admitted on 7/4/2022.    Chronic ulcer tracking into joint s/p right ankle I&D and application of wound vac, #4 Days Post-Op  No periop complications noted.  Reports pain currently controlled.  Wound vac in place.  WOC assisting with dressing changes.  Wound Care MD following.    --   Wound Vac changes and wound care as per WOC and Dr Park  --   IV Abx as per ID  --   Continue to follow with Dr Park.  If unable to see Dr Park for a 2 week post-op, can see Ortho in 2 weeks     Ortho will sign off.  Please call if any questions or concerns.       Madeleine Kowalski [facility]  [phone]  Text Page (7AM - 5PM)    _________________________________________________________    INTERVAL HISTORY:  Resting comfortably in bed.  Napping.  Denies pain at present.  Having trouble rolling independently in bed.       PHYSICAL EXAM:  Vital Signs: Temp: 98.4  F (36.9  C) Temp src: Oral BP: (!) 143/80 Pulse: 94   Resp: 16 SpO2: 97 % O2 Device: None (Room air)    I/O last 3 completed shifts:  In: -   Out: 1800 [Urine:1800]  Vitals:    07/05/22 1138   Weight: 146 kg (321 lb 14 oz)       Pleasant, cooperative.  Nontoxic.  NAD   NCAT.  EOM grossly intact bilaterally. MMM  Respirations unlabored  VAC in place, good seal.  C/d/i  Digits WWP      LABORATORY DATA:  I reviewed all medications, new labs and imaging results over the last 24 hours.  Recent Labs   Lab Test 07/14/22  0614 07/12/22  1019 07/11/22  1515    136 139   POTASSIUM 4.1 4.2 4.4   BUN 11 15 18   CR 0.77 0.80 0.84     Recent Labs   Lab Test 07/15/22  0616 07/14/22  0614 07/13/22  0815 07/12/22  1019 07/11/22  1515 07/10/22  1148 07/09/22  0913 07/08/22  0824   WBC  --  7.7  --   --  10.1 8.9 9.5 10.4   HGB 8.7* 8.4* 8.6* 9.2* 9.9* 8.9* 8.9* 8.8*   PLT  --  236  --   --  257 262 259 249     Recent Labs   Lab Test 07/04/22 2051  08/26/14  1000   INR 1.16* 1.08       Results for orders placed or performed during the hospital encounter of 07/04/22   XR Ankle Right 3 Views    Narrative    EXAM: XR ANKLE RIGHT G/E 3 VIEWS  LOCATION: [facility]  DATE/TIME: 7/4/2022 9:12 PM    INDICATION: pain after fall  COMPARISON: 09/30/2014      Impression    IMPRESSION: Plate-screw fixation of proximal old healed distal fibular fracture and the distal tibiofibular syndesmosis. No evidence of hardware failure. No widening of the syndesmosis. No acute fracture. The calcaneus appears mildly flattened but is   similar to prior although less well profiled. There is mild degenerative arthritis in the midfoot. Diffuse soft tissue swelling.   XR Joint Aspiration Intermed Right    Narrative    EXAM: XR JOINT ASPIRATION INTERMED RIGHT                7/7/2022 12:59  PM       History:  Right ankle pain with ulceration. Request for right ankle  joint aspiration.     PROCEDURE: The risks (including bleeding, infection, and allergy to  contrast and medications) and benefits of the procedure were explained  to the patient and consent was obtained.  Using sterile technique and  fluoroscopic guidance, a #20 gauge needle was placed into anterior  aspect of the right tibial talar joint using an anterolateral  approach. Entry site was just inferior to the ulceration.  0.5 mL of  blood-tinged viscus fluid was then aspirated. Fluid sent to lab for  analysis. Estimated blood loss during the procedure was less than 5  mL. No initial complication.       Fluoroscopy time: 0.1 minutes  Images Obtained: 3  Medications used: 3mL Lidocaine 1%      Impression    IMPRESSION:  Technically successful right ankle aspiration. Sample  sent to the lab for analysis.    KLEVER LACY PA-C         SYSTEM ID:  [id number]   US JAN Doppler No Exercise    Narrative    EXAM: RESTING ANKLE-BRACHIAL INDICES (ABIs)  LOCATION: [facility]  DATE/TIME: 7/7/2022 5:00  PM    INDICATION: bilateral lower extremity ulcers  COMPARISON: None.    JAN FINDINGS:  RIGHT  Brachial: 141  Ankle (PT): 177 Index: 1.13  Ankle (DP): 190 Index: 1.22  Digit: 128     LEFT  Brachial: 156  Ankle (PT): 187 Index: 1.20  Ankle (DP): 188 Index: 1.21  Digit: 150     The right JAN at rest is 1.22. The left JAN at rest is 1.21.      WAVEFORMS: The dorsalis pedis and posterior tibial arteries are triphasic bilaterally.      Impression    IMPRESSION:  1.  RIGHT LOWER EXTREMITY: JAN at rest is normal.  2.  LEFT LOWER EXTREMITY: JAN at rest is normal.   MR Ankle Right w/o Contrast    Narrative    MR right ankle without  contrast 7/8/2022 3:10 PM    History: Ankle pain.    Techniques: Multiplanar multisequence imaging of the right ankle was  attempted. Patient unable to continue the exam with inability to  straighten legs and pains in multiple body parts.    Comparison: 7/4/2022    Findings:    Metallic susceptibility artifact secondary to lateral fibular plate  and screw fixation compromising assessment. Low signal to noise  especially distal to the hindfoot due to positioning.    Diffuse circumferential subcutaneous edema and swelling of the  visualized leg extending to the ankle and dorsal foot.    Plantar calcaneal enthesophyte.    Grossly no evidence of edema like marrow signal intensity in the  visualized bones. Small T1 hypointensity area medial aspect of talus  at medial clear space area, maybe from remote trauma.    Physiologic amount of ankle and posterior subtalar joint fluid.    Achilles tendon is intact. Otherwise tendons and ligaments are not  well assessed.      Impression    IMPRESSION:   Severely limited study as patient was not able to straighten legs and  pains in multiple body parts.   1. Diffuse circumferential subcutaneous edema and swelling of the  visualized leg extending to the ankle and dorsal foot.    ERWIN MARYCARMEN         SYSTEM ID:  [id number]   US Lower Extremity Arterial rt     Narrative    US LOWER EXTREMITY ARTERIAL RT  7/14/2022 12:51 PM     HISTORY:  Peripheral arterial disease. Right foot wound. Tobacco use.    COMPARISON: None    FINDINGS: Color Doppler and spectral waveform analysis performed.    The following peak systolic velocities are measured in cm/s.     RIGHT    CFA: 162  PFA: 155  SFA, proximal: 119  SFA, mid: 155  SFA, distal: 174  Popliteal: 122  Anterior tibial artery: 50  Posterior tibial artery: 84  Peroneal artery: 97      Impression    IMPRESSION: No definite significant stenosis identified on ultrasound.    SALLIE BRADLEY MD         SYSTEM ID:  [id number]

## 2022-07-16 NOTE — PLAN OF CARE
Goal Outcome Evaluation:      Shift 7/16     Trauma/Ortho/Medical (Choose one) trauma    Diagnosis:R ankle I&D with wound vac  POD#:3  Mental Status:1-2  Activity/dangle: Turn and repo q.2hrs, pt baseline WC, pt came from group home, Pt does pivot into chair  Diet:reg  Pain: Pt receives robaxin for muscle spasms  Russell/Voiding:Pure wick  Tele/Restraints/Iso: n/a  02/LDA:PICC, anbx  D/C Date: unknown  Other Info: wound care done

## 2022-07-16 NOTE — PROGRESS NOTES
Care Management Follow Up    Length of Stay (days): 6    Expected Discharge Date: 07/16/2022     Concerns to be Addressed: discharge planning     Patient plan of care discussed at interdisciplinary rounds: Yes    Anticipated Discharge Disposition: Group Home     Anticipated Discharge Services: Transportation Services  Anticipated Discharge DME: None    Patient/family educated on Medicare website which has current facility and service quality ratings:  no  Education Provided on the Discharge Plan:  no  Patient/Family in Agreement with the Plan: yes    Referrals Placed by CM/SW: TCU Referrals  Private pay costs discussed: Not applicable    Additional Information:  Received a call back from Cristal, the [facility] liaison.  Patient has been declined from [facility], [facility], due to behaviors.  We will need to get additional choices.    Will continue to follow.      KAYLIE Fox, [facility]    [phone]  [facility]

## 2022-07-16 NOTE — PROGRESS NOTES
[facility]  Hospitalist Progress Note        Magno Arcos MD   07/16/2022        Interval History:        - had Vac dressing changes per WOC 7/15, no acute issues overnight         Assessment and Plan:        Josiane Dasilva is a 61 year old female with hypertension, depression/anxiety, peripheral neuropathy, GERD, chronic anemia, group home resident admitted on 7/4/2022 presented with right ankle pain following a fall couple of weeks prior to admission .     She is a Group home resident, wheelchair-bound at baseline.     Right ankle hardware infection Status post incision and debridement, wound VAC placed 7/12  Chronic lower extremity bilateral edema along with venous stasis changes  Peripheral neuropathy.  - Xray of the right ankle - fixation of an old proximal distal fibular fracture.  - MRI Diffuse circumferential subcutaneous edema and swelling of the visualized leg extending to the ankle and dorsal foot.  -  evaluated and followed by ortho, Status joint aspiration on 7/7; s/p I & D and wound vac placement 7/12  - remains afebrile, leukocytosis and CRP trended down; B Cx rom 7/4 with no growth; was on IV Ancef (7/4 to 7/15)  - wound culture 7/12 growing Serratia and Corynebacterium, ID d/florencio Cefazolin (7/15) and started Ertapenem and Vancomycin (7/15) ; PICC placed 7/13; ID plans for IV abx for 4 to 6 weeks from surgery; FUP with Dr Freeman 2-3 weeks following discharge.   - Ortho following , to mobilize with PT/OT, WBAT for pivot transfers/bed to chair in CAM boot; needs wound vac changes Monday and Friday  - evaluated by Dr Park 7/14, US LE arterial obtained which was noted with no significant stenosis; wound cares per WOC/surgery  - SW following for disposition to TCU  - to follow-up in 2 weeks with Dr.Dr Oscar  - Continue PTA gabapentin; as needed Tylenol, oxycodone, Robaxin for pain management.     Acute on chronic anemia likely dilutional, surgical blood loss.  Chronic  "anemia  Baseline hemoglobin between 9-10.  Postop hemoglobin at around 8.6  - stable around 8-9; Iron saturation index 8; got one dose IV Venofer  - Oral iron supplements.  - Monitor hemoglobin periodically.     Hypertension  - Continue PTA metoprolol, lisinopril with hold parameters.  - continue PTA Lasix 40 mg twice daily     Anxiety/depression  - Continue PTA trazodone     GERD  - Continue patient with Protonix     History of chronic neuropathy.  - Continue PTA gabapentin.     History of cognitive impairment.  - Patient alert and oriented but forgetful.  On chart review -confusion during previous admissions [6/2020] then neuropsychiatric testing was recommended.     Hypoalbuminemia likely from dilutional, acute illness.    Serum albumin 2.6  Dietary supplements with Ensure.       Physical deconditioning with chronic debility at baseline.  History of alcohol abuse, falls previously.  Now resident of [facility].  Fall few weeks prior to admission at [facility].  Recommend OT for cognitive assessment at TCU.  PT evaluation   Care management team assistance with transition     Obesity with a BMI greater than 30.  Consider lifestyle modification with diet and exercise as able to    Diet: Advance Diet as Tolerated: Regular Diet Adult  Snacks/Supplements Pediatric: Boost glucose control; Between Meals        DVT Prophylaxis: SCDs, ambulate.   Code Status: Full Code  Disposition: Expected discharge pending safe discharge plan in place; - SW following for disposition to TCU    Clinically Significant Risk Factors Present on Admission                       Page Me (7 am to 6 pm)    Care plan discussed with patient and nursing              Physical Exam:      Blood pressure (!) 147/69, pulse 90, temperature 98.2  F (36.8  C), temperature source Oral, resp. rate 16, height 1.727 m (5' 8\"), weight 146 kg (321 lb 14 oz), last menstrual period 04/07/2014, SpO2 100 %, not currently breastfeeding.  Vitals:    07/05/22 1138 "   Weight: 146 kg (321 lb 14 oz)     Vital Signs with Ranges  Temp:  [97.2  F (36.2  C)-98.2  F (36.8  C)] 98.2  F (36.8  C)  Pulse:  [81-95] 90  Resp:  [16-20] 16  BP: (142-180)/() 147/69  SpO2:  [97 %-100 %] 100 %  I/O's Last 24 hours  I/O last 3 completed shifts:  In: -   Out: 1800 [Urine:1800]    Constitutional: Alert, awake and oriented , forgetful; resting comfortably in no apparent distress   HEENT: Pupils equal and reactive to light and accomodation, neck supple    Oral cavity: Moist mucosa   Cardiovascular: Normal s1 s2, regular rate and rhythm, no murmur   Lungs: B/l clear to auscultation, no wheezes or crepitations   Abdomen: Soft, nt, nd, no guarding, rigidity or rebound; BS +   LE : B/l LE edema improving; RLE in bandage with wound vac in place; chronic venous stasis changes   Musculoskeletal: Power 5/5 in all extremities   Neuro: No focal neurological deficits noted   Psychiatry: normal mood and affect                Medications:          diosmin-hesperidin 450-50  1 capsule Oral BID     ertapenem (INVanz) IV  1 g Intravenous Q24H     ferrous sulfate  325 mg Oral Daily     furosemide  40 mg Oral BID     gabapentin  300 mg Oral At Bedtime     Michael  1 packet Oral BID     lisinopril  40 mg Oral QAM     metoprolol succinate ER  25 mg Oral BID     pantoprazole  40 mg Oral QAM AC     sodium chloride (PF)  10-40 mL Intracatheter Q7 Days     traZODone  50 mg Oral At Bedtime     vancomycin  1,250 mg Intravenous Q12H     PRN Meds: acetaminophen **OR** acetaminophen, lidocaine 4%, lidocaine (buffered or not buffered), melatonin, methocarbamol, naloxone **OR** naloxone **OR** naloxone **OR** naloxone, oxyCODONE, senna-docusate **OR** senna-docusate, sodium chloride (PF), sodium chloride (PF), sodium chloride (PF), sodium chloride (PF), sodium chloride (PF)         Data:      All new lab and imaging data was reviewed.   Recent Labs   Lab Test 07/15/22  0616 07/14/22  0614 07/13/22  0815 07/12/22  1019  07/11/22  1515 07/10/22  1148 07/05/22  0634 07/04/22  2053 01/08/15  1320 08/26/14  1000   WBC  --  7.7  --   --  10.1 8.9   < > 12.3*   < > 4.6   HGB 8.7* 8.4* 8.6*   < > 9.9* 8.9*   < > 9.9*   < > 11.6*   MCV  --  84  --   --  84 84   < > 83   < > 101*   PLT  --  236  --   --  257 262   < > 232   < > 179   INR  --   --   --   --   --   --   --  1.16*  --  1.08    < > = values in this interval not displayed.      Recent Labs   Lab Test 07/14/22  0614 07/13/22  0815 07/12/22  1740 07/12/22  1019 07/11/22  1515     --   --  136 139   POTASSIUM 4.1  --   --  4.2 4.4   CHLORIDE 107  --   --  105 105   CO2 29  --   --  27 30   BUN 11  --   --  15 18   CR 0.77  --   --  0.80 0.84   ANIONGAP 2*  --   --  4 4   JESSICA 8.5  --   --  8.8 8.8   * 100* 100* 123* 93     Recent Labs   Lab Test 05/29/20  2233 02/24/19  1142 02/24/19  0550   TROPI <0.015 <0.015 <0.015

## 2022-07-17 LAB
CREAT SERPL-MCNC: 0.88 MG/DL (ref 0.52–1.04)
GFR SERPL CREATININE-BSD FRML MDRD: 74 ML/MIN/1.73M2

## 2022-07-17 PROCEDURE — 250N000011 HC RX IP 250 OP 636: Performed by: SPECIALIST

## 2022-07-17 PROCEDURE — 250N000011 HC RX IP 250 OP 636: Performed by: HOSPITALIST

## 2022-07-17 PROCEDURE — 250N000013 HC RX MED GY IP 250 OP 250 PS 637: Performed by: HOSPITALIST

## 2022-07-17 PROCEDURE — 258N000003 HC RX IP 258 OP 636: Performed by: HOSPITALIST

## 2022-07-17 PROCEDURE — 99232 SBSQ HOSP IP/OBS MODERATE 35: CPT | Performed by: HOSPITALIST

## 2022-07-17 PROCEDURE — 250N000013 HC RX MED GY IP 250 OP 250 PS 637: Performed by: PHYSICIAN ASSISTANT

## 2022-07-17 PROCEDURE — 250N000013 HC RX MED GY IP 250 OP 250 PS 637: Performed by: SURGERY

## 2022-07-17 PROCEDURE — 82565 ASSAY OF CREATININE: CPT | Performed by: HOSPITALIST

## 2022-07-17 PROCEDURE — 999N000190 HC STATISTIC VAT ROUNDS

## 2022-07-17 PROCEDURE — 120N000001 HC R&B MED SURG/OB

## 2022-07-17 RX ORDER — VANCOMYCIN HYDROCHLORIDE 1 G/200ML
1000 INJECTION, SOLUTION INTRAVENOUS EVERY 12 HOURS
Status: DISCONTINUED | OUTPATIENT
Start: 2022-07-17 | End: 2022-07-18

## 2022-07-17 RX ADMIN — Medication 1 CAPSULE: at 09:27

## 2022-07-17 RX ADMIN — LISINOPRIL 40 MG: 40 TABLET ORAL at 09:25

## 2022-07-17 RX ADMIN — METOPROLOL SUCCINATE 25 MG: 25 TABLET, EXTENDED RELEASE ORAL at 09:24

## 2022-07-17 RX ADMIN — ACETAMINOPHEN 650 MG: 325 TABLET ORAL at 14:22

## 2022-07-17 RX ADMIN — ERTAPENEM SODIUM 1 G: 1 INJECTION, POWDER, LYOPHILIZED, FOR SOLUTION INTRAMUSCULAR; INTRAVENOUS at 09:44

## 2022-07-17 RX ADMIN — METHOCARBAMOL 500 MG: 500 TABLET ORAL at 14:22

## 2022-07-17 RX ADMIN — Medication 1 CAPSULE: at 21:01

## 2022-07-17 RX ADMIN — FERROUS SULFATE TAB 325 MG (65 MG ELEMENTAL FE) 325 MG: 325 (65 FE) TAB at 09:25

## 2022-07-17 RX ADMIN — FUROSEMIDE 40 MG: 40 TABLET ORAL at 15:55

## 2022-07-17 RX ADMIN — VANCOMYCIN HYDROCHLORIDE 1250 MG: 5 INJECTION, POWDER, LYOPHILIZED, FOR SOLUTION INTRAVENOUS at 02:24

## 2022-07-17 RX ADMIN — PANTOPRAZOLE SODIUM 40 MG: 40 TABLET, DELAYED RELEASE ORAL at 06:41

## 2022-07-17 RX ADMIN — GABAPENTIN 300 MG: 300 CAPSULE ORAL at 21:01

## 2022-07-17 RX ADMIN — TRAZODONE HYDROCHLORIDE 50 MG: 50 TABLET ORAL at 21:01

## 2022-07-17 RX ADMIN — METOPROLOL SUCCINATE 25 MG: 25 TABLET, EXTENDED RELEASE ORAL at 21:02

## 2022-07-17 RX ADMIN — FUROSEMIDE 40 MG: 40 TABLET ORAL at 09:24

## 2022-07-17 RX ADMIN — VANCOMYCIN HYDROCHLORIDE 1000 MG: 1 INJECTION, SOLUTION INTRAVENOUS at 14:28

## 2022-07-17 ASSESSMENT — ACTIVITIES OF DAILY LIVING (ADL)
ADLS_ACUITY_SCORE: 44
ADLS_ACUITY_SCORE: 42
ADLS_ACUITY_SCORE: 42
ADLS_ACUITY_SCORE: 44
ADLS_ACUITY_SCORE: 42
ADLS_ACUITY_SCORE: 44
ADLS_ACUITY_SCORE: 44
ADLS_ACUITY_SCORE: 42
ADLS_ACUITY_SCORE: 44

## 2022-07-17 NOTE — PLAN OF CARE
Goal Outcome Evaluation:    Plan of Care Reviewed With: patient      Patient vital signs are at baseline: Yes  Patient able to ambulate as they were prior to admission or with assist devices provided by therapies during their stay:  No,  Reason:  tcu  Patient MUST void prior to discharge:  Yes  Patient able to tolerate oral intake:  Yes  Pain has adequate pain control using Oral analgesics:  Yes  Does patient have an identified :  No,  Reason:  TCU  Has goal D/C date and time been discussed with patient:  No,  Reason:  TCU    A&O x 3, forgetful. VSS, RA. CMS intact. Dressing on RLE CDI, wound vac patent and intact. Pain managed with tylenol and robaxin. Up with 2 and lift. Incontinent of B&B. TCU pending.

## 2022-07-17 NOTE — PROGRESS NOTES
[facility]  Hospitalist Progress Note        Magno Arcos MD   07/17/2022        Interval History:        - ortho signed off 7/16, suggest to follow with Dr Park or Ortho follow up un 2 weeks  - no acute issues overnight         Assessment and Plan:        Josiane Dasilva is a 61 year old female with hypertension, depression/anxiety, peripheral neuropathy, GERD, chronic anemia, group home resident admitted on 7/4/2022 presented with right ankle pain following a fall couple of weeks prior to admission .     She is a Group home resident, wheelchair-bound at baseline.     Right ankle hardware infection Status post incision and debridement, wound VAC placed 7/12  Chronic lower extremity bilateral edema along with venous stasis changes  Peripheral neuropathy.  - Xray of the right ankle - fixation of an old proximal distal fibular fracture.  - MRI Diffuse circumferential subcutaneous edema and swelling of the visualized leg extending to the ankle and dorsal foot.  -  evaluated and followed by ortho, Status joint aspiration on 7/7; s/p I & D and wound vac placement 7/12  - remains afebrile, leukocytosis and CRP trended down; B Cx rom 7/4 with no growth; was on IV Ancef (7/4 to 7/15)  - wound culture 7/12 growing Serratia and Corynebacterium, ID d/florencio Cefazolin (7/15) and started Ertapenem and Vancomycin (7/15) ; PICC placed 7/13; ID plans for IV abx for 4 to 6 weeks from surgery; FUP with Dr Freeman 2-3 weeks following discharge.   - Ortho following , to mobilize with PT/OT, WBAT for pivot transfers/bed to chair in CAM boot; needs wound vac changes Monday and Friday  - evaluated by Dr Park 7/14, US LE arterial obtained which was noted with no significant stenosis; wound cares per WOC/surgery  - SW following for disposition to TCU  - to follow-up in 2 weeks with Dr.Dr Oscar; ortho signed off 7/16  - Continue PTA gabapentin; as needed Tylenol, oxycodone, Robaxin for pain management.     Acute  "on chronic anemia likely dilutional, surgical blood loss.  Chronic anemia  Baseline hemoglobin between 9-10.  Postop hemoglobin at around 8.6  - stable around 8-9; Iron saturation index 8; got one dose IV Venofer  - Oral iron supplements.  - Monitor hemoglobin periodically.     Hypertension  - Continue PTA metoprolol, lisinopril with hold parameters.  - continue PTA Lasix 40 mg twice daily     Anxiety/depression  - Continue PTA trazodone     GERD  - Continue patient with Protonix     History of chronic neuropathy.  - Continue PTA gabapentin.     History of cognitive impairment.  - Patient alert and oriented but forgetful.  On chart review -confusion during previous admissions [6/2020] then neuropsychiatric testing was recommended.     Hypoalbuminemia likely from dilutional, acute illness.    Serum albumin 2.6  Dietary supplements with Ensure.       Physical deconditioning with chronic debility at baseline.  History of alcohol abuse, falls previously.  Now resident of [facility].  Fall few weeks prior to admission at [facility].  Recommend OT for cognitive assessment at TCU.  PT evaluation   Care management team assistance with transition     Obesity with a BMI greater than 30.  Consider lifestyle modification with diet and exercise as able to    Diet: Advance Diet as Tolerated: Regular Diet Adult  Snacks/Supplements Pediatric: Boost glucose control; Between Meals  Room Service        DVT Prophylaxis: SCDs, ambulate.   Code Status: Full Code  Disposition: Expected discharge pending safe discharge plan in place; - SW following for disposition to TCU    Clinically Significant Risk Factors Present on Admission                       Page Me (7 am to 6 pm)    Care plan discussed with patient              Physical Exam:      Blood pressure (!) 153/70, pulse 92, temperature 98.4  F (36.9  C), temperature source Oral, resp. rate 18, height 1.727 m (5' 8\"), weight 146 kg (321 lb 14 oz), last menstrual period 04/07/2014, SpO2 " 98 %, not currently breastfeeding.  Vitals:    07/05/22 1138   Weight: 146 kg (321 lb 14 oz)     Vital Signs with Ranges  Temp:  [98.4  F (36.9  C)-99.3  F (37.4  C)] 98.4  F (36.9  C)  Pulse:  [82-98] 92  Resp:  [16-18] 18  BP: (134-153)/(60-80) 153/70  SpO2:  [97 %-98 %] 98 %  I/O's Last 24 hours  I/O last 3 completed shifts:  In: -   Out: 1975 [Urine:575; Drains:1400]    Constitutional: Alert, awake and oriented , forgetful; resting comfortably in no apparent distress   HEENT: Pupils equal and reactive to light and accomodation, neck supple    Oral cavity: Moist mucosa   Cardiovascular: Normal s1 s2, regular rate and rhythm, no murmur   Lungs: B/l clear to auscultation, no wheezes or crepitations   Abdomen: Soft, nt, nd, no guarding, rigidity or rebound; BS +   LE : B/l LE edema improving; RLE in bandage with wound vac in place; chronic venous stasis changes   Musculoskeletal: Power 5/5 in all extremities   Neuro: No focal neurological deficits noted   Psychiatry: normal mood and affect                Medications:          diosmin-hesperidin 450-50  1 capsule Oral BID     ertapenem (INVanz) IV  1 g Intravenous Q24H     ferrous sulfate  325 mg Oral Daily     furosemide  40 mg Oral BID     gabapentin  300 mg Oral At Bedtime     Michael  1 packet Oral BID     lisinopril  40 mg Oral QAM     metoprolol succinate ER  25 mg Oral BID     pantoprazole  40 mg Oral QAM AC     sodium chloride (PF)  10-40 mL Intracatheter Q7 Days     traZODone  50 mg Oral At Bedtime     vancomycin  1,250 mg Intravenous Q12H     PRN Meds: acetaminophen **OR** acetaminophen, lidocaine 4%, melatonin, methocarbamol, naloxone **OR** naloxone **OR** naloxone **OR** naloxone, oxyCODONE, senna-docusate **OR** senna-docusate, sodium chloride (PF), sodium chloride (PF), sodium chloride (PF), sodium chloride (PF)         Data:      All new lab and imaging data was reviewed.   Recent Labs   Lab Test 07/15/22  0616 07/14/22  0614 07/13/22  0815  07/12/22  1019 07/11/22  1515 07/10/22  1148 07/05/22  0634 07/04/22  2053 01/08/15  1320 08/26/14  1000   WBC  --  7.7  --   --  10.1 8.9   < > 12.3*   < > 4.6   HGB 8.7* 8.4* 8.6*   < > 9.9* 8.9*   < > 9.9*   < > 11.6*   MCV  --  84  --   --  84 84   < > 83   < > 101*   PLT  --  236  --   --  257 262   < > 232   < > 179   INR  --   --   --   --   --   --   --  1.16*  --  1.08    < > = values in this interval not displayed.      Recent Labs   Lab Test 07/17/22  0517 07/16/22 2111 07/14/22  0614 07/13/22  0815 07/12/22  1740 07/12/22  1019 07/11/22  1515   NA  --   --  138  --   --  136 139   POTASSIUM  --   --  4.1  --   --  4.2 4.4   CHLORIDE  --   --  107  --   --  105 105   CO2  --   --  29  --   --  27 30   BUN  --   --  11  --   --  15 18   CR 0.88 0.79 0.77  --   --  0.80 0.84   ANIONGAP  --   --  2*  --   --  4 4   JESSICA  --   --  8.5  --   --  8.8 8.8   GLC  --   --  102* 100* 100* 123* 93     Recent Labs   Lab Test 05/29/20 2233 02/24/19  1142 02/24/19  0550   TROPI <0.015 <0.015 <0.015

## 2022-07-17 NOTE — PLAN OF CARE
[id number]: Pleasantly confused, disoriented to time and place. Gets intermittent spasm, taking prn robaxin. (R) ankle tender to touch, Vac at 125 mm HG cont. Up with 2 assist with with celsa steady. Voiding via external catheter/BSC.  Pending TCU placement.

## 2022-07-17 NOTE — PHARMACY-VANCOMYCIN DOSING SERVICE
Pharmacy Vancomycin Note  Date of Service 2022  Patient's  1960   61 year old, female    Indication: Bone and Joint Infection  Day of Therapy: 3  Current vancomycin regimen:  1250 mg IV q12h  Current vancomycin monitoring method: AUC  Current vancomycin therapeutic monitoring goal: 400-600 mg*h/L     Creat up slightly from yesterday - AUC now predicted > 600 (621) - will empirically change Vanco dose to 1 gm q12h as result yesterday was at upper end of desired dose range and now today predicted above range.  Will check level after 3-5 doses.

## 2022-07-17 NOTE — PHARMACY-VANCOMYCIN DOSING SERVICE
Pharmacy Vancomycin Note  Date of Service 2022  Patient's  1960   61 year old, female    Indication: Bone and Joint Infection  Day of Therapy: 2   Current vancomycin regimen: 1250 mg IV q12h  Current vancomycin monitoring method: AUC  Current vancomycin therapeutic monitoring goal: 400-600 mg*h/L    InsightRX Prediction of Current Vancomycin Regimen  Loading dose: 2500 mg at 11:00 07/15/2022.  Regimen: 1250 mg IV every 12 hours.  Start time: 10:29 on 07/15/2022  Exposure target: AUC24 (range)400-600 mg/L.hr   AUC24,ss: 580 mg/L.hr  Probability of AUC24 > 400: 76 %  Ctrough,ss: 16.5 mg/L  Probability of Ctrough,ss > 20: 40 %  Probability of nephrotoxicity (Lodise JOSE ): 12 %    Current estimated CrCl = Estimated Creatinine Clearance: 112.7 mL/min (based on SCr of 0.79 mg/dL).    Creatinine for last 3 days  2022:  6:14 AM Creatinine 0.77 mg/dL  2022:  9:11 PM Creatinine 0.79 mg/dL    Recent Vancomycin Levels (past 3 days)  2022:  9:11 PM Vancomycin 22.0 mg/L    Vancomycin IV Administrations (past 72 hours)                   vancomycin 1250 mg in 0.9% NaCl 250 mL intermittent infusion 1,250 mg (mg) 1,250 mg New Bag 22 1427     1,250 mg New Bag  0111    vancomycin 2500 mg in 0.9% NaCl 500 ml intermittent infusion 2,500 mg (mg) 2,500 mg New Bag 07/15/22 1350                Nephrotoxins and other renal medications (From now, onward)    Start     Dose/Rate Route Frequency Ordered Stop    22 0200  vancomycin 1250 mg in 0.9% NaCl 250 mL intermittent infusion 1,250 mg         1,250 mg  over 90 Minutes Intravenous EVERY 12 HOURS 07/15/22 1031      22 1600  furosemide (LASIX) tablet 40 mg         40 mg Oral 2 TIMES DAILY (Diuretics and Nitrates) 22 1559      22 0900  lisinopril (ZESTRIL) tablet 40 mg         40 mg Oral EVERY MORNING 22 2313               Contrast Orders - past 72 hours (72h ago, onward)    None          Interpretation of levels and current  regimen:  Vancomycin level is reflective of -600    Has serum creatinine changed greater than 50% in last 72 hours: No    Urine output:  unable to determine    Renal Function: Stable    InsightRX Prediction of Planned New Vancomycin Regimen  Loading dose: N/A  Regimen: 1250 mg IV every 12 hours.  Start time: 02:00 on 07/17/2022  Exposure target: AUC24 (range)400-600 mg/L.hr   AUC24,ss: 560 mg/L.hr  Probability of AUC24 > 400: 100 %  Ctrough,ss: 15.8 mg/L  Probability of Ctrough,ss > 20: 27 %  Probability of nephrotoxicity (Lodise JOSE 2009): 11 %      Plan:  1. Continue Current Dose  2. Vancomycin monitoring method: AUC  3. Vancomycin therapeutic monitoring goal: 400-600 mg*h/L  4. Pharmacy will check vancomycin levels as appropriate in 1-3 Days.  5. Serum creatinine levels will be ordered daily for the first week of therapy and at least twice weekly for subsequent weeks.    Jessee Barros RPH

## 2022-07-18 ENCOUNTER — APPOINTMENT (OUTPATIENT)
Dept: PHYSICAL THERAPY | Facility: CLINIC | Age: 62
DRG: 464 | End: 2022-07-18
Payer: COMMERCIAL

## 2022-07-18 LAB
CREAT SERPL-MCNC: 0.81 MG/DL (ref 0.52–1.04)
GFR SERPL CREATININE-BSD FRML MDRD: 82 ML/MIN/1.73M2

## 2022-07-18 PROCEDURE — 250N000011 HC RX IP 250 OP 636: Performed by: HOSPITALIST

## 2022-07-18 PROCEDURE — 99232 SBSQ HOSP IP/OBS MODERATE 35: CPT | Performed by: HOSPITALIST

## 2022-07-18 PROCEDURE — 250N000013 HC RX MED GY IP 250 OP 250 PS 637: Performed by: SURGERY

## 2022-07-18 PROCEDURE — 82565 ASSAY OF CREATININE: CPT | Performed by: HOSPITALIST

## 2022-07-18 PROCEDURE — 250N000013 HC RX MED GY IP 250 OP 250 PS 637: Performed by: PHYSICIAN ASSISTANT

## 2022-07-18 PROCEDURE — 120N000001 HC R&B MED SURG/OB

## 2022-07-18 PROCEDURE — 250N000013 HC RX MED GY IP 250 OP 250 PS 637: Performed by: HOSPITALIST

## 2022-07-18 PROCEDURE — 97110 THERAPEUTIC EXERCISES: CPT | Mod: GP | Performed by: PHYSICAL THERAPIST

## 2022-07-18 PROCEDURE — 250N000011 HC RX IP 250 OP 636: Performed by: SPECIALIST

## 2022-07-18 PROCEDURE — 97530 THERAPEUTIC ACTIVITIES: CPT | Mod: GP | Performed by: PHYSICAL THERAPIST

## 2022-07-18 PROCEDURE — 999N000190 HC STATISTIC VAT ROUNDS

## 2022-07-18 RX ADMIN — PANTOPRAZOLE SODIUM 40 MG: 40 TABLET, DELAYED RELEASE ORAL at 06:14

## 2022-07-18 RX ADMIN — VANCOMYCIN HYDROCHLORIDE 1000 MG: 1 INJECTION, SOLUTION INTRAVENOUS at 14:33

## 2022-07-18 RX ADMIN — FUROSEMIDE 40 MG: 40 TABLET ORAL at 16:33

## 2022-07-18 RX ADMIN — GABAPENTIN 300 MG: 300 CAPSULE ORAL at 21:52

## 2022-07-18 RX ADMIN — METOPROLOL SUCCINATE 25 MG: 25 TABLET, EXTENDED RELEASE ORAL at 08:08

## 2022-07-18 RX ADMIN — FUROSEMIDE 40 MG: 40 TABLET ORAL at 08:08

## 2022-07-18 RX ADMIN — TRAZODONE HYDROCHLORIDE 50 MG: 50 TABLET ORAL at 21:52

## 2022-07-18 RX ADMIN — METHOCARBAMOL 500 MG: 500 TABLET ORAL at 08:08

## 2022-07-18 RX ADMIN — ACETAMINOPHEN 650 MG: 325 TABLET ORAL at 08:08

## 2022-07-18 RX ADMIN — METOPROLOL SUCCINATE 25 MG: 25 TABLET, EXTENDED RELEASE ORAL at 21:52

## 2022-07-18 RX ADMIN — LISINOPRIL 40 MG: 40 TABLET ORAL at 08:08

## 2022-07-18 RX ADMIN — ERTAPENEM SODIUM 1 G: 1 INJECTION, POWDER, LYOPHILIZED, FOR SOLUTION INTRAMUSCULAR; INTRAVENOUS at 10:37

## 2022-07-18 RX ADMIN — VANCOMYCIN HYDROCHLORIDE 1000 MG: 1 INJECTION, SOLUTION INTRAVENOUS at 02:01

## 2022-07-18 RX ADMIN — Medication 1 CAPSULE: at 08:12

## 2022-07-18 RX ADMIN — Medication 1 CAPSULE: at 21:52

## 2022-07-18 RX ADMIN — FERROUS SULFATE TAB 325 MG (65 MG ELEMENTAL FE) 325 MG: 325 (65 FE) TAB at 08:08

## 2022-07-18 ASSESSMENT — ACTIVITIES OF DAILY LIVING (ADL)
ADLS_ACUITY_SCORE: 42
ADLS_ACUITY_SCORE: 48
ADLS_ACUITY_SCORE: 44
ADLS_ACUITY_SCORE: 46
ADLS_ACUITY_SCORE: 48
ADLS_ACUITY_SCORE: 44
ADLS_ACUITY_SCORE: 46
ADLS_ACUITY_SCORE: 44

## 2022-07-18 NOTE — PLAN OF CARE
Goal Outcome Evaluation:    Plan of Care Reviewed With: patient      A&O X 3, forgetful. VSS, RA. CMS intact. Dressing on RLE CDI. Pain managed with tylenol and robaxin. Up with assist of 2 and lift. Sat in chair for meals. Incontinent of B&B, purewick in between.  PICC line SL. Pending TCU.

## 2022-07-18 NOTE — PROGRESS NOTES
[facility]    Infectious Disease Progress Note    Date of Service : 07/18/2022     Assessment:  Patient with PMH venous stasis, right ankle hardware infection, peripheral neuropathy admitted 7/4 with infected right venous stasis ulcer; underwent arthrocentesis 7/7 as part of deep infection evaluation, cultures ngtd. S/p OR debridement , wound extending to the ankle joint, cxs are growing Serratia marcescens from multiple cxs and corynebacterium striatum from a single cx     Recommendations  1. Treat with Ertapenem for serratia and the anaerobe, the main Pathogen here being serratia, added  Vancomycin for corynebacterium only seen in 1 cultures, no hardware in the joint space,  given the nature of the wound a skin contaminant, will stop vancomycin.   2. Will need long term IV antibiotics for septic joint given extension of wound into the joint space for 4-6 weeks from surgery. No hardware in joint space but in close proximity    OPIV antibiotic orders placed in Epic for ertapenem alone.     Silvia Freeman MD    Interval History   Resting, has no new complaints, has remained afebrile, new cx data available with growth of serratia and corynebacterium from operative cxs. Physical Exam   Temp: 97.6  F (36.4  C) Temp src: Oral BP: 114/54 Pulse: 79   Resp: 18 SpO2: 98 % O2 Device: None (Room air)    Vitals:    07/05/22 1138   Weight: 146 kg (321 lb 14 oz)     Vital Signs with Ranges  Temp:  [97.6  F (36.4  C)-98.3  F (36.8  C)] 97.6  F (36.4  C)  Pulse:  [79-94] 79  Resp:  [16-18] 18  BP: (114-152)/(54-71) 114/54  SpO2:  [95 %-98 %] 98 %    Constitutional: Awake, alert, cooperative, no apparent distress  Lungs: Clear to auscultation bilaterally, no crackles or wheezing  Cardiovascular: Regular rate and rhythm, normal S1 and S2, and no murmur noted  Abdomen: Normal bowel sounds, soft, non-distended, non-tender  MS : r ankle wound with exposed tendons     Other:    Medications       diosmin-hesperidin  450-50  1 capsule Oral BID     ertapenem (INVanz) IV  1 g Intravenous Q24H     ferrous sulfate  325 mg Oral Daily     furosemide  40 mg Oral BID     gabapentin  300 mg Oral At Bedtime     Michael  1 packet Oral BID     lisinopril  40 mg Oral QAM     metoprolol succinate ER  25 mg Oral BID     pantoprazole  40 mg Oral QAM AC     sodium chloride (PF)  10-40 mL Intracatheter Q7 Days     traZODone  50 mg Oral At Bedtime     vancomycin  1,000 mg Intravenous Q12H       Data   All microbiology laboratory data reviewed.  Recent Labs   Lab Test 07/15/22  0616 07/14/22  0614 07/13/22  0815 07/12/22  1019 07/11/22  1515 07/10/22  1148   WBC  --  7.7  --   --  10.1 8.9   HGB 8.7* 8.4* 8.6*   < > 9.9* 8.9*   HCT  --  29.0*  --   --  33.5* 30.3*   MCV  --  84  --   --  84 84   PLT  --  236  --   --  257 262    < > = values in this interval not displayed.     Recent Labs   Lab Test 07/18/22  0615 07/17/22  0517 07/16/22  2111   CR 0.81 0.88 0.79     Microbiology  7/12 R ankle tissue  Ankle, Right; Tissue          0 Result Notes    Culture 1+ Serratia marcescens Abnormal        1+ Corynebacterium striatum Abnormal     Identification obtained by MALDI-TOF mass spectrometry research use only database. Test characteristics determined and verified by the [facility].   Susceptibilities not routinely done        Resulting Agency: [facility]       Susceptibility     Serratia marcescens     TIFFANIE     Ampicillin  Resistant 1     Ampicillin/ Sulbactam  Resistant 1     Cefepime <=1 ug/mL Susceptible     Ceftazidime <=1 ug/mL Susceptible     Ceftriaxone <=1 ug/mL Susceptible     Ciprofloxacin <=0.25 ug/mL Susceptible     Gentamicin <=1 ug/mL Susceptible     Levofloxacin <=0.12 ug/mL Susceptible     Meropenem <=0.25 ug/mL Susceptible     Piperacillin/Tazobactam 3 ug/mL Susceptible     Tobramycin <=1 ug/mL Susceptible     Trimethoprim/Sulfamethoxazole <=1/19 ug/mL Susceptible

## 2022-07-18 NOTE — PLAN OF CARE
Goal Outcome Evaluation:  Patient vital signs are at baseline: Yes  Patient able to ambulate as they were prior to admission or with assist devices provided by therapies during their stay:  No,  Reason:  A2 with lift. Total cares  Patient MUST void prior to discharge:  Yes  Patient able to tolerate oral intake:  Yes  Pain has adequate pain control using Oral analgesics:  Yes  Does patient have an identified :  Yes  Has goal D/C date and time been discussed with patient:  No,  Reason:  discharge pending TCU placement    Alert , Disoriented to date and time. Repositioned with A2. To continue to monitor.

## 2022-07-18 NOTE — PROGRESS NOTES
Care Management Follow Up    Length of Stay (days): 8    Expected Discharge Date: 07/19/2022     Concerns to be Addressed: discharge planning     Patient plan of care discussed at interdisciplinary rounds: Yes    Anticipated Discharge Disposition: Group Home     Anticipated Discharge Services: Transportation Services  Anticipated Discharge DME: None    Patient/family educated on Medicare website which has current facility and service quality ratings:    Education Provided on the Discharge Plan:    Patient/Family in Agreement with the Plan: yes    Referrals Placed by CM/SW: Internal Clinic Care Coordination, External Care Coordination, Specialty Providers, Transportation, Community Residential Settings (Group Homes)  Private pay costs discussed: Not applicable    Additional Information:  Writer called patient's sister/guardian Jyotsna to update that patient needs new choices for TCU sent. Jyotsna does not know of any more and is ok with writer sending out options near home in [place]. Referrals sent to [facility], [facility], [facility], [facility], [facility] and [facility]. Referral sent via [facility].  to update Jyotsna with bed.      DEWAYNE Cisneros

## 2022-07-18 NOTE — PROGRESS NOTES
[facility]  Hospitalist Progress Note        Magno Arcos MD   07/18/2022        Interval History:        - no acute issues overnight, awaiting TCU placement         Assessment and Plan:        Josiane Dasilva is a 61 year old female with hypertension, depression/anxiety, peripheral neuropathy, GERD, chronic anemia, group home resident admitted on 7/4/2022 presented with right ankle pain following a fall couple of weeks prior to admission .     She is a Group home resident, wheelchair-bound at baseline.     Right ankle hardware infection Status post incision and debridement, wound VAC placed 7/12  Chronic lower extremity bilateral edema along with venous stasis changes  Peripheral neuropathy.  - Xray of the right ankle - fixation of an old proximal distal fibular fracture.  - MRI Diffuse circumferential subcutaneous edema and swelling of the visualized leg extending to the ankle and dorsal foot.  -  evaluated and followed by ortho, Status joint aspiration on 7/7; s/p I & D and wound vac placement 7/12  - remains afebrile, leukocytosis and CRP trended down; B Cx rom 7/4 with no growth; was on IV Ancef (7/4 to 7/15)  - wound culture 7/12 growing Serratia and Corynebacterium, ID d/florencio Cefazolin (7/15) and started Ertapenem and Vancomycin (7/15) ; PICC placed 7/13; ID plans for IV abx for 4 to 6 weeks from surgery; FUP with Dr Freeman 2-3 weeks following discharge.   - Ortho signed off 7/16, to mobilize with PT/OT, WBAT for pivot transfers/bed to chair in CAM boot; needs wound vac changes Monday and Friday  - evaluated by Dr Park 7/14, US LE arterial obtained which was noted with no significant stenosis; wound cares per WOC/surgery  - SW following for disposition to TCU  - to follow-up in 2 weeks with Dr.Dr Ocsar; ortho signed off 7/16  - Continue PTA gabapentin; as needed Tylenol, oxycodone, Robaxin for pain management.     Acute on chronic anemia likely dilutional, surgical blood  "loss.  Chronic anemia  Baseline hemoglobin between 9-10.  Postop hemoglobin at around 8.6  - stable around 8-9; Iron saturation index 8; got one dose IV Venofer  - Oral iron supplements.  - Monitor hemoglobin periodically.     Hypertension  - Continue PTA metoprolol, lisinopril with hold parameters.  - continue PTA Lasix 40 mg twice daily     Anxiety/depression  - Continue PTA trazodone     GERD  - Continue patient with Protonix     History of chronic neuropathy.  - Continue PTA gabapentin.     History of cognitive impairment.  - Patient alert and oriented but forgetful.  On chart review -confusion during previous admissions [6/2020] then neuropsychiatric testing was recommended.     Hypoalbuminemia likely from dilutional, acute illness.    Serum albumin 2.6  Dietary supplements with Ensure.       Physical deconditioning with chronic debility at baseline.  History of alcohol abuse, falls previously.  Now resident of [facility].  Fall few weeks prior to admission at [facility].  Recommend OT for cognitive assessment at TCU.  PT evaluation   Care management team assistance with transition     Obesity with a BMI greater than 30.  Consider lifestyle modification with diet and exercise as able to    Diet: Advance Diet as Tolerated: Regular Diet Adult  Snacks/Supplements Pediatric: Boost glucose control; Between Meals  Room Service        DVT Prophylaxis: SCDs, ambulate.   Code Status: Full Code  Disposition: Expected discharge pending safe discharge plan in place; - SW following for disposition to TCU    Clinically Significant Risk Factors Present on Admission                       Page Me (7 am to 6 pm)    Care plan discussed with patient              Physical Exam:      Blood pressure (!) 142/65, pulse 88, temperature 97.9  F (36.6  C), temperature source Axillary, resp. rate 16, height 1.727 m (5' 8\"), weight 146 kg (321 lb 14 oz), last menstrual period 04/07/2014, SpO2 95 %, not currently breastfeeding.  Vitals:    " 07/05/22 1138   Weight: 146 kg (321 lb 14 oz)     Vital Signs with Ranges  Temp:  [97.9  F (36.6  C)-98.2  F (36.8  C)] 97.9  F (36.6  C)  Pulse:  [81-94] 88  Resp:  [16-18] 16  BP: (124-145)/(59-65) 142/65  SpO2:  [95 %-98 %] 95 %  I/O's Last 24 hours  I/O last 3 completed shifts:  In: -   Out: 1050 [Urine:500; Drains:550]    Constitutional: Alert, awake and oriented , forgetful; resting comfortably in no apparent distress   HEENT: Pupils equal and reactive to light and accomodation, neck supple    Oral cavity: Moist mucosa   Cardiovascular: Normal s1 s2, regular rate and rhythm, no murmur   Lungs: B/l clear to auscultation, no wheezes or crepitations   Abdomen: Soft, nt, nd, no guarding, rigidity or rebound; BS +   LE : B/l LE edema improving; RLE in bandage with wound vac in place; chronic venous stasis changes   Musculoskeletal: Power 5/5 in all extremities   Neuro: No focal neurological deficits noted   Psychiatry: normal mood and affect                Medications:          diosmin-hesperidin 450-50  1 capsule Oral BID     ertapenem (INVanz) IV  1 g Intravenous Q24H     ferrous sulfate  325 mg Oral Daily     furosemide  40 mg Oral BID     gabapentin  300 mg Oral At Bedtime     Michael  1 packet Oral BID     lisinopril  40 mg Oral QAM     metoprolol succinate ER  25 mg Oral BID     pantoprazole  40 mg Oral QAM AC     sodium chloride (PF)  10-40 mL Intracatheter Q7 Days     traZODone  50 mg Oral At Bedtime     vancomycin  1,000 mg Intravenous Q12H     PRN Meds: acetaminophen **OR** acetaminophen, lidocaine 4%, melatonin, methocarbamol, naloxone **OR** naloxone **OR** naloxone **OR** naloxone, oxyCODONE, senna-docusate **OR** senna-docusate, sodium chloride (PF), sodium chloride (PF), sodium chloride (PF), sodium chloride (PF)         Data:      All new lab and imaging data was reviewed.   Recent Labs   Lab Test 07/15/22  0616 07/14/22  0614 07/13/22  0815 07/12/22  1019 07/11/22  1515 07/10/22  1148 07/05/22  0634  07/04/22  2053 01/08/15  1320 08/26/14  1000   WBC  --  7.7  --   --  10.1 8.9   < > 12.3*   < > 4.6   HGB 8.7* 8.4* 8.6*   < > 9.9* 8.9*   < > 9.9*   < > 11.6*   MCV  --  84  --   --  84 84   < > 83   < > 101*   PLT  --  236  --   --  257 262   < > 232   < > 179   INR  --   --   --   --   --   --   --  1.16*  --  1.08    < > = values in this interval not displayed.      Recent Labs   Lab Test 07/18/22 0615 07/17/22 0517 07/16/22 2111 07/14/22  0614 07/13/22  0815 07/12/22  1740 07/12/22  1019 07/11/22  1515   NA  --   --   --  138  --   --  136 139   POTASSIUM  --   --   --  4.1  --   --  4.2 4.4   CHLORIDE  --   --   --  107  --   --  105 105   CO2  --   --   --  29  --   --  27 30   BUN  --   --   --  11  --   --  15 18   CR 0.81 0.88 0.79 0.77  --   --  0.80 0.84   ANIONGAP  --   --   --  2*  --   --  4 4   JESSICA  --   --   --  8.5  --   --  8.8 8.8   GLC  --   --   --  102* 100* 100* 123* 93     Recent Labs   Lab Test 05/29/20 2233 02/24/19  1142 02/24/19  0550   TROPI <0.015 <0.015 <0.015

## 2022-07-19 LAB
BACTERIA TISS BX CULT: ABNORMAL
BACTERIA WND CULT: NORMAL
SARS-COV-2 RNA RESP QL NAA+PROBE: NEGATIVE

## 2022-07-19 PROCEDURE — 120N000001 HC R&B MED SURG/OB

## 2022-07-19 PROCEDURE — 97605 NEG PRS WND THER DME<=50SQCM: CPT

## 2022-07-19 PROCEDURE — 250N000013 HC RX MED GY IP 250 OP 250 PS 637: Performed by: PHYSICIAN ASSISTANT

## 2022-07-19 PROCEDURE — 99232 SBSQ HOSP IP/OBS MODERATE 35: CPT | Performed by: HOSPITALIST

## 2022-07-19 PROCEDURE — 999N000190 HC STATISTIC VAT ROUNDS

## 2022-07-19 PROCEDURE — G0463 HOSPITAL OUTPT CLINIC VISIT: HCPCS | Mod: 25

## 2022-07-19 PROCEDURE — 250N000011 HC RX IP 250 OP 636: Performed by: SPECIALIST

## 2022-07-19 PROCEDURE — 250N000013 HC RX MED GY IP 250 OP 250 PS 637: Performed by: HOSPITALIST

## 2022-07-19 PROCEDURE — 250N000013 HC RX MED GY IP 250 OP 250 PS 637: Performed by: SURGERY

## 2022-07-19 PROCEDURE — 99231 SBSQ HOSP IP/OBS SF/LOW 25: CPT | Performed by: SURGERY

## 2022-07-19 PROCEDURE — U0003 INFECTIOUS AGENT DETECTION BY NUCLEIC ACID (DNA OR RNA); SEVERE ACUTE RESPIRATORY SYNDROME CORONAVIRUS 2 (SARS-COV-2) (CORONAVIRUS DISEASE [COVID-19]), AMPLIFIED PROBE TECHNIQUE, MAKING USE OF HIGH THROUGHPUT TECHNOLOGIES AS DESCRIBED BY CMS-2020-01-R: HCPCS | Performed by: HOSPITALIST

## 2022-07-19 RX ADMIN — GABAPENTIN 300 MG: 300 CAPSULE ORAL at 20:56

## 2022-07-19 RX ADMIN — FUROSEMIDE 40 MG: 40 TABLET ORAL at 15:32

## 2022-07-19 RX ADMIN — TRAZODONE HYDROCHLORIDE 50 MG: 50 TABLET ORAL at 20:56

## 2022-07-19 RX ADMIN — METOPROLOL SUCCINATE 25 MG: 25 TABLET, EXTENDED RELEASE ORAL at 20:56

## 2022-07-19 RX ADMIN — LISINOPRIL 40 MG: 40 TABLET ORAL at 08:21

## 2022-07-19 RX ADMIN — ERTAPENEM SODIUM 1 G: 1 INJECTION, POWDER, LYOPHILIZED, FOR SOLUTION INTRAMUSCULAR; INTRAVENOUS at 11:30

## 2022-07-19 RX ADMIN — Medication 1 CAPSULE: at 08:22

## 2022-07-19 RX ADMIN — ACETAMINOPHEN 650 MG: 325 TABLET ORAL at 14:24

## 2022-07-19 RX ADMIN — OXYCODONE HYDROCHLORIDE 2.5 MG: 5 TABLET ORAL at 16:01

## 2022-07-19 RX ADMIN — FERROUS SULFATE TAB 325 MG (65 MG ELEMENTAL FE) 325 MG: 325 (65 FE) TAB at 08:21

## 2022-07-19 RX ADMIN — METHOCARBAMOL 500 MG: 500 TABLET ORAL at 15:33

## 2022-07-19 RX ADMIN — ACETAMINOPHEN 650 MG: 325 TABLET ORAL at 08:37

## 2022-07-19 RX ADMIN — ACETAMINOPHEN 650 MG: 325 TABLET ORAL at 01:49

## 2022-07-19 RX ADMIN — PANTOPRAZOLE SODIUM 40 MG: 40 TABLET, DELAYED RELEASE ORAL at 06:22

## 2022-07-19 RX ADMIN — FUROSEMIDE 40 MG: 40 TABLET ORAL at 08:22

## 2022-07-19 RX ADMIN — Medication 1 CAPSULE: at 21:27

## 2022-07-19 RX ADMIN — OXYCODONE HYDROCHLORIDE 2.5 MG: 5 TABLET ORAL at 21:33

## 2022-07-19 ASSESSMENT — ACTIVITIES OF DAILY LIVING (ADL)
ADLS_ACUITY_SCORE: 48
ADLS_ACUITY_SCORE: 46
ADLS_ACUITY_SCORE: 42
ADLS_ACUITY_SCORE: 42
ADLS_ACUITY_SCORE: 44
ADLS_ACUITY_SCORE: 48
ADLS_ACUITY_SCORE: 44
ADLS_ACUITY_SCORE: 48
ADLS_ACUITY_SCORE: 46

## 2022-07-19 NOTE — PROGRESS NOTES
Date/Time:07/19,,1385-7888    Trauma/Ortho/Medical (Choose one) :Ortho    Diagnosis:Cellulitis of right lower extremity   POD#:I&D 07/12  Mental Status:A&O x 3,very forgetful  Activity/dangle:A2-3 Sera steady  Diet:Reg  Pain:Managed with PRN Tylenol  Russell/Voiding:Incontinent b/b  Tele/Restraints/Iso:None  02/LDA:RA  D/C Date:Pending  TCU placement 07/20 ?? Referrals sent,SW following for bed availability.   Other Info:CMS intact,PICC line right upper arm SL.  RLE wound vac in place, -125 mmHg continuous with some drainage in the canister.

## 2022-07-19 NOTE — PROGRESS NOTES
Persistent lymphedema thigh right side, no lymphedema applied, requires yellow stripe EdemaWear from proximal calf to high thigh to stimulate lymphatic contractility to decrease interstitial edema of the right lower extremity.  VAC instill change with Vashe is to be performed today, will discuss with WOCN, when wound of adequate quality, can transition to moist hypochlorous acid dressing changes twice a day under EdemaWear which would then need to be continued to be performed at a care facility until wound is completely healed.  Continue micronized purified flavonoid fraction, adjunctive bio nutrients  Vivian  Cell [phone]

## 2022-07-19 NOTE — PLAN OF CARE
A&O x3 forgetful. CMS intact. VSS on RA. Wound vac in place. Up with assist of 2 with celsa steady. Picc line SL. Incontient of bowel and bladder. Up in chair x2.

## 2022-07-19 NOTE — PROGRESS NOTES
Care Management Follow Up    Length of Stay (days): 9    Expected Discharge Date: 07/20/2022     Concerns to be Addressed: discharge planning     Patient plan of care discussed at interdisciplinary rounds: Yes    Anticipated Discharge Disposition: Group Home     Anticipated Discharge Services: Transportation Services  Anticipated Discharge DME: None    Patient/family educated on Medicare website which has current facility and service quality ratings:    Education Provided on the Discharge Plan:    Patient/Family in Agreement with the Plan: yes    Referrals Placed by CM/SW: Senior Linkage Line, Post Acute Facilities, Transportation, Communication hand-offs to next level of Care Providers  Private pay costs discussed: Not applicable    Additional Information:  Estates liaison reviewed patient's referral and reported that the [facility] can offer a shared room tomorrow. Writer completed a PAS. Level 2 Obra was NOT triggered with PAS. Writer updated Jacinda of this.    Clint Moseley called and requested an update. Call returned and she was updated.    PAS-RR    D: Per DHS regulation, SW completed and submitted PAS-RR to [place] [employer] Direct Connect via the Senior LinkAge Line.  PAS-RR confirmation # is : [id number]    I: SW spoke with guardian and they are aware a PAS-RR has been submitted.  SW reviewed with guardian that they may be contacted for a follow up appointment within 10 days of hospital discharge if their SNF stay is < 30 days.  Contact information for [facility] LinkAge Line was also provided.    A: guardian verbalized understanding.    P: Further questions may be directed to [facility] LinkAge Line at #[phone], option #4 for PAS-RR staff.    Call to Jyotsna to update on placement, message left. Writer indicated that a ride will be set up for tomorrow through transport and if that was not the plan to update writer. Call to  [employer] and stretcher set up for A&O X3 and forgetful, up with 2-3  assist and Samira Steady and needing supervision. Stretcher set up for 7/20/22 @ 1400.     DEWAYNE Cisneros

## 2022-07-19 NOTE — PROGRESS NOTES
[facility]  WOC Nurse Inpatient Assessment     Consulted for: Vac dressing changes to RLE; follow-up on bilateral lower extremities    Brief assessment/ plan: Right anterior ankle wound over site of hardware, unclear etiology, now s/p I&D 7-12-22 Dr. Oscar with vac placement.  Dr. Park has also been consulted and recommended VeraFlo and EdemaWear.  7/15 WOC did first vac change and applied VeraFlo dressing with Vashe instillation.  Wound is small and clean but large tendon exposed. Edemawear placed from ankle up thigh per Dr. Park  BLE: prior scattered superficial weepy wounds are mostly resurfaced and dry now, will update POC to just moisturizer and EdemaWear as tolerated.      Patient History (according to provider note(s):      Josiane Dasilva is a 61 year old female with chronic lower extremity edema and venous stasis ulcers who is being admitted for a possible infected venous stasis ulcer on the right ankle overlying old right ankle hardware from a previous fracture.    Areas Assessed:      Areas visualized during today's visit: Lower extremities      Negative pressure wound therapy applied to: right anterior ankle   Last photo: 7-19-22     7-15-22      7-19-22         7-15-22 RLE        7-5-22 right ankle      Wound due to: Unknown Etiology, possibly venous vs infection vs other  Wound history/plan of care: pt poor historian, does not know when or how wound developed but says it is new in the couple of weeks PTA.  Reports usually wearing compression socks that maybe her sister bought for her.  Pt has hx hardware to ankle.  Ankle and knee very stiff and pt cannot really lift her right leg at all on her own and c/o pain when WOC moving or lifting leg.  Wound deteriorated since admission and is now s/p I&D 7-12-22 Dr. Oscar with vac placement.  Per report, wound was down to joint.  Dr. Park has also been consulted and recommended VeraFlo and EdemaWear.  Pt refusing  EdemaWear to thighs and to LLE but allowed a small EdemaWear to RLE 7/15.       Surgical date: 7-12-22     Date Negative Pressure Wound Therapy initiated: 7-12-22     Interventions in place: repositioning and compression    Is patient s nutritional status compromised? no   a. If yes, what interventions are in place? N/A    Reason for initiating vac therapy? Presence of co-morbidities, High risk of infections and Need for accelerated granulation tissue and exposed tendon    Which?of?the?following?co-morbidities?apply? Obesity and Smoking  a. If diabetic is patient on a diabetic management program? N/A     Is osteomyelitis present in wound? no  a.  If yes what treatments are in place? N/A    Wound base: 80% red granulation with 20% tendon     Palpation of the wound bed: normal      Drainage: moderate     Description of drainage: serosanguinous     Measurements (length x width x depth, in cm): 2.5 x 3.5 x 0.3     Tunneling: none      Undermining: none  Periwound skin: Blister(s), Dry/scaly and Edematous      Color: normal and consistent with surrounding tissue      Temperature: normal   Odor: none  Pain: moderate, sharp and tender  Pain interventions prior to dressing change: oral meds  Treatment goal: Infection control/prevention and Increase granulation  STATUS: clean and stable, newly post-op  Supplies ordered: gathered     Number of foam pieces removed from a wound (excluding foam for bridge) : 1 CleanseChoice Contact layer   Verified this matched the number of foam pieces applied last dressing change: Yes   Number of foam pieces packed into wound (excluding foam for bridge) : 1 CleanseChoice Contact layer     Wound location: Left lateral lower leg  Last photo: 7-5-22 (no new photo 7/19)        Wound due to: venous/lymphedema vs other  Wound history/plan of care: pt states is newer issue and she has not been to a wound clinic or dressing wound at home, ballooning effect from where socks are digging in, sock left  off 7/12. Intact and moistureized 7/19  Wound base: previous pink moist dermis now lightly resurfaced with flaking serous scabbing and peeling scales     Palpation of the wound bed: normal      Drainage: none  Periwound skin: Dry/scaly and Edematous      Color: normal and consistent with surrounding tissue      Temperature: normal   Odor: none  Pain: mild, tender  Pain interventions prior to dressing change: N/A  Treatment goal: Increase moisture  and Protection  STATUS: healed  Supplies ordered: at bedside     Wound location: Left Heel  Last photo: 7/12/22      Not currently a wound, significant dry peeling scale and pt reports pain. Tissue remains slightly boggy 7/15. No changes 7/19, encouraging offloading prevalon boots applied  Wound history/plan of care: offload pressure and provide moisture to dry scaly tissue  Wound base: 100 % blanchable  erythema, intact epidermis     Palpation of the wound bed: boggy       Drainage: none     Description of drainage: none     Measurements (length x width x depth, in cm): 1.5  x 2.5  x  0 cm      Tunneling: N/A     Undermining: N/A  Periwound skin: Intact and Dry/scaly      Color: pale      Temperature: normal   Odor: none  Pain: mild, and moderate to severe tenderness with palpation  Pain interventions prior to dressing change: N/A  Treatment goal: Protection  STATUS: unchanged  Supplies ordered: supplies stored on unit      Treatment Plan:     Right ankle: VeraFlo vac therapy with dressing changes 2x week and prn.  Next change Mon or Tues next week.    Current settings: 10cc Vashe instillation every 2 hrs for 10 min dwell time, and NPWT set to -125mmHg continuous.   Current dressing: Cleanse Choice contact layer (thin grey foam) to wound bed, Adapt ring to periwound, thin grey foam bridge with suction pad over wound and instill pad to medial proximal ankle  Compression: EdemaWear size small (navy stripe) applied to RLE from toes to knee, small hole cut to accomodate vac  tubing.  Large EdemaWear (red stripe) to be applied to thigh if pt allows.   Elevation: elevate BLE as much as possible; float heels at all times    If vac dressing fails and cannot be repaired in approx 2 hrs, notify Provider, remove vac dressing and start BID Vashe-moist gauze dressings until vac can be reapplied or any new orders from Surgery.     Nursing to replace Vashe bottle (#[id number]) and cannister (#[id number]) prn.    Left lower leg: Daily:   1.  Cleanse with mild skin cleanser  2.  Apply Sween 24 cream   3.  EdemaWear (small or medium) from toes to knee or thigh, as tolerated    Orders: Reviewed, Written and Updated    RECOMMEND PRIMARY TEAM ORDER: Lymphedema consult prn  Education provided: importance of repositioning, plan of care, wound progress, Moisture management and Off-loading pressure  Discussed plan of care with: Patient and Nurses  WOC nurse follow-up plan: twice weekly and prn - next dressing change Fri  Notify WOC if wound(s) deteriorate.  Nursing to notify the Provider(s) and re-consult the WOC Nurse if new skin concern.    DATA:     Current support surface: Standard  Atmos Air mattress  Containment of urine/stool: Incontinence Protocol prn  BMI: Body mass index is 48.94 kg/m .   Active diet order: Orders Placed This Encounter      Advance Diet as Tolerated: Regular Diet Adult     Output: I/O last 3 completed shifts:  In: 360 [P.O.:360]  Out: -      Labs:   Recent Labs   Lab 07/15/22  0616 07/14/22  0614   ALBUMIN  --  2.6*   HGB 8.7* 8.4*   WBC  --  7.7     Pressure injury risk assessment:   Sensory Perception: 4-->no impairment  Moisture: 3-->occasionally moist  Activity: 2-->chairfast  Mobility: 2-->very limited  Nutrition: 3-->adequate  Friction and Shear: 2-->potential problem  Hermes Score: 16    Kyrie Falcon RN CWOCN   Dept. Pager: [phone]  Dept. Office Number: [phone]

## 2022-07-19 NOTE — PROGRESS NOTES
[facility]  Hospitalist Progress Note        Magno Arcos MD   07/19/2022        Interval History:        - no acute issues overnight, awaiting TCU placement  - ID d/florencio Vancomycin (7/19) and suggested to continue with Ertapenem alone  - re-evaluated by Dr Park 7/19, wound vac to be changed         Assessment and Plan:        Josiane Dasilva is a 61 year old female with hypertension, depression/anxiety, peripheral neuropathy, GERD, chronic anemia, group home resident admitted on 7/4/2022 presented with right ankle pain following a fall couple of weeks prior to admission .     She is a Group home resident, wheelchair-bound at baseline.     Right ankle hardware infection Status post incision and debridement, wound VAC placed 7/12  Chronic lower extremity bilateral edema along with venous stasis changes  Peripheral neuropathy.  - Xray of the right ankle - fixation of an old proximal distal fibular fracture.  - MRI Diffuse circumferential subcutaneous edema and swelling of the visualized leg extending to the ankle and dorsal foot.  -  evaluated and followed by ortho, Status joint aspiration on 7/7; s/p I & D and wound vac placement 7/12  - remains afebrile, leukocytosis and CRP trended down; B Cx rom 7/4 with no growth; was on IV Ancef (7/4 to 7/15)  - wound culture 7/12 growing Serratia and Corynebacterium, ID d/florencio Cefazolin (7/15) and started Ertapenem and Vancomycin (7/15--7/19) ; PICC placed 7/13  - ID d/florencio Vancomycin (7/19) and suggested to continue with Ertapenem alone; ID plans for IV abx for 4 to 6 weeks from surgery; FUP with Dr Freeman 2-3 weeks following discharge.   - Ortho signed off 7/16, to mobilize with PT/OT, WBAT for pivot transfers/bed to chair in CAM boot; needs wound vac changes Monday and Friday  - evaluated by Dr Park 7/14 and 7/19, US LE arterial obtained which was noted with no significant stenosis; wound cares per WOC/surgery   - SW following for disposition to  TCU  - to follow-up in 2 weeks with Dr.Dr Oscar; ortho signed off 7/16  - Continue PTA gabapentin; as needed Tylenol, oxycodone, Robaxin for pain management.     Acute on chronic anemia likely dilutional, surgical blood loss.  Chronic anemia  Baseline hemoglobin between 9-10.  Postop hemoglobin at around 8.6  - stable around 8-9; Iron saturation index 8; got one dose IV Venofer  - Oral iron supplements.  - Monitor hemoglobin periodically.     Hypertension  - Continue PTA metoprolol, lisinopril with hold parameters.  - continue PTA Lasix 40 mg twice daily     Anxiety/depression  - Continue PTA trazodone     GERD  - Continue patient with Protonix     History of chronic neuropathy.  - Continue PTA gabapentin.     History of cognitive impairment.  - Patient alert and oriented but forgetful.  On chart review -confusion during previous admissions [6/2020] then neuropsychiatric testing was recommended.     Hypoalbuminemia likely from dilutional, acute illness.    Serum albumin 2.6  Dietary supplements with Ensure.       Physical deconditioning with chronic debility at baseline.  History of alcohol abuse, falls previously.  Now resident of longterm.  Fall few weeks prior to admission at longterm.  Recommend OT for cognitive assessment at TCU.  PT evaluation   Care management team assistance with transition     Obesity with a BMI greater than 30.  Consider lifestyle modification with diet and exercise as able to    Diet: Advance Diet as Tolerated: Regular Diet Adult  Snacks/Supplements Pediatric: Boost glucose control; Between Meals  Room Service        DVT Prophylaxis: SCDs, ambulate.   Code Status: Full Code  Disposition: medically stable; dispo pending TCU placement with IV antibiotics; SW following for disposition to TCU    Clinically Significant Risk Factors Present on Admission                       Page Me (7 am to 6 pm)    Care plan discussed with patient              Physical Exam:      Blood pressure  "128/50, pulse 91, temperature 98.5  F (36.9  C), temperature source Oral, resp. rate 16, height 1.727 m (5' 8\"), weight 146 kg (321 lb 14 oz), last menstrual period 04/07/2014, SpO2 95 %, not currently breastfeeding.  Vitals:    07/05/22 1138   Weight: 146 kg (321 lb 14 oz)     Vital Signs with Ranges  Temp:  [97.6  F (36.4  C)-98.8  F (37.1  C)] 98.5  F (36.9  C)  Pulse:  [79-91] 91  Resp:  [16-18] 16  BP: (113-158)/(50-79) 128/50  SpO2:  [95 %-98 %] 95 %  I/O's Last 24 hours  I/O last 3 completed shifts:  In: 360 [P.O.:360]  Out: 200 [Urine:200]    Constitutional: Alert, awake and oriented , forgetful; resting comfortably in no apparent distress   HEENT: Pupils equal and reactive to light and accomodation, neck supple    Oral cavity: Moist mucosa   Cardiovascular: Normal s1 s2, regular rate and rhythm, no murmur   Lungs: B/l clear to auscultation, no wheezes or crepitations   Abdomen: Soft, nt, nd, no guarding, rigidity or rebound; BS +   LE : B/l LE edema improving; RLE in bandage with wound vac in place; chronic venous stasis changes   Musculoskeletal: Power 5/5 in all extremities   Neuro: No focal neurological deficits noted   Psychiatry: normal mood and affect                Medications:          diosmin-hesperidin 450-50  1 capsule Oral BID     ertapenem (INVanz) IV  1 g Intravenous Q24H     ferrous sulfate  325 mg Oral Daily     furosemide  40 mg Oral BID     gabapentin  300 mg Oral At Bedtime     Michael  1 packet Oral BID     lisinopril  40 mg Oral QAM     metoprolol succinate ER  25 mg Oral BID     pantoprazole  40 mg Oral QAM AC     sodium chloride (PF)  10-40 mL Intracatheter Q7 Days     traZODone  50 mg Oral At Bedtime     PRN Meds: acetaminophen **OR** acetaminophen, lidocaine 4%, melatonin, methocarbamol, naloxone **OR** naloxone **OR** naloxone **OR** naloxone, oxyCODONE, senna-docusate **OR** senna-docusate, sodium chloride (PF), sodium chloride (PF), sodium chloride (PF)         Data:      All new " lab and imaging data was reviewed.   Recent Labs   Lab Test 07/15/22  0616 07/14/22  0614 07/13/22  0815 07/12/22  1019 07/11/22  1515 07/10/22  1148 07/05/22  0634 07/04/22  2053 01/08/15  1320 08/26/14  1000   WBC  --  7.7  --   --  10.1 8.9   < > 12.3*   < > 4.6   HGB 8.7* 8.4* 8.6*   < > 9.9* 8.9*   < > 9.9*   < > 11.6*   MCV  --  84  --   --  84 84   < > 83   < > 101*   PLT  --  236  --   --  257 262   < > 232   < > 179   INR  --   --   --   --   --   --   --  1.16*  --  1.08    < > = values in this interval not displayed.      Recent Labs   Lab Test 07/18/22  0615 07/17/22  0517 07/16/22 2111 07/14/22  0614 07/13/22  0815 07/12/22  1740 07/12/22  1019 07/11/22  1515   NA  --   --   --  138  --   --  136 139   POTASSIUM  --   --   --  4.1  --   --  4.2 4.4   CHLORIDE  --   --   --  107  --   --  105 105   CO2  --   --   --  29  --   --  27 30   BUN  --   --   --  11  --   --  15 18   CR 0.81 0.88 0.79 0.77  --   --  0.80 0.84   ANIONGAP  --   --   --  2*  --   --  4 4   JESSICA  --   --   --  8.5  --   --  8.8 8.8   GLC  --   --   --  102* 100* 100* 123* 93     Recent Labs   Lab Test 05/29/20 2233 02/24/19  1142 02/24/19  0550   TROPI <0.015 <0.015 <0.015

## 2022-07-20 ENCOUNTER — APPOINTMENT (OUTPATIENT)
Dept: PHYSICAL THERAPY | Facility: CLINIC | Age: 62
DRG: 464 | End: 2022-07-20
Payer: COMMERCIAL

## 2022-07-20 VITALS
DIASTOLIC BLOOD PRESSURE: 54 MMHG | RESPIRATION RATE: 18 BRPM | OXYGEN SATURATION: 95 % | BODY MASS INDEX: 44.41 KG/M2 | HEIGHT: 68 IN | SYSTOLIC BLOOD PRESSURE: 124 MMHG | TEMPERATURE: 98.8 F | HEART RATE: 82 BPM | WEIGHT: 293 LBS

## 2022-07-20 PROCEDURE — 250N000013 HC RX MED GY IP 250 OP 250 PS 637: Performed by: PHYSICIAN ASSISTANT

## 2022-07-20 PROCEDURE — 97530 THERAPEUTIC ACTIVITIES: CPT | Mod: GP | Performed by: PHYSICAL THERAPIST

## 2022-07-20 PROCEDURE — 999N000190 HC STATISTIC VAT ROUNDS

## 2022-07-20 PROCEDURE — 250N000011 HC RX IP 250 OP 636: Performed by: SPECIALIST

## 2022-07-20 PROCEDURE — 250N000013 HC RX MED GY IP 250 OP 250 PS 637: Performed by: HOSPITALIST

## 2022-07-20 PROCEDURE — 250N000013 HC RX MED GY IP 250 OP 250 PS 637: Performed by: SURGERY

## 2022-07-20 PROCEDURE — 99239 HOSP IP/OBS DSCHRG MGMT >30: CPT | Performed by: INTERNAL MEDICINE

## 2022-07-20 RX ORDER — ARGININE/GLUTAMINE/CALCIUM BMB 7G-7G-1.5G
1 POWDER IN PACKET (EA) ORAL 2 TIMES DAILY
Qty: 60 PACKET | Refills: 0 | DISCHARGE
Start: 2022-07-20

## 2022-07-20 RX ORDER — FERROUS SULFATE 325(65) MG
325 TABLET ORAL DAILY
Qty: 30 TABLET | Refills: 0 | DISCHARGE
Start: 2022-07-21

## 2022-07-20 RX ADMIN — LISINOPRIL 40 MG: 40 TABLET ORAL at 08:35

## 2022-07-20 RX ADMIN — FERROUS SULFATE TAB 325 MG (65 MG ELEMENTAL FE) 325 MG: 325 (65 FE) TAB at 08:35

## 2022-07-20 RX ADMIN — PANTOPRAZOLE SODIUM 40 MG: 40 TABLET, DELAYED RELEASE ORAL at 06:27

## 2022-07-20 RX ADMIN — Medication 1 CAPSULE: at 08:35

## 2022-07-20 RX ADMIN — Medication 1 PACKET: at 11:11

## 2022-07-20 RX ADMIN — ACETAMINOPHEN 650 MG: 325 TABLET ORAL at 06:27

## 2022-07-20 RX ADMIN — FUROSEMIDE 40 MG: 40 TABLET ORAL at 08:35

## 2022-07-20 RX ADMIN — ERTAPENEM SODIUM 1 G: 1 INJECTION, POWDER, LYOPHILIZED, FOR SOLUTION INTRAMUSCULAR; INTRAVENOUS at 10:26

## 2022-07-20 RX ADMIN — METOPROLOL SUCCINATE 25 MG: 25 TABLET, EXTENDED RELEASE ORAL at 08:35

## 2022-07-20 ASSESSMENT — ACTIVITIES OF DAILY LIVING (ADL)
BATHING: 1-->ASSISTANCE NEEDED
ADLS_ACUITY_SCORE: 54
CHANGE_IN_FUNCTIONAL_STATUS_SINCE_ONSET_OF_CURRENT_ILLNESS/INJURY: NO
DIFFICULTY_COMMUNICATING: NO
WALKING_OR_CLIMBING_STAIRS: AMBULATION DIFFICULTY, REQUIRES EQUIPMENT
DRESS: 1-->ASSISTANCE (EQUIPMENT/PERSON) NEEDED
DOING_ERRANDS_INDEPENDENTLY_DIFFICULTY: NO
NUMBER_OF_TIMES_PATIENT_HAS_FALLEN_WITHIN_LAST_SIX_MONTHS: 1
EQUIPMENT_CURRENTLY_USED_AT_HOME: WHEELCHAIR, MANUAL
TOILETING_ISSUES: YES
DIFFICULTY_EATING/SWALLOWING: NO
ADLS_ACUITY_SCORE: 46
WEAR_GLASSES_OR_BLIND: YES
TOILETING_MANAGEMENT: BSC
WALKING_OR_CLIMBING_STAIRS_DIFFICULTY: YES
DRESSING/BATHING_DIFFICULTY: YES
HEARING_DIFFICULTY_OR_DEAF: NO
ADLS_ACUITY_SCORE: 54
ADLS_ACUITY_SCORE: 54
TOILETING: 1-->ASSISTANCE (EQUIPMENT/PERSON) NEEDED
TOILETING: 1-->ASSISTANCE (EQUIPMENT/PERSON) NEEDED (NOT DEVELOPMENTALLY APPROPRIATE)
TRANSFERRING: 1-->ASSISTANCE (EQUIPMENT/PERSON) NEEDED
DRESSING/BATHING: BATHING DIFFICULTY, ASSISTANCE 1 PERSON
FALL_HISTORY_WITHIN_LAST_SIX_MONTHS: YES
VISION_MANAGEMENT: READING
CONCENTRATING,_REMEMBERING_OR_MAKING_DECISIONS_DIFFICULTY: YES
TRANSFERRING: 1-->ASSISTANCE (EQUIPMENT/PERSON) NEEDED (NOT DEVELOPMENTALLY APPROPRIATE)
DRESS: 1-->ASSISTANCE (EQUIPMENT/PERSON) NEEDED (NOT DEVELOPMENTALLY APPROPRIATE)
TOILETING_ASSISTANCE: TOILETING DIFFICULTY, REQUIRES EQUIPMENT;TOILETING DIFFICULTY, ASSISTANCE 1 PERSON

## 2022-07-20 NOTE — PLAN OF CARE
Up A2-3 with sera steady. A&Ox2, ex place and situation. Confused, forgetful. VSS RA. CMS intact. BLE otoniel, dusky. RLE wound vac in place with vashe, minimal output. Michelle area redness. Incontinent bladder. T/r prn. Pain managed with prn oxycodone and tylenol. Tolerating regular diet. Plan discharge to TCU pending placement.

## 2022-07-20 NOTE — DISCHARGE SUMMARY
[facility]  Discharge Summary        Josiane Dasilva MRN# [id number]   YOB: 1960 Age: 61 year old     Date of Admission:  7/4/2022  Date of Discharge:  7/20/2022  Admitting Physician:  Agatha Mata MD  Discharge Physician: Romelia Doty MD  Discharging Service: Hospitalist     Primary Provider: Carmen Arriaza  Primary Care Physician Phone Number: [phone]         Discharge Diagnoses/Problem Oriented Hospital Course (Providers):    Josiane Dasilva was admitted on 7/4/2022 by Agatha Mata MD and I would refer you to their history and physical.  The following problems were addressed during her hospitalization:    Josiane Dasilva is a 61 year old female with hypertension, depression/anxiety, peripheral neuropathy, GERD, chronic anemia, group home resident admitted on 7/4/2022 presented with right ankle pain following a fall couple of weeks prior to admission .      She is a Group home resident, wheelchair-bound at baseline.     Right ankle hardware infection Status post incision and debridement, wound VAC placed 7/12  Chronic lower extremity bilateral edema along with venous stasis changes  Peripheral neuropathy.  - Xray of the right ankle - fixation of an old proximal distal fibular fracture.  - MRI Diffuse circumferential subcutaneous edema and swelling of the visualized leg extending to the ankle and dorsal foot.  -  evaluated and followed by ortho, Status joint aspiration on 7/7; s/p I & D and wound vac placement 7/12  - remains afebrile, leukocytosis and CRP trended down; B Cx rom 7/4 with no growth; was on IV Ancef (7/4 to 7/15)  - wound culture 7/12 growing Serratia and Corynebacterium, ID d/florencio Cefazolin (7/15) and started Ertapenem and Vancomycin (7/15--7/19) ; PICC placed 7/13  - ID d/florencio Vancomycin (7/19) and suggested to continue with Ertapenem alone; ID plans for IV abx for 4 to 6 weeks from surgery; FUP with Dr Freeman 2-3 weeks following discharge.   - Ortho signed  off 7/16, to mobilize with PT/OT, WBAT for pivot transfers/bed to chair in CAM boot; needs wound vac changes Monday and Friday  - evaluated by Dr Park 7/14 and 7/19, US LE arterial obtained which was noted with no significant stenosis; wound cares per WOC/surgery   - SW following for disposition to TCU  - to follow-up in 2 weeks with Dr.Dr Oscar; ortho signed off 7/16  - Continue PTA gabapentin; as needed Tylenol, oxycodone, Robaxin for pain management.    Addendum;  Patient's right ankle anaerobic fluid culture grew 1+ Feingoldia  Magna.  With ID team this organism is covered by the IV ertapenem patient is getting discharged on so no need for additional antibiotics continue IV ertapenem as patient was discharged on it as per ID recommendation     Acute on chronic anemia likely dilutional, surgical blood loss.  Chronic anemia  Baseline hemoglobin between 9-10.  Postop hemoglobin at around 8.6  - stable around 8-9; Iron saturation index 8; got one dose IV Venofer  - Oral iron supplements.  - Monitor hemoglobin periodically.     Hypertension  - Continue PTA metoprolol, lisinopril with hold parameters.  - continue PTA Lasix 40 mg twice daily     Anxiety/depression  - Continue PTA trazodone     GERD  - Continue patient with Protonix     History of chronic neuropathy.  - Continue PTA gabapentin.     History of cognitive impairment.  - Patient alert and oriented but forgetful.  On chart review -confusion during previous admissions [6/2020] then neuropsychiatric testing was recommended.     Hypoalbuminemia likely from dilutional, acute illness.    Serum albumin 2.6  Dietary supplements with Ensure.        Physical deconditioning with chronic debility at baseline.  History of alcohol abuse, falls previously.  Now resident of skilled nursing.  Fall few weeks prior to admission at skilled nursing.  Recommend OT for cognitive assessment at TCU.  PT evaluation   Care management team assistance with transition     Obesity with a  BMI greater than 30.  Consider lifestyle modification with diet and exercise as able to     Diet: Advance Diet as Tolerated: Regular Diet Adult  Snacks/Supplements Pediatric: Boost glucose control; Between Meals  Room Service        DVT Prophylaxis: SCDs, ambulate.   Code Status: Full Code  Disposition: Discharge to TCU today in stable condition    Romelia Doty MD   Page [phone](7AM-6PM)           Code Status:      Full Code         Important Results:      Please see below         Pending Results:        Unresulted Labs Ordered in the Past 30 Days of this Admission     Date and Time Order Name Status Description    7/6/2022  1:31 PM Anaerobic bacterial culture Preliminary                Discharge Instructions and Follow-Up:      Follow-up Appointments     Follow Up and recommended labs and tests      Follow up with Dr. Park or Celestina in 2  weeks in clinic.  Please   call the clinic to schedule an appointment. [facility] [address] [place] [address] Phone: [phone].    [facility] has Walk-In Clinics daily from 8am-8pm 7days a   week.         Follow Up and recommended labs and tests      Follow up with [facility] physician.  The following labs/tests are   recommended: CBC, BMp in 1week .                  Discharge Disposition:      Discharged to short-term care facility         Discharge Medications:        Current Discharge Medication List      START taking these medications    Details   diosmin-hesperidin 450-50 (MPFF 500) 450-50 MG CAPS capsule Take 1 capsule by mouth 2 times daily  Qty: 60 capsule, Refills: 0    Associated Diagnoses: Ulcer of left lower extremity with fat layer exposed (H)      ertapenem (INVANZ) 1 GM vial Inject 1 g into the vein every 24 hours for 41 days    Comments: Check weekly CBC/diff, creatinine , AST, CRP -results to intermed consultants Dr Freeman  Associated Diagnoses: Septic arthritis of right ankle, due to unspecified organism (H)       ferrous sulfate (FEROSUL) 325 (65 Fe) MG tablet Take 1 tablet (325 mg) by mouth daily  Qty: 30 tablet, Refills: 0    Associated Diagnoses: Septic arthritis of right ankle, due to unspecified organism (H)      Nutritional Supplements (DERRICK) PACK Take 1 packet by mouth 2 times daily  Qty: 60 packet, Refills: 0    Associated Diagnoses: Septic arthritis of right ankle, due to unspecified organism (H)      oxyCODONE (ROXICODONE) 5 MG tablet Take 0.5-1 tablets (2.5-5 mg) by mouth every 4 hours as needed for moderate to severe pain  Qty: 20 tablet, Refills: 0    Comments: Take 1/2 tablet for pain rated 1-6 and 1 tablets for pain rated 7-10  Associated Diagnoses: Septic arthritis of right ankle, due to unspecified organism (H)         CONTINUE these medications which have CHANGED    Details   acetaminophen (TYLENOL) 325 MG tablet Take 2 tablets (650 mg) by mouth every 6 hours as needed for mild pain or other (and adjunct with moderate or severe pain or per patient request)  Qty: 40 tablet, Refills: 0    Associated Diagnoses: Septic arthritis of right ankle, due to unspecified organism (H)         CONTINUE these medications which have NOT CHANGED    Details   B Complex Vitamins (VITAMIN B-COMPLEX) TABS TAKE 1 TABLET BY MOUTH ONCE DAILY. **NON-COVERED MED**  *1 TOTAL FILL*  Qty: 90 tablet, Refills: 3    Comments: URGENT!  Associated Diagnoses: Takes dietary supplements      cyanocobalamin (VITAMIN B-12) 1000 MCG tablet TAKE 1 TABLET BY MOUTH TWICE DAILY *1 TOTAL FILL* **NON-COVERED MED**  Qty: 60 tablet, Refills: 11    Comments: PLEASE SEND REFILS  Associated Diagnoses: Peripheral polyneuropathy      furosemide (LASIX) 40 MG tablet TAKE 1 TABLET BY MOUTH TWICE DAILY AT 7AM AND 2PM *1 TOTAL FILL*  Qty: 60 tablet, Refills: 11    Comments: PLEASE SEND REFILLS  Associated Diagnoses: Benign essential hypertension      gabapentin (NEURONTIN) 300 MG capsule Take 1 capsule (300 mg) by mouth At Bedtime  Qty: 90 capsule, Refills:  3    Associated Diagnoses: Peripheral polyneuropathy      GAVILAX 17 GM/SCOOP powder TAKE 17G (MEASURE WITH THE MARKINGS INSIDE CAP) BY MOUTH EVERY 12 HOURS AS NEEDED FOR CONSTIPATION *1 TOTAL FILL*  Qty: 510 g, Refills: 11    Comments: PLEASE SEND REFILLS  Associated Diagnoses: Constipation, unspecified constipation type      lisinopril (ZESTRIL) 40 MG tablet Take 1 tablet (40 mg) by mouth every morning  Qty: 30 tablet, Refills: 3    Associated Diagnoses: Benign essential hypertension      methocarbamol (ROBAXIN) 500 MG tablet TAKE 1 TABLET BY MOUTH TWICE DAILY *1 TOTAL FILL*  Qty: 60 tablet, Refills: 11    Comments: PLEASE SEND REFILLS  Associated Diagnoses: Peripheral polyneuropathy      metoprolol succinate ER (TOPROL XL) 25 MG 24 hr tablet TAKE 1 TABLET BY MOUTH TWICE DAILY *1 TOTAL FILL*  Qty: 60 tablet, Refills: 11    Comments: PLEASE SEND REFILLS  Associated Diagnoses: Benign essential hypertension      omega-3 acid ethyl esters (LOVAZA) 1 g capsule Take 2 capsules (2 g) by mouth 2 times daily  Qty: 360 capsule, Refills: 3    Associated Diagnoses: Peripheral polyneuropathy      pantoprazole (PROTONIX) 40 MG EC tablet TAKE 1 TABLET BY MOUTH EVERY MORNING (BEFORE BREAKFAST) *1 TOTAL FILL*  Qty: 30 tablet, Refills: 11    Associated Diagnoses: Alcohol abuse      traZODone (DESYREL) 50 MG tablet TAKE 1 TABLET BY MOUTH AT BEDTIME *1 TOTAL FILL*  Qty: 31 tablet, Refills: 11    Comments: PLEASE SEND REFILLS  Associated Diagnoses: Recurrent major depressive disorder, in full remission (H)      vitamin B complex with vitamin C (STRESS TAB) tablet TAKE 1 TABLET BY MOUTH TWICE DAILY *1 TOTAL FILL*  Qty: 60 tablet, Refills: 11    Comments: PLEASE SEND REFILLS  Associated Diagnoses: Peripheral polyneuropathy      VITAMIN D3 50 MCG (2000 UT) tablet TAKE 1 TABLET BY MOUTH ONCE DAILY *1 TOTAL FILL*  Qty: 30 tablet, Refills: 11    Comments: PLEASE SEND REFILLS  Associated Diagnoses: Peripheral polyneuropathy         STOP  "taking these medications       ALLERGY RELIEF 10 MG tablet Comments:   Reason for Stopping:                    Allergies:         Allergies   Allergen Reactions     Asa [Aspirin]      Stomach irritation (hx of PUD)     Nsaids      Stomach irritation (Hx of PUD)            Consultations This Hospital Stay:      Consultation during this admission received from orthopedics, Dr. Park with vascular surgery, infectious disease         Condition and Physical Exam on Discharge:      Discharge condition: Stable   Discharge vitals: Blood pressure 124/54, pulse 82, temperature 98.8  F (37.1  C), temperature source Oral, resp. rate 18, height 1.727 m (5' 8\"), weight 146 kg (321 lb 14 oz), last menstrual period 04/07/2014, SpO2 95 %, not currently breastfeeding.     Constitutional:  Alert awake, not in acute distress   Lungs:  Clear to auscultation bilaterally diminished in the bases     Cardiovascular:  Normal rate rhythm regular   Abdomen:  Soft, nontender, nondistended   Skin:  Right lower extremity wound VAC in place.  Left lower extremity wound care dressing in place   Other:            Discharge Orders for Skilled Facility (from Discharge Orders):        After Care Instructions     Activity - Up with nursing assistance      Diet      Follow this diet upon discharge: Orders Placed This Encounter      Snacks/Supplements Boost glucose control; Between Meals      Room Service      Advance Diet as Tolerated: Regular Diet Adult         General info for SNF      Length of Stay Estimate: Short Term Care: Estimated # of Days 31-90  Condition at Discharge: Stable  Level of care:skilled   Rehabilitation Potential: Good  Admission H&P remains valid and up-to-date: Yes  Recent Chemotherapy: N/A  Use Nursing Home Standing Orders: Yes         Mantoux instructions      Give two-step Mantoux (PPD) Per Facility Policy Yes         Weight bearing status      Weight bearing as tolerated with CAM boot.  Okay to take boot off while in bed.      "               Rehab orders for Skilled Facility (from Discharge Orders):      Referrals     Future Labs/Procedures    Primary Care - Care Coordination Referral     Process Instructions:    Services are provided by a Care Coordinator for people with complex needs   such as: medical, social, or financial troubles.  The Care Coordinator   works with the patient and their Primary Care Provider to determine health   goals, obtain resources, achieve outcomes, and develop care plans that   help coordinate the patient's care.     Comments:         Occupational Therapy Adult Consult     Comments:    Evaluate and treat as clinically indicated.    Reason:  Right ankle hardware  infection S/p surgery    Physical Therapy Adult Consult     Comments:    Evaluate and treat as clinically indicated.    Reason:  Right ankle hardware  infection S/p surgery             Discharge Time:      Greater than 30 minutes.        Image Results From This Hospital Stay (For Non-EPIC Providers):        Results for orders placed or performed during the hospital encounter of 07/04/22   XR Ankle Right 3 Views    Narrative    EXAM: XR ANKLE RIGHT G/E 3 VIEWS  LOCATION: [facility]  DATE/TIME: 7/4/2022 9:12 PM    INDICATION: pain after fall  COMPARISON: 09/30/2014      Impression    IMPRESSION: Plate-screw fixation of proximal old healed distal fibular fracture and the distal tibiofibular syndesmosis. No evidence of hardware failure. No widening of the syndesmosis. No acute fracture. The calcaneus appears mildly flattened but is   similar to prior although less well profiled. There is mild degenerative arthritis in the midfoot. Diffuse soft tissue swelling.   XR Joint Aspiration Intermed Right    Narrative    EXAM: XR JOINT ASPIRATION INTERMED RIGHT                7/7/2022 12:59  PM       History:  Right ankle pain with ulceration. Request for right ankle  joint aspiration.     PROCEDURE: The risks (including bleeding,  infection, and allergy to  contrast and medications) and benefits of the procedure were explained  to the patient and consent was obtained.  Using sterile technique and  fluoroscopic guidance, a #20 gauge needle was placed into anterior  aspect of the right tibial talar joint using an anterolateral  approach. Entry site was just inferior to the ulceration.  0.5 mL of  blood-tinged viscus fluid was then aspirated. Fluid sent to lab for  analysis. Estimated blood loss during the procedure was less than 5  mL. No initial complication.       Fluoroscopy time: 0.1 minutes  Images Obtained: 3  Medications used: 3mL Lidocaine 1%      Impression    IMPRESSION:  Technically successful right ankle aspiration. Sample  sent to the lab for analysis.    KLEVER LACY PA-C         SYSTEM ID:  [id number]   US JAN Doppler No Exercise    Narrative    EXAM: RESTING ANKLE-BRACHIAL INDICES (ABIs)  LOCATION: [facility]  DATE/TIME: 7/7/2022 5:00 PM    INDICATION: bilateral lower extremity ulcers  COMPARISON: None.    JAN FINDINGS:  RIGHT  Brachial: 141  Ankle (PT): 177 Index: 1.13  Ankle (DP): 190 Index: 1.22  Digit: 128     LEFT  Brachial: 156  Ankle (PT): 187 Index: 1.20  Ankle (DP): 188 Index: 1.21  Digit: 150     The right JAN at rest is 1.22. The left JAN at rest is 1.21.      WAVEFORMS: The dorsalis pedis and posterior tibial arteries are triphasic bilaterally.      Impression    IMPRESSION:  1.  RIGHT LOWER EXTREMITY: JAN at rest is normal.  2.  LEFT LOWER EXTREMITY: JAN at rest is normal.   MR Ankle Right w/o Contrast    Narrative    MR right ankle without  contrast 7/8/2022 3:10 PM    History: Ankle pain.    Techniques: Multiplanar multisequence imaging of the right ankle was  attempted. Patient unable to continue the exam with inability to  straighten legs and pains in multiple body parts.    Comparison: 7/4/2022    Findings:    Metallic susceptibility artifact secondary to lateral fibular plate  and screw  fixation compromising assessment. Low signal to noise  especially distal to the hindfoot due to positioning.    Diffuse circumferential subcutaneous edema and swelling of the  visualized leg extending to the ankle and dorsal foot.    Plantar calcaneal enthesophyte.    Grossly no evidence of edema like marrow signal intensity in the  visualized bones. Small T1 hypointensity area medial aspect of talus  at medial clear space area, maybe from remote trauma.    Physiologic amount of ankle and posterior subtalar joint fluid.    Achilles tendon is intact. Otherwise tendons and ligaments are not  well assessed.      Impression    IMPRESSION:   Severely limited study as patient was not able to straighten legs and  pains in multiple body parts.   1. Diffuse circumferential subcutaneous edema and swelling of the  visualized leg extending to the ankle and dorsal foot.    ERWIN MURRAYASHI         SYSTEM ID:  [id number]   US Lower Extremity Arterial rt    Narrative    US LOWER EXTREMITY ARTERIAL RT  7/14/2022 12:51 PM     HISTORY:  Peripheral arterial disease. Right foot wound. Tobacco use.    COMPARISON: None    FINDINGS: Color Doppler and spectral waveform analysis performed.    The following peak systolic velocities are measured in cm/s.     RIGHT    CFA: 162  PFA: 155  SFA, proximal: 119  SFA, mid: 155  SFA, distal: 174  Popliteal: 122  Anterior tibial artery: 50  Posterior tibial artery: 84  Peroneal artery: 97      Impression    IMPRESSION: No definite significant stenosis identified on ultrasound.    SALLIE BRADLEY MD         SYSTEM ID:  [id number]             Most Recent Lab Results In EPIC (For Non-EPIC Providers):    Most Recent 3 CBC's:  Recent Labs   Lab Test 07/15/22  0616 07/14/22  0614 07/13/22  0815 07/12/22  1019 07/11/22  1515 07/10/22  1148   WBC  --  7.7  --   --  10.1 8.9   HGB 8.7* 8.4* 8.6*   < > 9.9* 8.9*   MCV  --  84  --   --  84 84   PLT  --  236  --   --  257 262    < > = values in this interval not  displayed.      Most Recent 3 BMP's:  Recent Labs   Lab Test 07/18/22  0615 07/17/22  0517 07/16/22  2111 07/14/22  0614 07/13/22  0815 07/12/22  1740 07/12/22  1019 07/11/22  1515   NA  --   --   --  138  --   --  136 139   POTASSIUM  --   --   --  4.1  --   --  4.2 4.4   CHLORIDE  --   --   --  107  --   --  105 105   CO2  --   --   --  29  --   --  27 30   BUN  --   --   --  11  --   --  15 18   CR 0.81 0.88 0.79 0.77  --   --  0.80 0.84   ANIONGAP  --   --   --  2*  --   --  4 4   JESSICA  --   --   --  8.5  --   --  8.8 8.8   GLC  --   --   --  102* 100* 100* 123* 93     Most Recent 3 Troponin's:  Recent Labs   Lab Test 05/29/20  2233 02/24/19  1142 02/24/19  0550   TROPI <0.015 <0.015 <0.015     Most Recent 3 INR's:  Recent Labs   Lab Test 07/04/22  2053 08/26/14  1000   INR 1.16* 1.08     Most Recent 2 LFT's:  Recent Labs   Lab Test 07/14/22  0614 07/12/22  1019   AST 17 16   ALT 10 12   ALKPHOS 115 126   BILITOTAL 0.3 0.5     Most Recent Cholesterol Panel:  Recent Labs   Lab Test 08/05/21  1110   CHOL 206*   *   HDL 84   TRIG 99     Most Recent 6 Bacteria Isolates From Any Culture (See EPIC Reports for Culture Details):  Recent Labs   Lab Test 02/25/19  0800 02/25/19  0754 02/23/19  0653 02/22/19  2045 02/22/19  1841 02/22/19  1836   CULT No growth No growth No growth 10,000 to 50,000 colonies/mL  mixed urogenital laly  Susceptibility testing not routinely done   No growth Cultured on the 2nd day of incubation:  Parabacteroides merdae  CORRECTED ON 03/04 AT 1113: PREVIOUSLY REPORTED AS Cultured on the 2nd day of incubation:   Parabacteroides merdae  *  Critical Value/Significant Value, preliminary result only, called to and read back by   SARAI GEORGE RN @3698 2/24/19. CT    (Note)  NEGATIVE for the following organisms and resistance markers:  Acinetobacter sp., Citrobacter sp., Enterobacter sp., Proteus sp., E.  coli, K. pneumoniae/oxytoca, P. aeruginosa, CTX-M, KPC, NDM, VIM, IMP  and OXA by [employer]  multiplex nucleic acid test. Final identification  and antimicrobial susceptibility testing will be verified by standard  methods.    Critical Value/Significant Value called to and read back by Yennifer Grace RN  2.25.19 FRANTZ.         Most Recent TSH, T4 and HgbA1c:  Recent Labs   Lab Test 02/23/19  0654 06/27/18  1514   TSH  --  5.48*   T4  --  0.93   A1C Canceled, Test credited  --

## 2022-07-20 NOTE — PLAN OF CARE
Date/Time:07/19,,1815-0033    Trauma/Ortho/Medical (Choose one) :Ortho    Diagnosis:Cellulitis of right lower extremity   POD#:I&D 07/12  Mental Status:A&O x 2-3, disoriented to situation and place; forgetful  Activity/dangle:A2-3 Sera steady/GB. Up to chair x2 today.   Diet:Reg, room service  Pain: Back and leg pain- PRN tylenol x2, robaxin x1, and oxy x1. Turn and repo q2hr encouraged.   Russell/Voiding:Incontinent bladder- periarea painful, no purewic or brief while in bed. Cont bowel- x1 BM this shift; up to BSC  Tele/Restraints/Iso:None  02/LDA:RA  Skin: Wound care done to LLE. Rash in periarea and under abdominal folds- powder applied. Wound Vac to R ankle. BLLE dusky/edema- edema wear on RLE, toes to upper thighs. Heels in off-loading boots.   D/C Date:Pending  TCU placement. SW following   Other Info: CMS intact. R-PICC-SL, blood return noted. RLE wound vac in place, -125 mmHg continuous with some drainage in the canister.

## 2022-07-20 NOTE — PLAN OF CARE
Physical Therapy Discharge Summary    Reason for therapy discharge:    Discharged to transitional care facility.    Progress towards therapy goal(s). See goals on Care Plan in [facility] electronic health record for goal details.  Goals not met.  Barriers to achieving goals:   discharge from facility.    Therapy recommendation(s):    Continued therapy is recommended.  Rationale/Recommendations:  To progress independence and safety with functional mobility.

## 2022-07-20 NOTE — PLAN OF CARE
Goal Outcome Evaluation:    Pt A&Ox2-3, baseline confusion, VSS, CMS intact with baseline neuropathy, wound vac to RLE wound with vasche irrigation, edemawear, cam boot when up OOB, WBAT to RLE, PICC line patent to RUE, continues on IV antibiotics, bariatric air bed, up with celsa steady and assist of 2 to chair, tolerating regular diet, incontinent of urine, rash to periarea, pt refused pain medication today, pt to transfer to TCU via stretcher at 1400 today.

## 2022-07-20 NOTE — DISCHARGE INSTRUCTIONS
RLE wound care: Twice daily  Remove old dressing and discard.  Cleanse wound with wound cleanser. Pat dry.  Apply no sting barrier film to the periwound skin.  Moisten gauze with hypochlorous acid (vashe) and apply to wound bed  Secure with EdemaWear Small/Navy from toes to knee and overlap EdemaWear Red/Large from calf to thigh.  Can place ABD over wound bed on the outside of edemawear for drainage and secure with kerlix.    EDEMA WEAR Stockings- apply fresh pair daily   DO NOT CUT OR TRIM STOCKINGS   Remove and set aside stockings, do not throw away   SKIN/WOUND CARE   Clean feet and legs with gentle wash   Complete any wound or skin treatments   Spray Emerald Cleanse and Protect on intact skin, especially dry, scaly plaques, massage in, wipe or rinse   Apply lotions   Complete wound care   Apply Edema Wear as indicated   CARE OF EDEMAWEAR      DO NOT THROW AWAY THE OLD PAIR OF EDEMA WEAR, WASH IT.   Wash stocking(s) with soap and HOT water. If really soiled use a surgical soap then regular soap and/or you may need to wash several times   Hang dry     Follow Up with Wound Care Clinic in 2 weeks [phone]

## 2022-07-20 NOTE — PROGRESS NOTES
Care Management Discharge Note    Discharge Date: 07/20/2022       Discharge Disposition: Group Home    Discharge Services: Transportation Services    Discharge DME: None    Discharge Transportation: [facility] stretcher transport    Private pay costs discussed: Not applicable    PAS Confirmation Code: [id number]  Patient/family educated on Medicare website which has current facility and service quality ratings:      Education Provided on the Discharge Plan:  yes  Persons Notified of Discharge Plans: patient  Patient/Family in Agreement with the Plan: yes    Handoff Referral Completed: Yes    Additional Information:  Writer completed PCS for patient's stretcher ride need-A&O X3 and forgetful, up with 2-3 assist and Samira Steady and needing supervision. Writer faxed PCS to [facility]. Received discharge orders and faxed to [facility]. [facility] ride set for 1400 today.    DEWAYNE Cordova

## 2022-07-21 ENCOUNTER — PATIENT OUTREACH (OUTPATIENT)
Dept: CARE COORDINATION | Facility: CLINIC | Age: 62
End: 2022-07-21

## 2022-07-21 NOTE — PROGRESS NOTES
Clinic Care Coordination Contact  Care Coordination Transition Communication         Clinical Data: Patient was hospitalized at [facility] from 7/4/22 to 7/12/22 with diagnosis of Right ankle hardware infection Status post incision and debridement, wound VAC placed 7/12.     Transition to Facility:              Facility Name: [facility]              Contact name and phone number/fax: [phone]    Plan: RN/SW Care Coordinator will await notification from facility staff informing RN/SW Care Coordinator of patient's discharge plans/needs. RN/SW Care Coordinator will review chart and outreach to facility staff every 4 weeks and as needed.     DEWAYNE Berumen  [facility] Care Coordination   [place] [facility]  Social Work Care Coordinator  [phone]

## 2022-07-21 NOTE — LETTER
TCU Hand In    Patient name: Josiane Dasilva  PCP: Carmen Arriaza  PCP Care Coordinator's information (phone number/email): [phone]  Primary family contact: Jyotsna Dasilva (GUARDIAN)   Other MDs involved: N/A    Patient Care Team:  Carmen Arriaza MD as PCP - General (Internal Medicine)  Carmen Arriaza MD as Assigned PCP  Danya Ludwig LSW as Clinic Care Coordinator    Resources in place previously (home care services, equipment needs, etc.): Group home    Advanced Directive: Y    Social History     Socioeconomic History     Marital status: Single     Spouse name: Not on file     Number of children: Not on file     Years of education: Not on file     Highest education level: Not on file   Occupational History     Not on file   Tobacco Use     Smoking status: Former Smoker     Packs/day: 0.00     Years: 10.00     Pack years: 0.00     Quit date: 2005     Years since quittin.5     Smokeless tobacco: Never Used   Substance and Sexual Activity     Alcohol use: Not Currently     Drug use: No     Sexual activity: Not Currently   Other Topics Concern     Parent/sibling w/ CABG, MI or angioplasty before 65F 55M? Not Asked   Social History Narrative    Single. Lives in a group home.     3 adult sons.    4 grandchildren.    Walks with walker/wheelchair.     Social Determinants of Health     Financial Resource Strain: Not on file   Food Insecurity: Not on file   Transportation Needs: Not on file   Physical Activity: Not on file   Stress: Not on file   Social Connections: Not on file   Intimate Partner Violence: Not on file   Housing Stability: Not on file       Additional social history: lives in group home, guardian    Known coverage issues: Medical Assistance    MERNA score: Average    Please contact me with discharge planning info. I would like to attend any care conferences you have as well.    -DEWAYNE Guerrero

## 2022-07-24 LAB — BACTERIA SNV CULT: ABNORMAL

## 2022-07-28 PROBLEM — L97.912 ULCER OF RIGHT LOWER EXTREMITY WITH FAT LAYER EXPOSED (H): Status: RESOLVED | Noted: 2021-10-20 | Resolved: 2022-07-28

## 2022-07-28 PROBLEM — S81.801A OPEN WOUND OF LOWER LIMB, RIGHT, INITIAL ENCOUNTER: Status: RESOLVED | Noted: 2022-07-04 | Resolved: 2022-07-28
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
## 2022-07-28 PROBLEM — L03.115 CELLULITIS OF RIGHT LOWER EXTREMITY: Status: RESOLVED | Noted: 2022-07-04 | Resolved: 2022-07-28
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
## 2022-07-28 PROBLEM — L97.922 ULCER OF LEFT LOWER EXTREMITY WITH FAT LAYER EXPOSED (H): Status: RESOLVED | Noted: 2021-10-20 | Resolved: 2022-07-28

## 2022-08-03 ENCOUNTER — TELEPHONE (OUTPATIENT)
Dept: WOUND CARE | Facility: CLINIC | Age: 62
End: 2022-08-03

## 2022-08-10 ENCOUNTER — TELEPHONE (OUTPATIENT)
Dept: WOUND CARE | Facility: CLINIC | Age: 62
End: 2022-08-10

## 2022-08-10 NOTE — TELEPHONE ENCOUNTER
Glo ( at [facility]) called and cancelled the patient's 8/10 appointment due to the patient being in a TCU ([facility] in [place]).     Glo- [phone]

## 2022-08-10 NOTE — TELEPHONE ENCOUNTER
Miscommunication between [facility] and [facility].    [facility] was going to send Josiane to her Wound Clinic appointment today, but then cancelled transportation with the miscommunication.    Will follow up August 31 as scheduled.

## 2022-08-16 DIAGNOSIS — I10 BENIGN ESSENTIAL HYPERTENSION: ICD-10-CM

## 2022-08-18 ENCOUNTER — PATIENT OUTREACH (OUTPATIENT)
Dept: CARE COORDINATION | Facility: CLINIC | Age: 62
End: 2022-08-18

## 2022-08-18 RX ORDER — LISINOPRIL 40 MG/1
TABLET ORAL
Qty: 31 TABLET | Refills: 7 | Status: SHIPPED | OUTPATIENT
Start: 2022-08-18

## 2022-08-18 NOTE — TELEPHONE ENCOUNTER
Prescription approved per [facility] Refill Protocol.  Marco Antonio Graham RN  [facility] Triage Nurse

## 2022-08-18 NOTE — PROGRESS NOTES
Clinic Care Coordination - Chart Review Only    Situation/Background: Patient chart reviewed by care coordinator related to [facility] conversion.    Assessment: Patient continues to be followed by Clinic Care Coordination.    Plan: Patient's chart updated to align with [facility] program for ongoing patient management.    Danya Ludwig, [facility] Care Coordination   [facility]  Social Work Care Coordinator  [phone]

## 2022-08-24 ENCOUNTER — PATIENT OUTREACH (OUTPATIENT)
Dept: CARE COORDINATION | Facility: CLINIC | Age: 62
End: 2022-08-24

## 2022-08-24 NOTE — PROGRESS NOTES
Clinic Care Coordination Contact    Situation: Patient chart reviewed by care coordinator.    Background: CCC Referral was placed, as pt discharged to TCU.    Assessment: Pt resides in a group home and has supports in place.     Plan/Recommendations: Pt is not a candidate for CCC.    DEWAYNE Berumen  [facility] Care Coordination   [facility]  Social Work Care Coordinator  [phone]

## 2022-09-06 ENCOUNTER — HOSPITAL ENCOUNTER (OUTPATIENT)
Dept: WOUND CARE | Facility: CLINIC | Age: 62
Discharge: HOME | End: 2022-09-06
Attending: SURGERY | Admitting: SURGERY
Payer: COMMERCIAL

## 2022-09-06 VITALS — SYSTOLIC BLOOD PRESSURE: 132 MMHG | HEART RATE: 76 BPM | TEMPERATURE: 97.9 F | DIASTOLIC BLOOD PRESSURE: 82 MMHG

## 2022-09-06 DIAGNOSIS — L97.312 ULCER OF RIGHT ANKLE, WITH FAT LAYER EXPOSED (H): ICD-10-CM

## 2022-09-06 PROCEDURE — 99213 OFFICE O/P EST LOW 20 MIN: CPT | Mod: 25 | Performed by: SURGERY

## 2022-09-06 PROCEDURE — 11042 DBRDMT SUBQ TIS 1ST 20SQCM/<: CPT | Performed by: SURGERY

## 2022-09-06 PROCEDURE — G0463 HOSPITAL OUTPT CLINIC VISIT: HCPCS | Mod: 25

## 2022-09-06 RX ORDER — TRAZODONE HYDROCHLORIDE 100 MG/1
100 TABLET ORAL AT BEDTIME
COMMUNITY
Start: 2022-09-02

## 2022-09-06 RX ORDER — NYSTATIN 100000 [USP'U]/G
1 POWDER TOPICAL DAILY
COMMUNITY
Start: 2022-08-15

## 2022-09-06 NOTE — PROGRESS NOTES
Patient arrived via transport and was left alone in the clinic.  Is not cognitively intact and does not appear to be able to make decisions.    Called the [facility] and made them aware that Josiane needs to be accompanied to her appointments.

## 2022-09-06 NOTE — PROGRESS NOTES
[facility] paperwork provided [phone] for contact information for patient's return ride, no name.    Called this number for .  They do not have the patient on their schedule.    Called [facility].  They will look into it and call us back.

## 2022-09-06 NOTE — PROGRESS NOTES
[facility] [place] Progress Note    Subject: Josiane Dasilva status post I&D right anterior ankle mortise abscess by Dr. Heredia, [facility], July 2022.  Residing in a care facility.  History of alcoholism, relatively poor historian.    Patient Active Problem List   Diagnosis     History of peptic ulcer disease     MDD (major depressive disorder)     RICO (generalized anxiety disorder)     Peripheral neuropathy     Benign essential hypertension     Alcoholism /alcohol abuse     Morbid obesity (H)     Poor short term memory     Impaired fasting glucose     Ulcer of right ankle, with fat layer exposed (H)     Past Medical History:   Diagnosis Date     Alcoholism /alcohol abuse      Benign essential hypertension      RICO (generalized anxiety disorder)      History of peptic ulcer disease      Impaired fasting glucose      MDD (major depressive disorder)      Morbid obesity (H)      Peripheral neuropathy      Poor short term memory      Exam:  /82 (BP Location: Left arm, Patient Position: Sitting)   Pulse 76   Temp 97.9  F (36.6  C) (Tympanic)   LMP 04/07/2014   Wound (used by OP [facility] only) 07/12/22 1525 Left anterior ankle surgical (Active)   Thickness/Stage full thickness 09/06/22 1029   Base pink;slough 09/06/22 1029   Periwound intact 09/06/22 1029   Periwound Temperature warm 09/06/22 1029   Periwound Skin Turgor soft 09/06/22 1029   Edges open 09/06/22 1029   Length (cm) 2 09/06/22 1029   Width (cm) 2 09/06/22 1029   Depth (cm) 0.1 09/06/22 1029   Wound (cm^2) 4 cm^2 09/06/22 1029   Wound Volume (cm^3) 0.4 cm^3 09/06/22 1029   Drainage Characteristics/Odor creamy 09/06/22 1029   Drainage Amount moderate 09/06/22 1029   Care, Wound non-select wound debridement performed 09/06/22 1029       Incision/Surgical Site 03/14/19 Right;Lower;Quadrant Abdomen (Active)       Incision/Surgical Site 03/14/19 Vagina (Active)       Incision/Surgical Site 03/14/19 Umbilicus (Active)       Incision/Surgical Site  03/14/19 Left;Lower;Quadrant Abdomen (Active)       Incision/Surgical Site 07/12/22 Right Ankle (Active)     Chronic interstitial edema right lower extremity, 3 sutures removed from right anterior ankle mortise skin, extensive biofilm, sharply excised, underlying viable extensor tendon.    Procedure:   Patient was determined to be capable of making their own medical decisions and informed consent was obtained. Topical anesthetic of 4% lidocaine was applied, debridement was performed using a #15 blade down to and including subcutaneous tissue and biofilm, total area debrided approximately 5 cm , bleeding controlled with light pressure. Patient tolerated procedure well.    Impression: Uncontrolled interstitial edema right lower extremity, right ankle mortise abscess status post incision and drainage    Plan: Moist hypochlorous acid Vashe dressing changes twice a day, utilization of purple stripe EdemaWear, elevate leg when sitting as tolerated.    Patient will return to the clinic in 3 weeks time      Further instructions from your care team       Josiane Dasilva      1960    [facility] (Phone: [phone], Fax: [phone])    In the future, please have Josiane come to clinic with an escort.    Sutures removed today    Wound Dressing Change: Left anterior ankle  -Cleanse, pat dry  -Apply Vashe damp gauze over the wound  -Apply Navy Stripe EdemaWear from behind the toes to below the knee  -Change twice daily    -Continue the boot until Orthopedics says otherwise    EdemaWear should be worn 24/7 unless bathing/showering or changing the dressing. You will wash and reuse the EdemaWear. DO NOT CUT THE EDEMAWEAR. IF IT IS TOO LONG THEN CUFF THE EDEMAWEAR (the EdemaWear can shrink length wise with washing).     HARMEET Park M.D. September 6, 2022    Call us at [phone] if you have any questions about your wounds, have redness or swelling around your wound, have a fever of 101 or greater or if  you have any other problems or concerns. We answer the phone Monday through Friday 8 am to 4 pm, please leave a message as we check the voicemail frequently throughout the day.     If you had a positive experience please indicate that on your patient satisfaction survey form that [facility] will be sending you.    It was a pleasure meeting with you today.  Thank you for allowing me and my team the privilege of caring for you today.  YOU are the reason we are here, and I truly hope we provided you with the excellent service you deserve.  Please let us know if there is anything else we can do for you so that we can be sure you are leaving completely satisfied with your care experience.      If you have any billing related questions please call the [facility] Business office at [phone]. The clinic staff does not handle billing related matters.    If you are scheduled to have a follow up appointment, you will receive a reminder call the day before your visit. On the appointment day please arrive 15 minutes prior to your appointment time. If you are unable to keep that appointment, please call the clinic to cancel or reschedule. If you are more than 10 minutes late or greater for your appointment, the clinic policy is that you may be asked to reschedule.          Phani Park MD on 9/6/2022 at 10:35 AM      Dictated using Dragon voice recognition software which may result in transcription errors

## 2022-09-06 NOTE — DISCHARGE INSTRUCTIONS
Josiane Dasilva      1960    [facility] (Phone: [phone], Fax: [phone])    In the future, please have Josiane come to clinic with an escort.    Sutures removed today    Wound Dressing Change: Left anterior ankle  -Cleanse, pat dry  -Apply Vashe damp gauze over the wound  -Apply Navy Stripe EdemaWear from behind the toes to below the knee  -Change twice daily    -Continue the boot until Orthopedics says otherwise    EdemaWear should be worn 24/7 unless bathing/showering or changing the dressing. You will wash and reuse the EdemaWear. DO NOT CUT THE EDEMAWEAR. IF IT IS TOO LONG THEN CUFF THE EDEMAWEAR (the EdemaWear can shrink length wise with washing).     HARMEET Prak M.D. September 6, 2022    Call us at [phone] if you have any questions about your wounds, have redness or swelling around your wound, have a fever of 101 or greater or if you have any other problems or concerns. We answer the phone Monday through Friday 8 am to 4 pm, please leave a message as we check the voicemail frequently throughout the day.     If you had a positive experience please indicate that on your patient satisfaction survey form that [facility] will be sending you.    It was a pleasure meeting with you today.  Thank you for allowing me and my team the privilege of caring for you today.  YOU are the reason we are here, and I truly hope we provided you with the excellent service you deserve.  Please let us know if there is anything else we can do for you so that we can be sure you are leaving completely satisfied with your care experience.      If you have any billing related questions please call the [facility] Business office at [phone]. The clinic staff does not handle billing related matters.    If you are scheduled to have a follow up appointment, you will receive a reminder call the day before your visit. On the appointment day please arrive 15 minutes prior to your appointment time. If  you are unable to keep that appointment, please call the clinic to cancel or reschedule. If you are more than 10 minutes late or greater for your appointment, the clinic policy is that you may be asked to reschedule.

## 2022-09-07 ENCOUNTER — TELEPHONE (OUTPATIENT)
Dept: WOUND CARE | Facility: CLINIC | Age: 62
End: 2022-09-07

## 2022-09-07 NOTE — TELEPHONE ENCOUNTER
Called number.  Went to voiceIEV for Kim, Director of Rehab.    Left a message that Josiane came to clinic in EdemaWear yesterday and we would advise that she continue EdemaWear 24/7.  Tubigrip can be worn over EdemaWear, but should be taken off at night.    Left call back number should there be additional questions.

## 2022-09-07 NOTE — TELEPHONE ENCOUNTER
Marcia stone, rehab director, called 9/6 to ask if the patient should switch to Edema wear per Dr Park's most recent instructions or if it's ok to continue what they had been doing which was tubigrip on both legs. They had been using the tubigrip with the patient 24/7 with OT staff checking patients skin and cleansing it daily.     [phone]

## 2022-10-24 ENCOUNTER — TELEPHONE (OUTPATIENT)
Dept: WOUND CARE | Facility: CLINIC | Age: 62
End: 2022-10-24

## 2022-10-24 NOTE — TELEPHONE ENCOUNTER
[facility]    Who is the name of the provider?:  Wound    What is the location you see this provider at/preferred location?: [place]  Person calling: Boubacar Gregory at [place]  Phone number:  [phone] xt [phone]  Nurse call back needed:  YES     Reason for call:   Please call to reschedule the missed 9/27 appointment.    Pharmacy location:  NA  Outside Imaging: Not Applicable   Can we leave a detailed message on this number?  Yes - can leave message on xt [phone]

## 2022-10-28 ENCOUNTER — MEDICAL CORRESPONDENCE (OUTPATIENT)
Dept: HEALTH INFORMATION MANAGEMENT | Facility: CLINIC | Age: 62
End: 2022-10-28

## 2022-11-14 ENCOUNTER — HOSPITAL ENCOUNTER (OUTPATIENT)
Dept: WOUND CARE | Facility: CLINIC | Age: 62
Discharge: HOME | End: 2022-11-14
Attending: SURGERY | Admitting: SURGERY
Payer: COMMERCIAL

## 2022-11-14 VITALS — SYSTOLIC BLOOD PRESSURE: 163 MMHG | HEART RATE: 71 BPM | DIASTOLIC BLOOD PRESSURE: 73 MMHG | TEMPERATURE: 97.5 F

## 2022-11-14 DIAGNOSIS — L97.312 ULCER OF RIGHT ANKLE, WITH FAT LAYER EXPOSED (H): Primary | ICD-10-CM

## 2022-11-14 PROBLEM — L97.309 ANKLE ULCER (H): Status: ACTIVE | Noted: 2022-09-06

## 2022-11-14 PROBLEM — R41.3 POOR SHORT-TERM MEMORY: Status: ACTIVE | Noted: 2021-12-12

## 2022-11-14 PROCEDURE — 97602 WOUND(S) CARE NON-SELECTIVE: CPT

## 2022-11-14 PROCEDURE — 99212 OFFICE O/P EST SF 10 MIN: CPT | Performed by: SURGERY

## 2022-11-14 NOTE — DISCHARGE INSTRUCTIONS
Josiane COLEMAN Brown      1960    A DME order was not completed because the supplies are ordered by home care or at a care facility    [facility] (Phone: [phone], Fax: [phone])    In the future, please have Josiane come to clinic with an escort.    Patient was using DermaBlue and Mepilex, however this was not ordered by Dr. Park. It appears another provider is managing this wound. Please continue to follow with other wound care provider.    Continue to elevate legs as much as possible & avoid salt/sodium in diet    EdemaWear should be worn 24/7 unless bathing/showering or changing the dressing.  You will wash and reuse the EdemaWear. DO NOT CUT THE EDEMAWEAR. IF IT IS TOO LONG THEN CUFF THE EDEMAWEAR (the EdemaWear can shrink length wise with washing).     HARMEET Park M.D. November 14, 2022    Call us at [phone] if you have any questions about your wounds, have redness or swelling around your wound, have a fever of 101 or greater or if you have any other problems or concerns. We answer the phone Monday through Friday 8 am to 4 pm, please leave a message as we check the voicemail frequently throughout the day.     If you had a positive experience please indicate that on your patient satisfaction survey form that [facility] will be sending you.    It was a pleasure meeting with you today.  Thank you for allowing me and my team the privilege of caring for you today.  YOU are the reason we are here, and I truly hope we provided you with the excellent service you deserve.  Please let us know if there is anything else we can do for you so that we can be sure you are leaving completely satisfied with your care experience.      If you have any billing related questions please call the [facility] Business office at [phone]. The clinic staff does not handle billing related matters.    If you are scheduled to have a follow up appointment, you will receive a reminder call the day  before your visit. On the appointment day please arrive 15 minutes prior to your appointment time. If you are unable to keep that appointment, please call the clinic to cancel or reschedule. If you are more than 10 minutes late or greater for your appointment, the clinic policy is that you may be asked to reschedule.

## 2022-11-14 NOTE — PROGRESS NOTES
[facility] [place] Progress Note    Subject: Josiane JARED Dasilva not historian, previous right ankle abscess status post surgical debridement, currently at a long-term care facility, paperwork dressing changes different than previous recommendations at last visit.  I reached out to the care facility and left a message to discuss options for management based on their ability to perform dressing changes or if there is another care provider doing wound care at The facility in which case we would like to respect their direction of dressing changes and could certainly defer to the onsite clinician judgment for maximizing wound care.    Patient Active Problem List   Diagnosis     Anemia     History of peptic ulcer disease     MDD (major depressive disorder)     RICO (generalized anxiety disorder)     Peripheral neuropathy     Benign essential hypertension     Alcoholism /alcohol abuse     Morbid obesity (H)     Poor short-term memory     Impaired fasting glucose     Ankle ulcer (H)     Past Medical History:   Diagnosis Date     Alcoholism /alcohol abuse      Benign essential hypertension      RICO (generalized anxiety disorder)      History of peptic ulcer disease      Impaired fasting glucose      MDD (major depressive disorder)      Morbid obesity (H)      Peripheral neuropathy      Poor short term memory      Exam:  BP (!) 163/73 (BP Location: Right arm, Patient Position: Sitting)   Pulse 71   Temp 97.5  F (36.4  C) (Temporal)   LMP 04/07/2014   Wound (used by OP [facility] only) 07/12/22 1525 Left anterior ankle surgical (Active)   Thickness/Stage full thickness 11/14/22 1412   Base pink;slough;exposed structure 11/14/22 1412   Periwound intact 11/14/22 1412   Periwound Temperature warm 11/14/22 1412   Periwound Skin Turgor soft 11/14/22 1412   Edges open 11/14/22 1412   Length (cm) 1.5 11/14/22 1412   Width (cm) 1.6 11/14/22 1412   Depth (cm) 0.1 09/06/22 1029   Wound (cm^2) 2.4 cm^2 11/14/22 1412   Wound Volume  (cm^3) 0.4 cm^3 09/06/22 1029   Wound healing % 40 11/14/22 1412   Drainage Characteristics/Odor creamy 11/14/22 1412   Drainage Amount moderate 11/14/22 1412   Care, Wound debrided 09/06/22 1029       Incision/Surgical Site 03/14/19 Right;Lower;Quadrant Abdomen (Active)       Incision/Surgical Site 03/14/19 Vagina (Active)       Incision/Surgical Site 03/14/19 Umbilicus (Active)       Incision/Surgical Site 03/14/19 Left;Lower;Quadrant Abdomen (Active)       Incision/Surgical Site 07/12/22 Right Ankle (Active)     Right ankle wound, chronic, increased granulation tissue, no cellulitis, no undermining, no heel ulceration.  Chronic right lower extremity interstitial edema, lymphedema.        Impression: Chronic right ankle ulceration, not historian    Plan: We will dress the wounds with deferment to onsite local decision making, I have left a message with the care facility to contact me to further discuss and review.  Patient will return to the clinic in 6 weeks time though would cancel if the care facility has an onsite clinician who is currently providing her dressing and wound care.      Further instructions from your care team       Josianeelvira Dasilva      1960    A DME order was not completed because the supplies are ordered by home care or at a care facility    [facility] (Phone: [phone], Fax: [phone])    In the future, please have Josiane come to clinic with an escort.    Patient was using DermaBlue and Mepilex, however this was not ordered by Dr. Park. It appears another provider is managing this wound. Please continue to follow with other wound care provider.    Continue to elevate legs as much as possible & avoid salt/sodium in diet    EdemaWear should be worn 24/7 unless bathing/showering or changing the dressing.  You will wash and reuse the EdemaWear. DO NOT CUT THE EDEMAWEAR. IF IT IS TOO LONG THEN CUFF THE EDEMAWEAR (the EdemaWear can shrink length wise with washing).      HARMEET Park M.D. November 14, 2022    Call us at [phone] if you have any questions about your wounds, have redness or swelling around your wound, have a fever of 101 or greater or if you have any other problems or concerns. We answer the phone Monday through Friday 8 am to 4 pm, please leave a message as we check the voicemail frequently throughout the day.     If you had a positive experience please indicate that on your patient satisfaction survey form that [facility] will be sending you.    It was a pleasure meeting with you today.  Thank you for allowing me and my team the privilege of caring for you today.  YOU are the reason we are here, and I truly hope we provided you with the excellent service you deserve.  Please let us know if there is anything else we can do for you so that we can be sure you are leaving completely satisfied with your care experience.      If you have any billing related questions please call the [facility] Business office at [phone]. The clinic staff does not handle billing related matters.    If you are scheduled to have a follow up appointment, you will receive a reminder call the day before your visit. On the appointment day please arrive 15 minutes prior to your appointment time. If you are unable to keep that appointment, please call the clinic to cancel or reschedule. If you are more than 10 minutes late or greater for your appointment, the clinic policy is that you may be asked to reschedule.           Phani Park MD on 11/14/2022 at 3:19 PM    Dictated using Dragon voice recognition software which may result in transcription errors

## 2022-11-23 ENCOUNTER — DOCUMENTATION ONLY (OUTPATIENT)
Dept: OTHER | Facility: CLINIC | Age: 62
End: 2022-11-23

## 2022-11-23 NOTE — PROGRESS NOTES
A compass report was filed by nurse at the wound clinic from Josiane's visit on 11/14.   Pt who has known cogitative impairment was transported to clinic and left unattended in clinic waiting room prior to and after clinic visit.   In response to the compass a vulnerable adult report was filed on 11/23

## 2023-05-15 NOTE — PROGRESS NOTES
[facility]    Outpatient Occupational Therapy Discharge Note  Patient: Josiane Dasilva  : 1960    Beginning/End Dates of Reporting Period:  22 to 23    Referring Provider: Carmen Daniel Diagnosis: lymphedema    Client Self Report:      Objective Measurements:                                                        Outcome Measures (most recent score):       Goals:   Goal Identifier     Goal Description     Target Date     Date Met      Progress (detail required for progress note):       Goal Identifier     Goal Description     Target Date     Date Met      Progress (detail required for progress note):       Goal Identifier     Goal Description     Target Date     Date Met      Progress (detail required for progress note):       Goal Identifier     Goal Description     Target Date     Date Met      Progress (detail required for progress note):       Goal Identifier     Goal Description     Target Date     Date Met      Progress (detail required for progress note):       Goal Identifier     Goal Description     Target Date     Date Met      Progress (detail required for progress note):       Goal Identifier     Goal Description     Target Date     Date Met      Progress (detail required for progress note):       Goal Identifier     Goal Description     Target Date     Date Met      Progress (detail required for progress note):         Plan:  Discharge from therapy. Pt eval w/o return for services    Discharge:    Reason for Discharge: Patient has failed to schedule further appointments.    Equipment Issued:     Discharge Plan: Pt to return for services

## 2023-05-15 NOTE — ADDENDUM NOTE
Encounter addended by: Phani Merida on: 5/15/2023 10:49 AM   Actions taken: Clinical Note Signed, Episode resolved

## 2023-08-01 NOTE — PROGRESS NOTES
GYN note:  Pt admitted for anemia secondary to blood loss from post menopausal bleeding for 3 months.   Pt has thickened endometrium and myomatous uterus.   Endometrial biopsy was unable to be obtained in office due to discomfort.  Pt was brought in for anemia and need for transfusion. She will need hysterectomy in the very near future and correction of anemia was necessary. Pt was symptomatic from it as well.  Had fever during transfusion and +PCR for strep noted on blood product. Eventually received transfusion and Hb stable now.  Initial blood culture was pos for gram neg rods but further culturing has not grown any specific organism. Pt has been afebrile on initial Vanco and then Zosyn.  In addition to blood she has received Venofer for low iron.    PMH:   Past Medical History:   Diagnosis Date     Alcohol abuse      Anxiety      Depressive disorder      Elevated uric acid in blood      Fatty liver      Hypertension      Normal cardiac stress test June 2014    Lexiscan     Obesity      Pancytopenia (H)      Stomach ulcer     As a teenager in [place] (s/p Billroth I)     Tubular adenoma of colon April 2012; Aug 2014    Recommend q3 yr colonoscopies     Vitamin D deficiency      Currently has outpatient appt with Dr Roberto in GYN Onc at [facility]. She will determine ultimate surgical plan. There may be a re-attempt at endometrial biopsy in office.  Pt will ultimately have hysterectomy.  Vaginal bleeding is currently stable on Provera and Lysteda. Will maintain on Provera as outpatient until surgery.  Awaiting recommendations from ID as to whether IV antibiotics can be discontinued and if further oral treatment is recommended.   Hoping for D/C today. All depends on ID recommendations.  Has new PCP and outpatient appt with Dr. Arriaza on 3/8 after GYN onc visit.  I have spoken with Dr De La Cruz and agrees with discharge if OK with ID.     15

## 2023-12-30 ENCOUNTER — HOSPITAL ENCOUNTER (EMERGENCY)
Facility: CLINIC | Age: 63
Discharge: HOME | End: 2023-12-30
Attending: EMERGENCY MEDICINE | Admitting: EMERGENCY MEDICINE
Payer: COMMERCIAL

## 2023-12-30 VITALS
DIASTOLIC BLOOD PRESSURE: 57 MMHG | TEMPERATURE: 98 F | OXYGEN SATURATION: 98 % | RESPIRATION RATE: 18 BRPM | SYSTOLIC BLOOD PRESSURE: 121 MMHG | HEART RATE: 66 BPM

## 2023-12-30 DIAGNOSIS — R45.5 AGGRESSIVE OUTBURST: Primary | ICD-10-CM

## 2023-12-30 PROCEDURE — 99283 EMERGENCY DEPT VISIT LOW MDM: CPT

## 2023-12-30 ASSESSMENT — ACTIVITIES OF DAILY LIVING (ADL): ADLS_ACUITY_SCORE: 35
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
## 2023-12-31 NOTE — DISCHARGE INSTRUCTIONS
Continue to use appropriate communication skills at your facility.  Your sister Callie was aware that you visited the emergency department tonight and feels comfortable with you returning to your facility.  Follow-up with your primary care doctor as needed.

## 2024-02-23 ENCOUNTER — APPOINTMENT (OUTPATIENT)
Dept: GENERAL RADIOLOGY | Facility: CLINIC | Age: 64
End: 2024-02-23
Attending: EMERGENCY MEDICINE
Payer: COMMERCIAL

## 2024-02-23 ENCOUNTER — HOSPITAL ENCOUNTER (EMERGENCY)
Facility: CLINIC | Age: 64
Discharge: HOME | End: 2024-02-23
Attending: EMERGENCY MEDICINE | Admitting: EMERGENCY MEDICINE
Payer: COMMERCIAL

## 2024-02-23 VITALS
WEIGHT: 203 LBS | TEMPERATURE: 98 F | RESPIRATION RATE: 16 BRPM | BODY MASS INDEX: 30.77 KG/M2 | HEIGHT: 68 IN | HEART RATE: 111 BPM | DIASTOLIC BLOOD PRESSURE: 65 MMHG | SYSTOLIC BLOOD PRESSURE: 116 MMHG | OXYGEN SATURATION: 95 %

## 2024-02-23 DIAGNOSIS — J06.9 UPPER RESPIRATORY TRACT INFECTION, UNSPECIFIED TYPE: ICD-10-CM

## 2024-02-23 DIAGNOSIS — H10.9 CONJUNCTIVITIS OF BOTH EYES, UNSPECIFIED CONJUNCTIVITIS TYPE: ICD-10-CM

## 2024-02-23 LAB
ANION GAP SERPL CALCULATED.3IONS-SCNC: 9 MMOL/L (ref 7–15)
ATRIAL RATE - MUSE: 90 BPM
BASOPHILS # BLD AUTO: 0 10E3/UL (ref 0–0.2)
BASOPHILS NFR BLD AUTO: 0 %
BUN SERPL-MCNC: 41.5 MG/DL (ref 8–23)
CALCIUM SERPL-MCNC: 9.6 MG/DL (ref 8.8–10.2)
CHLORIDE SERPL-SCNC: 95 MMOL/L (ref 98–107)
CREAT SERPL-MCNC: 1.15 MG/DL (ref 0.51–0.95)
DEPRECATED HCO3 PLAS-SCNC: 35 MMOL/L (ref 22–29)
DIASTOLIC BLOOD PRESSURE - MUSE: NORMAL MMHG
EGFRCR SERPLBLD CKD-EPI 2021: 53 ML/MIN/1.73M2
EOSINOPHIL # BLD AUTO: 0.6 10E3/UL (ref 0–0.7)
EOSINOPHIL NFR BLD AUTO: 4 %
ERYTHROCYTE [DISTWIDTH] IN BLOOD BY AUTOMATED COUNT: 14.1 % (ref 10–15)
FLUAV RNA SPEC QL NAA+PROBE: NEGATIVE
FLUBV RNA RESP QL NAA+PROBE: NEGATIVE
GLUCOSE SERPL-MCNC: 133 MG/DL (ref 70–99)
GROUP A STREP BY PCR: NOT DETECTED
HCT VFR BLD AUTO: 32.5 % (ref 35–47)
HGB BLD-MCNC: 9.8 G/DL (ref 11.7–15.7)
IMM GRANULOCYTES # BLD: 0.1 10E3/UL
IMM GRANULOCYTES NFR BLD: 1 %
INTERPRETATION ECG - MUSE: NORMAL
LYMPHOCYTES # BLD AUTO: 2.1 10E3/UL (ref 0.8–5.3)
LYMPHOCYTES NFR BLD AUTO: 14 %
MCH RBC QN AUTO: 26.4 PG (ref 26.5–33)
MCHC RBC AUTO-ENTMCNC: 30.2 G/DL (ref 31.5–36.5)
MCV RBC AUTO: 88 FL (ref 78–100)
MONOCYTES # BLD AUTO: 0.8 10E3/UL (ref 0–1.3)
MONOCYTES NFR BLD AUTO: 6 %
NEUTROPHILS # BLD AUTO: 11 10E3/UL (ref 1.6–8.3)
NEUTROPHILS NFR BLD AUTO: 75 %
NRBC # BLD AUTO: 0.1 10E3/UL
NRBC BLD AUTO-RTO: 0 /100
P AXIS - MUSE: 71 DEGREES
PLATELET # BLD AUTO: 200 10E3/UL (ref 150–450)
POTASSIUM SERPL-SCNC: 4.2 MMOL/L (ref 3.4–5.3)
PR INTERVAL - MUSE: 146 MS
QRS DURATION - MUSE: 74 MS
QT - MUSE: 392 MS
QTC - MUSE: 479 MS
R AXIS - MUSE: 71 DEGREES
RBC # BLD AUTO: 3.71 10E6/UL (ref 3.8–5.2)
RSV RNA SPEC NAA+PROBE: NEGATIVE
SARS-COV-2 RNA RESP QL NAA+PROBE: NEGATIVE
SODIUM SERPL-SCNC: 139 MMOL/L (ref 135–145)
SYSTOLIC BLOOD PRESSURE - MUSE: NORMAL MMHG
T AXIS - MUSE: 69 DEGREES
VENTRICULAR RATE- MUSE: 90 BPM
WBC # BLD AUTO: 14.7 10E3/UL (ref 4–11)

## 2024-02-23 PROCEDURE — 94640 AIRWAY INHALATION TREATMENT: CPT

## 2024-02-23 PROCEDURE — 82374 ASSAY BLOOD CARBON DIOXIDE: CPT | Performed by: EMERGENCY MEDICINE

## 2024-02-23 PROCEDURE — 87651 STREP A DNA AMP PROBE: CPT | Performed by: EMERGENCY MEDICINE

## 2024-02-23 PROCEDURE — 36415 COLL VENOUS BLD VENIPUNCTURE: CPT | Performed by: EMERGENCY MEDICINE

## 2024-02-23 PROCEDURE — 250N000009 HC RX 250: Performed by: EMERGENCY MEDICINE

## 2024-02-23 PROCEDURE — 85025 COMPLETE CBC W/AUTO DIFF WBC: CPT | Performed by: EMERGENCY MEDICINE

## 2024-02-23 PROCEDURE — 99285 EMERGENCY DEPT VISIT HI MDM: CPT | Mod: 25

## 2024-02-23 PROCEDURE — 71046 X-RAY EXAM CHEST 2 VIEWS: CPT

## 2024-02-23 PROCEDURE — 87637 SARSCOV2&INF A&B&RSV AMP PRB: CPT | Performed by: EMERGENCY MEDICINE

## 2024-02-23 PROCEDURE — 93005 ELECTROCARDIOGRAM TRACING: CPT

## 2024-02-23 RX ORDER — IPRATROPIUM BROMIDE AND ALBUTEROL SULFATE 2.5; .5 MG/3ML; MG/3ML
3 SOLUTION RESPIRATORY (INHALATION) ONCE
Status: COMPLETED | OUTPATIENT
Start: 2024-02-23 | End: 2024-02-23

## 2024-02-23 RX ORDER — POLYMYXIN B SULFATE AND TRIMETHOPRIM 1; 10000 MG/ML; [USP'U]/ML
1 SOLUTION OPHTHALMIC
Qty: 10 ML | Refills: 0 | Status: SHIPPED | OUTPATIENT
Start: 2024-02-23 | End: 2024-02-23

## 2024-02-23 RX ORDER — POLYMYXIN B SULFATE AND TRIMETHOPRIM 1; 10000 MG/ML; [USP'U]/ML
1 SOLUTION OPHTHALMIC
Qty: 10 ML | Refills: 0 | Status: SHIPPED | OUTPATIENT
Start: 2024-02-23

## 2024-02-23 RX ADMIN — IPRATROPIUM BROMIDE AND ALBUTEROL SULFATE 3 ML: .5; 3 SOLUTION RESPIRATORY (INHALATION) at 04:17

## 2024-02-23 ASSESSMENT — COLUMBIA-SUICIDE SEVERITY RATING SCALE - C-SSRS
1. IN THE PAST MONTH, HAVE YOU WISHED YOU WERE DEAD OR WISHED YOU COULD GO TO SLEEP AND NOT WAKE UP?: NO
6. HAVE YOU EVER DONE ANYTHING, STARTED TO DO ANYTHING, OR PREPARED TO DO ANYTHING TO END YOUR LIFE?: NO
2. HAVE YOU ACTUALLY HAD ANY THOUGHTS OF KILLING YOURSELF IN THE PAST MONTH?: NO

## 2024-02-23 ASSESSMENT — ACTIVITIES OF DAILY LIVING (ADL)
ADLS_ACUITY_SCORE: 39

## 2024-02-23 NOTE — ED NOTES
Bed: ED09  Expected date:   Expected time:   Means of arrival:   Comments:  Celia 546 63F bed bound, cough eta 0320

## 2024-02-23 NOTE — DISCHARGE INSTRUCTIONS
Start the eye drops for the drainage/eye redness  I think you have a virus causing the cough, sore throat and eye discharge  No signs of pneumonia on x-ray today  
No

## 2024-02-23 NOTE — ED PROVIDER NOTES
"History     Chief Complaint:  Cough       The history is provided by the patient and the nursing home.      Josiane Dasilva is a 63 year old female who presents with a productive cough. Per EMS, the staff at the [facility] taking care of the patient reported that Josiane has been having increased shortness of breath and productive cough. At presentation, Josiane notes she does have some drainage from her eyes. She denies any chest pain, eye pain, vision changes, or fever. She states that she is not using any drugs.     Independent Historian:    Nursing home staff per EMS - provided history    Medications:    Lasix  Gabapentin  Lisinopril  Robaxin  Metoprolol  Protonix  Trazodone  Claritin  Senna    Past Medical History:    Anxiety   Anemia  Alcoholism   Cellulitis of right thigh   Diabetes mellitus  Depression   History of peptic ulcer disease  Hypertension   Impaired fasting glucose  Peripheral neuropathy  Poor short term memory    Past Surgical History:    Appendectomy  Colonoscopy  Cystoscopy  Combined EGD  Combined I & D  Combined laparoscopic hysterectomy, total bilateral, salpingo-oophorectomy, node dissection  ORIF  Billroth  Tubal ligation    Physical Exam   Patient Vitals for the past 24 hrs:   BP Temp Temp src Pulse Resp SpO2 Height Weight   02/23/24 0333 123/66 98.7  F (37.1  C) Temporal 87 16 97 % 1.727 m (5' 8\") 92.1 kg (203 lb)        Physical Exam  General: Sitting up in bed  Eyes:  The pupils are equal and round    Mild bilateral conjunctival injection, mild clear drainage on left eye  ENT:    Atraumatic face  Neck:  Normal range of motion  CV:  Regular rate, regular rhythm     Skin warm and well perfused   Resp:  Non labored breathing on room air    No tachypnea    Dry cough heard    No wheezing on exam    Decreased breath sounds bilaterally  GI:  Abdomen is soft, there is no rigidity    No distension    No rebound tenderness     No abdominal tenderness  MS:  Mild bilateral lower extremity " edema  Skin:  No rash or acute skin lesions noted  Neuro:   Awake, alert.      Speech is normal and fluent.    Face is symmetric.     Moves all extremities equally  Psych: Normal affect.  Appropriate interactions.    Emergency Department Course   ECG  ECG taken at 0437, ECG read at 0504  Normal sinus rhythm  Septal infarct, age undetermined  Abnormal ECG   When compared with prior ECG, dated 5/29/20, no significant change  Rate 90 bpm. SD interval 146 ms. QRS duration 74 ms. QT/QTc 392/479 ms. P-R-T axes 71 71 69.    Imaging:  XR Chest 2 Views   Final Result   IMPRESSION: No convincing evidence of active cardiopulmonary disease.         Report per radiology    Laboratory:  Labs Ordered and Resulted from Time of ED Arrival to Time of ED Departure   BASIC METABOLIC PANEL - Abnormal       Result Value    Sodium 139      Potassium 4.2      Chloride 95 (*)     Carbon Dioxide (CO2) 35 (*)     Anion Gap 9      Urea Nitrogen 41.5 (*)     Creatinine 1.15 (*)     GFR Estimate 53 (*)     Calcium 9.6      Glucose 133 (*)    CBC WITH PLATELETS AND DIFFERENTIAL - Abnormal    WBC Count 14.7 (*)     RBC Count 3.71 (*)     Hemoglobin 9.8 (*)     Hematocrit 32.5 (*)     MCV 88      MCH 26.4 (*)     MCHC 30.2 (*)     RDW 14.1      Platelet Count 200      % Neutrophils 75      % Lymphocytes 14      % Monocytes 6      % Eosinophils 4      % Basophils 0      % Immature Granulocytes 1      NRBCs per 100 WBC 0      Absolute Neutrophils 11.0 (*)     Absolute Lymphocytes 2.1      Absolute Monocytes 0.8      Absolute Eosinophils 0.6      Absolute Basophils 0.0      Absolute Immature Granulocytes 0.1      Absolute NRBCs 0.1     INFLUENZA A/B, RSV, & SARS-COV2 PCR - Normal    Influenza A PCR Negative      Influenza B PCR Negative      RSV PCR Negative      SARS CoV2 PCR Negative     GROUP A STREPTOCOCCUS PCR THROAT SWAB - Normal    Group A strep by PCR Not Detected       Emergency Department Course & Assessments:    Interventions:  Medications    ipratropium - albuterol 0.5 mg/2.5 mg/3 mL (DUONEB) neb solution 3 mL (3 mLs Nebulization $Given 2/23/24 7142)        Assessments:  0425 I obtained history and examined the patient as noted above.   0545 I rechecked the patient and explained findings. We discussed plan for discharge and patient is in agreement with plan.      Independent Interpretation (X-rays, CTs, rhythm strip):  I independently reviewed chest xray - no pneumonia seen    Consultations/Discussion of Management or Tests:  None    Disposition:  The patient was discharged to home.     Impression & Plan    CMS Diagnoses: None    Medical Decision Making:  Josiane Dasilva is a 63-year-old female who presented to the emergency department with cough, rhinorrhea, sore throat.  Has mild conjunctival injection bilaterally and symptoms are consistent with conjunctivitis.  She has no vision changes or eye pain. suspect that she has URI based on her symptoms and presentation.  She is not hypoxic.  Chest x-ray shows no pneumonia.  COVID and influenza testing was negative.  She denies any chest pain to suggest ACS or PE.  No indication for antibiotics at this time other than will start her on drops for conjunctivitis.  Follow-up with primary care provider as needed.    Diagnosis:    ICD-10-CM    1. Upper respiratory tract infection, unspecified type  J06.9       2. Conjunctivitis of both eyes, unspecified conjunctivitis type  H10.9         Discharge Medications:  Current Discharge Medication List        START taking these medications    Details   polymixin b-trimethoprim (POLYTRIM) 27245-0.1 UNIT/ML-% ophthalmic solution Place 1 drop into both eyes every 3 hours Every 3 hours while awake. Don't need to do while sleeping  Qty: 10 mL, Refills: 0           Scribe Disclosure:  Micky ANDERSON, am serving as a scribe at 4:32 AM on 2/23/2024 to document services personally performed by Pepper Bains MD, based on my observations and the provider's  statements to me.  2/23/2024   Pepper Bains MD Goertz, Maria Kristine, MD  02/23/24 0712

## 2024-02-23 NOTE — ED TRIAGE NOTES
"FACUNDO from the [facility]. Per EMS, staff called EMS for  pt having breathing problem. EMS arrived Pt denied breathing problem. Pt c/o  productive cough, denied  chest pain. Pt had CXR done @ 18:00 yesterday results Negative. Pt denied CP, fever, chills, dizziness.125/60, RR 18,  O2 Sat 98% RA. HR 83 GCS 15, /60.   Hx. Depression, Sinus problem    Pt arrived in ED A/O 4. Coughing no acute distress noted. Pt stated \" I've had this cough for years and I smoked cigarettes \" Observed bilateral eyes  to be red  with Lt eye contained moderated amount of crusty mattery yellow matter. Pt denied blurred vision and stated \"I have had my eyes like this since I moved at the home\" Reports pt.        "

## 2024-02-23 NOTE — ED NOTES
Call placed to pt's Guardian(Sister) Jyotsna Dasilva via [phone] ([phone]), message left on her voice to call this author at [phone].

## 2024-02-23 NOTE — ED NOTES
Report called and given to Adela VERDE at the [facility]. Below, is the Name, address, Fax Number  and telephone number Adela provided to have pt RX sent/or fax. The Provider notified    [facility]  [address] [place] [id number]  Tele: [phone]  Fax #: [phone]

## 2024-02-23 NOTE — ED NOTES
"Received returned call from Pt Guardicorin Dasilva, she was informed pt is currently in the ED with c/o cough. Jyotsna replied \" Okay. Thank you\".   "

## 2024-05-08 ENCOUNTER — HOSPITAL ENCOUNTER (EMERGENCY)
Facility: CLINIC | Age: 64
Discharge: HOME | End: 2024-05-08
Attending: EMERGENCY MEDICINE | Admitting: EMERGENCY MEDICINE
Payer: COMMERCIAL

## 2024-05-08 ENCOUNTER — APPOINTMENT (OUTPATIENT)
Dept: GENERAL RADIOLOGY | Facility: CLINIC | Age: 64
End: 2024-05-08
Attending: EMERGENCY MEDICINE
Payer: COMMERCIAL

## 2024-05-08 VITALS
DIASTOLIC BLOOD PRESSURE: 49 MMHG | TEMPERATURE: 97.4 F | OXYGEN SATURATION: 97 % | SYSTOLIC BLOOD PRESSURE: 117 MMHG | HEART RATE: 67 BPM

## 2024-05-08 DIAGNOSIS — S69.91XA HAND INJURY, RIGHT, INITIAL ENCOUNTER: ICD-10-CM

## 2024-05-08 PROCEDURE — 73110 X-RAY EXAM OF WRIST: CPT | Mod: RT

## 2024-05-08 PROCEDURE — 73130 X-RAY EXAM OF HAND: CPT | Mod: RT

## 2024-05-08 PROCEDURE — 99284 EMERGENCY DEPT VISIT MOD MDM: CPT

## 2024-05-08 PROCEDURE — 29125 APPL SHORT ARM SPLINT STATIC: CPT | Mod: RT

## 2024-05-08 RX ORDER — ACETAMINOPHEN 500 MG
1000 TABLET ORAL ONCE
Status: DISCONTINUED | OUTPATIENT
Start: 2024-05-08 | End: 2024-05-08 | Stop reason: HOSPADM

## 2024-05-08 ASSESSMENT — ACTIVITIES OF DAILY LIVING (ADL)
ADLS_ACUITY_SCORE: 39

## 2024-05-08 NOTE — ED TRIAGE NOTES
[facility]  ED Arrival Note      Means of Arrival: EMS  Comes from: Group home    Story:Pt brought in from group home post fall. Pt informed group home staff that she fell down last night and hurt her right wrist/hand. Pt stated she crawled into bed.         EMS/PD Interventions: N/A  EMS Medications: N/A    Meets Stroke Criteria? No  Meets Trauma Criteria? No  Shock Index: N/A      Directed to: /  Belongings: In room locker

## 2024-05-08 NOTE — ED NOTES
Bed: ED17  Expected date:   Expected time:   Means of arrival:   Comments:  990  63 F fell last night/poss hand fx/wheelchair pt  2281

## 2024-05-08 NOTE — ED PROVIDER NOTES
History   Chief Complaint:  Chief Complaint   Patient presents with     Wrist Pain     Right wrist and hand post fall            Josiane Dasilva is a 63 year old female who presents from her group home after a fall.  Fell when getting to the bathroom and back overnight.  Right hand painful and swollen.  No bleeding or open wound.  She denies passing out and falling, states she lost her balance.  No weakness or numbness but motion limited by pain.  Right handed.    Independent Historian: Patient    Review of External Notes: No     Medications:    acetaminophen (TYLENOL) 325 MG tablet  B Complex Vitamins (VITAMIN B-COMPLEX) TABS  cyanocobalamin (VITAMIN B-12) 1000 MCG tablet  diosmin-hesperidin 450-50 (MPFF 500) 450-50 MG CAPS capsule  DIOSMIN-HESPERIDIN-GRAPE SEED PO  ferrous sulfate (FEROSUL) 325 (65 Fe) MG tablet  furosemide (LASIX) 40 MG tablet  gabapentin (NEURONTIN) 300 MG capsule  GAVILAX 17 GM/SCOOP powder  lisinopril (ZESTRIL) 40 MG tablet  methocarbamol (ROBAXIN) 500 MG tablet  metoprolol succinate ER (TOPROL XL) 25 MG 24 hr tablet  Nutritional Supplements (DERRICK) PACK  nystatin (MYCOSTATIN) 141971 UNIT/GM external powder  omega-3 acid ethyl esters (LOVAZA) 1 g capsule  oxyCODONE (ROXICODONE) 5 MG tablet  pantoprazole (PROTONIX) 40 MG EC tablet  polymixin b-trimethoprim (POLYTRIM) 77323-0.1 UNIT/ML-% ophthalmic solution  traZODone (DESYREL) 100 MG tablet  vitamin B complex with vitamin C (STRESS TAB) tablet  VITAMIN D3 50 MCG (2000 UT) tablet        Past Medical History/Problem List:    Past Medical History:   Diagnosis Date     Alcoholism /alcohol abuse      Benign essential hypertension      RICO (generalized anxiety disorder)      History of peptic ulcer disease      Impaired fasting glucose      MDD (major depressive disorder)      Morbid obesity (H)      Peripheral neuropathy      Poor short term memory        Patient Active Problem List    Diagnosis Date Noted     Ankle ulcer (H) 09/06/2022      Priority: Medium     Poor short-term memory 12/12/2021     Priority: Medium     Impaired fasting glucose      Priority: Medium     Morbid obesity (H) 07/22/2021     Priority: Medium     History of peptic ulcer disease      Priority: Medium     MDD (major depressive disorder)      Priority: Medium     RICO (generalized anxiety disorder)      Priority: Medium     Peripheral neuropathy      Priority: Medium     Benign essential hypertension      Priority: Medium     Alcoholism /alcohol abuse      Priority: Medium     Replacing diagnoses that were inactivated after the 10/1/2021 regulatory import.       Anemia 07/22/2007     Priority: Medium        Physical Exam   Patient Vitals for the past 24 hrs:   BP Temp Temp src Pulse SpO2   05/08/24 1544 117/49 97.4  F (36.3  C) Oral 67 97 %      Resp:  Non-labored  Neuro:  Alert and cooperative  MSkel:  Moving all extremities.  Swelling and tenderness throughout the hand, no swelling or tenderness in the fingers.  Mild tenderness in wrist, no tenderness more proximally in forearm.  Skin:  Faint hyperpigmentation in area of swelling, no open wound  CV:  Normal capillary refill in fingers      Emergency Department Course     Imaging:    XR Wrist Right G/E 3 Views   Final Result   IMPRESSION: Marked soft tissue swelling about the dorsum of the right hand. No radiopaque foreign body. No fracture, erosion, or bone lesion. Normal joint alignment. Degenerative arthrosis at the base of the thumb in the first CMC joint, and in the IP    joints. Subchondral cystic change in the lunate indicative of overlying chondromalacia. Neutral to slightly negative ulnar variance. Otherwise unremarkable wrist.      XR Hand Right G/E 3 Views   Final Result   IMPRESSION: Marked soft tissue swelling about the dorsum of the right hand. No radiopaque foreign body. No fracture, erosion, or bone lesion. Normal joint alignment. Degenerative arthrosis at the base of the thumb in the first CMC joint, and in the  IP    joints. Subchondral cystic change in the lunate indicative of overlying chondromalacia. Neutral to slightly negative ulnar variance. Otherwise unremarkable wrist.           Laboratory:  Labs Ordered and Resulted from Time of ED Arrival to Time of ED Departure - No data to display     Procedures:     Narrative:      Custom orthoglass short arm splint was applied by myself with assistance and after placement I checked the fit to ensure proper positioning. Patient was more comfortable with splint in place. Sensation and circulation are intact after splint placement.   Supplies included webroll, orthoglass, ace wrap.    Emergency Department Course:    Assessments/Consultations/Discussion of Management or Tests :       Independent Interpretation (X-rays, CTs, rhythm strip):  No fracture visualized    Interventions:  Medications - No data to display     Social Determinants of Health affecting care:   No    Disposition:  Discharge    Impression & Plan    Medical Decision Making:  Exam consistent with injury and contusion.  X-rays negative for fracture.  However given the associated pain and tenderness will treat for possible occult fracture or ligamentous injury with short arm splint.  Orthopedic follow-up.  Return immediately for finger swelling or color changes or increasing pain under the splinted area.    Diagnosis:    ICD-10-CM    1. Hand injury, right, initial encounter  S69.91XA            Discharge Prescriptions:  Discharge Medication List as of 5/8/2024  6:59 PM          MD Yared Johnson, Emily Bales MD  05/13/24 1120

## 2024-05-13 ENCOUNTER — TRANSCRIBE ORDERS (OUTPATIENT)
Dept: OTHER | Age: 64
End: 2024-05-13

## 2024-05-13 DIAGNOSIS — M79.641 RIGHT HAND PAIN: Primary | ICD-10-CM

## 2024-05-13 DIAGNOSIS — M79.89 SWELLING OF RIGHT HAND: ICD-10-CM

## 2024-05-20 ENCOUNTER — TRANSFERRED RECORDS (OUTPATIENT)
Dept: HEALTH INFORMATION MANAGEMENT | Facility: CLINIC | Age: 64
End: 2024-05-20
Payer: COMMERCIAL

## 2024-05-25 ENCOUNTER — HOSPITAL ENCOUNTER (EMERGENCY)
Facility: CLINIC | Age: 64
Discharge: HOME | End: 2024-05-25
Attending: STUDENT IN AN ORGANIZED HEALTH CARE EDUCATION/TRAINING PROGRAM | Admitting: STUDENT IN AN ORGANIZED HEALTH CARE EDUCATION/TRAINING PROGRAM
Payer: COMMERCIAL

## 2024-05-25 VITALS
OXYGEN SATURATION: 95 % | BODY MASS INDEX: 43.4 KG/M2 | HEART RATE: 99 BPM | DIASTOLIC BLOOD PRESSURE: 67 MMHG | HEIGHT: 69 IN | WEIGHT: 293 LBS | SYSTOLIC BLOOD PRESSURE: 155 MMHG | TEMPERATURE: 98.3 F | RESPIRATION RATE: 18 BRPM

## 2024-05-25 DIAGNOSIS — T50.905A MEDICATION REACTION, INITIAL ENCOUNTER: ICD-10-CM

## 2024-05-25 PROCEDURE — 250N000013 HC RX MED GY IP 250 OP 250 PS 637: Performed by: STUDENT IN AN ORGANIZED HEALTH CARE EDUCATION/TRAINING PROGRAM

## 2024-05-25 PROCEDURE — 99284 EMERGENCY DEPT VISIT MOD MDM: CPT

## 2024-05-25 PROCEDURE — 250N000011 HC RX IP 250 OP 636: Performed by: STUDENT IN AN ORGANIZED HEALTH CARE EDUCATION/TRAINING PROGRAM

## 2024-05-25 RX ORDER — MAGNESIUM HYDROXIDE/ALUMINUM HYDROXICE/SIMETHICONE 120; 1200; 1200 MG/30ML; MG/30ML; MG/30ML
30 SUSPENSION ORAL EVERY 4 HOURS PRN
Status: DISCONTINUED | OUTPATIENT
Start: 2024-05-25 | End: 2024-05-25 | Stop reason: HOSPADM

## 2024-05-25 RX ORDER — ONDANSETRON 4 MG/1
4 TABLET, ORALLY DISINTEGRATING ORAL ONCE
Status: COMPLETED | OUTPATIENT
Start: 2024-05-25 | End: 2024-05-25

## 2024-05-25 RX ORDER — SIMETHICONE 80 MG
80 TABLET,CHEWABLE ORAL EVERY 6 HOURS PRN
Qty: 30 TABLET | Refills: 0 | Status: SHIPPED | OUTPATIENT
Start: 2024-05-25

## 2024-05-25 RX ORDER — ONDANSETRON 4 MG/1
4 TABLET, ORALLY DISINTEGRATING ORAL EVERY 8 HOURS PRN
Qty: 15 TABLET | Refills: 0 | Status: SHIPPED | OUTPATIENT
Start: 2024-05-25

## 2024-05-25 RX ADMIN — ONDANSETRON 4 MG: 4 TABLET, ORALLY DISINTEGRATING ORAL at 18:06

## 2024-05-25 RX ADMIN — ALUMINUM HYDROXIDE, MAGNESIUM HYDROXIDE, AND SIMETHICONE 30 ML: 1200; 120; 1200 SUSPENSION ORAL at 18:06

## 2024-05-25 ASSESSMENT — ACTIVITIES OF DAILY LIVING (ADL)
ADLS_ACUITY_SCORE: 39

## 2024-05-25 NOTE — ED NOTES
Bed: FT03  Expected date:   Expected time:   Means of arrival:   Comments:  [age]F nausea after taking asa/known allergy

## 2024-05-25 NOTE — DISCHARGE INSTRUCTIONS
Please take the Zofran medication for ongoing nausea every 6-8 hours.  Simethicone medication can also be taken every 6 hours for ongoing abdominal discomfort.  Please follow-up with your primary care provider within the week for reassessment and to ensure that symptoms are well-controlled.  If you develop any worsening symptoms please return to ER.

## 2024-05-25 NOTE — ED TRIAGE NOTES
[facility]  ED Arrival Note      Means of Arrival: EMS  Comes from: Nursing Home    Story:Pt has an aspirin allergy and accidentally given by the nursing home staff when she asked for something for pain. Pt is nauseated and is vomiting.         EMS/PD Interventions: N/A  EMS Medications: N/A    Meets Stroke Criteria? No  Meets Trauma Criteria? No  Shock Index (HR/SBP): N/A      Directed to: Main ED  Belongings: Remain with patient

## 2024-05-25 NOTE — ED PROVIDER NOTES
"  Emergency Department Note      History of Present Illness     Chief Complaint  Allergic Reaction (Aspirin )    HPI  Josiane Dasilva is a 63 year old female with a history of peptic ulcers disease who presents to the ED for an evaluation of allergic reaction. The patient reports that she has an allergy to Asprin and was given Asprin by nursing home staff. She notes that she experiences nausea and vomiting. She denies any abdominal pain. No shortness of breath and difficulty breathing. No rash.        Independent Historian  Patient, as per HPI.     Review of External Notes  Patient has been instructed not to take Asprin due to Peptic ulcer disease.   Past Medical History   Medical History and Problem List  Alcoholism   Benign essential hypertension   Generalized anxiety disorder   History of peptic ulcer disease   Impaired fasting glucose   Major depressive disorder   Morbid obesity   Peripheral neuropathy   Poor short term memory       Medications  Lasix   Gabapentin   Lisinopril   Methocarbamol   Nystatin   Oxycodone   Pantoprazole   Desyrel       Surgical History   Appendectomy   Colonoscopy   Cystoscopy   Esophagoscopy   Gastroscopy   Duodenoscopy   Incision and drainage of lower extremity   Laparoscopic hysterectomy   Open reduction internal fixation ankle   Tubal ligation   Physical Exam   Patient Vitals for the past 24 hrs:   BP Temp Temp src Pulse Resp SpO2 Height Weight   05/25/24 2041 -- -- -- -- -- 95 % -- --   05/25/24 2040 (!) 155/67 -- -- 99 -- -- -- --   05/25/24 1908 -- -- -- -- -- -- -- (!) 199.6 kg (440 lb)   05/25/24 1712 (!) 158/71 98.3  F (36.8  C) Temporal 86 18 95 % 1.753 m (5' 9\") 104.3 kg (230 lb)     Physical Exam  General: Alert and cooperative with exam. Dry heaving at times. Normal mentation.  Head:  Scalp is NC/AT  Eyes:  No scleral icterus, PERRL  ENT:  The external nose and ears are normal.  Neck:  Normal range of motion without rigidity.  CV:  Regular rate and rhythm    No pathologic " murmur   Resp:  Breath sounds are clear bilaterally    Non-labored, no retractions or accessory muscle use  GI:  Abdomen is soft, no distension, no tenderness. No peritoneal signs  MS:  No lower extremity edema   Skin:  Warm and dry, No rash or lesions noted.  Neuro:  Oriented x 3. No gross motor deficits.    Diagnostics   Independent Interpretation  None  ED Course    Medications Administered  Medications   alum & mag hydroxide-simethicone (MAALOX) suspension 30 mL (30 mLs Oral $Given 5/25/24 1806)   ondansetron (ZOFRAN ODT) ODT tab 4 mg (4 mg Oral $Given 5/25/24 1806)       Procedures  Procedures     Discussion of Management  None    Social Determinants of Health adding to complexity of care  None    ED Course  ED Course as of 05/25/24 2241   Sat May 25, 2024   1742 I have obtained and evaluated the patient.      Medical Decision Making / Diagnosis   GILBERT Dasilva is a 63 year old female who presents with medication reaction.  Patient reports that she had a fall at her group home and was provided aspirin for discomfort however she has known allergy with stomach irritation to aspirin.  She reports that after aspirin administration, she developed nausea and dry heaves.  She denies any abdominal pain.  States that these are her typical symptoms when she takes aspirin.  Provided patient with Zofran and Mylanta at her request.  Abdominal exam is entirely benign without any tenderness, guarding, or peritoneal signs.  After Zofran and Mylanta doses, patient feels much improved and has not had repeat episode of dry heaving or emesis.  Patient feels comfortable with discharge home with prescriptions for these medications.  Printed prescriptions provided.  Encouraged her to use these for any ongoing symptoms of nausea and dry heaving.  Follow-up with primary care provider within the week for reassessment.  Reasons to return to ER discussed.    Disposition  The patient was discharged.     ICD-10 Codes:    ICD-10-CM     1. Medication reaction, initial encounter  T50.905A            Discharge Medications  Discharge Medication List as of 5/25/2024  7:42 PM        START taking these medications    Details   ondansetron (ZOFRAN ODT) 4 MG ODT tab Take 1 tablet (4 mg) by mouth every 8 hours as needed for nausea, Disp-15 tablet, R-0, Local Print      simethicone (MYLICON) 80 MG chewable tablet Take 1 tablet (80 mg) by mouth every 6 hours as needed for cramping, Disp-30 tablet, R-0, Local Print               Scribe Disclosure:  I, Cindy Whitman, am serving as a scribe at 6:03 PM on 5/25/2024 to document services personally performed by Felisha Rascon, SHANTHI based on my observations and the provider's statements to me.        Felisha Rascon PA-C  05/25/24 7641

## 2024-05-28 ENCOUNTER — OFFICE VISIT (OUTPATIENT)
Dept: ORTHOPEDICS | Facility: CLINIC | Age: 64
End: 2024-05-28
Payer: COMMERCIAL

## 2024-05-28 ENCOUNTER — ANCILLARY PROCEDURE (OUTPATIENT)
Dept: GENERAL RADIOLOGY | Facility: CLINIC | Age: 64
End: 2024-05-28
Attending: STUDENT IN AN ORGANIZED HEALTH CARE EDUCATION/TRAINING PROGRAM
Payer: COMMERCIAL

## 2024-05-28 DIAGNOSIS — M79.89 SWELLING OF RIGHT HAND: ICD-10-CM

## 2024-05-28 DIAGNOSIS — M79.641 RIGHT HAND PAIN: ICD-10-CM

## 2024-05-28 DIAGNOSIS — M10.031 ACUTE IDIOPATHIC GOUT OF RIGHT WRIST: Primary | ICD-10-CM

## 2024-05-28 PROCEDURE — 73080 X-RAY EXAM OF ELBOW: CPT | Mod: TC | Performed by: RADIOLOGY

## 2024-05-28 PROCEDURE — 99203 OFFICE O/P NEW LOW 30 MIN: CPT | Performed by: STUDENT IN AN ORGANIZED HEALTH CARE EDUCATION/TRAINING PROGRAM

## 2024-05-28 NOTE — PROGRESS NOTES
[facility] H&P Note:  Date: May 28, 2024    Patient Name: Josiane Dasilva  MRN: [id number]    Consult requested by: Referred Self    CHIEF COMPLAINT: Right wrist and elbow pain    Dominant Hand: Right  Occupation: None      HPI:  Ms. Josiane Dasilva is a 63 year old female right hand dominant who presents with right wrist and elbow pain and swelling.  No injury.  Onset approximately 2-3 weeks ago.  Pain has improved, but swelling persist at the dorsal wrist.  Elbow range of motion is also improved.  Patient states that she fell, but the person who accompanies her states that this is not the case.  Progress Notes from [facility] on 5/20/2024 were reviewed in full.  X-rays 5/7/2024 were reportedly negative.  The patient was given 60 mg of prednisone and tramadol for pain.  She went to the emergency department due to persistent pain.  She has been provided with a splint.  She was also started on Augmentin due to concern for possible septic arthritis.  On 5/13, the patient was noted to be tender at the elbow as well.  Labs at facility 5/14/2024 demonstrates ESR 89, CRP 10.4, uric acid 9.3.  Anti-CCP antibody negative, rheumatoid factor less than 15, ABHIJIT less than 1: 80.she was started on prednisone with a slow taper 5/17/2024.  Augmentin was then discontinued.  She has been given tramadol for the pain.  Patient has a history gout.     PMH  Diabetes yes   Thyroid Problems no   Smoking Y/N non smoker.       PAST MEDICAL HISTORY:  Past Medical History:   Diagnosis Date    Alcoholism /alcohol abuse     Benign essential hypertension     RICO (generalized anxiety disorder)     History of peptic ulcer disease     Impaired fasting glucose     MDD (major depressive disorder)     Morbid obesity (H)     Peripheral neuropathy     Poor short term memory        PAST SURGICAL HISTORY:  Past Surgical History:   Procedure Laterality Date    APPENDECTOMY      COLONOSCOPY  08/13/2014     Procedure: COMBINED COLONOSCOPY, SINGLE BIOPSY/POLYPECTOMY BY BIOPSY;  Surgeon: Mike Mancuso MD;  Location:  GI    CYSTOSCOPY N/A 03/14/2019    Procedure: Cystoscopy;  Surgeon: Mary Ash MD;  Location: [facility] OR    ESOPHAGOSCOPY, GASTROSCOPY, DUODENOSCOPY (EGD), COMBINED  08/12/2014    Procedure: COMBINED ESOPHAGOSCOPY, GASTROSCOPY, DUODENOSCOPY (EGD), BIOPSY SINGLE OR MULTIPLE;  Surgeon: Mike Mancuso MD;  Location:  GI    INCISION AND DRAINAGE LOWER EXTREMITY, COMBINED Right 7/12/2022    Procedure: irrigation and debridement right ankle;  Surgeon: Titus Oscar MD;  Location:  OR    LAPAROSCOPIC HYSTERECTOMY TOTAL, BILATERAL SALPINGO-OOPHORECTOMY, NODE DISSECTION, COMBINED N/A 03/14/2019    Procedure: Laparoscopic Total Hysterectomy, Bilateral Salpingo-Oophorectomy, and Repair of Vaginal Laceration;  Surgeon: Mary Ash MD;  Location: [facility] OR    OPEN REDUCTION INTERNAL FIXATION ANKLE Left 2014    OTHER SURGICAL HISTORY      [place] I as teenager secondary to ulcer    TUBAL LIGATION         MEDICATIONS:  Current Outpatient Medications   Medication Sig Dispense Refill    acetaminophen (TYLENOL) 325 MG tablet Take 2 tablets (650 mg) by mouth every 6 hours as needed for mild pain or other (and adjunct with moderate or severe pain or per patient request) 40 tablet 0    B Complex Vitamins (VITAMIN B-COMPLEX) TABS TAKE 1 TABLET BY MOUTH ONCE DAILY. **NON-COVERED MED**  *1 TOTAL FILL* 90 tablet 3    cyanocobalamin (VITAMIN B-12) 1000 MCG tablet TAKE 1 TABLET BY MOUTH TWICE DAILY *1 TOTAL FILL* **NON-COVERED MED** 60 tablet 11    diosmin-hesperidin 450-50 (MPFF 500) 450-50 MG CAPS capsule Take 1 capsule by mouth 2 times daily 60 capsule 0    DIOSMIN-HESPERIDIN-GRAPE SEED PO Take 1 capsule by mouth 2 times daily      ferrous sulfate (FEROSUL) 325 (65 Fe) MG tablet Take 1 tablet (325 mg) by mouth daily 30 tablet 0    furosemide (LASIX) 40 MG tablet TAKE 1 TABLET BY  MOUTH TWICE DAILY AT 7AM AND 2PM *1 TOTAL FILL* 60 tablet 11    gabapentin (NEURONTIN) 300 MG capsule Take 1 capsule (300 mg) by mouth At Bedtime 90 capsule 3    GAVILAX 17 GM/SCOOP powder TAKE 17G (MEASURE WITH THE MARKINGS INSIDE CAP) BY MOUTH EVERY 12 HOURS AS NEEDED FOR CONSTIPATION *1 TOTAL FILL* 510 g 11    lisinopril (ZESTRIL) 40 MG tablet TAKE 1 TABLET BY MOUTH EVERY MORNING 31 tablet 7    methocarbamol (ROBAXIN) 500 MG tablet TAKE 1 TABLET BY MOUTH TWICE DAILY *1 TOTAL FILL* 60 tablet 11    metoprolol succinate ER (TOPROL XL) 25 MG 24 hr tablet TAKE 1 TABLET BY MOUTH TWICE DAILY *1 TOTAL FILL* 60 tablet 11    Nutritional Supplements (DERRICK) PACK Take 1 packet by mouth 2 times daily 60 packet 0    nystatin (MYCOSTATIN) 863443 UNIT/GM external powder Apply 1 Application topically daily      omega-3 acid ethyl esters (LOVAZA) 1 g capsule Take 2 capsules (2 g) by mouth 2 times daily 360 capsule 3    ondansetron (ZOFRAN ODT) 4 MG ODT tab Take 1 tablet (4 mg) by mouth every 8 hours as needed for nausea 15 tablet 0    oxyCODONE (ROXICODONE) 5 MG tablet Take 0.5-1 tablets (2.5-5 mg) by mouth every 4 hours as needed for moderate to severe pain 20 tablet 0    pantoprazole (PROTONIX) 40 MG EC tablet TAKE 1 TABLET BY MOUTH EVERY MORNING (BEFORE BREAKFAST) *1 TOTAL FILL* 30 tablet 11    polymixin b-trimethoprim (POLYTRIM) 39516-8.1 UNIT/ML-% ophthalmic solution Place 1 drop into both eyes every 3 hours Every 3 hours while awake. Don't need to do while sleeping 10 mL 0    simethicone (MYLICON) 80 MG chewable tablet Take 1 tablet (80 mg) by mouth every 6 hours as needed for cramping 30 tablet 0    traZODone (DESYREL) 100 MG tablet Take 100 mg by mouth At Bedtime      vitamin B complex with vitamin C (STRESS TAB) tablet TAKE 1 TABLET BY MOUTH TWICE DAILY *1 TOTAL FILL* 60 tablet 11    VITAMIN D3 50 MCG (2000 UT) tablet TAKE 1 TABLET BY MOUTH ONCE DAILY *1 TOTAL FILL* (Patient taking differently: Take 1 tablet by mouth 2  times daily) 30 tablet 11     No current facility-administered medications for this visit.       ALLERGIES:     Allergies   Allergen Reactions    Aspirin      Stomach irritation (hx of PUD)  Other reaction(s): GI bleeding  Other reaction(s): *Unknown  Stomach irritation (hx of PUD)  Stomach irritation (hx of PUD)    Nsaids      Stomach irritation (Hx of PUD)  Other reaction(s): *Unknown       FAMILY HISTORY:  No pertinent family history    SOCIAL HISTORY:  Social History     Tobacco Use    Smoking status: Former     Current packs/day: 0.00     Types: Cigarettes     Quit date: 1995     Years since quittin.4    Smokeless tobacco: Never   Substance Use Topics    Alcohol use: Not Currently    Drug use: No       The patient's past medical, family, and social history was reviewed and confirmed.    REVIEW OF SYMPTOMS:      General: Negative   Eyes: Negative   Ear, Nose and Throat: Negative   Respiratory: Negative   Cardiovascular: Negative   Gastrointestinal: Negative   Genito-urinary: Negative   Musculoskeletal: Negative  Neurological: Negative   Psychological: Negative  HEME: Negative   ENDO: Negative   SKIN: Negative    VITALS:  There were no vitals filed for this visit.    EXAM:  General: NAD, A&Ox3  HEENT: NC/AT  CV: RRR by peripheral pulse  Pulmonary: Non-labored breathing on RA  RUE:  Soft tissues swelling over the dorsal wrist that is tender to palpation  Intact full active and passive wrist range of motion without significant discomfort  Intact and full active and passive elbow range of motion without significant discomfort  No warmth or erythema  Intact EPL, FPL, hand intrinsics  Sensation intact to light touch all distributions  Warm well-perfused, capillary fill less than 2 seconds    LUE:  Contracture of the small finger from a remote injury about 3 years ago, with 90 degree flexion pressures at the DIP, PIP joint.  Painful and resisting to passive extension    LABS:  See HPI    IMAGING:    X-ray  right wrist and hand 5/8/2024 demonstrates soft tissue swelling over the dorsum of the right hand.  No significant erosion or degenerative change of the wrist.  Degenerative change at the first CMC joint.    X-ray of the right elbow today demonstrates mild joint effusion.  Small enthesophyte over the common extensor and flexor origin.  No significant degenerative change    I have personally reviewed the above images and labs.     IMPRESSION AND RECOMMENDATIONS:  Ms. Josiane Dasivla is a 63 year old female right hand dominant with right wrist and elbow pain and swelling    Symptoms are most consistent with acute gout flare given recent onset of pain, that appears to be resolving spontaneously.  Her wrist and elbow range of motion today are excellent.  There is no concern for septic arthritis.  No surgical indications.    On further clarification, it seems that she has similar flares in other joints multiple times per year.  Her uric acid is also elevated at 9.3.    I have recommended treatment with anti-inflammatories such as naproxen.  If these do not help, consider colchicine for acute flares.  I do think that she should be on maintenance allopurinol for chronic gout given elevated uric acid.  These recommendations were written out and provided to the facility.  Her gout should be managed by her primary care provider.  I did place a rheumatology referral per their request.    All questions answered.  She and her  voiced understanding and agreement.  All questions answered.  Follow-up with me as needed.    Devin Calhoun MD    [facility], Upper Extremity & Microvascular Surgery  Department of Orthopaedic Surgery  [facility]

## 2024-05-28 NOTE — LETTER
5/28/2024         RE: Josiane Dasilva  [address] [facility] [address]        Dear Colleague,    Thank you for referring your patient, Josiane Dasilva, to the [facility] [place]. Please see a copy of my visit note below.    Orthopaedic Surgery Hand and Upper Extremity Clinic H&P Note:  Date: May 28, 2024    Patient Name: Josiane Dasilva  MRN: [id number]    Consult requested by: Referred Self    CHIEF COMPLAINT: Right wrist and elbow pain    Dominant Hand: Right  Occupation: None      HPI:  Ms. Josiane Dasilva is a 63 year old female right hand dominant who presents with right wrist and elbow pain and swelling.  No injury.  Onset approximately 2-3 weeks ago.  Pain has improved, but swelling persist at the dorsal wrist.  Elbow range of motion is also improved.  Patient states that she fell, but the person who accompanies her states that this is not the case.  Progress Notes from [facility] on 5/20/2024 were reviewed in full.  X-rays 5/7/2024 were reportedly negative.  The patient was given 60 mg of prednisone and tramadol for pain.  She went to the emergency department due to persistent pain.  She has been provided with a splint.  She was also started on Augmentin due to concern for possible septic arthritis.  On 5/13, the patient was noted to be tender at the elbow as well.  Labs at facility 5/14/2024 demonstrates ESR 89, CRP 10.4, uric acid 9.3.  Anti-CCP antibody negative, rheumatoid factor less than 15, ABHIJIT less than 1: 80.she was started on prednisone with a slow taper 5/17/2024.  Augmentin was then discontinued.  She has been given tramadol for the pain.  Patient has a history gout.     PMH  Diabetes yes   Thyroid Problems no   Smoking Y/N non smoker.       PAST MEDICAL HISTORY:  Past Medical History:   Diagnosis Date     Alcoholism /alcohol abuse      Benign essential hypertension      RICO (generalized anxiety disorder)      History of peptic ulcer disease       Impaired fasting glucose      MDD (major depressive disorder)      Morbid obesity (H)      Peripheral neuropathy      Poor short term memory        PAST SURGICAL HISTORY:  Past Surgical History:   Procedure Laterality Date     APPENDECTOMY       COLONOSCOPY  08/13/2014    Procedure: COMBINED COLONOSCOPY, SINGLE BIOPSY/POLYPECTOMY BY BIOPSY;  Surgeon: Mike Mancuso MD;  Location:  GI     CYSTOSCOPY N/A 03/14/2019    Procedure: Cystoscopy;  Surgeon: Mary Ash MD;  Location: [facility] OR     ESOPHAGOSCOPY, GASTROSCOPY, DUODENOSCOPY (EGD), COMBINED  08/12/2014    Procedure: COMBINED ESOPHAGOSCOPY, GASTROSCOPY, DUODENOSCOPY (EGD), BIOPSY SINGLE OR MULTIPLE;  Surgeon: Mike Mancuso MD;  Location:  GI     INCISION AND DRAINAGE LOWER EXTREMITY, COMBINED Right 7/12/2022    Procedure: irrigation and debridement right ankle;  Surgeon: Titus Oscar MD;  Location:  OR     LAPAROSCOPIC HYSTERECTOMY TOTAL, BILATERAL SALPINGO-OOPHORECTOMY, NODE DISSECTION, COMBINED N/A 03/14/2019    Procedure: Laparoscopic Total Hysterectomy, Bilateral Salpingo-Oophorectomy, and Repair of Vaginal Laceration;  Surgeon: Mary Ash MD;  Location: [facility] OR     OPEN REDUCTION INTERNAL FIXATION ANKLE Left 2014     OTHER SURGICAL HISTORY      [place] I as teenager secondary to ulcer     TUBAL LIGATION         MEDICATIONS:  Current Outpatient Medications   Medication Sig Dispense Refill     acetaminophen (TYLENOL) 325 MG tablet Take 2 tablets (650 mg) by mouth every 6 hours as needed for mild pain or other (and adjunct with moderate or severe pain or per patient request) 40 tablet 0     B Complex Vitamins (VITAMIN B-COMPLEX) TABS TAKE 1 TABLET BY MOUTH ONCE DAILY. **NON-COVERED MED**  *1 TOTAL FILL* 90 tablet 3     cyanocobalamin (VITAMIN B-12) 1000 MCG tablet TAKE 1 TABLET BY MOUTH TWICE DAILY *1 TOTAL FILL* **NON-COVERED MED** 60 tablet 11     diosmin-hesperidin 450-50 (MPFF 500) 450-50 MG CAPS  capsule Take 1 capsule by mouth 2 times daily 60 capsule 0     DIOSMIN-HESPERIDIN-GRAPE SEED PO Take 1 capsule by mouth 2 times daily       ferrous sulfate (FEROSUL) 325 (65 Fe) MG tablet Take 1 tablet (325 mg) by mouth daily 30 tablet 0     furosemide (LASIX) 40 MG tablet TAKE 1 TABLET BY MOUTH TWICE DAILY AT 7AM AND 2PM *1 TOTAL FILL* 60 tablet 11     gabapentin (NEURONTIN) 300 MG capsule Take 1 capsule (300 mg) by mouth At Bedtime 90 capsule 3     GAVILAX 17 GM/SCOOP powder TAKE 17G (MEASURE WITH THE MARKINGS INSIDE CAP) BY MOUTH EVERY 12 HOURS AS NEEDED FOR CONSTIPATION *1 TOTAL FILL* 510 g 11     lisinopril (ZESTRIL) 40 MG tablet TAKE 1 TABLET BY MOUTH EVERY MORNING 31 tablet 7     methocarbamol (ROBAXIN) 500 MG tablet TAKE 1 TABLET BY MOUTH TWICE DAILY *1 TOTAL FILL* 60 tablet 11     metoprolol succinate ER (TOPROL XL) 25 MG 24 hr tablet TAKE 1 TABLET BY MOUTH TWICE DAILY *1 TOTAL FILL* 60 tablet 11     Nutritional Supplements (DERRICK) PACK Take 1 packet by mouth 2 times daily 60 packet 0     nystatin (MYCOSTATIN) 005712 UNIT/GM external powder Apply 1 Application topically daily       omega-3 acid ethyl esters (LOVAZA) 1 g capsule Take 2 capsules (2 g) by mouth 2 times daily 360 capsule 3     ondansetron (ZOFRAN ODT) 4 MG ODT tab Take 1 tablet (4 mg) by mouth every 8 hours as needed for nausea 15 tablet 0     oxyCODONE (ROXICODONE) 5 MG tablet Take 0.5-1 tablets (2.5-5 mg) by mouth every 4 hours as needed for moderate to severe pain 20 tablet 0     pantoprazole (PROTONIX) 40 MG EC tablet TAKE 1 TABLET BY MOUTH EVERY MORNING (BEFORE BREAKFAST) *1 TOTAL FILL* 30 tablet 11     polymixin b-trimethoprim (POLYTRIM) 49114-0.1 UNIT/ML-% ophthalmic solution Place 1 drop into both eyes every 3 hours Every 3 hours while awake. Don't need to do while sleeping 10 mL 0     simethicone (MYLICON) 80 MG chewable tablet Take 1 tablet (80 mg) by mouth every 6 hours as needed for cramping 30 tablet 0     traZODone (DESYREL) 100  MG tablet Take 100 mg by mouth At Bedtime       vitamin B complex with vitamin C (STRESS TAB) tablet TAKE 1 TABLET BY MOUTH TWICE DAILY *1 TOTAL FILL* 60 tablet 11     VITAMIN D3 50 MCG ( UT) tablet TAKE 1 TABLET BY MOUTH ONCE DAILY *1 TOTAL FILL* (Patient taking differently: Take 1 tablet by mouth 2 times daily) 30 tablet 11     No current facility-administered medications for this visit.       ALLERGIES:     Allergies   Allergen Reactions     Aspirin      Stomach irritation (hx of PUD)  Other reaction(s): GI bleeding  Other reaction(s): *Unknown  Stomach irritation (hx of PUD)  Stomach irritation (hx of PUD)     Nsaids      Stomach irritation (Hx of PUD)  Other reaction(s): *Unknown       FAMILY HISTORY:  No pertinent family history    SOCIAL HISTORY:  Social History     Tobacco Use     Smoking status: Former     Current packs/day: 0.00     Types: Cigarettes     Quit date: 1995     Years since quittin.4     Smokeless tobacco: Never   Substance Use Topics     Alcohol use: Not Currently     Drug use: No       The patient's past medical, family, and social history was reviewed and confirmed.    REVIEW OF SYMPTOMS:      General: Negative   Eyes: Negative   Ear, Nose and Throat: Negative   Respiratory: Negative   Cardiovascular: Negative   Gastrointestinal: Negative   Genito-urinary: Negative   Musculoskeletal: Negative  Neurological: Negative   Psychological: Negative  HEME: Negative   ENDO: Negative   SKIN: Negative    VITALS:  There were no vitals filed for this visit.    EXAM:  General: NAD, A&Ox3  HEENT: NC/AT  CV: RRR by peripheral pulse  Pulmonary: Non-labored breathing on RA  RUE:  Soft tissues swelling over the dorsal wrist that is tender to palpation  Intact full active and passive wrist range of motion without significant discomfort  Intact and full active and passive elbow range of motion without significant discomfort  No warmth or erythema  Intact EPL, FPL, hand intrinsics  Sensation intact  to light touch all distributions  Warm well-perfused, capillary fill less than 2 seconds    LUE:  Contracture of the small finger from a remote injury about 3 years ago, with 90 degree flexion pressures at the DIP, PIP joint.  Painful and resisting to passive extension    LABS:  See HPI    IMAGING:    X-ray right wrist and hand 5/8/2024 demonstrates soft tissue swelling over the dorsum of the right hand.  No significant erosion or degenerative change of the wrist.  Degenerative change at the first CMC joint.    X-ray of the right elbow today demonstrates mild joint effusion.  Small enthesophyte over the common extensor and flexor origin.  No significant degenerative change    I have personally reviewed the above images and labs.     IMPRESSION AND RECOMMENDATIONS:  Ms. Josiane Dasilva is a 63 year old female right hand dominant with right wrist and elbow pain and swelling    Symptoms are most consistent with acute gout flare given recent onset of pain, that appears to be resolving spontaneously.  Her wrist and elbow range of motion today are excellent.  There is no concern for septic arthritis.  No surgical indications.    On further clarification, it seems that she has similar flares in other joints multiple times per year.  Her uric acid is also elevated at 9.3.    I have recommended treatment with anti-inflammatories such as naproxen.  If these do not help, consider colchicine for acute flares.  I do think that she should be on maintenance allopurinol for chronic gout given elevated uric acid.  These recommendations were written out and provided to the facility.  Her gout should be managed by her primary care provider.  I did place a rheumatology referral per their request.    All questions answered.  She and her  voiced understanding and agreement.  All questions answered.  Follow-up with me as needed.    Devin Calhoun MD    Hand, Upper Extremity & Microvascular Surgery  Department of  Orthopaedic Surgery  [facility]          Again, thank you for allowing me to participate in the care of your patient.        Sincerely,        Devin Calhoun MD

## 2024-08-15 NOTE — PROGRESS NOTES
Social Work Services Discharge Note      Patient Name:  Josiane Dasilva     Anticipated Discharge Date:  3/16/19    Discharge Disposition: TCU: [facility] - [address] [place] [address] (P: [phone]; F: [phone])    Following MD:  per facility     Pre-Admission Screening (PAS) online form has been completed.  The Level of Care (LOC) is:  Determined  Confirmation Code is:  [id number]  Patient/caregiver informed of referral to [facility] for Pre-Admission Screening for skilled nursing facility (SNF) placement and to expect a phone call post discharge from SNF.     Additional Services/Equipment Arranged:  [employer] (Ph: [phone]) WC ride arranged and the earliest ride available is 5:45 PM today.      Patient / Family response to discharge plan:  Pt and sister is agreeable to discharge to TCU today.      Persons notified of above discharge plan:  Pt, Pt's sisterRon lianicole Jaffe (P: [phone]; F:[phone]), charge nurse    Staff Discharge Instructions:  SW faxed discharge orders and signed hard scripts for any controlled substances.  Please print a packet and send with patient.  Please call for nurse to nurse report.     CTS Handoff completed:  YES    Medicare Notice of Rights provided to the patient/family:  N/A - Pt is MA only.     KAYLIE Mccormack, YUMIKO  7C Surgical/Oncology Unit   Phone: [phone]  Pager: [phone]     Additional Notes: Patient consent was obtained to proceed with the visit and recommended plan of care after discussion of all risks and benefits, including the risks of COVID-19 exposure. Render Risk Assessment In Note?: no Detail Level: Simple

## 2024-10-24 ENCOUNTER — OFFICE VISIT (OUTPATIENT)
Dept: RHEUMATOLOGY | Facility: CLINIC | Age: 64
End: 2024-10-24
Attending: STUDENT IN AN ORGANIZED HEALTH CARE EDUCATION/TRAINING PROGRAM
Payer: COMMERCIAL

## 2024-10-24 VITALS — DIASTOLIC BLOOD PRESSURE: 89 MMHG | OXYGEN SATURATION: 99 % | SYSTOLIC BLOOD PRESSURE: 178 MMHG | HEART RATE: 75 BPM

## 2024-10-24 DIAGNOSIS — M10.031 ACUTE IDIOPATHIC GOUT OF RIGHT WRIST: ICD-10-CM

## 2024-10-24 PROCEDURE — 99205 OFFICE O/P NEW HI 60 MIN: CPT | Performed by: INTERNAL MEDICINE

## 2024-10-24 RX ORDER — SENNOSIDES 8.6 MG
650 CAPSULE ORAL EVERY 8 HOURS PRN
COMMUNITY

## 2024-10-24 RX ORDER — ALLOPURINOL 100 MG/1
TABLET ORAL
COMMUNITY
Start: 2024-10-15

## 2024-10-24 RX ORDER — TORSEMIDE 20 MG/1
TABLET ORAL
COMMUNITY
Start: 2024-09-30

## 2024-10-24 RX ORDER — PROCHLORPERAZINE 25 MG/1
SUPPOSITORY RECTAL
COMMUNITY

## 2024-10-24 RX ORDER — GABAPENTIN 100 MG/1
100 CAPSULE ORAL 3 TIMES DAILY
COMMUNITY

## 2024-10-24 RX ORDER — PREDNISONE 10 MG/1
TABLET ORAL
Qty: 30 TABLET | Refills: 1 | Status: SHIPPED | OUTPATIENT
Start: 2024-10-24

## 2024-10-24 RX ORDER — ALBUTEROL SULFATE 90 UG/1
AEROSOL, METERED RESPIRATORY (INHALATION)
COMMUNITY
Start: 2024-02-24

## 2024-10-24 RX ORDER — ATORVASTATIN CALCIUM 20 MG/1
TABLET, FILM COATED ORAL
COMMUNITY
Start: 2024-10-15

## 2024-10-24 RX ORDER — PAROXETINE 20 MG/1
TABLET, FILM COATED ORAL
COMMUNITY
Start: 2024-09-28

## 2024-10-24 RX ORDER — LIDOCAINE 4 G/G
1 PATCH TOPICAL EVERY 24 HOURS
COMMUNITY

## 2024-10-24 ASSESSMENT — PAIN SCALES - GENERAL: PAINLEVEL_OUTOF10: SEVERE PAIN (6)

## 2024-10-24 NOTE — PROGRESS NOTES
New patient referral for recurrent gout attacks.    Josiane Dasilva is 63 years old and she is accompanied by an administrative employee of the nursing home where the patient resides.  The patient has a history of dementia and she cannot give a reliable story.  Essentially she has a history of recurrent gout attacks affecting primarily the right wrist.  A few months ago she had a painful, swollen right wrist.  All rings needed to be taken off.  The attack lasted 1 to 2 weeks and resolved spontaneously and currently she does not have any painful joints.  The patient herself states that she has had a major fall that causes joint pains.  Apparently she had a fall that was the reason for admission to the nursing home.  During the history taking it was clear that she had some discrepancies in her story due to her dementia.    The patient needed to be lifted into a different wheelchair in order to allow for moving into the exam room.    Past medical history  Recurrent gout attacks  Hypertension  Peripheral neuropathy  Dementia    Social history  Former smoker  Living in a nursing home    I have reviewed her medication list and significantly she is on torsemide which is a diuretic that increases uric acid levels in the blood, and allopurinol 100 mg daily.    Physical examination  Vital signs were reviewed and her blood pressure is 179/89  We do not have a BMI but her weight is 440 pounds  Joint examination shows that she has no active synovitis in the hands, wrists, elbows or shoulders.  She is able to move her joints without pain.  She can make a fist bilaterally.  She is seated in wheelchair and lower extremity exam is limited.  Examination of the knees shows that there is no active synovitis in the knees, ankles and feet.  There is mild peripheral edema at the ankles bilaterally.    I have reviewed the x-ray images of the right hand and wrist.  She has significant osteoarthritis of the CMC joint and STT joint.  There is  no calcific deposit in the TFC.    Impression/plan  Most likely she has intermittent gout attacks in the right wrist.  Differential would be pseudogout. Her last reported uric acid was 9.3 in May 2024.  Treatment of gout is based on the following 3 parts.  1.  Uric acid lowering therapy with a uric acid goal between 4.0 and 5.0.  The plan is to increase her allopurinol by 100 mg every month based on a monthly uric acid level, until her uric acid has decreased to her goal as stated above.  2.  For acute gout attacks she could have a short course of prednisone as follows: Prednisone course for acute gout flare only. 30 mg x 1-2 days, then 20 mg x 1-2 days, then 10 mg x 2-3 days then stop.  3.  We could consider colchicine for prophylaxis if she were to have many recurrent attacks.  For now we will just hold off and see how things develop.  4.  She has several risk factors for developing gout, being overweight, and on torsemide.  5.  Plan for follow-up in 3 months by phone, in the presence of nursing home staff.    60 minutes spent on the date of the encounter doing chart review, history and exam, documentation and further activities per the note.

## 2024-10-24 NOTE — PATIENT INSTRUCTIONS
Blood test: CBC, complete metabolic profile, uric acid monthly x 4.  Increase allopurinol by 100 mg every month based on the uric acid. Goal of uric acid is between 4.0 and 5.0.  Prednisone course for acute gout flare only. 30 mg x 1-2 days, then 20 mg x 1-2 days, then 10 mg x 2-3 days then stop.  Follow up visit by phone with nursing home in 3 months.

## 2024-10-24 NOTE — NURSING NOTE
Chief Complaint   Patient presents with    New Patient      Acute idiopathic gout of right wrist       Vitals:    10/24/24 1159   BP: (!) 178/89   BP Location: Left arm   Patient Position: Sitting   Cuff Size: Adult Regular   Pulse: 75   SpO2: 99%       There is no height or weight on file to calculate BMI.    Esther Hopper, [facility]

## 2024-12-23 ENCOUNTER — APPOINTMENT (OUTPATIENT)
Dept: GENERAL RADIOLOGY | Facility: CLINIC | Age: 64
DRG: 689 | End: 2024-12-23
Attending: STUDENT IN AN ORGANIZED HEALTH CARE EDUCATION/TRAINING PROGRAM
Payer: MEDICARE

## 2024-12-23 ENCOUNTER — APPOINTMENT (OUTPATIENT)
Dept: CT IMAGING | Facility: CLINIC | Age: 64
DRG: 689 | End: 2024-12-23
Attending: STUDENT IN AN ORGANIZED HEALTH CARE EDUCATION/TRAINING PROGRAM
Payer: MEDICARE

## 2024-12-23 ENCOUNTER — HOSPITAL ENCOUNTER (INPATIENT)
Facility: CLINIC | Age: 64
LOS: 3 days | Discharge: NURSING, REHAB OR LONG-TERM CARE | End: 2024-12-26
Attending: STUDENT IN AN ORGANIZED HEALTH CARE EDUCATION/TRAINING PROGRAM | Admitting: STUDENT IN AN ORGANIZED HEALTH CARE EDUCATION/TRAINING PROGRAM
Payer: MEDICARE

## 2024-12-23 DIAGNOSIS — N17.9 AKI (ACUTE KIDNEY INJURY) (H): ICD-10-CM

## 2024-12-23 DIAGNOSIS — R41.82 ALTERED MENTAL STATUS, UNSPECIFIED ALTERED MENTAL STATUS TYPE: ICD-10-CM

## 2024-12-23 DIAGNOSIS — N39.0 ACUTE UTI: ICD-10-CM

## 2024-12-23 LAB
ALBUMIN SERPL BCG-MCNC: 3.7 G/DL (ref 3.5–5.2)
ALBUMIN UR-MCNC: 30 MG/DL
ALP SERPL-CCNC: 170 U/L (ref 40–150)
ALT SERPL W P-5'-P-CCNC: 14 U/L (ref 0–50)
AMMONIA PLAS-SCNC: 41 UMOL/L (ref 11–51)
ANION GAP SERPL CALCULATED.3IONS-SCNC: 10 MMOL/L (ref 7–15)
APPEARANCE UR: ABNORMAL
AST SERPL W P-5'-P-CCNC: 13 U/L (ref 0–45)
BACTERIA #/AREA URNS HPF: ABNORMAL /HPF
BASOPHILS # BLD AUTO: 0 10E3/UL (ref 0–0.2)
BASOPHILS NFR BLD AUTO: 0 %
BILIRUB SERPL-MCNC: 0.3 MG/DL
BILIRUB UR QL STRIP: ABNORMAL
BUN SERPL-MCNC: 28 MG/DL (ref 8–23)
CALCIUM SERPL-MCNC: 9.3 MG/DL (ref 8.8–10.4)
CAOX CRY #/AREA URNS HPF: ABNORMAL /HPF
CHLORIDE SERPL-SCNC: 105 MMOL/L (ref 98–107)
COLOR UR AUTO: YELLOW
CREAT SERPL-MCNC: 1.9 MG/DL (ref 0.51–0.95)
EGFRCR SERPLBLD CKD-EPI 2021: 29 ML/MIN/1.73M2
EOSINOPHIL # BLD AUTO: 0.2 10E3/UL (ref 0–0.7)
EOSINOPHIL NFR BLD AUTO: 2 %
ERYTHROCYTE [DISTWIDTH] IN BLOOD BY AUTOMATED COUNT: 15.1 % (ref 10–15)
ETHANOL SERPL-MCNC: <0.01 G/DL
GLUCOSE SERPL-MCNC: 137 MG/DL (ref 70–99)
GLUCOSE UR STRIP-MCNC: NEGATIVE MG/DL
HCO3 SERPL-SCNC: 30 MMOL/L (ref 22–29)
HCT VFR BLD AUTO: 36.7 % (ref 35–47)
HGB BLD-MCNC: 10.9 G/DL (ref 11.7–15.7)
HGB UR QL STRIP: NEGATIVE
HYALINE CASTS: 22 /LPF
IMM GRANULOCYTES # BLD: 0 10E3/UL
IMM GRANULOCYTES NFR BLD: 0 %
KETONES UR STRIP-MCNC: NEGATIVE MG/DL
LACTATE SERPL-SCNC: 0.7 MMOL/L (ref 0.7–2)
LEUKOCYTE ESTERASE UR QL STRIP: ABNORMAL
LYMPHOCYTES # BLD AUTO: 1 10E3/UL (ref 0.8–5.3)
LYMPHOCYTES NFR BLD AUTO: 8 %
MCH RBC QN AUTO: 26.9 PG (ref 26.5–33)
MCHC RBC AUTO-ENTMCNC: 29.7 G/DL (ref 31.5–36.5)
MCV RBC AUTO: 91 FL (ref 78–100)
MONOCYTES # BLD AUTO: 0.7 10E3/UL (ref 0–1.3)
MONOCYTES NFR BLD AUTO: 5 %
MUCOUS THREADS #/AREA URNS LPF: PRESENT /LPF
NEUTROPHILS # BLD AUTO: 11.8 10E3/UL (ref 1.6–8.3)
NEUTROPHILS NFR BLD AUTO: 85 %
NITRATE UR QL: NEGATIVE
NRBC # BLD AUTO: 0 10E3/UL
NRBC BLD AUTO-RTO: 0 /100
PH UR STRIP: 5 [PH] (ref 5–7)
PLATELET # BLD AUTO: 175 10E3/UL (ref 150–450)
POTASSIUM SERPL-SCNC: 4.7 MMOL/L (ref 3.4–5.3)
PROT SERPL-MCNC: 7.5 G/DL (ref 6.4–8.3)
RBC # BLD AUTO: 4.05 10E6/UL (ref 3.8–5.2)
RBC URINE: 3 /HPF
SODIUM SERPL-SCNC: 145 MMOL/L (ref 135–145)
SP GR UR STRIP: 1.02 (ref 1–1.03)
SQUAMOUS EPITHELIAL: 1 /HPF
UROBILINOGEN UR STRIP-MCNC: 4 MG/DL
WBC # BLD AUTO: 13.8 10E3/UL (ref 4–11)
WBC URINE: 20 /HPF

## 2024-12-23 PROCEDURE — 99223 1ST HOSP IP/OBS HIGH 75: CPT | Mod: AI | Performed by: PHYSICIAN ASSISTANT

## 2024-12-23 PROCEDURE — 96374 THER/PROPH/DIAG INJ IV PUSH: CPT

## 2024-12-23 PROCEDURE — 250N000011 HC RX IP 250 OP 636: Performed by: PHYSICIAN ASSISTANT

## 2024-12-23 PROCEDURE — 81003 URINALYSIS AUTO W/O SCOPE: CPT | Performed by: STUDENT IN AN ORGANIZED HEALTH CARE EDUCATION/TRAINING PROGRAM

## 2024-12-23 PROCEDURE — 120N000001 HC R&B MED SURG/OB

## 2024-12-23 PROCEDURE — 83605 ASSAY OF LACTIC ACID: CPT | Performed by: STUDENT IN AN ORGANIZED HEALTH CARE EDUCATION/TRAINING PROGRAM

## 2024-12-23 PROCEDURE — 87186 SC STD MICRODIL/AGAR DIL: CPT | Performed by: STUDENT IN AN ORGANIZED HEALTH CARE EDUCATION/TRAINING PROGRAM

## 2024-12-23 PROCEDURE — 80053 COMPREHEN METABOLIC PANEL: CPT | Performed by: STUDENT IN AN ORGANIZED HEALTH CARE EDUCATION/TRAINING PROGRAM

## 2024-12-23 PROCEDURE — 85048 AUTOMATED LEUKOCYTE COUNT: CPT | Performed by: STUDENT IN AN ORGANIZED HEALTH CARE EDUCATION/TRAINING PROGRAM

## 2024-12-23 PROCEDURE — 258N000003 HC RX IP 258 OP 636: Performed by: STUDENT IN AN ORGANIZED HEALTH CARE EDUCATION/TRAINING PROGRAM

## 2024-12-23 PROCEDURE — 74176 CT ABD & PELVIS W/O CONTRAST: CPT

## 2024-12-23 PROCEDURE — 82140 ASSAY OF AMMONIA: CPT | Performed by: STUDENT IN AN ORGANIZED HEALTH CARE EDUCATION/TRAINING PROGRAM

## 2024-12-23 PROCEDURE — 36415 COLL VENOUS BLD VENIPUNCTURE: CPT | Performed by: STUDENT IN AN ORGANIZED HEALTH CARE EDUCATION/TRAINING PROGRAM

## 2024-12-23 PROCEDURE — 96361 HYDRATE IV INFUSION ADD-ON: CPT

## 2024-12-23 PROCEDURE — 250N000011 HC RX IP 250 OP 636: Performed by: STUDENT IN AN ORGANIZED HEALTH CARE EDUCATION/TRAINING PROGRAM

## 2024-12-23 PROCEDURE — 99285 EMERGENCY DEPT VISIT HI MDM: CPT | Mod: 25

## 2024-12-23 PROCEDURE — 83036 HEMOGLOBIN GLYCOSYLATED A1C: CPT | Performed by: PHYSICIAN ASSISTANT

## 2024-12-23 PROCEDURE — 71045 X-RAY EXAM CHEST 1 VIEW: CPT

## 2024-12-23 PROCEDURE — 85004 AUTOMATED DIFF WBC COUNT: CPT | Performed by: STUDENT IN AN ORGANIZED HEALTH CARE EDUCATION/TRAINING PROGRAM

## 2024-12-23 PROCEDURE — 93005 ELECTROCARDIOGRAM TRACING: CPT

## 2024-12-23 PROCEDURE — 82077 ASSAY SPEC XCP UR&BREATH IA: CPT | Performed by: STUDENT IN AN ORGANIZED HEALTH CARE EDUCATION/TRAINING PROGRAM

## 2024-12-23 PROCEDURE — 87040 BLOOD CULTURE FOR BACTERIA: CPT | Performed by: STUDENT IN AN ORGANIZED HEALTH CARE EDUCATION/TRAINING PROGRAM

## 2024-12-23 PROCEDURE — 70450 CT HEAD/BRAIN W/O DYE: CPT

## 2024-12-23 RX ORDER — ALLOPURINOL 100 MG/1
100 TABLET ORAL DAILY
Status: DISCONTINUED | OUTPATIENT
Start: 2024-12-24 | End: 2024-12-26 | Stop reason: HOSPADM

## 2024-12-23 RX ORDER — AMOXICILLIN 250 MG
2 CAPSULE ORAL 2 TIMES DAILY
Status: DISCONTINUED | OUTPATIENT
Start: 2024-12-24 | End: 2024-12-26 | Stop reason: HOSPADM

## 2024-12-23 RX ORDER — HYDRALAZINE HYDROCHLORIDE 10 MG/1
10 TABLET, FILM COATED ORAL EVERY 4 HOURS PRN
Status: DISCONTINUED | OUTPATIENT
Start: 2024-12-23 | End: 2024-12-26 | Stop reason: HOSPADM

## 2024-12-23 RX ORDER — NALOXONE HYDROCHLORIDE 1 MG/ML
2 INJECTION INTRAMUSCULAR; INTRAVENOUS; SUBCUTANEOUS ONCE
Status: COMPLETED | OUTPATIENT
Start: 2024-12-23 | End: 2024-12-23

## 2024-12-23 RX ORDER — ACETAMINOPHEN 325 MG/1
650 TABLET ORAL 2 TIMES DAILY PRN
COMMUNITY

## 2024-12-23 RX ORDER — ONDANSETRON 4 MG/1
4 TABLET, ORALLY DISINTEGRATING ORAL EVERY 6 HOURS PRN
Status: DISCONTINUED | OUTPATIENT
Start: 2024-12-23 | End: 2024-12-26 | Stop reason: HOSPADM

## 2024-12-23 RX ORDER — LISINOPRIL 10 MG/1
10 TABLET ORAL DAILY
COMMUNITY

## 2024-12-23 RX ORDER — ALBUTEROL SULFATE 90 UG/1
2 INHALANT RESPIRATORY (INHALATION)
Status: DISCONTINUED | OUTPATIENT
Start: 2024-12-23 | End: 2024-12-26 | Stop reason: HOSPADM

## 2024-12-23 RX ORDER — ONDANSETRON 2 MG/ML
4 INJECTION INTRAMUSCULAR; INTRAVENOUS EVERY 6 HOURS PRN
Status: DISCONTINUED | OUTPATIENT
Start: 2024-12-23 | End: 2024-12-26 | Stop reason: HOSPADM

## 2024-12-23 RX ORDER — LISINOPRIL 10 MG/1
10 TABLET ORAL DAILY
Status: DISCONTINUED | OUTPATIENT
Start: 2024-12-24 | End: 2024-12-26 | Stop reason: HOSPADM

## 2024-12-23 RX ORDER — CEFTRIAXONE 1 G/1
1 INJECTION, POWDER, FOR SOLUTION INTRAMUSCULAR; INTRAVENOUS EVERY 24 HOURS
Status: DISCONTINUED | OUTPATIENT
Start: 2024-12-24 | End: 2024-12-26

## 2024-12-23 RX ORDER — HYDROMORPHONE HCL IN WATER/PF 6 MG/30 ML
0.2 PATIENT CONTROLLED ANALGESIA SYRINGE INTRAVENOUS
Status: DISCONTINUED | OUTPATIENT
Start: 2024-12-23 | End: 2024-12-26 | Stop reason: HOSPADM

## 2024-12-23 RX ORDER — ACETAMINOPHEN 325 MG/1
650 TABLET ORAL 2 TIMES DAILY PRN
Status: DISCONTINUED | OUTPATIENT
Start: 2024-12-23 | End: 2024-12-26 | Stop reason: HOSPADM

## 2024-12-23 RX ORDER — LIDOCAINE 40 MG/G
CREAM TOPICAL
Status: DISCONTINUED | OUTPATIENT
Start: 2024-12-23 | End: 2024-12-26 | Stop reason: HOSPADM

## 2024-12-23 RX ORDER — PROCHLORPERAZINE MALEATE 10 MG
10 TABLET ORAL EVERY 6 HOURS PRN
Status: DISCONTINUED | OUTPATIENT
Start: 2024-12-23 | End: 2024-12-26 | Stop reason: HOSPADM

## 2024-12-23 RX ORDER — GABAPENTIN 100 MG/1
100 CAPSULE ORAL 2 TIMES DAILY
Status: DISCONTINUED | OUTPATIENT
Start: 2024-12-24 | End: 2024-12-26 | Stop reason: HOSPADM

## 2024-12-23 RX ORDER — HYDRALAZINE HYDROCHLORIDE 20 MG/ML
10 INJECTION INTRAMUSCULAR; INTRAVENOUS EVERY 4 HOURS PRN
Status: DISCONTINUED | OUTPATIENT
Start: 2024-12-23 | End: 2024-12-26 | Stop reason: HOSPADM

## 2024-12-23 RX ORDER — SIMETHICONE 80 MG
80 TABLET,CHEWABLE ORAL EVERY 6 HOURS PRN
Status: DISCONTINUED | OUTPATIENT
Start: 2024-12-23 | End: 2024-12-26 | Stop reason: HOSPADM

## 2024-12-23 RX ORDER — DEXTROSE MONOHYDRATE 25 G/50ML
25-50 INJECTION, SOLUTION INTRAVENOUS
Status: DISCONTINUED | OUTPATIENT
Start: 2024-12-23 | End: 2024-12-26 | Stop reason: HOSPADM

## 2024-12-23 RX ORDER — DULOXETIN HYDROCHLORIDE 20 MG/1
20 CAPSULE, DELAYED RELEASE ORAL DAILY
Status: DISCONTINUED | OUTPATIENT
Start: 2024-12-24 | End: 2024-12-26 | Stop reason: HOSPADM

## 2024-12-23 RX ORDER — ONDANSETRON 4 MG/1
4 TABLET, FILM COATED ORAL 2 TIMES DAILY PRN
COMMUNITY

## 2024-12-23 RX ORDER — SODIUM CHLORIDE 9 MG/ML
INJECTION, SOLUTION INTRAVENOUS CONTINUOUS
Status: DISCONTINUED | OUTPATIENT
Start: 2024-12-23 | End: 2024-12-24

## 2024-12-23 RX ORDER — TRAZODONE HYDROCHLORIDE 50 MG/1
50 TABLET, FILM COATED ORAL AT BEDTIME
COMMUNITY

## 2024-12-23 RX ORDER — LIDOCAINE 4 G/G
1 PATCH TOPICAL EVERY 24 HOURS
Status: DISCONTINUED | OUTPATIENT
Start: 2024-12-24 | End: 2024-12-25

## 2024-12-23 RX ORDER — BISACODYL 10 MG
10 SUPPOSITORY, RECTAL RECTAL DAILY PRN
Status: DISCONTINUED | OUTPATIENT
Start: 2024-12-23 | End: 2024-12-26 | Stop reason: HOSPADM

## 2024-12-23 RX ORDER — AMOXICILLIN 250 MG
1 CAPSULE ORAL 2 TIMES DAILY PRN
Status: DISCONTINUED | OUTPATIENT
Start: 2024-12-23 | End: 2024-12-26 | Stop reason: HOSPADM

## 2024-12-23 RX ORDER — PAROXETINE 40 MG/1
40 TABLET, FILM COATED ORAL EVERY MORNING
COMMUNITY

## 2024-12-23 RX ORDER — CALCIUM CARBONATE 500 MG/1
1000 TABLET, CHEWABLE ORAL 4 TIMES DAILY PRN
Status: DISCONTINUED | OUTPATIENT
Start: 2024-12-23 | End: 2024-12-26 | Stop reason: HOSPADM

## 2024-12-23 RX ORDER — AMLODIPINE BESYLATE 2.5 MG/1
2.5 TABLET ORAL DAILY
COMMUNITY

## 2024-12-23 RX ORDER — AMLODIPINE BESYLATE 2.5 MG/1
2.5 TABLET ORAL DAILY
Status: DISCONTINUED | OUTPATIENT
Start: 2024-12-24 | End: 2024-12-26 | Stop reason: HOSPADM

## 2024-12-23 RX ORDER — FERROUS SULFATE 325(65) MG
325 TABLET ORAL EVERY OTHER DAY
Status: DISCONTINUED | OUTPATIENT
Start: 2024-12-24 | End: 2024-12-26 | Stop reason: HOSPADM

## 2024-12-23 RX ORDER — AMOXICILLIN 250 MG
2 CAPSULE ORAL 2 TIMES DAILY PRN
Status: DISCONTINUED | OUTPATIENT
Start: 2024-12-23 | End: 2024-12-26 | Stop reason: HOSPADM

## 2024-12-23 RX ORDER — ACETAMINOPHEN 325 MG/1
650 TABLET ORAL 3 TIMES DAILY
Status: DISCONTINUED | OUTPATIENT
Start: 2024-12-24 | End: 2024-12-26 | Stop reason: HOSPADM

## 2024-12-23 RX ORDER — PANTOPRAZOLE SODIUM 40 MG/1
40 TABLET, DELAYED RELEASE ORAL DAILY
Status: DISCONTINUED | OUTPATIENT
Start: 2024-12-24 | End: 2024-12-26 | Stop reason: HOSPADM

## 2024-12-23 RX ORDER — GABAPENTIN 300 MG/1
300 CAPSULE ORAL AT BEDTIME
Status: DISCONTINUED | OUTPATIENT
Start: 2024-12-24 | End: 2024-12-26 | Stop reason: HOSPADM

## 2024-12-23 RX ORDER — NICOTINE POLACRILEX 4 MG
15-30 LOZENGE BUCCAL
Status: DISCONTINUED | OUTPATIENT
Start: 2024-12-23 | End: 2024-12-26 | Stop reason: HOSPADM

## 2024-12-23 RX ORDER — PAROXETINE 40 MG/1
40 TABLET, FILM COATED ORAL EVERY MORNING
Status: DISCONTINUED | OUTPATIENT
Start: 2024-12-24 | End: 2024-12-26 | Stop reason: HOSPADM

## 2024-12-23 RX ORDER — ENOXAPARIN SODIUM 100 MG/ML
40 INJECTION SUBCUTANEOUS EVERY 12 HOURS
Status: DISCONTINUED | OUTPATIENT
Start: 2024-12-24 | End: 2024-12-26 | Stop reason: HOSPADM

## 2024-12-23 RX ORDER — OMEGA-3 FATTY ACIDS/FISH OIL 300-1000MG
1000 CAPSULE ORAL 2 TIMES DAILY
COMMUNITY

## 2024-12-23 RX ORDER — AMOXICILLIN 250 MG
1 CAPSULE ORAL 2 TIMES DAILY
Status: DISCONTINUED | OUTPATIENT
Start: 2024-12-24 | End: 2024-12-26 | Stop reason: HOSPADM

## 2024-12-23 RX ORDER — ATORVASTATIN CALCIUM 20 MG/1
20 TABLET, FILM COATED ORAL AT BEDTIME
Status: DISCONTINUED | OUTPATIENT
Start: 2024-12-24 | End: 2024-12-26 | Stop reason: HOSPADM

## 2024-12-23 RX ORDER — FERROUS SULFATE 325(65) MG
325 TABLET, DELAYED RELEASE (ENTERIC COATED) ORAL EVERY OTHER DAY
COMMUNITY

## 2024-12-23 RX ORDER — DULOXETIN HYDROCHLORIDE 20 MG/1
20 CAPSULE, DELAYED RELEASE ORAL DAILY
COMMUNITY

## 2024-12-23 RX ORDER — POLYETHYLENE GLYCOL 3350 17 G/17G
17 POWDER, FOR SOLUTION ORAL 2 TIMES DAILY PRN
Status: DISCONTINUED | OUTPATIENT
Start: 2024-12-23 | End: 2024-12-26 | Stop reason: HOSPADM

## 2024-12-23 RX ORDER — METOPROLOL SUCCINATE 25 MG/1
37.5 TABLET, EXTENDED RELEASE ORAL DAILY
COMMUNITY

## 2024-12-23 RX ORDER — TORSEMIDE 20 MG/1
20 TABLET ORAL DAILY
Status: DISCONTINUED | OUTPATIENT
Start: 2024-12-24 | End: 2024-12-26 | Stop reason: HOSPADM

## 2024-12-23 RX ORDER — CEFTRIAXONE 2 G/1
2 INJECTION, POWDER, FOR SOLUTION INTRAMUSCULAR; INTRAVENOUS ONCE
Status: COMPLETED | OUTPATIENT
Start: 2024-12-23 | End: 2024-12-23

## 2024-12-23 RX ADMIN — SODIUM CHLORIDE 1000 ML: 9 INJECTION, SOLUTION INTRAVENOUS at 18:29

## 2024-12-23 RX ADMIN — NALOXONE HYDROCHLORIDE 2 MG: 1 INJECTION PARENTERAL at 16:58

## 2024-12-23 RX ADMIN — CEFTRIAXONE SODIUM 2 G: 2 INJECTION, POWDER, FOR SOLUTION INTRAMUSCULAR; INTRAVENOUS at 20:00

## 2024-12-23 ASSESSMENT — ACTIVITIES OF DAILY LIVING (ADL)
ADLS_ACUITY_SCORE: 57

## 2024-12-23 ASSESSMENT — COLUMBIA-SUICIDE SEVERITY RATING SCALE - C-SSRS
6. HAVE YOU EVER DONE ANYTHING, STARTED TO DO ANYTHING, OR PREPARED TO DO ANYTHING TO END YOUR LIFE?: NO
2. HAVE YOU ACTUALLY HAD ANY THOUGHTS OF KILLING YOURSELF IN THE PAST MONTH?: NO
1. IN THE PAST MONTH, HAVE YOU WISHED YOU WERE DEAD OR WISHED YOU COULD GO TO SLEEP AND NOT WAKE UP?: NO

## 2024-12-23 NOTE — ED NOTES
Bed: ED28  Expected date:   Expected time:   Means of arrival:   Comments:  Celia 023 63f overdose eta 12

## 2024-12-23 NOTE — ED TRIAGE NOTES
Pt BIB EMS. EMS reports staff at pt's NH called EMS stating that pt developed altered mental status after her morning meds. EMS reports pt was lethargic at scene with pinpoint pupils. EMS reports giving pt 1.6 mg Narcan total with immediate increased alertness. Pt arrives to the room awake and alert with nasal cannula O2 flowing.

## 2024-12-23 NOTE — ED PROVIDER NOTES
Emergency Department Note      History of Present Illness     Chief Complaint   Altered Mental Status    HPI   Josiane Dasilva is a 63 year old female with a history of dementia, morbid obesity, depression, alcohol use disorder and hypertension who was brought in by EMS from nursing home for evaluation of an altered mental status which she developed after taking her morning pills. As per the EMS, she was lethargic and had pinpoint pupil, so they gave her 1.6 gm of Narcan. She had increased alertness to Narcan as per the EMS. Upon evaluation, she was drowsy and was a limited historian.  Patient reports that she fell yesterday.  There was no report of any fall from EMS.      Independent Historian   None as detailed above. Limited due to altered mental status.   Discussed with sister -She states that her mental status can wax and wane and she can get agitated. I asked her regarding a potential fall and she states that the patient states that she falls frequently when she has not fallen.    Review of External Notes   10/24/24 reports history of dementia and limited historian.     Past Medical History   Medical History and Problem List  Alcoholism   Benign essential hypertension   Generalized anxiety disorder   History of peptic ulcer disease   Impaired fasting glucose   Major depressive disorder   Morbid obesity   Peripheral neuropathy   Poor short term memory      Medications  Lasix   Gabapentin   Lisinopril   Methocarbamol   Nystatin   Oxycodone   Pantoprazole   Desyrel      Surgical History   Appendectomy   Colonoscopy   Cystoscopy   Esophagoscopy   Gastroscopy   Duodenoscopy   Incision and drainage of lower extremity   Laparoscopic hysterectomy   Open reduction internal fixation ankle   Tubal ligation   Physical Exam     Patient Vitals for the past 24 hrs:   BP Temp Temp src Pulse Resp SpO2   12/23/24 1552 132/82 97.6  F (36.4  C) Oral 84 20 99 %     Physical Exam  GENERAL: Patient appears tired.  Eyes closed but  opens them when I chat with her.  Limited historian.  HEAD: Atraumatic.  NECK: No rigidity  CV: RRR, no murmurs, rubs or gallops  PULM: CTAB with good aeration; no retractions, rales, rhonchi, or wheezing  ABD: Soft, nontender, nondistended, no guarding  DERM: Dry, scaly skin in the extremities.  Skin warm and dry  EXTREMITY: Do not see visible injury in the extremities.  She is moving all extremities.    Diagnostics     Lab Results   Labs Ordered and Resulted from Time of ED Arrival to Time of ED Departure   COMPREHENSIVE METABOLIC PANEL - Abnormal       Result Value    Sodium 145      Potassium 4.7      Carbon Dioxide (CO2) 30 (*)     Anion Gap 10      Urea Nitrogen 28.0 (*)     Creatinine 1.90 (*)     GFR Estimate 29 (*)     Calcium 9.3      Chloride 105      Glucose 137 (*)     Alkaline Phosphatase 170 (*)     AST 13      ALT 14      Protein Total 7.5      Albumin 3.7      Bilirubin Total 0.3     ROUTINE UA WITH MICROSCOPIC REFLEX TO CULTURE - Abnormal    Color Urine Yellow      Appearance Urine Slightly Cloudy (*)     Glucose Urine Negative      Bilirubin Urine Small (*)     Ketones Urine Negative      Specific Gravity Urine 1.025      Blood Urine Negative      pH Urine 5.0      Protein Albumin Urine 30 (*)     Urobilinogen Urine 4.0 (*)     Nitrite Urine Negative      Leukocyte Esterase Urine Moderate (*)     Bacteria Urine Many (*)     Mucus Urine Present (*)     Calcium Oxalate Crystals Urine Few (*)     RBC Urine 3 (*)     WBC Urine 20 (*)     Squamous Epithelials Urine 1      Hyaline Casts Urine 22 (*)    CBC WITH PLATELETS AND DIFFERENTIAL - Abnormal    WBC Count 13.8 (*)     RBC Count 4.05      Hemoglobin 10.9 (*)     Hematocrit 36.7      MCV 91      MCH 26.9      MCHC 29.7 (*)     RDW 15.1 (*)     Platelet Count 175      % Neutrophils 85      % Lymphocytes 8      % Monocytes 5      % Eosinophils 2      % Basophils 0      % Immature Granulocytes 0      NRBCs per 100 WBC 0      Absolute Neutrophils 11.8  (*)     Absolute Lymphocytes 1.0      Absolute Monocytes 0.7      Absolute Eosinophils 0.2      Absolute Basophils 0.0      Absolute Immature Granulocytes 0.0      Absolute NRBCs 0.0     ETHYL ALCOHOL LEVEL - Normal    Alcohol ethyl <0.01     AMMONIA - Normal    Ammonia 41     LACTIC ACID WHOLE BLOOD   URINE CULTURE   BLOOD CULTURE   BLOOD CULTURE       Imaging   CT Head w/o Contrast   Final Result   IMPRESSION:     1.  No evidence of acute intracranial hemorrhage or mass effect.      XR Chest Port 1 View   Final Result   IMPRESSION: Stable chest with no acute cardiopulmonary or musculoskeletal abnormalities seen to explain patient's shortness of breath clinically.          EKG   ECG results from 12/23/24   EKG 12-lead, tracing only     Value    Systolic Blood Pressure     Diastolic Blood Pressure     Ventricular Rate 84    Atrial Rate 84    VA Interval 144    QRS Duration 84        QTc 463    P Axis 56    R AXIS 50    T Axis 14    Interpretation ECG      Sinus rhythm  Interepreted by me at 1658        Independent Interpretation   CXR: No infiltrate or visible rib fracture.  CT Head: No intracranial hemorrhage.  ED Course      Medications Administered   Medications   sodium chloride 0.9% BOLUS 1,000 mL (1,000 mLs Intravenous $New Bag 12/23/24 1829)   cefTRIAXone (ROCEPHIN) 2 g vial to attach to  ml bag for ADULTS or NS 50 ml bag for PEDS (has no administration in time range)   naloxone (NARCAN) injection 2 mg (2 mg Intravenous $Given 12/23/24 1658)       Procedures   Procedures     Discussion of Management   Admitting Hospitalist, Spoke with SHAY Joe for Dr. Cuevas    ED Course   ED Course as of 12/23/24 1918   Mon Dec 23, 2024   1624 I obtained the history and examined the patient as above.    1637 I spoke to her sister Jyotsna regarding this patient.    1715 RN updated me regarding the patient altered mental status.    1856 I rechecked and updated the patient.     1915 I spoke with SHAY Joe for  Dr. Cuevas from the hospitalist service regarding the patient's presentation, findings here in the ED, and plan of care.      Additional Documentation  None    Medical Decision Making / Diagnosis        GILBERT Dasilva is a 63 year old female with history of dementia, presenting with worsening mental status.  EMS gave patient Narcan and she reportedly became more alert.  On my assessment, she is drowsy, but responsive verbal.  Very limited historian.  Moving all extremities without difficulty.  No facial droop.  We gave her a dose of Narcan here and she briefly became more alert as well but then became sleepy again.  She is not showing signs of respiratory depression.  She has oxycodone listed in her meds, but per discussion with nursing, there is not report of patient getting narcotics at the facility.  Ordered broad lab workup to evaluate for cause.  CT scan of the head is without acute process.  She has an LO so was given IV fluids.  Urine appears that she is dry with hyaline cast and she also has signs of infection, therefore she was given antibiotics after I ordered a lactate and blood culture, however I do not suspect sepsis at this time.  I discussed with patient's daughter sister that I will admit her.  Discussed with hospitalist will admit the patient.  Do not think she requires LP.    Disposition   The patient was admitted to the hospital.     Diagnosis     ICD-10-CM    1. Altered mental status, unspecified altered mental status type  R41.82       2. LO (acute kidney injury) (H)  N17.9          Scribe Disclosure:  Jacinto ANDERSON, am serving as a scribe at 4:04 PM on 12/23/2024 to document services personally performed by Mitul Murrieta MD based on my observations and the provider's statements to me.        Mitul Murrieta MD  12/24/24 9939

## 2024-12-24 LAB
ALBUMIN SERPL BCG-MCNC: 3.5 G/DL (ref 3.5–5.2)
ALP SERPL-CCNC: 163 U/L (ref 40–150)
ALT SERPL W P-5'-P-CCNC: 13 U/L (ref 0–50)
ANION GAP SERPL CALCULATED.3IONS-SCNC: 10 MMOL/L (ref 7–15)
ANION GAP SERPL CALCULATED.3IONS-SCNC: 9 MMOL/L (ref 7–15)
AST SERPL W P-5'-P-CCNC: 11 U/L (ref 0–45)
ATRIAL RATE - MUSE: 84 BPM
BILIRUB SERPL-MCNC: 0.3 MG/DL
BUN SERPL-MCNC: 30.7 MG/DL (ref 8–23)
BUN SERPL-MCNC: 32.5 MG/DL (ref 8–23)
CALCIUM SERPL-MCNC: 8.6 MG/DL (ref 8.8–10.4)
CALCIUM SERPL-MCNC: 9 MG/DL (ref 8.8–10.4)
CHLORIDE SERPL-SCNC: 106 MMOL/L (ref 98–107)
CHLORIDE SERPL-SCNC: 109 MMOL/L (ref 98–107)
CREAT SERPL-MCNC: 1.34 MG/DL (ref 0.51–0.95)
CREAT SERPL-MCNC: 1.69 MG/DL (ref 0.51–0.95)
CREAT SERPL-MCNC: 1.88 MG/DL (ref 0.51–0.95)
DIASTOLIC BLOOD PRESSURE - MUSE: NORMAL MMHG
EGFRCR SERPLBLD CKD-EPI 2021: 29 ML/MIN/1.73M2
EGFRCR SERPLBLD CKD-EPI 2021: 33 ML/MIN/1.73M2
EGFRCR SERPLBLD CKD-EPI 2021: 44 ML/MIN/1.73M2
ERYTHROCYTE [DISTWIDTH] IN BLOOD BY AUTOMATED COUNT: 15 % (ref 10–15)
EST. AVERAGE GLUCOSE BLD GHB EST-MCNC: 143 MG/DL
FLUAV RNA SPEC QL NAA+PROBE: NEGATIVE
FLUBV RNA RESP QL NAA+PROBE: NEGATIVE
GLUCOSE SERPL-MCNC: 121 MG/DL (ref 70–99)
GLUCOSE SERPL-MCNC: 146 MG/DL (ref 70–99)
HBA1C MFR BLD: 6.6 %
HCO3 SERPL-SCNC: 28 MMOL/L (ref 22–29)
HCO3 SERPL-SCNC: 28 MMOL/L (ref 22–29)
HCT VFR BLD AUTO: 35.7 % (ref 35–47)
HGB BLD-MCNC: 10.2 G/DL (ref 11.7–15.7)
INTERPRETATION ECG - MUSE: NORMAL
MCH RBC QN AUTO: 26.4 PG (ref 26.5–33)
MCHC RBC AUTO-ENTMCNC: 28.6 G/DL (ref 31.5–36.5)
MCV RBC AUTO: 93 FL (ref 78–100)
P AXIS - MUSE: 56 DEGREES
PLATELET # BLD AUTO: 151 10E3/UL (ref 150–450)
POTASSIUM SERPL-SCNC: 4.4 MMOL/L (ref 3.4–5.3)
POTASSIUM SERPL-SCNC: 5 MMOL/L (ref 3.4–5.3)
PR INTERVAL - MUSE: 144 MS
PROT SERPL-MCNC: 7 G/DL (ref 6.4–8.3)
QRS DURATION - MUSE: 84 MS
QT - MUSE: 392 MS
QTC - MUSE: 463 MS
R AXIS - MUSE: 50 DEGREES
RBC # BLD AUTO: 3.86 10E6/UL (ref 3.8–5.2)
RSV RNA SPEC NAA+PROBE: NEGATIVE
SARS-COV-2 RNA RESP QL NAA+PROBE: NEGATIVE
SODIUM SERPL-SCNC: 144 MMOL/L (ref 135–145)
SODIUM SERPL-SCNC: 146 MMOL/L (ref 135–145)
SYSTOLIC BLOOD PRESSURE - MUSE: NORMAL MMHG
T AXIS - MUSE: 14 DEGREES
VENTRICULAR RATE- MUSE: 84 BPM
WBC # BLD AUTO: 11.8 10E3/UL (ref 4–11)

## 2024-12-24 PROCEDURE — 250N000013 HC RX MED GY IP 250 OP 250 PS 637: Performed by: PHYSICIAN ASSISTANT

## 2024-12-24 PROCEDURE — 99232 SBSQ HOSP IP/OBS MODERATE 35: CPT | Performed by: INTERNAL MEDICINE

## 2024-12-24 PROCEDURE — 84295 ASSAY OF SERUM SODIUM: CPT | Performed by: PHYSICIAN ASSISTANT

## 2024-12-24 PROCEDURE — 120N000001 HC R&B MED SURG/OB

## 2024-12-24 PROCEDURE — 85027 COMPLETE CBC AUTOMATED: CPT | Performed by: PHYSICIAN ASSISTANT

## 2024-12-24 PROCEDURE — 87637 SARSCOV2&INF A&B&RSV AMP PRB: CPT | Performed by: PHYSICIAN ASSISTANT

## 2024-12-24 PROCEDURE — 36415 COLL VENOUS BLD VENIPUNCTURE: CPT | Performed by: PHYSICIAN ASSISTANT

## 2024-12-24 PROCEDURE — 82565 ASSAY OF CREATININE: CPT | Performed by: PHYSICIAN ASSISTANT

## 2024-12-24 PROCEDURE — 36415 COLL VENOUS BLD VENIPUNCTURE: CPT | Performed by: INTERNAL MEDICINE

## 2024-12-24 PROCEDURE — 999N000128 HC STATISTIC PERIPHERAL IV START W/O US GUIDANCE

## 2024-12-24 PROCEDURE — 258N000003 HC RX IP 258 OP 636: Performed by: INTERNAL MEDICINE

## 2024-12-24 PROCEDURE — 250N000011 HC RX IP 250 OP 636: Performed by: PHYSICIAN ASSISTANT

## 2024-12-24 PROCEDURE — 80053 COMPREHEN METABOLIC PANEL: CPT | Performed by: PHYSICIAN ASSISTANT

## 2024-12-24 PROCEDURE — 258N000003 HC RX IP 258 OP 636: Performed by: PHYSICIAN ASSISTANT

## 2024-12-24 PROCEDURE — 80048 BASIC METABOLIC PNL TOTAL CA: CPT | Performed by: INTERNAL MEDICINE

## 2024-12-24 RX ORDER — DEXTROSE MONOHYDRATE AND SODIUM CHLORIDE 5; .45 G/100ML; G/100ML
INJECTION, SOLUTION INTRAVENOUS CONTINUOUS
Status: ACTIVE | OUTPATIENT
Start: 2024-12-24 | End: 2024-12-24

## 2024-12-24 RX ORDER — NALOXONE HYDROCHLORIDE 0.4 MG/ML
0.4 INJECTION, SOLUTION INTRAMUSCULAR; INTRAVENOUS; SUBCUTANEOUS
Status: DISCONTINUED | OUTPATIENT
Start: 2024-12-24 | End: 2024-12-26 | Stop reason: HOSPADM

## 2024-12-24 RX ORDER — NALOXONE HYDROCHLORIDE 0.4 MG/ML
0.2 INJECTION, SOLUTION INTRAMUSCULAR; INTRAVENOUS; SUBCUTANEOUS
Status: DISCONTINUED | OUTPATIENT
Start: 2024-12-24 | End: 2024-12-26 | Stop reason: HOSPADM

## 2024-12-24 RX ORDER — QUETIAPINE FUMARATE 25 MG/1
25 TABLET, FILM COATED ORAL EVERY 8 HOURS PRN
Status: DISCONTINUED | OUTPATIENT
Start: 2024-12-24 | End: 2024-12-26 | Stop reason: HOSPADM

## 2024-12-24 RX ORDER — DEXTROSE MONOHYDRATE AND SODIUM CHLORIDE 5; .45 G/100ML; G/100ML
INJECTION, SOLUTION INTRAVENOUS CONTINUOUS
Status: DISCONTINUED | OUTPATIENT
Start: 2024-12-24 | End: 2024-12-24

## 2024-12-24 RX ADMIN — SENNOSIDES AND DOCUSATE SODIUM 1 TABLET: 50; 8.6 TABLET ORAL at 09:02

## 2024-12-24 RX ADMIN — DEXTROSE AND SODIUM CHLORIDE: 5; 450 INJECTION, SOLUTION INTRAVENOUS at 17:33

## 2024-12-24 RX ADMIN — ENOXAPARIN SODIUM 40 MG: 40 INJECTION SUBCUTANEOUS at 12:56

## 2024-12-24 RX ADMIN — SODIUM CHLORIDE, PRESERVATIVE FREE: 5 INJECTION INTRAVENOUS at 00:21

## 2024-12-24 RX ADMIN — PANTOPRAZOLE SODIUM 40 MG: 40 TABLET, DELAYED RELEASE ORAL at 09:02

## 2024-12-24 RX ADMIN — DULOXETINE HYDROCHLORIDE 20 MG: 20 CAPSULE, DELAYED RELEASE ORAL at 09:02

## 2024-12-24 RX ADMIN — DICLOFENAC 2 G: 10 GEL TOPICAL at 20:52

## 2024-12-24 RX ADMIN — FERROUS SULFATE TAB 325 MG (65 MG ELEMENTAL FE) 325 MG: 325 (65 FE) TAB at 09:02

## 2024-12-24 RX ADMIN — SENNOSIDES AND DOCUSATE SODIUM 2 TABLET: 50; 8.6 TABLET ORAL at 01:57

## 2024-12-24 RX ADMIN — ATORVASTATIN CALCIUM 20 MG: 20 TABLET, FILM COATED ORAL at 21:44

## 2024-12-24 RX ADMIN — ACETAMINOPHEN 650 MG: 325 TABLET, FILM COATED ORAL at 09:01

## 2024-12-24 RX ADMIN — DICLOFENAC 2 G: 10 GEL TOPICAL at 02:02

## 2024-12-24 RX ADMIN — DEXTROSE AND SODIUM CHLORIDE: 5; 450 INJECTION, SOLUTION INTRAVENOUS at 12:56

## 2024-12-24 RX ADMIN — ATORVASTATIN CALCIUM 20 MG: 20 TABLET, FILM COATED ORAL at 01:58

## 2024-12-24 RX ADMIN — ACETAMINOPHEN 650 MG: 325 TABLET, FILM COATED ORAL at 01:57

## 2024-12-24 RX ADMIN — ACETAMINOPHEN 650 MG: 325 TABLET, FILM COATED ORAL at 21:44

## 2024-12-24 RX ADMIN — METOPROLOL SUCCINATE 37.5 MG: 25 TABLET, EXTENDED RELEASE ORAL at 09:01

## 2024-12-24 RX ADMIN — ENOXAPARIN SODIUM 40 MG: 40 INJECTION SUBCUTANEOUS at 01:58

## 2024-12-24 RX ADMIN — DICLOFENAC 2 G: 10 GEL TOPICAL at 09:02

## 2024-12-24 RX ADMIN — ALLOPURINOL 100 MG: 100 TABLET ORAL at 09:02

## 2024-12-24 RX ADMIN — CEFTRIAXONE SODIUM 1 G: 1 INJECTION, POWDER, FOR SOLUTION INTRAMUSCULAR; INTRAVENOUS at 20:41

## 2024-12-24 RX ADMIN — SENNOSIDES AND DOCUSATE SODIUM 1 TABLET: 50; 8.6 TABLET ORAL at 21:44

## 2024-12-24 RX ADMIN — PAROXETINE 40 MG: 40 TABLET, FILM COATED ORAL at 12:55

## 2024-12-24 RX ADMIN — ACETAMINOPHEN 650 MG: 325 TABLET, FILM COATED ORAL at 16:05

## 2024-12-24 ASSESSMENT — ACTIVITIES OF DAILY LIVING (ADL)
ADLS_ACUITY_SCORE: 87
ADLS_ACUITY_SCORE: 57
ADLS_ACUITY_SCORE: 85
ADLS_ACUITY_SCORE: 87
ADLS_ACUITY_SCORE: 85
ADLS_ACUITY_SCORE: 87
ADLS_ACUITY_SCORE: 57
ADLS_ACUITY_SCORE: 57
ADLS_ACUITY_SCORE: 87
ADLS_ACUITY_SCORE: 87
ADLS_ACUITY_SCORE: 57
ADLS_ACUITY_SCORE: 87
ADLS_ACUITY_SCORE: 73
ADLS_ACUITY_SCORE: 57
ADLS_ACUITY_SCORE: 87
ADLS_ACUITY_SCORE: 57
ADLS_ACUITY_SCORE: 85
ADLS_ACUITY_SCORE: 85
ADLS_ACUITY_SCORE: 87
DEPENDENT_IADLS:: CLEANING;COOKING;LAUNDRY;SHOPPING;MEAL PREPARATION;MEDICATION MANAGEMENT;MONEY MANAGEMENT;TRANSPORTATION
ADLS_ACUITY_SCORE: 87

## 2024-12-24 NOTE — H&P
[facility]    History and Physical - Hospitalist Service       Date of Admission:  12/23/2024    Assessment & Plan   Josiane Dasilva is a 63 year old female nursing home resident with guardian (sister) with past medical history significant for dementia, history of alcohol abuse, hypertension, morbid obesity, generalized anxiety disorder, major depressive disorder, prediabetes with peripheral neuropathy among others who presented from her nursing home with altered mental status and was admitted for encephalopathy likely due to suspected UTI, acute kidney injury, and dehydration.    Encephalopathy likely related to UTI, dehydration, LO  Suspect Urinary tract infection  Leukocytosis  Presented from nursing home with altered mental status. EMS was called after patient was confused after receiving morning pills and they gave Narcan concerned she may have been given opiates.  Mental status improved and she was more alert briefly after getting Narcan, she is brought to the emergency department given more Narcan with brief improvement as well.  She is overall quite drowsy and able to respond to some basic questions.  Hemodynamically stable in the ED.  Head CT negative for acute pathology.  Labs revealed Cr 1.9 up grom baseline 0.8-0.9 range (as high as 1.15), lytes stable. LA WNL. WBC 13.8. Hgb baseline. Ammonia WNL. EtOH neg. UA catheterized, neg nitrite, 20 WBC, mod LE, 22 hyaline casts. UC sent. BCx drawn.  CT of abdomen and pelvis, noncontrast due to some abdominal pain on exam which is unrevealing and no acute abnormalities. She was given 1 L normal saline bolus as well as 2 g of IV ceftriaxone for possible urinary tract infection.  Suspect altered mental status is in the setting of possible UTI, LO with dehydration.  -Inpatient status  -Follow up blood and urine cultures  -IV Ceftriaxone  -Neurochecks every 4 hours  -Hold PTA diuretics  -Check COVID/Flu/RSV  -Supportive care, pain  control  -Follow basic labs  -Monitor closely.    Acute kidney injury  Baseline Cr 0.8-1, though last noted 2/2024 at 1.15. On admission, 1.9.  Suspect patient is dry, takes diuretics at home as well, torsemide 20 mg daily and ACE inhibitor.  In the setting of possible urinary tract infection.  Given 1 L of normal saline in the ED.  -Avoid nephrotoxins - contrast, NSAIDs  -Renally dose medications as able/needed  -Hold PTA ACE inhibitor until renal function improves  -Give another 1 L over 10 hours and reassess on 12/24  -Monitor closely    Dementia  Baseline dementia and forgetful.  Lives in nursing home as noted and has a guardian.  -Reorient often    Chronic debility at baseline  Nursing home resident  History of alcohol abuse, falls previously.  Nursing home resident at [facility].  Has a guardian, her sister who makes her decisions.  History of falls in the past.  Unclear of any recent falls.    -SW/CC consultation or dispo planning  -PT not ordered due to baseline at nursing home     Chronic lower extremity bilateral edema along with venous stasis changes  -Hold PTA torsemide 20 mg daily  -Monitor lower extremity edema  -Elevate legs    Pulmonary nodule, incidental finding  There is loss of detail given motion artifact in the lung bases, however there is a suggestion of a 5 x 4.6 mm noncalcified indeterminate pulmonary nodule in the right middle lobe which was not seen on CT 03/11/2019  -Nonemergent short-term follow-up CT of the entire chest is recommended for further evaluation.  -Follow-up as outpatient     Hypertension  - Continue PTA metoprolol, lisinopril with hold parameters.  - continue PTA Lasix 40 mg twice daily    Benign essential hypertension  PTA regimen: Amlodipine 2.5 mg daily, lisinopril 10 mg daily, metoprolol succinate 37.5 mg daily  - Continue PTA beta-blocker with hold parameters  - Hold additional PTA antihypertensives in the setting of soft blood pressures  - PRN IV  hydralazine available for SBP >180  - Monitor BP trend and need for medication adjustments    Chronic stable diagnoses and other pertinent medical history: Appropriate PTA medications will be resumed.     History of right ankle hardware infection s/p incision and debridement, wound VAC placed 7/2022, noted.    Prediabetes: Hold PTA metformin, low-dose SSI available    Hyperlipidemia: Continue PTA atorvastatin 20 mg nightly    Chronic anemia: Baseline hemoglobin between 9-10, at baseline on admission.  Continue PTA iron tabs every other day, monitor PRN    Generalized anxiety disorder  Major depressive disorder  -Continue PTA Cymbalta    History of recurrent right wrist gout: No current signs of gout flare.  Has seen rheumatology and orthopedics in the past. Continue PTA allopurinol 100 mg daily    GERD: Continue PTA omeprazole    History of chronic neuropathy: HOLD PTA gabapentin, until mental status and acute kidney injury improves, then resume as able    Dementia: Baseline poor historian, dementia, lives in nursing home. Has a guardian, sister.     Obesity with a BMI greater than 30          Diet:  CLD ADAT to Regular  DVT Prophylaxis: Enoxaparin (Lovenox) SQ  Russell Catheter: Not present  Lines: None     Cardiac Monitoring: None  Code Status:  Full code per POLST    Clinically Significant Risk Factors Present on Admission                   # Hypertension: Noted on problem list      # Anemia: based on hgb <11                  Disposition Plan     Medically Ready for Discharge: Anticipated in 2-4 Days         The patient's care was discussed with the Attending Physician, Dr. Cuevas, Patient, and ED Physician .    Rocio Joe PA-C  Hospitalist Service  [facility]  Securely message with [employer] (more info)  Text page via [employer] Paging/Directory     ______________________________________________________________________    Chief Complaint   Altered mental status    History is obtained  from the electronic health record, and emergency department physician    History of Present Illness   Josiane Dasilva is a 63 year old female with past medical history significant for dementia, history of alcohol abuse, hypertension, morbid obesity, generalized anxiety disorder, major depressive disorder, diabetes with peripheral neuropathy among others who presented from her nursing home with altered mental status after receiving her morning pills.  EMS was called after patient was confused after receiving morning pills and they gave Narcan concerned she may have been given opiates.  Mental status improved and she was more alert briefly after getting Narcan, she is brought to the emergency department given more Narcan with brief improvement as well.  She is overall quite drowsy and able to respond to some basic questions.  Hemodynamically stable in the ED.  Head CT negative for acute pathology.  Labs revealed Cr 1.9 up grom baseline 0.8-0.9 range (as high as 1.15), lytes stable. LA WNL. WBC 13.8. Hgb baseline. Ammonia WNL. EtOH neg. UA catheterized, neg nitrite, 20 WBC, mod LE, 22 hyaline casts. UC sent. BCx drawn.  CT of abdomen and pelvis, noncontrast due to some abdominal pain on exam which is unrevealing and no acute abnormalities.  He does note an incidental finding of a pulmonary nodule that could be followed up with a nonurgent CT scan.  She is given 1 L normal saline bolus as well as 2 g of IV ceftriaxone for possible urinary tract infection.      Past Medical History    Past Medical History:   Diagnosis Date    Alcoholism /alcohol abuse     Benign essential hypertension     RICO (generalized anxiety disorder)     History of peptic ulcer disease     Impaired fasting glucose     MDD (major depressive disorder)     Morbid obesity (H)     Peripheral neuropathy     Poor short term memory        Past Surgical History   Past Surgical History:   Procedure Laterality Date    APPENDECTOMY      COLONOSCOPY  08/13/2014     Procedure: COMBINED COLONOSCOPY, SINGLE BIOPSY/POLYPECTOMY BY BIOPSY;  Surgeon: Mike Mancuso MD;  Location:  GI    CYSTOSCOPY N/A 03/14/2019    Procedure: Cystoscopy;  Surgeon: Mary Ash MD;  Location: [facility] OR    ESOPHAGOSCOPY, GASTROSCOPY, DUODENOSCOPY (EGD), COMBINED  08/12/2014    Procedure: COMBINED ESOPHAGOSCOPY, GASTROSCOPY, DUODENOSCOPY (EGD), BIOPSY SINGLE OR MULTIPLE;  Surgeon: Mike Mancuso MD;  Location:  GI    INCISION AND DRAINAGE LOWER EXTREMITY, COMBINED Right 7/12/2022    Procedure: irrigation and debridement right ankle;  Surgeon: Titus Oscar MD;  Location:  OR    LAPAROSCOPIC HYSTERECTOMY TOTAL, BILATERAL SALPINGO-OOPHORECTOMY, NODE DISSECTION, COMBINED N/A 03/14/2019    Procedure: Laparoscopic Total Hysterectomy, Bilateral Salpingo-Oophorectomy, and Repair of Vaginal Laceration;  Surgeon: Mary Ash MD;  Location: [facility] OR    OPEN REDUCTION INTERNAL FIXATION ANKLE Left 2014    OTHER SURGICAL HISTORY      [place]th I as teenager secondary to ulcer    TUBAL LIGATION         Prior to Admission Medications   Prior to Admission Medications   Prescriptions Last Dose Informant Patient Reported? Taking?   DULoxetine (CYMBALTA) 20 MG capsule 12/23/2024 Morning  Yes Yes   Sig: Take 20 mg by mouth daily.   GAVILAX 17 GM/SCOOP powder  Nursing Home No Yes   Sig: TAKE 17G (MEASURE WITH THE MARKINGS INSIDE CAP) BY MOUTH EVERY 12 HOURS AS NEEDED FOR CONSTIPATION *1 TOTAL FILL*   Lidocaine (LIDOCARE) 4 % Patch 12/23/2024 Morning  Yes Yes   Sig: Place 1 patch onto the skin every 24 hours. To prevent lidocaine toxicity, patient should be patch free for 12 hrs daily.  Apply to right shoulder topically one time a day for pain.   Menthol, Topical Analgesic, (BIOFREEZE PROFESSIONAL) 5 % GEL 12/23/2024 Morning  Yes Yes   Sig: Externally apply topically 2 times daily as needed. Apply to left hip   PARoxetine (PAXIL) 40 MG tablet 12/23/2024 Morning  Yes Yes    Sig: Take 40 mg by mouth every morning.   VENTOLIN  (90 Base) MCG/ACT inhaler   Yes Yes   Sig: Inhale 2 puffs into the lungs every 2 hours as needed.   VITAMIN D3 50 MCG (2000 UT) tablet 12/23/2024 Morning Nursing Home No Yes   Sig: TAKE 1 TABLET BY MOUTH ONCE DAILY *1 TOTAL FILL*   acetaminophen (TYLENOL) 325 MG tablet   Yes Yes   Sig: Take 650 mg by mouth 2 times daily as needed for mild pain.   acetaminophen (TYLENOL) 650 MG CR tablet 12/23/2024 Noon  Yes Yes   Sig: Take 650 mg by mouth 3 times daily.   allopurinol (ZYLOPRIM) 100 MG tablet 12/22/2024  Yes Yes   Sig: Take 100 mg by mouth daily.   amLODIPine (NORVASC) 2.5 MG tablet 12/23/2024 Morning  Yes Yes   Sig: Take 2.5 mg by mouth daily.   atorvastatin (LIPITOR) 20 MG tablet 12/22/2024 Evening  Yes Yes   Sig: Take 20 mg by mouth at bedtime.   camphor-menthol (DERMASARRA) 0.5-0.5 % external lotion   Yes Yes   Sig: Apply topically daily as needed for skin care.   diclofenac (VOLTAREN) 1 % topical gel 12/23/2024 Morning  Yes Yes   Sig: Apply topically 2 times daily. Apply to bilateral knees   ferrous sulfate (FE TABS) 325 (65 Fe) MG EC tablet 12/22/2024 Morning  Yes Yes   Sig: Take 325 mg by mouth every other day.   gabapentin (NEURONTIN) 100 MG capsule 12/23/2024 Morning  Yes Yes   Sig: Take 100 mg by mouth 2 times daily.   gabapentin (NEURONTIN) 300 MG capsule 12/22/2024 Evening Nursing Home No Yes   Sig: Take 1 capsule (300 mg) by mouth At Bedtime   lisinopril (ZESTRIL) 10 MG tablet 12/23/2024 Morning  Yes Yes   Sig: Take 10 mg by mouth daily.   melatonin 5 MG tablet 12/22/2024 Evening  Yes Yes   Sig: Take 5 mg by mouth at bedtime. Give one tablet by mouth at bedtime for insomnia.   menthol-zinc oxide (CALMOSEPTINE) 0.44-20.6 % OINT ointment   Yes Yes   Sig: Apply topically 3 times daily as needed for skin protection. Apply to buttocks topically as needed for excoriation every shift with every jenny-cares until area resolved.   menthol-zinc oxide  (CALMOSEPTINE) 0.44-20.6 % OINT ointment 12/23/2024 Morning  Yes Yes   Sig: Apply topically 2 times daily. Apply to buttocks with jenny-cares   metFORMIN (GLUCOPHAGE) 500 MG tablet 12/23/2024 Morning  Yes Yes   Sig: Take 500 mg by mouth 2 times daily (with meals). Give one tablet by mouth two time a day for prediabetes.   metoprolol succinate ER (TOPROL XL) 25 MG 24 hr tablet 12/23/2024 Morning  Yes Yes   Sig: Take 37.5 mg by mouth daily.   omega 3 1000 MG CAPS 12/23/2024 Morning  Yes Yes   Sig: Take 1,000 mg by mouth 2 times daily.   omeprazole (PRILOSEC) 20 MG DR capsule 12/23/2024 Morning  Yes Yes   Sig: Take 20 mg by mouth daily.   ondansetron (ZOFRAN) 4 MG tablet   Yes Yes   Sig: Take 4 mg by mouth 2 times daily as needed for nausea.   predniSONE (DELTASONE) 10 MG tablet Past Month  No Yes   Sig: Prednisone course for acute gout flare only. 30 mg x 1-2 days, then 20 mg x 1-2 days, then 10 mg x 2-3 days then stop.   Patient taking differently: Prednisone course for acute gout flare only. 30 mg x 1-2 days, then 20 mg x 1-2 days, then 10 mg x 2-3 days then stop.    PRN only for gout flare   simethicone (MYLICON) 80 MG chewable tablet   No Yes   Sig: Take 1 tablet (80 mg) by mouth every 6 hours as needed for cramping   torsemide (DEMADEX) 20 MG tablet 12/23/2024 Morning  Yes Yes   Sig: Take 20 mg by mouth daily.   traZODone (DESYREL) 50 MG tablet 12/22/2024 Evening  Yes Yes   Sig: Take 50 mg by mouth at bedtime.      Facility-Administered Medications: None        Review of Systems    Review of systems not obtained due to patient factors - confusion    Allergies   Allergies   Allergen Reactions    Aspirin      Stomach irritation (hx of PUD)  Other reaction(s): GI bleeding  Other reaction(s): *Unknown  Stomach irritation (hx of PUD)  Stomach irritation (hx of PUD)    Nsaids      Stomach irritation (Hx of PUD)  Other reaction(s): *Unknown        Physical Exam   Vital Signs: Temp: 97.6  F (36.4  C) Temp src: Oral BP:  104/75 Pulse: 85   Resp: 20 SpO2: 97 % O2 Device: Nasal cannula Oxygen Delivery: 2 LPM  Weight: 0 lbs 0 oz    Physical Exam    General: lethargic chronically ill appearing obese woman. No acute distress. Arouses to a few questions but only answers in groaning, very difficult to discern any information.  HEENT: Normocephalic, atraumatic. Extraocular movements intact.   Respiratory: Clear to auscultation bilaterally, no rales, wheezing, or rhonchi.  Cardiovascular: Regular rate and rhythm, +S1 and S2, no murmur auscultated.    Gastrointestinal: Soft, some periumbilical tenderness on assessment, obese non-distended. Bowel sounds present. No guarding or rigidity.  Skin: BLE very dry and unkempt with much dry skin flaking off  Musculoskeletal: No joint swelling, erythema or tenderness. Moves all extremities equally.  Neurologic: AAO x1      Medical Decision Making       >75 MINUTES SPENT BY ME on the date of service doing chart review, history, exam, documentation & further activities per the note.      Data     I have personally reviewed the following data over the past 24 hrs:    13.8 (H)  \   10.9 (L)   / 175     145 105 28.0 (H) /  137 (H)   4.7 30 (H) 1.90 (H) \     ALT: 14 AST: 13 AP: 170 (H) TBILI: 0.3   ALB: 3.7 TOT PROTEIN: 7.5 LIPASE: N/A     Procal: N/A CRP: N/A Lactic Acid: 0.7         Imaging results reviewed over the past 24 hrs:   Recent Results (from the past 24 hours)   XR Chest Port 1 View    Narrative    EXAM: XR CHEST PORT 1 VIEW  LOCATION: [facility]  DATE: 12/23/2024    INDICATION: sob  COMPARISON: 02/23/2024      Impression    IMPRESSION: Stable chest with no acute cardiopulmonary or musculoskeletal abnormalities seen to explain patient's shortness of breath clinically.   CT Head w/o Contrast    Narrative    EXAM: CT HEAD W/O CONTRAST  LOCATION: [facility]  DATE: 12/23/2024    INDICATION: ams  COMPARISON: None.  TECHNIQUE: Routine CT Head without  IV contrast. Multiplanar reformats. Dose reduction techniques were used.    FINDINGS:  INTRACRANIAL CONTENTS: No evidence of acute intracranial hemorrhage or mass effect. Scattered foci of decreased attenuation within the cerebral hemispheric white matter which are nonspecific, though most commonly ascribed to chronic small ischemic   disease. The ventricles and sulci are normal for age. Normal gray-white matter differentiation. The basilar cisterns are patent.    VISUALIZED ORBITS/SINUSES/MASTOIDS: The globes are unremarkable. The partially imaged paranasal sinuses, mastoid air cells and middle ear cavities are unremarkable.     BONES/SOFT TISSUES: The visualized skull base and calvarium are unremarkable.      Impression    IMPRESSION:    1.  No evidence of acute intracranial hemorrhage or mass effect.   CT Abdomen Pelvis w/o Contrast    Narrative    EXAM: CT ABDOMEN PELVIS W/O CONTRAST  LOCATION: [facility]  DATE: 12/23/2024    INDICATION: Confused with umbilical abdominal pain.  COMPARISON: 03/11/2019.  TECHNIQUE: CT scan of the abdomen and pelvis was performed without IV contrast. Multiplanar reformats were obtained. Dose reduction techniques were used.  CONTRAST: None.    FINDINGS:   LOWER CHEST: Minimal vascular calcification to include the coronary arteries. There is a suggestion of a 4.6 x 5.0 mm pulmonary nodule in the right middle lobe although detail is limited given motion artifact. No pulmonary nodule is seen in this region   on correlated of CT 03/11/2019. I will recommend a nonemergent CT examination of the chest without IV contrast. Further evaluation of this possible pulmonary nodule in the right middle lobe and to assess for any new pulmonary nodules in the upper/mid   portions of the lungs.    HEPATOBILIARY: The gallbladder is mildly prominent in size, but otherwise normal with no gallbladder wall thickening, cholelithiasis, or pericholecystic fluid identified. The liver  parenchyma is intact. There is no bile duct dilatation.    PANCREAS: Normal.    SPLEEN: Accessory splenule.    ADRENAL GLANDS: Normal.    KIDNEYS/BLADDER: There is benign cysts seen in the left kidney with no follow-up recommended. The urinary tract is otherwise normal.    BOWEL: Mild diverticulosis with no evidence for diverticulitis. The appendix is not seen with any certainty, but there is no evidence for pericecal inflammation to suggest acute appendicitis. Prior postoperative changes of the stomach with no   complications identified.    LYMPH NODES: Normal.    VASCULATURE: Mild calcification with no aneurysm.    PELVIC ORGANS: Surgically absent.    MUSCULOSKELETAL: There is ectasia seen involving the anterior pelvic/abdominal wall anteriorly, but no evidence for a hernia. Moderate degenerative changes most pronounced in the lower lumbar spine facet joints.      Impression    IMPRESSION:   1.  No acute abnormalities. Nothing seen to explain patient's periumbilical pain.    2.  There is loss of detail given motion artifact in the lung bases, however there is a suggestion of a 5 x 4.6 mm noncalcified indeterminate pulmonary nodule in the right middle lobe which was not seen on CT 03/11/2019, thus a nonemergent short-term   follow-up CT of the entire chest is recommended for further evaluation.    3.  Benign cysts in the left kidney with no follow-up recommended.    4.  Mild diverticulosis with no evidence for diverticulitis.    5.  Ectasia seen involving the anterior pelvic/abdominal wall anteriorly with no associated hernia.    6.  Degenerative changes most marked lower lumbar spine facet joints.

## 2024-12-24 NOTE — CONSULTS
Care Management Initial Consult    General Information  Assessment completed with: Care Team Member, AMMON Wade  Type of CM/SW Visit: Initial Assessment    Primary Care Provider verified and updated as needed: Yes   Readmission within the last 30 days: no previous admission in last 30 days      Reason for Consult: discharge planning  Advance Care Planning: Advance Care Planning Reviewed: present on chart          Communication Assessment  Patient's communication style: spoken language (English or Bilingual)             Cognitive  Cognitive/Neuro/Behavioral: .WDL except  Level of Consciousness: alert, confused  Arousal Level: opens eyes spontaneously  Orientation: disoriented to, situation, time  Mood/Behavior: calm, cooperative  Best Language: 0 - No aphasia  Speech: hoarse    Living Environment:   People in home: facility resident     Current living Arrangements: assisted living  Name of Facility: [facility]   Able to return to prior arrangements: yes       Family/Social Support:  Care provided by: other (see comments) ([facility] staff)  Provides care for: no one, unable/limited ability to care for self  Marital Status: Single  Support system: Sibling(s), Facility resident(s)/Staff          Description of Support System: Involved, Supportive    Support Assessment: Adequate family and caregiver support    Current Resources:   Patient receiving home care services: Yes  Skilled Home Care Services: Skilled Nursing     Community Resources: County Programs, County Worker, Financial/Insurance, Transportation Services  Equipment currently used at home: lift device, grab bar, toilet, grab bar, tub/shower, shower chair, wheelchair, manual  Supplies currently used at home: Incontinence Supplies, Diabetic Supplies, Nutritional Supplements, Enteral Nutrition & Supplies, Wound Care Supplies    Employment/Financial:  Employment Status: disabled        Financial Concerns: none   Referral to Financial Worker: No       Does the  "patient's insurance plan have a 3 day qualifying hospital stay waiver?  No    Lifestyle & Psychosocial Needs:  Social Drivers of Health     Food Insecurity: Not on file   Depression: At risk (4/29/2022)    PHQ-2     PHQ-2 Score: 6   Housing Stability: Not on file   Tobacco Use: Medium Risk (10/24/2024)    Patient History     Smoking Tobacco Use: Former     Smokeless Tobacco Use: Never     Passive Exposure: Not on file   Financial Resource Strain: Not on file   Alcohol Use: Not on file   Transportation Needs: Not on file   Physical Activity: Not on file   Interpersonal Safety: Not on file   Stress: Not on file   Social Connections: Not on file   Health Literacy: Not on file       Functional Status:  Prior to admission patient needed assistance:   Dependent ADLs:: Wheelchair-independent, Transfers  Dependent IADLs:: Cleaning, Cooking, Laundry, Shopping, Meal Preparation, Medication Management, Money Management, Transportation  Assesssment of Functional Status: At functional baseline    Mental Health Status:          Chemical Dependency Status:                Values/Beliefs:  Spiritual, Cultural Beliefs, Tenriism Practices, Values that affect care: no               Discussed  Partnership in Safe Discharge Planning  document with patient/family: No    Additional Information:  CM team called [facility], phone [phone]  and Spoke with Sheri who provided the following information:     In what kind of facility does pt reside?  Facility Type: Nursing Home (skilled nursing facility) - \"SNF discharge\"     Name of building/unit: NF Any steps to get in (#)? 0                2.   Best number to reach facility nursing? Phone [phone]  Fax:  [phone]     3.  What is the preferred return time for the resident?  Not after 8pm or on a Sunday without meds. Prefer mid morning or early afternoon.   Do you know how they typically transport? Wheelchair    4.   Does facility manage medications?  Yes  If yes, " "contracted pharmacy? Name:  [facility]          If new Rx meds at discharge, fill at hospital pharmacy or contracted pharmacy?   Put in orders, ensure signed by MD, narcotics on hard script     5.   What is pt's baseline cognition and mobility? 2 person assist, E-Z stand or olman if needed. Pt can wheel herself around. Pt is usually A&Ox2. Pt can only remember what she just said, pt remembers her family members. Pt can tell you what she wants and what she likes.  Pt does not often use call buttons, she yells when she needs something.         What level of cognition/mobility is required for safe return? Baseline is required for her to return.      6.  Is resident enrolled in any \"services?\"  Yes, If so, what services:  Pt get meals set up, housekeeping, laundry, medications         7.  Are \"skilled home health\" or \"on-site outpatient therapy services\" available there? Yes, name/agency, if        known: PT, OT, SLP    8.   Is the resident seen by PCP: on-site   Name: [facility]    9.   Does pt have any personal DME (describe)? Wheelchair, grab bars, olman lift, shower chair    DEWAYNE Nguyen  [facility]  Inpatient Care Management     Next Steps: Follow for discharge recommendations.     DEWAYNE Nguyen  [facility]  Inpatient Care Management      "

## 2024-12-24 NOTE — ED NOTES
[facility]  ED Nurse Handoff Report    ED Chief complaint: Altered Mental Status      ED Diagnosis:   Final diagnoses:   Altered mental status, unspecified altered mental status type   LO (acute kidney injury) (H)   Acute UTI       Code Status: Full Code    Allergies:   Allergies   Allergen Reactions    Aspirin      Stomach irritation (hx of PUD)  Other reaction(s): GI bleeding  Other reaction(s): *Unknown  Stomach irritation (hx of PUD)  Stomach irritation (hx of PUD)    Nsaids      Stomach irritation (Hx of PUD)  Other reaction(s): *Unknown       Patient Story: Pt BIB EMS. EMS reports staff at pt's NH called EMS stating that pt developed altered mental status after her morning meds. EMS reports pt was lethargic at scene with pinpoint pupils. EMS reports giving pt 1.6 mg Narcan total with immediate increased alertness. Pt arrives to the room awake and alert with nasal cannula O2 flowing.       Focused Assessment:  pt being worked up for infection more alert and arousalable to verbal stimuli     Treatments and/or interventions provided: blood work abx imaging   Patient's response to treatments and/or interventions: pain controlled pt more awake     To be done/followed up on inpatient unit:  follow up care     Does this patient have any cognitive concerns?: Alcohol/Drugs temporary cognitive concerns    Activity level - Baseline/Home:  [facility]  Activity Level - Current:   Unknown    Patient's Preferred language: English   Needed?: No    Isolation: None  Infection: Not Applicable  Patient tested for COVID 19 prior to admission: NO  Bariatric?: No    Vital Signs:   Vitals:    12/23/24 1552 12/23/24 1930   BP: 132/82 104/74   Pulse: 84 75   Resp: 20    Temp: 97.6  F (36.4  C)    TempSrc: Oral    SpO2: 99% 98%       Cardiac Rhythm:     Was the PSS-3 completed:   Yes  What interventions are required if any?               Family Comments: none present   OBS brochure/video discussed/provided to  patient/family: No              Name of person given brochure if not patient:               Relationship to patient: none present    For the majority of the shift this patient's behavior was Green.   Behavioral interventions performed were none .    ED NURSE PHONE NUMBER: ED RN

## 2024-12-24 NOTE — PLAN OF CARE
Goal Outcome Evaluation:      Plan of Care Reviewed With: patient        Admission    Patient arrives to room 617 via cart from ED.    Inpatient nursing criteria listed below were met:    Did you put disposition on whiteboard and in sticky note: Yes  Full skin assessment done (add LDA if skin issue present). Initials of 2nd RN :Yes TA  Isolation education started/completed NA  Patient allergies verified with patient: No, pt unable to answer  Fall Risk? (Care plan updated, Education given and documented) Yes  Primary Care Plan initiated: Yes  Home medications documented in belongings flowsheet: Yes  Patient belongings documented in belongings flowsheet: Yes  Reminder note (belongings/ medications) placed in discharge instructions:Yes  Admission profile/ required documentation complete: No, Pt unable to answer  If patient is a 72 hour hold/Commitment are belongings removed from room and locked up? NA

## 2024-12-24 NOTE — PLAN OF CARE
Goal Outcome Evaluation:      Plan of Care Reviewed With: caregiver    Overall Patient Progress: no changeOverall Patient Progress: no change    Outcome Evaluation: Pt is able to discharge back to [facility] when medically ready pending d/c reccomendations.

## 2024-12-24 NOTE — PLAN OF CARE
Goal Outcome Evaluation:       DATE & TIME: 12/24/375674-4336   Cognitive Concerns/ Orientation : A&O x 2, confused, hx of dementia,very loud  and  yells out at  times.  BEHAVIOR & AGGRESSION TOOL COLOR: Green  CIWA SCORE: N/A   ABNL VS/O2: VSS on RA  MOBILITY: heavy assist, LIFT  PAIN MANAGMENT: Denies pain, on scheduled Tylenol and Voltran gel applied on bilateral knee and lidocaine  patch on R shoulder removed this AM  DIET: Regular  BOWEL/BLADDER: Incontinent of B&B, purewick in place  ABNL LAB/BG: cr 1.69, Na 146, K 5.0, UA +ve, pending UC  DRAIN/DEVICES: PIVx2,one  infusing   TELEMETRY RHYTHM: NA  SKIN: unkept,Very dry, scaly,peeling, mario BLE, cool to touch on BUE and BLE, redness blanchable, some skin pigmentation on buttock and thigh  TESTS/PROCEDURES: NA  D/C DAY/GOALS/PLACE: Pending improvement  OTHER IMPORTANT INFO: lethargic this AM, Alert to self and talkative later on the day. Continues on IV abx.                    Patient was seen, and examined with resident.  History and physical discussed with Dr. Taylor and agree with assessment and plan.  Cellulitis, resoved with abx's. DJD. .  Please refer to resident note for details. Hand surgery referral. Complete abx's.

## 2024-12-24 NOTE — ED NOTES
Pt now more awake and somewhat restless in the bed after Narcan administration. Pt denies any opioid use or any opiates in her daily medications.

## 2024-12-24 NOTE — PROGRESS NOTES
RECEIVING UNIT ED HANDOFF REVIEW    ED Nurse Handoff Report was reviewed by: Oleksandr Garcia RN on December 23, 2024 at 11:13 PM

## 2024-12-24 NOTE — ED NOTES
Pt arousable to verbal stimuli able to voice she has some pain now in hands pt continues to be on 2 L NC desaturates to 80 percent on RA  CT staff taking pt to CT for abdominal CT

## 2024-12-24 NOTE — ED NOTES
Pt was awake and alert on arrival but became more drowsy during assessment, requiring verbal stimuli to awaken.

## 2024-12-24 NOTE — PHARMACY-ADMISSION MEDICATION HISTORY
Pharmacist Admission Medication History    Admission medication history is complete. The information provided in this note is only as accurate as the sources available at the time of the update.    Information Source(s): Facility (U/NH/) medication list/MAR via N/A --> From [facility]     Changes made to PTA medication list:  Added: amlodipine, Cymbalta, iron, lisinopril  Deleted: lovaza, Tylenol, BComplex, B12, diosimin, Lasix, Robaxin, Michael, nystatin, oxycodone, Protonix, polytrim  Changed: metoprolol strength changed, trazodone strength changed, frequency changed on Diclofenac, frequency changed from TID on 100mg gabapentin to BID, Zofran frequency changed    Allergies reviewed with patient and updates made in EHR: no    Medication History Completed By: Jacinda Huffman, PharmD 12/23/2024 8:33 PM    PTA Med List   Medication Sig Note Last Dose/Taking    acetaminophen (TYLENOL) 325 MG tablet Take 650 mg by mouth 2 times daily as needed for mild pain.  Taking As Needed    acetaminophen (TYLENOL) 650 MG CR tablet Take 650 mg by mouth 3 times daily.  12/23/2024 Noon    allopurinol (ZYLOPRIM) 100 MG tablet Take 100 mg by mouth daily.  12/22/2024    amLODIPine (NORVASC) 2.5 MG tablet Take 2.5 mg by mouth daily.  12/23/2024 Morning    atorvastatin (LIPITOR) 20 MG tablet Take 20 mg by mouth at bedtime.  12/22/2024 Evening    camphor-menthol (DERMASARRA) 0.5-0.5 % external lotion Apply topically daily as needed for skin care.  Taking As Needed    diclofenac (VOLTAREN) 1 % topical gel Apply topically 2 times daily. Apply to bilateral knees  12/23/2024 Morning    DULoxetine (CYMBALTA) 20 MG capsule Take 20 mg by mouth daily.  12/23/2024 Morning    ferrous sulfate (FE TABS) 325 (65 Fe) MG EC tablet Take 325 mg by mouth every other day.  12/22/2024 Morning    gabapentin (NEURONTIN) 100 MG capsule Take 100 mg by mouth 2 times daily.  12/23/2024 Morning    gabapentin (NEURONTIN) 300 MG capsule Take 1  capsule (300 mg) by mouth At Bedtime  12/22/2024 Evening    GAVILAX 17 GM/SCOOP powder TAKE 17G (MEASURE WITH THE MARKINGS INSIDE CAP) BY MOUTH EVERY 12 HOURS AS NEEDED FOR CONSTIPATION *1 TOTAL FILL*  Taking    Lidocaine (LIDOCARE) 4 % Patch Place 1 patch onto the skin every 24 hours. To prevent lidocaine toxicity, patient should be patch free for 12 hrs daily.  Apply to right shoulder topically one time a day for pain. 12/23/2024: Applied at 0800 12/23/2024 Morning    lisinopril (ZESTRIL) 10 MG tablet Take 10 mg by mouth daily.  12/23/2024 Morning    melatonin 5 MG tablet Take 5 mg by mouth at bedtime. Give one tablet by mouth at bedtime for insomnia.  12/22/2024 Evening    Menthol, Topical Analgesic, (BIOFREEZE PROFESSIONAL) 5 % GEL Externally apply topically 2 times daily as needed. Apply to left hip  12/23/2024 Morning    menthol-zinc oxide (CALMOSEPTINE) 0.44-20.6 % OINT ointment Apply topically 2 times daily. Apply to buttocks with jenny-cares  12/23/2024 Morning    menthol-zinc oxide (CALMOSEPTINE) 0.44-20.6 % OINT ointment Apply topically 3 times daily as needed for skin protection. Apply to buttocks topically as needed for excoriation every shift with every jenny-cares until area resolved.  Taking As Needed    metFORMIN (GLUCOPHAGE) 500 MG tablet Take 500 mg by mouth 2 times daily (with meals). Give one tablet by mouth two time a day for prediabetes.  12/23/2024 Morning    metoprolol succinate ER (TOPROL XL) 25 MG 24 hr tablet Take 37.5 mg by mouth daily.  12/23/2024 Morning    omega 3 1000 MG CAPS Take 1,000 mg by mouth 2 times daily.  12/23/2024 Morning    omeprazole (PRILOSEC) 20 MG DR capsule Take 20 mg by mouth daily.  12/23/2024 Morning    ondansetron (ZOFRAN) 4 MG tablet Take 4 mg by mouth 2 times daily as needed for nausea.  Taking As Needed    PARoxetine (PAXIL) 40 MG tablet Take 40 mg by mouth every morning. 12/23/2024: Patient increased from 20mg daily to 40mg daily 12/18/24 12/23/2024 Morning     predniSONE (DELTASONE) 10 MG tablet Prednisone course for acute gout flare only. 30 mg x 1-2 days, then 20 mg x 1-2 days, then 10 mg x 2-3 days then stop. (Patient taking differently: Prednisone course for acute gout flare only. 30 mg x 1-2 days, then 20 mg x 1-2 days, then 10 mg x 2-3 days then stop.    PRN only for gout flare)  Past Month    simethicone (MYLICON) 80 MG chewable tablet Take 1 tablet (80 mg) by mouth every 6 hours as needed for cramping  Taking As Needed    torsemide (DEMADEX) 20 MG tablet Take 20 mg by mouth daily.  12/23/2024 Morning    traZODone (DESYREL) 50 MG tablet Take 50 mg by mouth at bedtime.  12/22/2024 Evening    VENTOLIN  (90 Base) MCG/ACT inhaler Inhale 2 puffs into the lungs every 2 hours as needed.  Taking As Needed    VITAMIN D3 50 MCG (2000 UT) tablet TAKE 1 TABLET BY MOUTH ONCE DAILY *1 TOTAL FILL*  12/23/2024 Morning

## 2024-12-24 NOTE — PROGRESS NOTES
[facility]    Hospitalist Progress Note    Assessment & Plan   Date of Admission:  12/23/2024        Assessment & Plan  Josiane Dasilva is a 63 year old female nursing home resident with guardian (sister) with past medical history significant for dementia, history of alcohol abuse, hypertension, morbid obesity, generalized anxiety disorder, major depressive disorder, prediabetes with peripheral neuropathy among others who presented from her nursing home with altered mental status and was admitted for encephalopathy likely due to suspected UTI, acute kidney injury, and dehydration.     Encephalopathy likely related to UTI, dehydration, LO  Suspect Urinary tract infection  Leukocytosis  Presented from nursing home with altered mental status. EMS was called after patient was confused after receiving morning pills and they gave Narcan concerned she may have been given opiates.  Mental status improved and she was more alert briefly after getting Narcan, she is brought to the emergency department given more Narcan with brief improvement as well.  She is overall quite drowsy and able to respond to some basic questions.  Hemodynamically stable in the ED.  Head CT negative for acute pathology.  Labs revealed Cr 1.9 up grom baseline 0.8-0.9 range (as high as 1.15), lytes stable. LA WNL. WBC 13.8. Hgb baseline. Ammonia WNL. EtOH neg. UA catheterized, neg nitrite, 20 WBC, mod LE, 22 hyaline casts. UC sent. BCx drawn.  CT of abdomen and pelvis, noncontrast due to some abdominal pain on exam which is unrevealing and no acute abnormalities. She was given 1 L normal saline bolus as well as 2 g of IV ceftriaxone for possible urinary tract infection.  Suspect altered mental status is in the setting of possible UTI, LO with dehydration.  -COVID influenza RSV PCR negative.  -Urine culture growing E. coli.  Blood cultures no growth today  -Vital signs within normal limits.  Afebrile.  -Patient much more alert  today conversant no focal complaints.  -Encephalopathy much improved with improved renal function and treatment of UTI.  Creatinine down.  White blood cell count improved    Plan-  -Inpatient status  -Follow up blood and urine cultures  -Continue IV Ceftriaxone  -Neurochecks every 4 hours  -Hold PTA diuretics  -Check COVID/Flu/RSV  -Supportive care, pain control  -A.m. labs.  -Monitor closely.  -Holding Neurontin.    Mild hypernatremia-change fluids to D5 half-normal saline.  Follow-up BMP shows improved sodium.  A.m. BMP     Acute kidney injury  Baseline Cr 0.8-1, though last noted 2/2024 at 1.15. On admission, 1.9.  Suspect patient is dry, takes diuretics at home as well, torsemide 20 mg daily and ACE inhibitor.  In the setting of possible urinary tract infection.  Given 1 L of normal saline in the ED.  -C 1 L normal saline.  Sodium 1-1 46.  Down to 144 with initiating D5 half-normal saline  -Clinically improving.  Creatinine down to 1.3    Plan  -Avoid nephrotoxins - contrast, NSAIDs  -Renally dose medications as able/needed  -Hold PTA ACE inhibitor until renal function improves  -D5 half-normal saline  -Monitor closely  -Patient taking oral p.o. well     Dementia  Baseline dementia and forgetful.  Lives in nursing home as noted and has a guardian.  -Reorient often     Chronic debility at baseline  Nursing home resident  History of alcohol abuse, falls previously.  Nursing home resident at [facility].  Has a guardian, her sister who makes her decisions.  History of falls in the past.  Unclear of any recent falls.    -SW/CC consultation or dispo planning  -PT not ordered due to baseline at nursing home     Chronic lower extremity bilateral edema along with venous stasis changes  -Hold PTA torsemide 20 mg daily  -Monitor lower extremity edema  -Elevate legs     Pulmonary nodule, incidental finding  There is loss of detail given motion artifact in the lung bases, however there is a suggestion of a 5 x  4.6 mm noncalcified indeterminate pulmonary nodule in the right middle lobe which was not seen on CT 03/11/2019  -Nonemergent short-term follow-up CT of the entire chest is recommended for further evaluation.  -Follow-up as outpatient      Benign essential hypertension  PTA regimen: Amlodipine 2.5 mg daily, lisinopril 10 mg daily, metoprolol succinate 37.5 mg daily  Normotensive to slightly hypertensive this afternoon.    - Continue PTA beta-blocker with hold parameters  - Hold additional PTA antihypertensives in the setting of soft blood pressures and LO.  Holding Lasix and lisinopril  - PRN IV hydralazine available for SBP >180  - Monitor BP trend and need for medication adjustments     Chronic stable diagnoses and other pertinent medical history: Appropriate PTA medications will be resumed.      History of right ankle hardware infection s/p incision and debridement, wound VAC placed 7/2022, noted.     Prediabetes: Hold PTA metformin, low-dose SSI available     Hyperlipidemia: Continue PTA atorvastatin 20 mg nightly     Chronic anemia: Baseline hemoglobin between 9-10, at baseline on admission.  Continue PTA iron tabs every other day, monitor PRN     Generalized anxiety disorder  Major depressive disorder  -Continue PTA Cymbalta     History of recurrent right wrist gout: No current signs of gout flare.  Has seen rheumatology and orthopedics in the past. Continue PTA allopurinol 100 mg daily     GERD: Continue PTA omeprazole     History of chronic neuropathy: HOLD PTA gabapentin, until mental status and acute kidney injury improves, then resume as able     Dementia: Baseline poor historian, dementia, lives in nursing home. Has a guardian, sister.     Obesity with a BMI greater than 30           Diet:  CLD ADAT to Regular  DVT Prophylaxis: Enoxaparin (Lovenox) SQ  Russell Catheter: Not present  Lines: None     Cardiac Monitoring: None  Code Status:  Full code per POLST          Code Status: Full Code    Disposition:  Back to long-term [facility] facility  Medically Ready for Discharge: Anticipated in 2-4 Days pending resolution of her acute renal failure and clinical response to UTI    Clinically Significant Risk Factors Present on Admission         # Hypernatremia: Highest Na = 146 mmol/L in last 2 days, will monitor as appropriate  # Hyperchloremia: Highest Cl = 109 mmol/L in last 2 days, will monitor as appropriate      # Hypocalcemia: Lowest Ca = 8.6 mg/dL in last 2 days, will monitor and replace as appropriate         # Hypertension: Noted on problem list      # Anemia: based on hgb <11      # DMII: A1C = 6.6 % (Ref range: <5.7 %) within past 6 months             Moderate complex medical decision making.  Greater than 35 minutes spent on patient care including chart review, charting, exam, , care coordination with bedside nurse      Melvin De La Cruz MD, MD  Text Page  (7am to 6pm)  Interval History   Patient much more alert today.  Even since this early morning.  Patient seen this morning.  Taking p.o.  No focal complaints    -Data reviewed today: I reviewed all new labs and imaging results over the last 24 hours. I personally reviewed laboratory studies since admission  Physical Exam   Temp: 98  F (36.7  C) Temp src: Oral BP: 114/53 Pulse: 83   Resp: 18 SpO2: 96 % O2 Device: Nasal cannula Oxygen Delivery: 2 LPM  Vitals:    12/24/24 0013   Weight: 149 kg (328 lb 7.8 oz)     Vital Signs with Ranges  Temp:  [98  F (36.7  C)] 98  F (36.7  C)  Pulse:  [75-91] 83  Resp:  [18-20] 18  BP: (104-114)/(53-75) 114/53  SpO2:  [80 %-98 %] 96 %  I/O last 3 completed shifts:  In: 1310 [P.O.:240; I.V.:1070]  Out: -     Constitutional: In bed appears well.  No acute distress  Respiratory: Clear to auscultation bilaterally, breathing easily  Cardiovascular: Regular rate and rhythm no rubs gallops or murmurs appreciate  GI: Soft nontender nondistended  Skin/Integumen: No rash or edema  Neurologic she is alert conversant oriented to  self only.  Peers to be at her baseline mental status    Medications   Current Facility-Administered Medications   Medication Dose Route Frequency Provider Last Rate Last Admin    dextrose 5% and 0.45% NaCl infusion   Intravenous Continuous Melvin De La Cruz  mL/hr at 12/24/24 1256 New Bag at 12/24/24 1256     Current Facility-Administered Medications   Medication Dose Route Frequency Provider Last Rate Last Admin    acetaminophen (TYLENOL) tablet 650 mg  650 mg Oral TID Rocio Joe PA-C   650 mg at 12/24/24 1605    allopurinol (ZYLOPRIM) tablet 100 mg  100 mg Oral Daily Rocio Joe PA-C   100 mg at 12/24/24 0902    [Held by provider] amLODIPine (NORVASC) tablet 2.5 mg  2.5 mg Oral Daily Rocio Joe PA-C        atorvastatin (LIPITOR) tablet 20 mg  20 mg Oral At Bedtime Rocio Joe PA-C   20 mg at 12/24/24 0158    cefTRIAXone (ROCEPHIN) 1 g vial to attach to  mL bag for ADULTS or NS 50 mL bag for PEDS  1 g Intravenous Q24H Rocio Joe PA-C        diclofenac (VOLTAREN) 1 % topical gel 2 g  2 g Topical BID Rocio Joe PA-C   2 g at 12/24/24 0902    DULoxetine (CYMBALTA) DR capsule 20 mg  20 mg Oral Daily Rocio Joe PA-C   20 mg at 12/24/24 0902    enoxaparin ANTICOAGULANT (LOVENOX) injection 40 mg  40 mg Subcutaneous Q12H Rocio Joe PA-C   40 mg at 12/24/24 1256    ferrous sulfate (FEROSUL) tablet 325 mg  325 mg Oral Every Other Day Rocio Joe PA-C   325 mg at 12/24/24 0902    [Held by provider] gabapentin (NEURONTIN) capsule 100 mg  100 mg Oral BID Rocio Joe PA-C        [Held by provider] gabapentin (NEURONTIN) capsule 300 mg  300 mg Oral At Bedtime Rocio Joe PA-C        Lidocaine (LIDOCARE) 4 % Patch 1 patch  1 patch Transdermal Q24H Joe, Rocio Tori, PA-C        [Held by provider] lisinopril (ZESTRIL) tablet 10 mg  10 mg Oral Daily  Rocio Joe PA-C        metoprolol succinate ER (TOPROL-XL) 24 hr half-tab 37.5 mg  37.5 mg Oral Daily Rocio Joe PA-C   37.5 mg at 12/24/24 0901    pantoprazole (PROTONIX) EC tablet 40 mg  40 mg Oral Daily Rocio Joe PA-C   40 mg at 12/24/24 0902    PARoxetine (PAXIL) tablet 40 mg  40 mg Oral QAM Rocio Joe PA-C   40 mg at 12/24/24 1255    senna-docusate (SENOKOT-S/PERICOLACE) 8.6-50 MG per tablet 1 tablet  1 tablet Oral BID Rocio Joe PA-C   1 tablet at 12/24/24 0902    Or    senna-docusate (SENOKOT-S/PERICOLACE) 8.6-50 MG per tablet 2 tablet  2 tablet Oral BID Rocio Joe PA-C   2 tablet at 12/24/24 0157    sodium chloride (PF) 0.9% PF flush 3 mL  3 mL Intracatheter Q8H Rocio Joe PA-C   3 mL at 12/24/24 0023    [Held by provider] torsemide (DEMADEX) tablet 20 mg  20 mg Oral Daily Rocio Joe PA-C           Data   Recent Labs   Lab 12/24/24  1439 12/24/24  0716 12/24/24  0029 12/23/24  1653   WBC  --  11.8*  --  13.8*   HGB  --  10.2*  --  10.9*   MCV  --  93  --  91   PLT  --  151  --  175    146*  --  145   POTASSIUM 4.4 5.0  --  4.7   CHLORIDE 106 109*  --  105   CO2 28 28  --  30*   BUN 30.7* 32.5*  --  28.0*   CR 1.34* 1.69* 1.88* 1.90*   ANIONGAP 10 9  --  10   JESSICA 9.0 8.6*  --  9.3   * 121*  --  137*   ALBUMIN  --  3.5  --  3.7   PROTTOTAL  --  7.0  --  7.5   BILITOTAL  --  0.3  --  0.3   ALKPHOS  --  163*  --  170*   ALT  --  13  --  14   AST  --  11  --  13       Imaging:   Recent Results (from the past 24 hours)   XR Chest Port 1 View    Narrative    EXAM: XR CHEST PORT 1 VIEW  LOCATION: [facility]  DATE: 12/23/2024    INDICATION: sob  COMPARISON: 02/23/2024      Impression    IMPRESSION: Stable chest with no acute cardiopulmonary or musculoskeletal abnormalities seen to explain patient's shortness of breath clinically.   CT Head w/o Contrast     Narrative    EXAM: CT HEAD W/O CONTRAST  LOCATION: [facility]  DATE: 12/23/2024    INDICATION: ams  COMPARISON: None.  TECHNIQUE: Routine CT Head without IV contrast. Multiplanar reformats. Dose reduction techniques were used.    FINDINGS:  INTRACRANIAL CONTENTS: No evidence of acute intracranial hemorrhage or mass effect. Scattered foci of decreased attenuation within the cerebral hemispheric white matter which are nonspecific, though most commonly ascribed to chronic small ischemic   disease. The ventricles and sulci are normal for age. Normal gray-white matter differentiation. The basilar cisterns are patent.    VISUALIZED ORBITS/SINUSES/MASTOIDS: The globes are unremarkable. The partially imaged paranasal sinuses, mastoid air cells and middle ear cavities are unremarkable.     BONES/SOFT TISSUES: The visualized skull base and calvarium are unremarkable.      Impression    IMPRESSION:    1.  No evidence of acute intracranial hemorrhage or mass effect.   CT Abdomen Pelvis w/o Contrast    Narrative    EXAM: CT ABDOMEN PELVIS W/O CONTRAST  LOCATION: [facility]  DATE: 12/23/2024    INDICATION: Confused with umbilical abdominal pain.  COMPARISON: 03/11/2019.  TECHNIQUE: CT scan of the abdomen and pelvis was performed without IV contrast. Multiplanar reformats were obtained. Dose reduction techniques were used.  CONTRAST: None.    FINDINGS:   LOWER CHEST: Minimal vascular calcification to include the coronary arteries. There is a suggestion of a 4.6 x 5.0 mm pulmonary nodule in the right middle lobe although detail is limited given motion artifact. No pulmonary nodule is seen in this region   on correlated of CT 03/11/2019. I will recommend a nonemergent CT examination of the chest without IV contrast. Further evaluation of this possible pulmonary nodule in the right middle lobe and to assess for any new pulmonary nodules in the upper/mid   portions of the  lungs.    HEPATOBILIARY: The gallbladder is mildly prominent in size, but otherwise normal with no gallbladder wall thickening, cholelithiasis, or pericholecystic fluid identified. The liver parenchyma is intact. There is no bile duct dilatation.    PANCREAS: Normal.    SPLEEN: Accessory splenule.    ADRENAL GLANDS: Normal.    KIDNEYS/BLADDER: There is benign cysts seen in the left kidney with no follow-up recommended. The urinary tract is otherwise normal.    BOWEL: Mild diverticulosis with no evidence for diverticulitis. The appendix is not seen with any certainty, but there is no evidence for pericecal inflammation to suggest acute appendicitis. Prior postoperative changes of the stomach with no   complications identified.    LYMPH NODES: Normal.    VASCULATURE: Mild calcification with no aneurysm.    PELVIC ORGANS: Surgically absent.    MUSCULOSKELETAL: There is ectasia seen involving the anterior pelvic/abdominal wall anteriorly, but no evidence for a hernia. Moderate degenerative changes most pronounced in the lower lumbar spine facet joints.      Impression    IMPRESSION:   1.  No acute abnormalities. Nothing seen to explain patient's periumbilical pain.    2.  There is loss of detail given motion artifact in the lung bases, however there is a suggestion of a 5 x 4.6 mm noncalcified indeterminate pulmonary nodule in the right middle lobe which was not seen on CT 03/11/2019, thus a nonemergent short-term   follow-up CT of the entire chest is recommended for further evaluation.    3.  Benign cysts in the left kidney with no follow-up recommended.    4.  Mild diverticulosis with no evidence for diverticulitis.    5.  Ectasia seen involving the anterior pelvic/abdominal wall anteriorly with no associated hernia.    6.  Degenerative changes most marked lower lumbar spine facet joints.

## 2024-12-24 NOTE — PLAN OF CARE
Goal Outcome Evaluation:      Plan of Care Reviewed With: patient      Summary:  Josiane Dasilva is a 63 year old female nursing home with h/o significant for dementia, alcohol abuse, HTN, morbid obesity, anxiety, depressive, prediabetes with peripheral neuropathy among others who presented from her nursing home with AMS and was admitted for encephalopathy likely due to suspected UTI, LO, and dehydration.  DATE & TIME: 12/23/24 6079-7783    Cognitive Concerns/ Orientation : alert to self only   BEHAVIOR & AGGRESSION TOOL COLOR: Green  CIWA SCORE: N/A   ABNL VS/O2: VSS on 2L NC  MOBILITY: heavy assist, LIFT  PAIN MANAGMENT: Denies pain, on scheduled Tylenol and Voltran gel applied on bilateral knee and lidocaine in place on R shoulder  DIET: Regular  BOWEL/BLADDER: Incontinent of B&B, purewick in place  ABNL LAB/BG: cr 1.88  DRAIN/DEVICES: PIV infusing NS@100ml/hr  TELEMETRY RHYTHM: NA  SKIN: unkept,Very dry, scaly,peeling, mario BLE, cool to touch on BUE and BLE, redness blanchable, some skin pigmentation on buttock and thigh  TESTS/PROCEDURES: NA  D/C DAY/GOALS/PLACE: Pending improvement  OTHER IMPORTANT INFO: pt is lethargic, unable to finish admission questions

## 2024-12-25 LAB
ANION GAP SERPL CALCULATED.3IONS-SCNC: 10 MMOL/L (ref 7–15)
BUN SERPL-MCNC: 17.7 MG/DL (ref 8–23)
CALCIUM SERPL-MCNC: 9.3 MG/DL (ref 8.8–10.4)
CHLORIDE SERPL-SCNC: 105 MMOL/L (ref 98–107)
CREAT SERPL-MCNC: 0.82 MG/DL (ref 0.51–0.95)
EGFRCR SERPLBLD CKD-EPI 2021: 79 ML/MIN/1.73M2
ERYTHROCYTE [DISTWIDTH] IN BLOOD BY AUTOMATED COUNT: 14.3 % (ref 10–15)
GLUCOSE SERPL-MCNC: 117 MG/DL (ref 70–99)
HCO3 SERPL-SCNC: 28 MMOL/L (ref 22–29)
HCT VFR BLD AUTO: 33.6 % (ref 35–47)
HGB BLD-MCNC: 10.1 G/DL (ref 11.7–15.7)
MCH RBC QN AUTO: 26.4 PG (ref 26.5–33)
MCHC RBC AUTO-ENTMCNC: 30.1 G/DL (ref 31.5–36.5)
MCV RBC AUTO: 88 FL (ref 78–100)
PLATELET # BLD AUTO: 137 10E3/UL (ref 150–450)
POTASSIUM SERPL-SCNC: 4.1 MMOL/L (ref 3.4–5.3)
RBC # BLD AUTO: 3.83 10E6/UL (ref 3.8–5.2)
SODIUM SERPL-SCNC: 143 MMOL/L (ref 135–145)
WBC # BLD AUTO: 7.7 10E3/UL (ref 4–11)

## 2024-12-25 PROCEDURE — 80048 BASIC METABOLIC PNL TOTAL CA: CPT | Performed by: INTERNAL MEDICINE

## 2024-12-25 PROCEDURE — 36415 COLL VENOUS BLD VENIPUNCTURE: CPT | Performed by: INTERNAL MEDICINE

## 2024-12-25 PROCEDURE — 250N000013 HC RX MED GY IP 250 OP 250 PS 637: Performed by: STUDENT IN AN ORGANIZED HEALTH CARE EDUCATION/TRAINING PROGRAM

## 2024-12-25 PROCEDURE — 120N000001 HC R&B MED SURG/OB

## 2024-12-25 PROCEDURE — 99232 SBSQ HOSP IP/OBS MODERATE 35: CPT | Performed by: INTERNAL MEDICINE

## 2024-12-25 PROCEDURE — 250N000013 HC RX MED GY IP 250 OP 250 PS 637: Performed by: PHYSICIAN ASSISTANT

## 2024-12-25 PROCEDURE — 250N000013 HC RX MED GY IP 250 OP 250 PS 637: Performed by: INTERNAL MEDICINE

## 2024-12-25 PROCEDURE — 82565 ASSAY OF CREATININE: CPT | Performed by: INTERNAL MEDICINE

## 2024-12-25 PROCEDURE — 85018 HEMOGLOBIN: CPT | Performed by: INTERNAL MEDICINE

## 2024-12-25 PROCEDURE — 250N000011 HC RX IP 250 OP 636: Performed by: PHYSICIAN ASSISTANT

## 2024-12-25 RX ORDER — LIDOCAINE 4 G/G
1 PATCH TOPICAL EVERY 24 HOURS
Status: DISCONTINUED | OUTPATIENT
Start: 2024-12-25 | End: 2024-12-26 | Stop reason: HOSPADM

## 2024-12-25 RX ADMIN — PAROXETINE 40 MG: 40 TABLET, FILM COATED ORAL at 09:37

## 2024-12-25 RX ADMIN — SENNOSIDES AND DOCUSATE SODIUM 1 TABLET: 50; 8.6 TABLET ORAL at 20:11

## 2024-12-25 RX ADMIN — CEFTRIAXONE SODIUM 1 G: 1 INJECTION, POWDER, FOR SOLUTION INTRAMUSCULAR; INTRAVENOUS at 20:07

## 2024-12-25 RX ADMIN — QUETIAPINE FUMARATE 25 MG: 25 TABLET ORAL at 13:29

## 2024-12-25 RX ADMIN — GABAPENTIN 100 MG: 100 CAPSULE ORAL at 20:10

## 2024-12-25 RX ADMIN — ENOXAPARIN SODIUM 40 MG: 40 INJECTION SUBCUTANEOUS at 01:34

## 2024-12-25 RX ADMIN — ENOXAPARIN SODIUM 40 MG: 40 INJECTION SUBCUTANEOUS at 12:58

## 2024-12-25 RX ADMIN — DULOXETINE HYDROCHLORIDE 20 MG: 20 CAPSULE, DELAYED RELEASE ORAL at 09:31

## 2024-12-25 RX ADMIN — LIDOCAINE 1 PATCH: 4 PATCH TOPICAL at 09:41

## 2024-12-25 RX ADMIN — SENNOSIDES AND DOCUSATE SODIUM 1 TABLET: 50; 8.6 TABLET ORAL at 09:32

## 2024-12-25 RX ADMIN — ALLOPURINOL 100 MG: 100 TABLET ORAL at 09:34

## 2024-12-25 RX ADMIN — AMLODIPINE BESYLATE 2.5 MG: 2.5 TABLET ORAL at 09:32

## 2024-12-25 RX ADMIN — ACETAMINOPHEN 650 MG: 325 TABLET, FILM COATED ORAL at 09:32

## 2024-12-25 RX ADMIN — DICLOFENAC 2 G: 10 GEL TOPICAL at 09:31

## 2024-12-25 RX ADMIN — METOPROLOL SUCCINATE 37.5 MG: 25 TABLET, EXTENDED RELEASE ORAL at 09:33

## 2024-12-25 RX ADMIN — QUETIAPINE FUMARATE 25 MG: 25 TABLET ORAL at 02:06

## 2024-12-25 RX ADMIN — PANTOPRAZOLE SODIUM 40 MG: 40 TABLET, DELAYED RELEASE ORAL at 09:32

## 2024-12-25 RX ADMIN — ACETAMINOPHEN 650 MG: 325 TABLET, FILM COATED ORAL at 16:46

## 2024-12-25 ASSESSMENT — ACTIVITIES OF DAILY LIVING (ADL)
ADLS_ACUITY_SCORE: 82
ADLS_ACUITY_SCORE: 82
ADLS_ACUITY_SCORE: 87
ADLS_ACUITY_SCORE: 85
ADLS_ACUITY_SCORE: 86
ADLS_ACUITY_SCORE: 87
ADLS_ACUITY_SCORE: 85
ADLS_ACUITY_SCORE: 82
ADLS_ACUITY_SCORE: 87
ADLS_ACUITY_SCORE: 82
ADLS_ACUITY_SCORE: 82
ADLS_ACUITY_SCORE: 87
ADLS_ACUITY_SCORE: 87
ADLS_ACUITY_SCORE: 85
ADLS_ACUITY_SCORE: 87
ADLS_ACUITY_SCORE: 89
ADLS_ACUITY_SCORE: 85
ADLS_ACUITY_SCORE: 87
ADLS_ACUITY_SCORE: 82
ADLS_ACUITY_SCORE: 86
ADLS_ACUITY_SCORE: 89
ADLS_ACUITY_SCORE: 88
ADLS_ACUITY_SCORE: 89

## 2024-12-25 NOTE — PROGRESS NOTES
[facility]    Hospitalist Progress Note    Assessment & Plan   Date of Admission:  12/23/2024        Assessment & Plan  Josiane Dasilva is a 63 year old female nursing home resident with guardian (sister) with past medical history significant for dementia, history of alcohol abuse, hypertension, morbid obesity, generalized anxiety disorder, major depressive disorder, prediabetes with peripheral neuropathy among others who presented from her nursing home with altered mental status and was admitted for encephalopathy likely due to suspected UTI, acute kidney injury, and dehydration.     Encephalopathy likely related to UTI- seems resolved  dehydration, LO- resolved   Ecoli Urinary tract infection  Leukocytosis-resolved   Presented from nursing home with altered mental status. EMS was called after patient was confused after receiving morning pills and they gave Narcan concerned she may have been given opiates.  Mental status improved and she was more alert briefly after getting Narcan, she is brought to the emergency department given more Narcan with brief improvement as well.  She is overall quite drowsy and able to respond to some basic questions.  Hemodynamically stable in the ED.  Head CT negative for acute pathology.  Labs revealed Cr 1.9 up grom baseline 0.8-0.9 range (as high as 1.15), lytes stable. LA WNL. WBC 13.8. Hgb baseline. Ammonia WNL. EtOH neg. UA catheterized, neg nitrite, 20 WBC, mod LE, 22 hyaline casts. UC sent. BCx drawn.  CT of abdomen and pelvis, noncontrast due to some abdominal pain on exam which is unrevealing and no acute abnormalities. She was given 1 L normal saline bolus as well as 2 g of IV ceftriaxone for possible urinary tract infection.  Suspect altered mental status is in the setting of possible UTI, LO with dehydration.  -COVID influenza RSV PCR negative.  -Urine culture growing E. coli.  Blood cultures no growth today  -Vital signs within normal limits.   Afebrile.  -Patient much more alert since am 12/24.   -Encephalopathy (lethargic on admission) much improved with improved renal function and treatment of UTI and suspect pt now at baseline cognitive dysfx. Alert, verbal, interactive     Plan-  -Inpatient status  -Follow up blood and urine cultures  -Continue IV Ceftriaxone  -Neurochecks every 4 hours  -Hold PTA diuretics-restart on 12/26 if taking po well   -Supportive care, pain control  -Monitor closely.  -restart neurontin     Mild hypernatremia-resolved with D51/2 NS       Acute kidney injury-resolved   Baseline Cr 0.8-1, though last noted 2/2024 at 1.15. On admission, 1.9.  Suspect patient is dry, takes diuretics at home as well, torsemide 20 mg daily and ACE inhibitor.  In the setting of possible urinary tract infection.  Given 1 L of normal saline in the ED.  -resolved with IV fluids. Cr now at baseline of 0.8.   -taking po     Plan  -Avoid nephrotoxins - contrast, NSAIDs  -Renally dose medications as able/needed  -restart PTA ACE inhibitor on 12/26   -Monitor closely     Dementia  Baseline dementia and forgetful.  Lives in nursing home as noted and has a guardian.  -some restlessness and attempted repeatedly to get out of bed last night. Given dose seroquel prn   EKG with nl qtc. Nl lytes   -Reorient often  Delirium precautions  Prn seroquel for severe agitation     Chronic debility at baseline  Nursing home resident  History of alcohol abuse, falls previously.  Nursing home resident at [facility].  Has a guardian, her sister who makes her decisions.  History of falls in the past.  Unclear of any recent falls.    -SW/CC consultation or dispo planning  -PT not ordered due to baseline at nursing home     Chronic lower extremity bilateral edema along with venous stasis changes  -Hold PTA torsemide 20 mg daily  -Monitor lower extremity edema  -Elevate legs     Pulmonary nodule, incidental finding  There is loss of detail given motion artifact in the  lung bases, however there is a suggestion of a 5 x 4.6 mm noncalcified indeterminate pulmonary nodule in the right middle lobe which was not seen on CT 03/11/2019  -Nonemergent short-term follow-up CT of the entire chest is recommended for further evaluation.  -Follow-up as outpatient      Benign essential hypertension  PTA regimen: Amlodipine 2.5 mg daily, lisinopril 10 mg daily, metoprolol succinate 37.5 mg daily  Normotensive to slightly hypertensive this afternoon.    - Continue PTA beta-blocker with hold parameters  - Hold additional PTA antihypertensives in the setting of soft blood pressures and LO.  Holding Lasix and lisinopril  - PRN IV hydralazine available for SBP >180  - Monitor BP trend and need for medication adjustments     Chronic stable diagnoses and other pertinent medical history: Appropriate PTA medications will be resumed.      History of right ankle hardware infection s/p incision and debridement, wound VAC placed 7/2022, noted.     Prediabetes: Hold PTA metformin, low-dose SSI available     Hyperlipidemia: Continue PTA atorvastatin 20 mg nightly     Chronic anemia: Baseline hemoglobin between 9-10, at baseline on admission.  Continue PTA iron tabs every other day, monitor PRN     Generalized anxiety disorder  Major depressive disorder  -Continue PTA Cymbalta     History of recurrent right wrist gout: No current signs of gout flare.  Has seen rheumatology and orthopedics in the past. Continue PTA allopurinol 100 mg daily     GERD: Continue PTA omeprazole     History of chronic neuropathy: HOLD PTA gabapentin, until mental status and acute kidney injury improves, then resume as able     Dementia: Baseline poor historian, dementia, lives in nursing home. Has a guardian, sister.     Obesity with a BMI greater than 30           Diet:  CLD ADAT to Regular  DVT Prophylaxis: Enoxaparin (Lovenox) SQ  Russell Catheter: Not present  Lines: None     Cardiac Monitoring: None  Code Status:  Full code per  POLST          Code Status: Full Code    Disposition: Back to [facility]    Medically Ready for Discharge: Ready Now       Clinically Significant Risk Factors         # Hypernatremia: Highest Na = 146 mmol/L in last 2 days, will monitor as appropriate  # Hyperchloremia: Highest Cl = 109 mmol/L in last 2 days, will monitor as appropriate      # Hypocalcemia: Lowest Ca = 8.6 mg/dL in last 2 days, will monitor and replace as appropriate         # Hypertension: Noted on problem list             # DMII: A1C = 6.6 % (Ref range: <5.7 %) within past 6 months, PRESENT ON ADMISSION      # Financial/Environmental Concerns: none       Moderate complex medical decision making.  Greater than 35 minutes spent on patient care including chart review, charting, exam, , care coordination with bedside nurse, discharge preparation       Melvin De La Cruz MD, MD  Text Page  (7am to 6pm)  Interval History   Cont to be alert, conversant, some agitation last night continually attempting to get out of bed, seroquel given last night.   No complaints.   -Data reviewed today: I reviewed all new labs and imaging results over the last 24 hours. I personally reviewed laboratory studies since admission  Physical Exam   Temp: 98.2  F (36.8  C) Temp src: Oral BP: (!) 156/73 Pulse: 91   Resp: 19 SpO2: 97 % O2 Device: None (Room air)    Vitals:    12/24/24 0013   Weight: 149 kg (328 lb 7.8 oz)     Vital Signs with Ranges  Temp:  [98  F (36.7  C)-98.3  F (36.8  C)] 98.2  F (36.8  C)  Pulse:  [90-98] 91  Resp:  [18-19] 19  BP: (107-160)/(70-73) 156/73  SpO2:  [97 %-99 %] 97 %  I/O last 3 completed shifts:  In: 1890 [P.O.:420; I.V.:1470]  Out: 300 [Urine:300]    Constitutional: In bed appears well.  NAD  Respiratory: Clear to auscultation bilaterally, breathing easily  Cardiovascular: Regular rate and rhythm no rubs gallops or murmurs appreciate  GI: Soft nontender nondistended  Skin/Integumen: No rash or edema  Neurologic she is  alert conversant oriented to self only.  Appears to be at her baseline mental status    Medications   Current Facility-Administered Medications   Medication Dose Route Frequency Provider Last Rate Last Admin     Current Facility-Administered Medications   Medication Dose Route Frequency Provider Last Rate Last Admin    acetaminophen (TYLENOL) tablet 650 mg  650 mg Oral TID Rocio Joe PA-C   650 mg at 12/25/24 0932    allopurinol (ZYLOPRIM) tablet 100 mg  100 mg Oral Daily Rocio Joe PA-C   100 mg at 12/25/24 0934    amLODIPine (NORVASC) tablet 2.5 mg  2.5 mg Oral Daily Melvin De La Cruz MD   2.5 mg at 12/25/24 0932    atorvastatin (LIPITOR) tablet 20 mg  20 mg Oral At Bedtime Rocio Joe PA-C   20 mg at 12/24/24 2144    cefTRIAXone (ROCEPHIN) 1 g vial to attach to  mL bag for ADULTS or NS 50 mL bag for PEDS  1 g Intravenous Q24H Rocio Joe PA-C   1 g at 12/24/24 2041    diclofenac (VOLTAREN) 1 % topical gel 2 g  2 g Topical BID Rocio Joe PA-C   2 g at 12/25/24 0931    DULoxetine (CYMBALTA) DR capsule 20 mg  20 mg Oral Daily Rocio Joe PA-C   20 mg at 12/25/24 0931    enoxaparin ANTICOAGULANT (LOVENOX) injection 40 mg  40 mg Subcutaneous Q12H Rocio Joe PA-C   40 mg at 12/25/24 1258    ferrous sulfate (FEROSUL) tablet 325 mg  325 mg Oral Every Other Day Rocio Joe PA-C   325 mg at 12/24/24 0902    gabapentin (NEURONTIN) capsule 100 mg  100 mg Oral BID Melvin De La Cruz MD        gabapentin (NEURONTIN) capsule 300 mg  300 mg Oral At Bedtime Melvin De La Cruz MD        Lidocaine (LIDOCARE) 4 % Patch 1 patch  1 patch Transdermal Q24H Anthony Cuevas MD   1 patch at 12/25/24 0941    And    lidocaine patch REMOVAL   Transdermal Q24H Anthony Cuevas MD        [Held by provider] lisinopril (ZESTRIL) tablet 10 mg  10 mg Oral Daily Rocio Joe PA-C        metoprolol  succinate ER (TOPROL-XL) 24 hr half-tab 37.5 mg  37.5 mg Oral Daily Rocio Joe PA-C   37.5 mg at 12/25/24 0933    pantoprazole (PROTONIX) EC tablet 40 mg  40 mg Oral Daily Rocio Joe PA-C   40 mg at 12/25/24 0932    PARoxetine (PAXIL) tablet 40 mg  40 mg Oral QAM Rocio Joe PA-C   40 mg at 12/25/24 0937    senna-docusate (SENOKOT-S/PERICOLACE) 8.6-50 MG per tablet 1 tablet  1 tablet Oral BID Rocio Joe PA-C   1 tablet at 12/25/24 0932    Or    senna-docusate (SENOKOT-S/PERICOLACE) 8.6-50 MG per tablet 2 tablet  2 tablet Oral BID Rocio Joe PA-C   2 tablet at 12/24/24 0157    sodium chloride (PF) 0.9% PF flush 3 mL  3 mL Intracatheter Q8H Rocio Joe PA-C   3 mL at 12/25/24 0940    [Held by provider] torsemide (DEMADEX) tablet 20 mg  20 mg Oral Daily Rocio Joe PA-C           Data   Recent Labs   Lab 12/25/24  0549 12/24/24  1439 12/24/24  0716 12/24/24  0029 12/23/24  1653   WBC 7.7  --  11.8*  --  13.8*   HGB 10.1*  --  10.2*  --  10.9*   MCV 88  --  93  --  91   *  --  151  --  175    144 146*  --  145   POTASSIUM 4.1 4.4 5.0  --  4.7   CHLORIDE 105 106 109*  --  105   CO2 28 28 28  --  30*   BUN 17.7 30.7* 32.5*  --  28.0*   CR 0.82 1.34* 1.69*   < > 1.90*   ANIONGAP 10 10 9  --  10   JESSICA 9.3 9.0 8.6*  --  9.3   * 146* 121*  --  137*   ALBUMIN  --   --  3.5  --  3.7   PROTTOTAL  --   --  7.0  --  7.5   BILITOTAL  --   --  0.3  --  0.3   ALKPHOS  --   --  163*  --  170*   ALT  --   --  13  --  14   AST  --   --  11  --  13    < > = values in this interval not displayed.       Imaging:   No results found for this or any previous visit (from the past 24 hours).

## 2024-12-25 NOTE — PLAN OF CARE
Goal Outcome Evaluation:        DATE & TIME: 12/25/24 9522-6536  Cognitive Concerns/ Orientation : A&O x 2, confused , hx of dementia,impulsive at times, Needs to be reoriented often.  BEHAVIOR & AGGRESSION TOOL COLOR: Green to yellow  CIWA SCORE: N/A   ABNL VS/O2: VSS,  On RA  MOBILITY: Turn&Repo q2hr, can help with turns at times. Refused to sit up in chair.  PAIN MANAGMENT:  On scheduled Tylenol and Voltran gel for bilateral knee pain.lidocaine patch to right shoulder.  DIET: Regular,   BOWEL/BLADDER: Incontinent of B&B.   ABNL LAB/BG: cr 0.84 Na 143, K+ 4.1  DRAIN/DEVICES: PIV x1 SL.  TELEMETRY RHYTHM: NA  SKIN: Very dry, scaly,peeling, mario BLE, cool to touch on BUE and BLE, redness blanchable, some skin pigmentation on buttock and thigh  TESTS/PROCEDURES: NA  D/C DAY/GOALS/PLACE: Pending improvement, possible discharge tomorrow  back to NH.  OTHER IMPORTANT INFO:  Continues on IV abx. Neuro's intact, unable to perform some,   Given Seroquel x 1 for restlessness and agitation.

## 2024-12-25 NOTE — PLAN OF CARE
Goal Outcome Evaluation:      Plan of Care Reviewed With: patient      Summary:  Josiane Dasilva is a 63 year old female nursing home with h/o significant for dementia, alcohol abuse, HTN, morbid obesity, anxiety, depressive, prediabetes with peripheral neuropathy among others who presented from her nursing home with AMS and was admitted for encephalopathy likely due to suspected UTI, LO, and dehydration.    DATE & TIME: 12/24/24 9109-4350  Cognitive Concerns/ Orientation : A&O x 2, hx of dementia, very loud and yelling out at times. Needs to be reoriented often. Seroquel given x1   BEHAVIOR & AGGRESSION TOOL COLOR: Green  CIWA SCORE: N/A   ABNL VS/O2: VSS,  On RA  MOBILITY: Turn&Repo q2hr, can help with turns at times.   PAIN MANAGMENT:  On scheduled Tylenol and Voltran gel for bilateral knee pain.  DIET: Regular  BOWEL/BLADDER: Incontinent of B&B, purewick was in place but pt pulled it out  ABNL LAB/BG: cr 1.34, Na 144, K+ 4.4  DRAIN/DEVICES: PIV x1 SL. Pulled out one IV last evening.   TELEMETRY RHYTHM: NA  SKIN: unkept,Very dry, scaly,peeling, mario BLE, cool to touch on BUE and BLE, redness blanchable, some skin pigmentation on buttock and thigh  TESTS/PROCEDURES: NA  D/C DAY/GOALS/PLACE: Pending improvement  OTHER IMPORTANT INFO:  Continues on IV abx. Neuro's intact, unable to perform some, pt confused.

## 2024-12-25 NOTE — PLAN OF CARE
Summary:  Josiane Dasilva is a 63 year old female nursing home with h/o significant for dementia, alcohol abuse, HTN, morbid obesity, anxiety, depressive, prediabetes with peripheral neuropathy among others who presented from her nursing home with AMS and was admitted for encephalopathy likely due to suspected UTI, LO, and dehydration.    DATE & TIME: 12/24/24 [id number]  Cognitive Concerns/ Orientation : A&O x 2, hx of dementia, very loud and yelling out at times. Needs to be reoriented often.   BEHAVIOR & AGGRESSION TOOL COLOR: Green  CIWA SCORE: N/A   ABNL VS/O2:  VSS. On RA  MOBILITY: Turn&Repo q2hr, can help with turns at times.   PAIN MANAGMENT:  On scheduled Tylenol and Voltran gel applied on bilateral knee.  DIET: Regular  BOWEL/BLADDER: Incontinent of B&B, purewick in place  ABNL LAB/BG: cr 1.34, Na 144, K+ 4.4  DRAIN/DEVICES: PIV x1 SL. Pulled out one IV this evening.   TELEMETRY RHYTHM: NA  SKIN: unkept,Very dry, scaly,peeling, mario BLE, cool to touch on BUE and BLE, redness blanchable, some skin pigmentation on buttock and thigh  TESTS/PROCEDURES: NA  D/C DAY/GOALS/PLACE: Pending improvement  OTHER IMPORTANT INFO:  Continues on IV abx. Neuro's intact, unable to perform some, pt confused.

## 2024-12-25 NOTE — PROGRESS NOTES
"MD Notification    Notified Person: MD    Notified Person Name: Dorothy Charles    Notification Date/Time: 12/24/2024 1830    Notification Interaction: VM    Purpose of Notification: Pt agitated, restless, confused, pulled out x1 IV, can we get something to help relax.     Orders Received: \"Patient has dementia. Will give low dose seroquel but only for severe agitation\".      "

## 2024-12-26 VITALS
SYSTOLIC BLOOD PRESSURE: 146 MMHG | BODY MASS INDEX: 48.51 KG/M2 | RESPIRATION RATE: 16 BRPM | OXYGEN SATURATION: 94 % | DIASTOLIC BLOOD PRESSURE: 66 MMHG | WEIGHT: 293 LBS | HEART RATE: 87 BPM | TEMPERATURE: 98.2 F

## 2024-12-26 LAB
BACTERIA BLD CULT: NORMAL
BACTERIA BLD CULT: NORMAL
BACTERIA UR CULT: ABNORMAL
PLATELET # BLD AUTO: 144 10E3/UL (ref 150–450)

## 2024-12-26 PROCEDURE — 36415 COLL VENOUS BLD VENIPUNCTURE: CPT | Performed by: PHYSICIAN ASSISTANT

## 2024-12-26 PROCEDURE — 85049 AUTOMATED PLATELET COUNT: CPT | Performed by: PHYSICIAN ASSISTANT

## 2024-12-26 PROCEDURE — 250N000013 HC RX MED GY IP 250 OP 250 PS 637: Performed by: STUDENT IN AN ORGANIZED HEALTH CARE EDUCATION/TRAINING PROGRAM

## 2024-12-26 PROCEDURE — 250N000013 HC RX MED GY IP 250 OP 250 PS 637: Performed by: PHYSICIAN ASSISTANT

## 2024-12-26 PROCEDURE — 250N000013 HC RX MED GY IP 250 OP 250 PS 637: Performed by: INTERNAL MEDICINE

## 2024-12-26 PROCEDURE — 99239 HOSP IP/OBS DSCHRG MGMT >30: CPT | Performed by: INTERNAL MEDICINE

## 2024-12-26 PROCEDURE — 250N000011 HC RX IP 250 OP 636: Performed by: PHYSICIAN ASSISTANT

## 2024-12-26 RX ORDER — CEPHALEXIN 500 MG/1
500 CAPSULE ORAL EVERY 6 HOURS
Qty: 16 CAPSULE | Refills: 0 | Status: SHIPPED | OUTPATIENT
Start: 2024-12-26 | End: 2024-12-30

## 2024-12-26 RX ORDER — CEPHALEXIN 500 MG/1
500 CAPSULE ORAL EVERY 6 HOURS SCHEDULED
Status: DISCONTINUED | OUTPATIENT
Start: 2024-12-26 | End: 2024-12-26 | Stop reason: HOSPADM

## 2024-12-26 RX ADMIN — ALLOPURINOL 100 MG: 100 TABLET ORAL at 09:55

## 2024-12-26 RX ADMIN — FERROUS SULFATE TAB 325 MG (65 MG ELEMENTAL FE) 325 MG: 325 (65 FE) TAB at 09:56

## 2024-12-26 RX ADMIN — AMLODIPINE BESYLATE 2.5 MG: 2.5 TABLET ORAL at 09:56

## 2024-12-26 RX ADMIN — GABAPENTIN 100 MG: 100 CAPSULE ORAL at 09:54

## 2024-12-26 RX ADMIN — PANTOPRAZOLE SODIUM 40 MG: 40 TABLET, DELAYED RELEASE ORAL at 09:55

## 2024-12-26 RX ADMIN — CEPHALEXIN 500 MG: 500 CAPSULE ORAL at 12:22

## 2024-12-26 RX ADMIN — SENNOSIDES AND DOCUSATE SODIUM 1 TABLET: 50; 8.6 TABLET ORAL at 09:55

## 2024-12-26 RX ADMIN — ENOXAPARIN SODIUM 40 MG: 40 INJECTION SUBCUTANEOUS at 12:22

## 2024-12-26 RX ADMIN — ACETAMINOPHEN 650 MG: 325 TABLET, FILM COATED ORAL at 09:55

## 2024-12-26 RX ADMIN — METOPROLOL SUCCINATE 37.5 MG: 25 TABLET, EXTENDED RELEASE ORAL at 09:55

## 2024-12-26 RX ADMIN — PAROXETINE 40 MG: 40 TABLET, FILM COATED ORAL at 09:56

## 2024-12-26 RX ADMIN — LIDOCAINE 1 PATCH: 4 PATCH TOPICAL at 09:58

## 2024-12-26 RX ADMIN — DULOXETINE HYDROCHLORIDE 20 MG: 20 CAPSULE, DELAYED RELEASE ORAL at 09:56

## 2024-12-26 RX ADMIN — DICLOFENAC 2 G: 10 GEL TOPICAL at 09:54

## 2024-12-26 ASSESSMENT — ACTIVITIES OF DAILY LIVING (ADL)
ADLS_ACUITY_SCORE: 82
ADLS_ACUITY_SCORE: 86
ADLS_ACUITY_SCORE: 82
ADLS_ACUITY_SCORE: 82
ADLS_ACUITY_SCORE: 86
ADLS_ACUITY_SCORE: 82
ADLS_ACUITY_SCORE: 82
ADLS_ACUITY_SCORE: 86
ADLS_ACUITY_SCORE: 82
ADLS_ACUITY_SCORE: 78
ADLS_ACUITY_SCORE: 82

## 2024-12-26 NOTE — DISCHARGE SUMMARY
[facility]    Hospitalist Discharge Summary       Date of Admission:  12/23/2024  Date of Discharge:  12/26/2024  Discharging Provider: Dylon Cabrera MD      Discharge Diagnoses   E coli UTI  Acute infectious encephalopathy, improved  LO, resolved    Follow-ups Needed After Discharge   Follow-up Appointments       Follow Up and recommended labs and tests      -follow up with primary care team at NH within 1 week  - BMP in 3 days              Unresulted Labs Ordered in the Past 30 Days of this Admission       Date and Time Order Name Status Description    12/23/2024  6:55 PM Blood Culture Peripheral Blood Preliminary     12/23/2024  6:55 PM Blood Culture Peripheral Blood Preliminary         These results will be followed up by PCP    Hospital Course   Date of Admission:  12/23/2024        Assessment & Plan  Josiane Dasilva is a 63 year old female nursing home resident with guardian (sister) with past medical history significant for dementia, history of alcohol abuse, hypertension, morbid obesity, generalized anxiety disorder, major depressive disorder, prediabetes with peripheral neuropathy among others who was admitted on 12/23/2024 with UTI, LO, and confusion.  Presented from nursing home with altered mental status. EMS was called after patient was confused after receiving morning pills and they gave Narcan concerned she may have been given opiates.  Mental status improved and she was more alert briefly after getting Narcan, she is brought to the emergency department given more Narcan with brief improvement as well.  She is overall quite drowsy and able to respond to some basic questions.  Hemodynamically stable in the ED.  Head CT negative for acute pathology.  Labs revealed Cr 1.9 up grom baseline 0.8-0.9 range (as high as 1.15), lytes stable. LA WNL. WBC 13.8. Hgb baseline. Ammonia WNL. EtOH neg. UA catheterized, neg nitrite, 20 WBC, mod LE, 22 hyaline casts. UC sent. BCx  drawn.  CT of abdomen and pelvis, noncontrast due to some abdominal pain on exam which is unrevealing and no acute abnormalities. She was given 1 L normal saline bolus as well as 2 g of IV ceftriaxone for possible urinary tract infection.  Patient improved with antibiotic therapy, back to baseline cognition on 12/25/2024, conversant. Urine cultures grew pansensitive E coli. Patient to complete 7 day course of antibiotics and stable to return to LT NH.  - Okay to resume PTA antihypertensives and torsemide    Acute kidney injury-resolved   Back to baseline Cr 0.8 at discharge     Dementia  Chronic debility at baseline  Nursing home resident  History of alcohol abuse, falls previously.  Nursing home resident at [facility].  Has a guardian, her sister who makes her decisions.  History of falls in the past.  Baseline dementia and forgetful.  Lives in nursing home as noted and has a guardian. Improved mental status 12/25     Chronic lower extremity bilateral edema along with venous stasis changes  - PTA torsemide     Pulmonary nodule, incidental finding  There is loss of detail given motion artifact in the lung bases, however there is a suggestion of a 5 x 4.6 mm noncalcified indeterminate pulmonary nodule in the right middle lobe which was not seen on CT 03/11/2019  -Nonemergent short-term follow-up CT of the entire chest is recommended for further evaluation. Defer to PCP  -Follow-up as outpatient      Benign essential hypertension: Resume PTA regimen at discharge    History of right ankle hardware infection s/p incision and debridement, wound VAC placed 7/2022, noted.     Prediabetes: Hold PTA metformin, low-dose SSI available     Hyperlipidemia: Continue PTA atorvastatin 20 mg nightly     Chronic anemia: Baseline hemoglobin between 9-10, at baseline on admission.  Continue PTA iron tabs every other day, monitor PRN     Generalized anxiety disorder  Major depressive disorder  -Continue PTA Cymbalta      History of recurrent right wrist gout: No current signs of gout flare.  Has seen rheumatology and orthopedics in the past. Continue PTA allopurinol 100 mg daily     GERD: Continue PTA omeprazole     History of chronic neuropathy: okay to resume gabapentin at discharge     Dementia: Baseline poor historian, dementia, lives in nursing home. Has a guardian, sister.     Obesity with a BMI greater than 30      Clinically Significant Risk Factors                   # Hypertension: Noted on problem list           # DMII: A1C = 6.6 % (Ref range: <5.7 %) within past 6 months, PRESENT ON ADMISSION      # Financial/Environmental Concerns: none           Consultations This Hospital Stay   CARE MANAGEMENT / SOCIAL WORK IP CONSULT  VASCULAR ACCESS ADULT IP CONSULT  PHARMACY IP CONSULT    Code Status   Full Code    Time Spent on this Encounter   I, Dylon Cabrera, personally saw the patient today and spent approximately 45 minutes discharging this patient. Sister/Guardian Jyotsna called and updated regarding discharge.       Dylon Cabrera MD  [facility]  ______________________________________________________________________    Physical Exam   Vital Signs: Temp: 98.2  F (36.8  C) Temp src: Oral BP: (!) 146/66 Pulse: 87   Resp: 16 SpO2: 94 % O2 Device: None (Room air)    Weight: 328 lbs 7.77 oz    Constitutional: Obese female in NAD  HEENT: Normocephalic, atraumatic, oral mucosa moist  Respiratory: CTAB, no wheezing or crackles  Cardiovascular: RRR, normal S1/2, no m/r/g  GI: Nontender, nondistended  Skin: Venous insufficiency woody in BLE  Musculoskeletal: Normal strength in UE and LE, moves all extremities  Neurologic: A&O to self and place, pleasant  Psychiatric: Appropriate affect and mood       Primary Care Physician   Carmen Arriaza    Discharge Disposition   Discharged to long term care nursing home  Condition at discharge: Stable    Significant Results and Procedures   Most Recent 3  CBC's:  Recent Labs   Lab Test 12/26/24  0804 12/25/24  0549 12/24/24  0716 12/23/24  1653   WBC  --  7.7 11.8* 13.8*   HGB  --  10.1* 10.2* 10.9*   MCV  --  88 93 91   * 137* 151 175     Most Recent 3 BMP's:  Recent Labs   Lab Test 12/25/24  0549 12/24/24  1439 12/24/24  0716    144 146*   POTASSIUM 4.1 4.4 5.0   CHLORIDE 105 106 109*   CO2 28 28 28   BUN 17.7 30.7* 32.5*   CR 0.82 1.34* 1.69*   ANIONGAP 10 10 9   JESSICA 9.3 9.0 8.6*   * 146* 121*     Most Recent 2 LFT's:  Recent Labs   Lab Test 12/24/24  0716 12/23/24  1653   AST 11 13   ALT 13 14   ALKPHOS 163* 170*   BILITOTAL 0.3 0.3     Most Recent 3 INR's:  Recent Labs   Lab Test 07/04/22  2053   INR 1.16*     Most Recent 3 Troponin's:  Recent Labs   Lab Test 05/29/20  2233 02/24/19  1142 02/24/19  0550   TROPI <0.015 <0.015 <0.015     Most Recent 3 BNP's:  Recent Labs   Lab Test 03/13/19  0822   NTBNP 148*     Most Recent D-dimer:No lab results found.  Most Recent Cholesterol Panel:  Recent Labs   Lab Test 08/05/21  1110   CHOL 206*   *   HDL 84   TRIG 99       Results for orders placed or performed during the hospital encounter of 12/23/24   CT Head w/o Contrast    Narrative    EXAM: CT HEAD W/O CONTRAST  LOCATION: [facility]  DATE: 12/23/2024    INDICATION: ams  COMPARISON: None.  TECHNIQUE: Routine CT Head without IV contrast. Multiplanar reformats. Dose reduction techniques were used.    FINDINGS:  INTRACRANIAL CONTENTS: No evidence of acute intracranial hemorrhage or mass effect. Scattered foci of decreased attenuation within the cerebral hemispheric white matter which are nonspecific, though most commonly ascribed to chronic small ischemic   disease. The ventricles and sulci are normal for age. Normal gray-white matter differentiation. The basilar cisterns are patent.    VISUALIZED ORBITS/SINUSES/MASTOIDS: The globes are unremarkable. The partially imaged paranasal sinuses, mastoid air cells and middle  ear cavities are unremarkable.     BONES/SOFT TISSUES: The visualized skull base and calvarium are unremarkable.      Impression    IMPRESSION:    1.  No evidence of acute intracranial hemorrhage or mass effect.   XR Chest Port 1 View    Narrative    EXAM: XR CHEST PORT 1 VIEW  LOCATION: [facility]  DATE: 12/23/2024    INDICATION: sob  COMPARISON: 02/23/2024      Impression    IMPRESSION: Stable chest with no acute cardiopulmonary or musculoskeletal abnormalities seen to explain patient's shortness of breath clinically.   CT Abdomen Pelvis w/o Contrast    Narrative    EXAM: CT ABDOMEN PELVIS W/O CONTRAST  LOCATION: [facility]  DATE: 12/23/2024    INDICATION: Confused with umbilical abdominal pain.  COMPARISON: 03/11/2019.  TECHNIQUE: CT scan of the abdomen and pelvis was performed without IV contrast. Multiplanar reformats were obtained. Dose reduction techniques were used.  CONTRAST: None.    FINDINGS:   LOWER CHEST: Minimal vascular calcification to include the coronary arteries. There is a suggestion of a 4.6 x 5.0 mm pulmonary nodule in the right middle lobe although detail is limited given motion artifact. No pulmonary nodule is seen in this region   on correlated of CT 03/11/2019. I will recommend a nonemergent CT examination of the chest without IV contrast. Further evaluation of this possible pulmonary nodule in the right middle lobe and to assess for any new pulmonary nodules in the upper/mid   portions of the lungs.    HEPATOBILIARY: The gallbladder is mildly prominent in size, but otherwise normal with no gallbladder wall thickening, cholelithiasis, or pericholecystic fluid identified. The liver parenchyma is intact. There is no bile duct dilatation.    PANCREAS: Normal.    SPLEEN: Accessory splenule.    ADRENAL GLANDS: Normal.    KIDNEYS/BLADDER: There is benign cysts seen in the left kidney with no follow-up recommended. The urinary tract is otherwise  normal.    BOWEL: Mild diverticulosis with no evidence for diverticulitis. The appendix is not seen with any certainty, but there is no evidence for pericecal inflammation to suggest acute appendicitis. Prior postoperative changes of the stomach with no   complications identified.    LYMPH NODES: Normal.    VASCULATURE: Mild calcification with no aneurysm.    PELVIC ORGANS: Surgically absent.    MUSCULOSKELETAL: There is ectasia seen involving the anterior pelvic/abdominal wall anteriorly, but no evidence for a hernia. Moderate degenerative changes most pronounced in the lower lumbar spine facet joints.      Impression    IMPRESSION:   1.  No acute abnormalities. Nothing seen to explain patient's periumbilical pain.    2.  There is loss of detail given motion artifact in the lung bases, however there is a suggestion of a 5 x 4.6 mm noncalcified indeterminate pulmonary nodule in the right middle lobe which was not seen on CT 03/11/2019, thus a nonemergent short-term   follow-up CT of the entire chest is recommended for further evaluation.    3.  Benign cysts in the left kidney with no follow-up recommended.    4.  Mild diverticulosis with no evidence for diverticulitis.    5.  Ectasia seen involving the anterior pelvic/abdominal wall anteriorly with no associated hernia.    6.  Degenerative changes most marked lower lumbar spine facet joints.         Discharge Orders      Mantoux instructions    Give two-step Mantoux (PPD) Per Facility Policy Yes     Follow Up and recommended labs and tests    -follow up with primary care team at NH within 1 week  - BMP in 3 days     Reason for your hospital stay    Ecoli UTI  Dehydration with LO  Metabolic encephalopathy 2/2 to above     Glucose monitor nursing POCT    Before meals and at bedtime     Daily weights    Call Provider for weight gain of more than 2 pounds per day or 5 pounds per week.     Activity - Up with nursing assistance     Encourage PO fluids     Full Code      Fall precautions     Diet    Follow this diet upon discharge: Current Diet:Orders Placed This Encounter      Advance Diet as Tolerated: Regular Diet Adult; Regular Diet Adult     Discharge Medications   Current Discharge Medication List        START taking these medications    Details   cephALEXin (KEFLEX) 500 MG capsule Take 1 capsule (500 mg) by mouth every 6 hours for 4 days.  Qty: 16 capsule, Refills: 0    Associated Diagnoses: Acute UTI           CONTINUE these medications which have NOT CHANGED    Details   acetaminophen (TYLENOL) 325 MG tablet Take 650 mg by mouth 2 times daily as needed for mild pain.      acetaminophen (TYLENOL) 650 MG CR tablet Take 650 mg by mouth 3 times daily.      allopurinol (ZYLOPRIM) 100 MG tablet Take 100 mg by mouth daily.      amLODIPine (NORVASC) 2.5 MG tablet Take 2.5 mg by mouth daily.      atorvastatin (LIPITOR) 20 MG tablet Take 20 mg by mouth at bedtime.      camphor-menthol (DERMASARRA) 0.5-0.5 % external lotion Apply topically daily as needed for skin care.      diclofenac (VOLTAREN) 1 % topical gel Apply topically 2 times daily. Apply to bilateral knees      DULoxetine (CYMBALTA) 20 MG capsule Take 20 mg by mouth daily.      ferrous sulfate (FE TABS) 325 (65 Fe) MG EC tablet Take 325 mg by mouth every other day.      !! gabapentin (NEURONTIN) 100 MG capsule Take 100 mg by mouth 2 times daily.      !! gabapentin (NEURONTIN) 300 MG capsule Take 1 capsule (300 mg) by mouth At Bedtime  Qty: 90 capsule, Refills: 3    Associated Diagnoses: Peripheral polyneuropathy      GAVILAX 17 GM/SCOOP powder TAKE 17G (MEASURE WITH THE MARKINGS INSIDE CAP) BY MOUTH EVERY 12 HOURS AS NEEDED FOR CONSTIPATION *1 TOTAL FILL*  Qty: 510 g, Refills: 11    Comments: PLEASE SEND REFILLS  Associated Diagnoses: Constipation, unspecified constipation type      Lidocaine (LIDOCARE) 4 % Patch Place 1 patch onto the skin every 24 hours. To prevent lidocaine toxicity, patient should be patch free for  12 hrs daily.  Apply to right shoulder topically one time a day for pain.      lisinopril (ZESTRIL) 10 MG tablet Take 10 mg by mouth daily.      melatonin 5 MG tablet Take 5 mg by mouth at bedtime. Give one tablet by mouth at bedtime for insomnia.      Menthol, Topical Analgesic, (BIOFREEZE PROFESSIONAL) 5 % GEL Externally apply topically 2 times daily as needed. Apply to left hip      !! menthol-zinc oxide (CALMOSEPTINE) 0.44-20.6 % OINT ointment Apply topically 2 times daily. Apply to buttocks with jenny-cares      !! menthol-zinc oxide (CALMOSEPTINE) 0.44-20.6 % OINT ointment Apply topically 3 times daily as needed for skin protection. Apply to buttocks topically as needed for excoriation every shift with every jenny-cares until area resolved.      metFORMIN (GLUCOPHAGE) 500 MG tablet Take 500 mg by mouth 2 times daily (with meals). Give one tablet by mouth two time a day for prediabetes.      metoprolol succinate ER (TOPROL XL) 25 MG 24 hr tablet Take 37.5 mg by mouth daily.      omega 3 1000 MG CAPS Take 1,000 mg by mouth 2 times daily.      omeprazole (PRILOSEC) 20 MG DR capsule Take 20 mg by mouth daily.      ondansetron (ZOFRAN) 4 MG tablet Take 4 mg by mouth 2 times daily as needed for nausea.      PARoxetine (PAXIL) 40 MG tablet Take 40 mg by mouth every morning.      predniSONE (DELTASONE) 10 MG tablet Prednisone course for acute gout flare only. 30 mg x 1-2 days, then 20 mg x 1-2 days, then 10 mg x 2-3 days then stop.  Qty: 30 tablet, Refills: 1    Associated Diagnoses: Acute idiopathic gout of right wrist      simethicone (MYLICON) 80 MG chewable tablet Take 1 tablet (80 mg) by mouth every 6 hours as needed for cramping  Qty: 30 tablet, Refills: 0      torsemide (DEMADEX) 20 MG tablet Take 20 mg by mouth daily.      traZODone (DESYREL) 50 MG tablet Take 50 mg by mouth at bedtime.      VENTOLIN  (90 Base) MCG/ACT inhaler Inhale 2 puffs into the lungs every 2 hours as needed.      VITAMIN D3 50 MCG  (2000 UT) tablet TAKE 1 TABLET BY MOUTH ONCE DAILY *1 TOTAL FILL*  Qty: 30 tablet, Refills: 11    Comments: PLEASE SEND REFILLS  Associated Diagnoses: Peripheral polyneuropathy       !! - Potential duplicate medications found. Please discuss with provider.        Allergies   Allergies   Allergen Reactions    Aspirin      Stomach irritation (hx of PUD)  Other reaction(s): GI bleeding  Other reaction(s): *Unknown  Stomach irritation (hx of PUD)  Stomach irritation (hx of PUD)    Nsaids      Stomach irritation (Hx of PUD)  Other reaction(s): *Unknown

## 2024-12-26 NOTE — PLAN OF CARE
Summary:  Josiane Dasilva is a 63 year old female nursing home with h/o significant for dementia, alcohol abuse, HTN, morbid obesity, anxiety, depressive, prediabetes with peripheral neuropathy among others who presented from her nursing home with AMS and was admitted for encephalopathy likely due to suspected UTI, LO, and dehydration.    DATE & TIME: 12/25/24 6931-5293  Cognitive Concerns/ Orientation : A&O x 2, hx of dementia, very loud and yelling out at times. Needs to be reoriented often.   BEHAVIOR & AGGRESSION TOOL COLOR: Green  CIWA SCORE: N/A   ABNL VS/O2: /76, On RA  MOBILITY: [employer] q2hr, can help with turns at times.   PAIN MANAGMENT:  On scheduled Tylenol/Gabapentin and Voltran gel for bilateral knee pain.   DIET: Regular,   BOWEL/BLADDER: Incontinent of B&B. X2 bm this shift.   ABNL LAB/BG: cr 0.82 Na 143, K+ 4.1  DRAIN/DEVICES: PIV x1 SL.  TELEMETRY RHYTHM: NA  SKIN: Very dry, scaly,peeling, mario BLE, cool to touch on BUE and BLE, redness blanchable, some skin pigmentation on buttock and thigh  TESTS/PROCEDURES: NA  D/C DAY/GOALS/PLACE: Pending improvement, possible discharge tomorrow back to NH- following.   OTHER IMPORTANT INFO:  Continues on IV abx. Neuro's intact, unable to perform some, pt confused.

## 2024-12-26 NOTE — PLAN OF CARE
Discharge    Patient discharged to [facility] via  with [facility]  transport.   Care plan note  Pt alert and oriented to self only, pt is at baseline. VSS on RA. Up using a lift Ax2. Pt bathed, skin otoniel in BLE with peeling/cracking, cool to touch. Redness to coccyx/buttocks. Lidocaine patch applied to R shoulder. Voltaren gel applied to B knees. Pt is incontinent of urine and bowel. No BM this shift. Denies pain or SOB. [facility]. SW following for discharge planning, discharge med[facility] to [facility]. Pt belongings packed and sent with patient.     Listed belongings gathered and given to patient (including from security/pharmacy). Yes  Care Plan and Patient education resolved: Yes  Prescriptions if needed, hard copies sent with patient  NA  Medication Bin checked and emptied on discharge Yes  SW/care coordinator/charge RN aware of discharge: Yes

## 2024-12-26 NOTE — PLAN OF CARE
Goal Outcome Evaluation:      Plan of Care Reviewed With: patient      Summary:  Josiane Dasilva is a 63 year old female nursing home with h/o significant for dementia, alcohol abuse, HTN, morbid obesity, anxiety, depressive, prediabetes with peripheral neuropathy among others who presented from her nursing home with AMS and was admitted for encephalopathy likely due to suspected UTI, LO, and dehydration.    DATE & TIME: 12/25/24 9240-0786  Cognitive Concerns/ Orientation : A&O x 2, hx of dementia, very loud and yelling out at times. Needs to be reoriented often.   BEHAVIOR & AGGRESSION TOOL COLOR: Green  CIWA SCORE: N/A   ABNL VS/O2: VSS, On RA  MOBILITY: [employer] q2hr, can help with turns at times.   PAIN MANAGMENT:  On scheduled Tylenol/Gabapentin and Voltran gel for bilateral knee pain.   DIET: Regular,   BOWEL/BLADDER: Incontinent of B&B. No BM this shift.   ABNL LAB/BG: cr 0.82 Na 143, K+ 4.1  DRAIN/DEVICES: PIV x1 SL.  TELEMETRY RHYTHM: NA  SKIN: Very dry, scaly,peeling, mario BLE, cool to touch on BUE and BLE, redness blanchable, some skin pigmentation on buttock and thigh  TESTS/PROCEDURES: NA  D/C DAY/GOALS/PLACE: Pending improvement, possible discharge today back to [facility] following.   OTHER IMPORTANT INFO:  Continues on IV abx. Neuro's intact, unable to perform some, pt confused.

## 2024-12-28 LAB
BACTERIA BLD CULT: NO GROWTH
BACTERIA BLD CULT: NO GROWTH

## 2025-01-31 ENCOUNTER — VIRTUAL VISIT (OUTPATIENT)
Dept: RHEUMATOLOGY | Facility: CLINIC | Age: 65
End: 2025-01-31
Payer: MEDICARE

## 2025-01-31 VITALS
OXYGEN SATURATION: 93 % | DIASTOLIC BLOOD PRESSURE: 68 MMHG | WEIGHT: 293 LBS | SYSTOLIC BLOOD PRESSURE: 154 MMHG | HEART RATE: 80 BPM | BODY MASS INDEX: 47.26 KG/M2

## 2025-01-31 DIAGNOSIS — M10.031 ACUTE IDIOPATHIC GOUT OF RIGHT WRIST: Primary | ICD-10-CM

## 2025-01-31 PROCEDURE — 1126F AMNT PAIN NOTED NONE PRSNT: CPT | Performed by: INTERNAL MEDICINE

## 2025-01-31 PROCEDURE — 99207 PR NO BILLABLE SERVICE THIS VISIT: CPT | Performed by: INTERNAL MEDICINE

## 2025-01-31 PROCEDURE — 3077F SYST BP >= 140 MM HG: CPT | Performed by: INTERNAL MEDICINE

## 2025-01-31 PROCEDURE — 3078F DIAST BP <80 MM HG: CPT | Performed by: INTERNAL MEDICINE

## 2025-01-31 ASSESSMENT — PAIN SCALES - GENERAL: PAINLEVEL_OUTOF10: NO PAIN (0)

## 2025-01-31 NOTE — PROGRESS NOTES
This was scheduled as a video visit but upon their request changed to a phone visit, which was then cancelled just prior to the start because the patient was not available.  Hence no charge.  Toñito White MD

## 2025-01-31 NOTE — NURSING NOTE
Current patient location: [address] [facility] [address]    Is the patient currently in the state of [place]? YES    Visit mode: TELEPHONE    If the visit is dropped, the patient can be reconnected by:TELEPHONE VISIT: Phone number: [phone]    Will anyone else be joining the visit? Yes  (If patient encounters technical issues they should call [phone]    Are changes needed to the allergy or medication list? No    Are refills needed on medications prescribed by this physician? NO    Rooming Documentation:  Unable to complete questionnaire(s) due to time    Reason for visit: MARKO BANKS

## (undated) DEVICE — TUBING IRRIG CYSTO/BLADDER SET 81" LF 2C4040

## (undated) DEVICE — ESU GROUND PAD ADULT W/CORD E7507

## (undated) DEVICE — SOL NACL 0.9% IRRIG 3000ML BAG 2B7477

## (undated) DEVICE — KIT PATIENT POSITIONING PIGAZZI LATEX FREE 40580

## (undated) DEVICE — GLOVE PROTEXIS POWDER FREE SMT 6.5  2D72PT65X

## (undated) DEVICE — SOL NACL 0.9% IRRIG 1000ML BOTTLE 2F7124

## (undated) DEVICE — SU VICRYL 0 CT-1 CR 8X18" J740D

## (undated) DEVICE — GLOVE PROTEXIS POWDER FREE 8.0 ORTHOPEDIC 2D73ET80

## (undated) DEVICE — NDL INSUFFLATION 13GA 120MM C2201

## (undated) DEVICE — ESU LIGASURE LAPAROSCOPIC BLUNT TIP SEALER 5MMX37CM LF1837

## (undated) DEVICE — TUBING SUCTION 12"X1/4" N612

## (undated) DEVICE — SU DERMABOND ADVANCED .7ML DNX12

## (undated) DEVICE — PREP TECHNI-CARE CHLOROXYLENOL 3% 4OZ BOTTLE C222-4ZWO

## (undated) DEVICE — GLOVE PROTEXIS BLUE W/NEU-THERA 7.0  2D73EB70

## (undated) DEVICE — SUCTION MANIFOLD DORNOCH ULTRA CART UL-CL500

## (undated) DEVICE — ENDO SCOPE WARMER SEAL  C3101

## (undated) DEVICE — DRSG XEROFORM 5X9" 8884431605

## (undated) DEVICE — Device

## (undated) DEVICE — SPONGE RAY-TEC 4X8" 7318

## (undated) DEVICE — PREP CHLORAPREP 26ML TINTED ORANGE  260815

## (undated) DEVICE — CONNECTOR WATER VALVE PERFUSION PACK STR 020272801

## (undated) DEVICE — LINEN TOWEL PACK X6 WHITE 5487

## (undated) DEVICE — SUCTION TIP YANKAUER STR K87

## (undated) DEVICE — ENDO TROCAR FIRST ENTRY KII FIOS Z-THRD 12X100MM CTF73

## (undated) DEVICE — GLOVE PROTEXIS BLUE W/NEU-THERA 8.0  2D73EB80

## (undated) DEVICE — SOL NACL 0.9% INJ 1000ML BAG 2B1324X

## (undated) DEVICE — TUBING SUCTION 10'X3/16" N510

## (undated) DEVICE — SU VICRYL 3-0 SH 27" UND J416H

## (undated) DEVICE — ENDO TROCAR SLEEVE KII Z-THREADED 05X100MM CTS02

## (undated) DEVICE — SOL NACL 0.9% IRRIG 3000ML BAG 07972-08

## (undated) DEVICE — SU VICRYL 4-0 PS-2 18" UND J496H

## (undated) DEVICE — DRAPE SHEET MED 44X70" 9355

## (undated) DEVICE — ANTIFOG SOLUTION W/FOAM PAD 31142527

## (undated) DEVICE — ENDO TROCAR FIRST ENTRY KII FIOS Z-THRD 05X100MM CTF03

## (undated) DEVICE — SU VICRYL 3-0 SH 27" J316H

## (undated) DEVICE — SPONGE LAP 18X18" X8435

## (undated) DEVICE — CANISTER WOUND VAC W/GEL 1000ML M8275093/5

## (undated) DEVICE — SU MONOCRYL 4-0 PS-2 27" UND Y426H

## (undated) DEVICE — GOWN IMPERVIOUS SPECIALTY XL/XLONG 39049

## (undated) DEVICE — DEVICE SUTURE GRASPER TROCAR CLOSURE 14GA PMITCSG

## (undated) DEVICE — RETR ELEV / UTERINE MANIPULATOR V-CARE LG CUP 60-6085-202A

## (undated) DEVICE — JELLY LUBRICATING SURGILUBE 2OZ TUBE

## (undated) DEVICE — SPECIMEN CULTURETTE DBL SWAB 220109

## (undated) DEVICE — SU WND CLOSURE RELOAD VLOC 180 ABS 0 GREEN 8" VLOCA008L

## (undated) DEVICE — DEVICE ENDO STITCH APPLIER 10MM 173016

## (undated) DEVICE — KOH COLPOTOMIZER OCCLUDER  CPO-6

## (undated) DEVICE — SU VICRYL 2-0 SH 27" UND J417H

## (undated) DEVICE — SPECIMEN TRAP MUCOUS 40ML LUKI C30200A

## (undated) DEVICE — SUCTION IRR STRYKERFLOW II W/TIP 250-070-520

## (undated) DEVICE — ENDO POUCH UNIVERSAL RETRIEVAL SYSTEM INZII 12/15MM CD004

## (undated) DEVICE — SOL WATER IRRIG 1000ML BOTTLE 2F7114

## (undated) DEVICE — ESU ENDO SCISSORS 5MM CVD 5DCS

## (undated) DEVICE — DRAPE SHEET REV FOLD 3/4 9349

## (undated) DEVICE — SU VICRYL 0 TIE 54" J608H

## (undated) DEVICE — LINEN TOWEL PACK X30 5481

## (undated) DEVICE — PACK EXTREMITY SOP15EXFSD

## (undated) RX ORDER — ACETAMINOPHEN 325 MG/1
TABLET ORAL
Status: DISPENSED
Start: 2019-03-14

## (undated) RX ORDER — HEPARIN SODIUM 5000 [USP'U]/.5ML
INJECTION, SOLUTION INTRAVENOUS; SUBCUTANEOUS
Status: DISPENSED
Start: 2019-03-14

## (undated) RX ORDER — PHENAZOPYRIDINE HYDROCHLORIDE 200 MG/1
TABLET, FILM COATED ORAL
Status: DISPENSED
Start: 2019-03-14

## (undated) RX ORDER — HYDROMORPHONE HYDROCHLORIDE 1 MG/ML
INJECTION, SOLUTION INTRAMUSCULAR; INTRAVENOUS; SUBCUTANEOUS
Status: DISPENSED
Start: 2019-03-14

## (undated) RX ORDER — FENTANYL CITRATE 50 UG/ML
INJECTION, SOLUTION INTRAMUSCULAR; INTRAVENOUS
Status: DISPENSED
Start: 2022-07-12

## (undated) RX ORDER — CEFAZOLIN SODIUM 1 G/3ML
INJECTION, POWDER, FOR SOLUTION INTRAMUSCULAR; INTRAVENOUS
Status: DISPENSED
Start: 2019-03-14

## (undated) RX ORDER — PROPOFOL 10 MG/ML
INJECTION, EMULSION INTRAVENOUS
Status: DISPENSED
Start: 2019-03-14

## (undated) RX ORDER — EPHEDRINE SULFATE 50 MG/ML
INJECTION, SOLUTION INTRAMUSCULAR; INTRAVENOUS; SUBCUTANEOUS
Status: DISPENSED
Start: 2019-03-14

## (undated) RX ORDER — CEFAZOLIN SODIUM 2 G/100ML
INJECTION, SOLUTION INTRAVENOUS
Status: DISPENSED
Start: 2019-03-14

## (undated) RX ORDER — GABAPENTIN 300 MG/1
CAPSULE ORAL
Status: DISPENSED
Start: 2019-03-14

## (undated) RX ORDER — ALBUMIN, HUMAN INJ 5% 5 %
SOLUTION INTRAVENOUS
Status: DISPENSED
Start: 2019-03-14

## (undated) RX ORDER — FENTANYL CITRATE 50 UG/ML
INJECTION, SOLUTION INTRAMUSCULAR; INTRAVENOUS
Status: DISPENSED
Start: 2019-03-14

## (undated) RX ORDER — PHENYLEPHRINE HCL IN 0.9% NACL 1 MG/10 ML
SYRINGE (ML) INTRAVENOUS
Status: DISPENSED
Start: 2019-03-14

## (undated) RX ORDER — KETAMINE HCL IN 0.9 % NACL 50 MG/5 ML
SYRINGE (ML) INTRAVENOUS
Status: DISPENSED
Start: 2019-03-14

## (undated) RX ORDER — LIDOCAINE HYDROCHLORIDE 10 MG/ML
INJECTION, SOLUTION EPIDURAL; INFILTRATION; INTRACAUDAL; PERINEURAL
Status: DISPENSED
Start: 2022-07-07

## (undated) RX ORDER — HYDROMORPHONE HYDROCHLORIDE 1 MG/ML
INJECTION, SOLUTION INTRAMUSCULAR; INTRAVENOUS; SUBCUTANEOUS
Status: DISPENSED
Start: 2022-07-12

## (undated) RX ORDER — BUPIVACAINE HYDROCHLORIDE 5 MG/ML
INJECTION, SOLUTION EPIDURAL; INTRACAUDAL
Status: DISPENSED
Start: 2022-07-12

## (undated) RX ORDER — LIDOCAINE HYDROCHLORIDE 20 MG/ML
INJECTION, SOLUTION EPIDURAL; INFILTRATION; INTRACAUDAL; PERINEURAL
Status: DISPENSED
Start: 2019-03-14

## (undated) RX ORDER — ONDANSETRON 2 MG/ML
INJECTION INTRAMUSCULAR; INTRAVENOUS
Status: DISPENSED
Start: 2019-03-14

## (undated) RX ORDER — PROPOFOL 10 MG/ML
INJECTION, EMULSION INTRAVENOUS
Status: DISPENSED
Start: 2022-07-12